# Patient Record
Sex: MALE | Race: WHITE | NOT HISPANIC OR LATINO | Employment: FULL TIME | URBAN - METROPOLITAN AREA
[De-identification: names, ages, dates, MRNs, and addresses within clinical notes are randomized per-mention and may not be internally consistent; named-entity substitution may affect disease eponyms.]

---

## 2017-01-02 DIAGNOSIS — E78.5 HYPERLIPIDEMIA LDL GOAL <130: Primary | ICD-10-CM

## 2017-01-02 RX ORDER — ATORVASTATIN CALCIUM 20 MG/1
20 TABLET, FILM COATED ORAL DAILY
Qty: 90 TABLET | Refills: 2 | Status: SHIPPED | OUTPATIENT
Start: 2017-01-02 | End: 2017-01-11

## 2017-01-02 NOTE — TELEPHONE ENCOUNTER
atorvastatin (LIPITOR) 20 MG tablet     Last Written Prescription Date: 10/11/16  Last Fill Quantity: 90, # refills: 0  Last Office Visit with [facility], [facility] or [facility] prescribing provider: 10/25/16       CHOL      215   11/9/2016  HDL       35   11/9/2016  LDL      137   11/9/2016  LDL      145   10/25/2016  TRIG      215   11/9/2016  CHOLHDLRATIO      6.3   9/25/2015

## 2017-01-03 DIAGNOSIS — Z83.3 FAMILY HISTORY OF DIABETES MELLITUS: Primary | ICD-10-CM

## 2017-01-03 RX ORDER — FENOFIBRATE 160 MG/1
160 TABLET ORAL DAILY
Qty: 90 TABLET | Refills: 2 | Status: SHIPPED | OUTPATIENT
Start: 2017-01-03 | End: 2017-01-11

## 2017-01-03 NOTE — TELEPHONE ENCOUNTER
FENOFIBRATE 160 MG       Last Written Prescription Date: 4-13-16  Last Fill Quantity: 90, # refills: 1    Last Office Visit with [facility], [facility] or [facility] prescribing provider:  4-13-16   Future Office Visit:      CHOLESTEROL   Date Value Ref Range Status   11/09/2016 215* <200 mg/dL Final     Comment:     Desirable:       <200 mg/dl     HDL CHOLESTEROL   Date Value Ref Range Status   11/09/2016 35* >39 mg/dL Final     LDL CHOLESTEROL CALCULATED   Date Value Ref Range Status   11/09/2016 137* <100 mg/dL Final     Comment:     Above desirable:  100-129 mg/dl   Borderline High:  130-159 mg/dL   High:             160-189 mg/dL   Very high:       >189 mg/dl       LDL CHOLESTEROL DIRECT   Date Value Ref Range Status   10/25/2016 145* <100 mg/dL Final     Comment:     Above desirable:  100-129 mg/dl   Borderline High:  130-159 mg/dL   High:             160-189 mg/dL   Very high:       >189 mg/dl       TRIGLYCERIDES   Date Value Ref Range Status   11/09/2016 215* <150 mg/dL Final     Comment:     Borderline high:  150-199 mg/dl   High:             200-499 mg/dl   Very high:       >499 mg/dl   Fasting specimen       CHOLESTEROL/HDL RATIO   Date Value Ref Range Status   09/25/2015 6.3* 0.0 - 5.0 Final     ALT   Date Value Ref Range Status   06/22/2016 51 0 - 70 U/L Final

## 2017-01-03 NOTE — TELEPHONE ENCOUNTER
Routing refill request to provider for review/approval because:  Labs out of range:  LDL    Routing to PCP.    Aileen-Please sign if agree.    Thank you!  DEBRA Reza, ADITIN, RN

## 2017-01-11 ENCOUNTER — OFFICE VISIT (OUTPATIENT)
Dept: FAMILY MEDICINE | Facility: CLINIC | Age: 47
End: 2017-01-11
Payer: COMMERCIAL

## 2017-01-11 VITALS
DIASTOLIC BLOOD PRESSURE: 82 MMHG | WEIGHT: 200.5 LBS | SYSTOLIC BLOOD PRESSURE: 110 MMHG | HEART RATE: 82 BPM | HEIGHT: 69 IN | OXYGEN SATURATION: 95 % | TEMPERATURE: 98.2 F | BODY MASS INDEX: 29.7 KG/M2

## 2017-01-11 DIAGNOSIS — R10.32 LEFT GROIN PAIN: ICD-10-CM

## 2017-01-11 DIAGNOSIS — M79.10 MYALGIA: Primary | ICD-10-CM

## 2017-01-11 DIAGNOSIS — E78.1 HYPERTRIGLYCERIDEMIA: ICD-10-CM

## 2017-01-11 DIAGNOSIS — Z12.5 SCREENING FOR PROSTATE CANCER: ICD-10-CM

## 2017-01-11 DIAGNOSIS — N52.9 ERECTILE DYSFUNCTION, UNSPECIFIED ERECTILE DYSFUNCTION TYPE: ICD-10-CM

## 2017-01-11 DIAGNOSIS — E78.5 HYPERLIPIDEMIA LDL GOAL <130: ICD-10-CM

## 2017-01-11 LAB
ALBUMIN SERPL-MCNC: 4.3 G/DL (ref 3.4–5)
ALP SERPL-CCNC: 58 U/L (ref 40–150)
ALT SERPL W P-5'-P-CCNC: 42 U/L (ref 0–70)
ANION GAP SERPL CALCULATED.3IONS-SCNC: 10 MMOL/L (ref 3–14)
AST SERPL W P-5'-P-CCNC: 17 U/L (ref 0–45)
BILIRUB SERPL-MCNC: 0.6 MG/DL (ref 0.2–1.3)
BUN SERPL-MCNC: 20 MG/DL (ref 7–30)
CALCIUM SERPL-MCNC: 9.7 MG/DL (ref 8.5–10.1)
CHLORIDE SERPL-SCNC: 107 MMOL/L (ref 94–109)
CK SERPL-CCNC: 90 U/L (ref 30–300)
CO2 SERPL-SCNC: 24 MMOL/L (ref 20–32)
CREAT SERPL-MCNC: 0.96 MG/DL (ref 0.66–1.25)
GFR SERPL CREATININE-BSD FRML MDRD: 85 ML/MIN/1.7M2
GLUCOSE SERPL-MCNC: 127 MG/DL (ref 70–99)
POTASSIUM SERPL-SCNC: 4.4 MMOL/L (ref 3.4–5.3)
PROT SERPL-MCNC: 7.8 G/DL (ref 6.8–8.8)
PSA SERPL-ACNC: 1.43 UG/L (ref 0–4)
SODIUM SERPL-SCNC: 141 MMOL/L (ref 133–144)

## 2017-01-11 PROCEDURE — 36415 COLL VENOUS BLD VENIPUNCTURE: CPT | Performed by: FAMILY MEDICINE

## 2017-01-11 PROCEDURE — 80053 COMPREHEN METABOLIC PANEL: CPT | Performed by: FAMILY MEDICINE

## 2017-01-11 PROCEDURE — 82550 ASSAY OF CK (CPK): CPT | Performed by: FAMILY MEDICINE

## 2017-01-11 PROCEDURE — 99214 OFFICE O/P EST MOD 30 MIN: CPT | Performed by: FAMILY MEDICINE

## 2017-01-11 PROCEDURE — G0103 PSA SCREENING: HCPCS | Performed by: FAMILY MEDICINE

## 2017-01-11 RX ORDER — ATORVASTATIN CALCIUM 20 MG/1
40 TABLET, FILM COATED ORAL DAILY
Start: 2017-01-11 | End: 2017-02-02

## 2017-01-11 NOTE — PROGRESS NOTES
Quick Note:    Your muscle enzyme, liver enzymes are fine. Lets stop fenofibrate and take 40mg of atorvastatin (2 pills). Let me know how you feel. If pain gets worse - we can follow up.  Your psa is fine.  Your glucose is high again. You have had recent confirmatory test which was fine but you are very high risk for developing diabetes it if persist. Weight loss, cut down carbohyderate, sugar, fat in diet    Layton Omalley MD  [facility]    ______

## 2017-01-11 NOTE — NURSING NOTE
"Chief Complaint   Patient presents with     Musculoskeletal Problem       Initial /82 mmHg  Pulse 82  Temp(Src) 98.2  F (36.8  C) (Oral)  Ht 5' 9.25\" (1.759 m)  Wt 200 lb 8 oz (90.946 kg)  BMI 29.39 kg/m2  SpO2 95% Estimated body mass index is 29.39 kg/(m^2) as calculated from the following:    Height as of this encounter: 5' 9.25\" (1.759 m).    Weight as of this encounter: 200 lb 8 oz (90.946 kg).  BP completed using cuff size: keagan Lechuga CMA      "

## 2017-01-11 NOTE — MR AVS SNAPSHOT
After Visit Summary   1/11/2017    Alpesh Winston    MRN: [id number]           Patient Information     Date Of Birth          1970        Visit Information        Provider Department      1/11/2017 8:40 AM Layton Omalley MD [facility]        Today's Diagnoses     Hypertriglyceridemia    -  1     Hyperlipidemia LDL goal <130         Screening for prostate cancer         Myalgia         Erectile dysfunction, unspecified erectile dysfunction type         Left groin pain            Follow-ups after your visit        Additional Services     UROLOGY ADULT REFERRAL       Your provider has referred you to: N: [facility]  [place] [phone]   [email or web address]    Please be aware that coverage of these services is subject to the terms and limitations of your health insurance plan.  Call member services at your health plan with any benefit or coverage questions.      Please bring the following with you to your appointment:    (1) Any X-Rays, CTs or MRIs which have been performed.  Contact the facility where they were done to arrange for  prior to your scheduled appointment.    (2) List of current medications  (3) This referral request   (4) Any documents/labs given to you for this referral                  Who to contact     If you have questions or need follow up information about today's clinic visit or your schedule please contact [facility]note 2[facility] directly at [phone].  Normal or non-critical lab and imaging results will be communicated to you by MyChart, letter or phone within 4 business days after the clinic has received the results. If you do not hear from us within 7 days, please contact the clinic through MyChart or phone. If you have a critical or abnormal lab result, we will notify you by phone as soon as possible.  Submit refill requests through [employer] or call your pharmacy and they will forward the refill request to us.  "Please allow 3 business days for your refill to be completed.          Additional Information About Your Visit        MyChart Information     [employer] gives you secure access to your electronic health record. If you see a primary care provider, you can also send messages to your care team and make appointments. If you have questions, please call your primary care clinic.  If you do not have a primary care provider, please call [phone] and they will assist you.        Care EveryWhere ID     This is your Care EveryWhere ID. This could be used by other organizations to access your [place] medical records  [id number]        Your Vitals Were     Pulse Temperature Height BMI (Body Mass Index) Pulse Oximetry       82 98.2  F (36.8  C) (Oral) 5' 9.25\" (1.759 m) 29.39 kg/m2 95%        Blood Pressure from Last 3 Encounters:   01/11/17 110/82   10/27/16 130/97   10/25/16 132/87    Weight from Last 3 Encounters:   01/11/17 200 lb 8 oz (90.946 kg)   10/27/16 202 lb 4 oz (91.74 kg)   10/25/16 203 lb (92.08 kg)              We Performed the Following     CK total     Comprehensive metabolic panel     PSA, screen     UROLOGY ADULT REFERRAL        Primary Care Provider Office Phone # Fax #    Belem Joshua [phone]       [facility] [address]  [facility] [address]        Thank you!     Thank you for choosing [facility] [place]  for your care. Our goal is always to provide you with excellent care. Hearing back from our patients is one way we can continue to improve our services. Please take a few minutes to complete the written survey that you may receive in the mail after your visit with us. Thank you!             Your Updated Medication List - Protect others around you: Learn how to safely use, store and throw away your medicines at www.disposemymeds.org.          This list is accurate as of: 1/11/17  9:26 AM.  Always use your most recent med list.                   Brand " Name Dispense Instructions for use    albuterol 108 (90 BASE) MCG/ACT Inhaler    PROAIR HFA/PROVENTIL HFA/VENTOLIN HFA    1 Inhaler    Inhale 2 puffs into the lungs every 4 hours as needed for shortness of breath / dyspnea or wheezing       atorvastatin 20 MG tablet    LIPITOR    90 tablet    Take 1 tablet (20 mg) by mouth daily       COENZYME Q-10 PO      Take 20 mg by mouth daily       fenofibrate 160 MG tablet     90 tablet    Take 1 tablet (160 mg) by mouth daily       IBUPROFEN PO      Take 800 mg by mouth every 6 hours       multivitamin, therapeutic Tabs tablet      Take 1 tablet by mouth daily

## 2017-01-11 NOTE — PROGRESS NOTES
SUBJECTIVE:                                                    Alpesh Winston is a 47 year old male who presents to clinic today for the following health issues:    Musculoskeletal problem/pain      Duration: 6 months     Description  Location: both shoulders usually hurts more morning     Intensity:  mild, moderate    Accompanying signs and symptoms: dull ache and sore when moving     History  Previous similar problem: YES- both hip pain similar   Previous evaluation:  none    Precipitating or alleviating factors:  Trauma or overuse: no   Aggravating factors include: none    Therapies tried and outcome: heat and co q 10    Additional: pt would like to check groin area. Some pain, if time allows. He understands if not    Hip pain - better with co q 10.   Shoulder pain - left and then right shoulder. Felt similar to hip pain. Same spots on both sides.     Father had prostate cancer at age 58.     Had hernia surgery. Yesterday some pain. Similar pain before surgery. Some symptoms of ED.   Been to urology 2 yrs ago for similar issue. cialis did not help much.     Problem list and histories reviewed & adjusted, as indicated.  Additional history: as documented    Problem list, Medication list, Allergies, and Medical/Social/Surgical histories reviewed in [facility] and updated as appropriate.    Social History     Social History     Marital Status:      Spouse Name: N/A     Number of Children: N/A     Years of Education: N/A     Occupational History      [employer]     Social History Main Topics     Smoking status: Never Smoker      Smokeless tobacco: Current User     Types: Chew      Comment: chewing less     Alcohol Use: 0.0 oz/week     0 Standard drinks or equivalent per week      Comment: 6-8 drinks a week      Drug Use: No     Sexual Activity:     Partners: Female     Other Topics Concern     Parent/Sibling W/ Cabg, Mi Or Angioplasty Before 65f 55m? No     Social History Narrative     "Dairy/d 1 servings/d.     Caffeine 3 20 oz bottles  servings/d    Exercise 2 x week    Sunscreen used - NA    Seatbelts used - Yes    Working smoke/CO detectors in the home - Yes    Guns stored in the home - Yes    Self Breast Exams - NA    Self Testicular Exam - No    Eye Exam up to date - No    Dental Exam up to date - No    Pap Smear up to date - NA    Mammogram up to date - NA    PSA up to date - NA    Dexa Scan up to date - NA    Flex Sig / Colonoscopy up to date - NA    Immunizations up to date - No-1991    Abuse: Current or Past(Physical, Sexual or Emotional)- No    Do you feel safe in your environment - Yes                 Allergies   Allergen Reactions     Omnicef Hives     Sulfa Drugs Rash     Patient Active Problem List   Diagnosis     ALLERGIC RHINITIS      HYPERLIPIDEMIA LDL GOAL <130     Family history of diabetes mellitus     Hypertriglyceridemia     Family history of coronary artery disease     Mass     Chews tobacco     Gastroesophageal reflux disease without esophagitis     Right knee pain     Tibia/fibula fracture     Reviewed medications, social history and  past medical and surgical history.    Review of system: for general, respiratory, CVS, GI and psychiatry negative except for noted above.     EXAM:  /82 mmHg  Pulse 82  Temp(Src) 98.2  F (36.8  C) (Oral)  Ht 5' 9.25\" (1.759 m)  Wt 200 lb 8 oz (90.946 kg)  BMI 29.39 kg/m2  SpO2 95%  Constitutional: healthy, alert and no distress   Psychiatric: mentation appears normal and affect normal/bright  Joint - both shoulder on deltoid region tender spot. ROM of shoulder not restricted.     ASSESSMENT / PLAN:   (M79.1) Myalgia  (primary encounter diagnosis)  Comment: I am not entirely sure if it is statin myopathy but had similar issue in the past (bilateral hip pain) and improved with coq 10. He is on both statin and fenofibrate. If CK is fine, go up on the dose of statin and stop fenofibrate and see if it improves his pain and also it will " improve compliance. He agreed. We talked about possible tendinosis.   Plan: CK total              (E78.1) Hypertriglyceridemia  Comment: see above.   Plan:      (E78.5) Hyperlipidemia LDL goal <130  Comment:  See above.    Plan: Comprehensive metabolic panel, atorvastatin         (LIPITOR) 20 MG tablet             (R10.30) Left groin pain  Comment:  Persistent even after hernia surgery. Been to urologist in the past. Offered follow up. Discussed could be inguinal ligament strain too. Had scrotal US and it was negative except for benign appearing cyst.   Plan: UROLOGY ADULT REFERRAL             (N52.9) Erectile dysfunction, unspecified erectile dysfunction type  Comment:  Used cialis in the past - did not help. Follow up with urology.   Plan: UROLOGY ADULT REFERRAL             (Z12.5) Screening for prostate cancer  Comment:  discussed too young and also usptsf recommendations on PSA . Father had prostate cancer in 50s. Will obtain screen.   Plan: PSA, screen

## 2017-01-12 ENCOUNTER — MYC MEDICAL ADVICE (OUTPATIENT)
Dept: FAMILY MEDICINE | Facility: CLINIC | Age: 47
End: 2017-01-12

## 2017-01-12 NOTE — TELEPHONE ENCOUNTER
Writer responded as per below.    Routing to Dr. Omalley.    Dr. Omalley-Please review and advise.    Thank you!  ADITI GomezN, RN

## 2017-01-30 ENCOUNTER — OFFICE VISIT (OUTPATIENT)
Dept: FAMILY MEDICINE | Facility: CLINIC | Age: 47
End: 2017-01-30
Payer: COMMERCIAL

## 2017-01-30 VITALS
TEMPERATURE: 97.7 F | OXYGEN SATURATION: 97 % | DIASTOLIC BLOOD PRESSURE: 88 MMHG | SYSTOLIC BLOOD PRESSURE: 139 MMHG | HEIGHT: 70 IN | BODY MASS INDEX: 28.77 KG/M2 | WEIGHT: 201 LBS | HEART RATE: 67 BPM

## 2017-01-30 DIAGNOSIS — J01.90 ACUTE SINUSITIS WITH COEXISTING CONDITION REQUIRING PROPHYLACTIC TREATMENT: Primary | ICD-10-CM

## 2017-01-30 PROCEDURE — 99213 OFFICE O/P EST LOW 20 MIN: CPT | Performed by: INTERNAL MEDICINE

## 2017-01-30 RX ORDER — FLUTICASONE PROPIONATE 50 MCG
1-2 SPRAY, SUSPENSION (ML) NASAL DAILY
Qty: 1 BOTTLE | Refills: 11 | Status: SHIPPED | OUTPATIENT
Start: 2017-01-30 | End: 2018-04-20

## 2017-01-30 NOTE — PROGRESS NOTES
"SUBJECTIVE:   Alpesh Winston is a 47 year old male presenting with a chief complaint of   Chief Complaint   Patient presents with     Respiratory Problems     Pt in clinic to have eval for congestion, fatigue, cough,, dizziness, sinus pressure, and sore throat for 6 days.     Pharyngitis     Fatigue     Dizziness       Onset of symptoms was 6 day(s) ago.  Course of illness is worsening.    Dizzy like room spinning for few days  Current and Associated symptoms: fever, nasal congestion, cough , sore throat, facial pain/pressure and fatigue  Eye discharge  Gum/tooth pain  Ear crackling  Treatment measures tried include otc cold combo med, netti pot  .  Predisposing factors include None.    Past Medical History   Diagnosis Date     Other and unspecified hyperlipidemia      Allergic rhinitis due to other allergen      Hyperlipidaemia      Lipoma of other skin and subcutaneous tissue 5/2012     Chest wall     History of chest pain      TO ER - EVALULATED, STRESS TEST ETC, ALL NEGATIVE     Current Outpatient Prescriptions   Medication Sig Dispense Refill     FENOFIBRATE PO        atorvastatin (LIPITOR) 20 MG tablet Take 2 tablets (40 mg) by mouth daily       albuterol (PROAIR HFA, PROVENTIL HFA, VENTOLIN HFA) 108 (90 BASE) MCG/ACT inhaler Inhale 2 puffs into the lungs every 4 hours as needed for shortness of breath / dyspnea or wheezing 1 Inhaler 0     IBUPROFEN PO Take 800 mg by mouth every 6 hours       COENZYME Q-10 PO Take 20 mg by mouth daily       multivitamin, therapeutic (THERA-VIT) TABS Take 1 tablet by mouth daily       Social History   Substance Use Topics     Smoking status: Never Smoker      Smokeless tobacco: Current User     Types: Chew      Comment: chewing less     Alcohol Use: 0.0 oz/week     0 Standard drinks or equivalent per week      Comment: 6-8 drinks a week        OBJECTIVE  :/88 mmHg  Pulse 67  Temp(Src) 97.7  F (36.5  C) (Tympanic)  Ht 5' 10\" (1.778 m)  Wt 201 lb (91.173 kg)  BMI " 28.84 kg/m2  SpO2 97%  GENERAL APPEARANCE: healthy, alert and no distress  HENT: TM's normal bilaterally, oral mucous membranes moist, no erythema noted, frontal sinus tenderness , maxillary sinus tenderness  and white pnd  NECK: supple, nontender, no lymphadenopathy  RESP: lungs clear to auscultation - no rales, rhonchi or wheezes  CV: regular rates and rhythm, normal S1 S2, no murmur noted    ASSESSMENT:   (J01.90) Acute sinusitis with coexisting condition requiring prophylactic treatment  (primary encounter diagnosis)  Comment:   Plan: fluticasone (FLONASE) 50 MCG/ACT spray,         amoxicillin-clavulanate (AUGMENTIN) 875-125 MG         per tablet             Patient Instructions   Fluids  No antihistamine  flonase  vicks  Menthol cough drop  Honey  netti pott    If not improved , fill A prescription for  augmentin antibiotics

## 2017-01-30 NOTE — PATIENT INSTRUCTIONS
Fluids  No antihistamine  flonase  vicks  Menthol cough drop  Honey  netti pott    If not improved , fill A prescription for  augmentin antibiotics

## 2017-01-30 NOTE — MR AVS SNAPSHOT
After Visit Summary   1/30/2017    Alpesh Winston    MRN: [id number]           Patient Information     Date Of Birth          1970        Visit Information        Provider Department      1/30/2017 4:30 PM Leatha Dow MD [facility]        Today's Diagnoses     Acute sinusitis with coexisting condition requiring prophylactic treatment    -  1       Care Instructions    Fluids  No antihistamine  flonase  vicks  Menthol cough drop  Honey  netti pott    If not improved         Follow-ups after your visit        Who to contact     If you have questions or need follow up information about today's clinic visit or your schedule please contact [facility] directly at [phone].  Normal or non-critical lab and imaging results will be communicated to you by [employer], letter or phone within 4 business days after the clinic has received the results. If you do not hear from us within 7 days, please contact the clinic through [employer] or phone. If you have a critical or abnormal lab result, we will notify you by phone as soon as possible.  Submit refill requests through [employer] or call your pharmacy and they will forward the refill request to us. Please allow 3 business days for your refill to be completed.          Additional Information About Your Visit        MyChart Information     [employer] gives you secure access to your electronic health record. If you see a primary care provider, you can also send messages to your care team and make appointments. If you have questions, please call your primary care clinic.  If you do not have a primary care provider, please call [phone] and they will assist you.        Care EveryWhere ID     This is your Care EveryWhere ID. This could be used by other organizations to access your [place] medical records  [id number]        Your Vitals Were     Pulse Temperature Height BMI (Body Mass Index) Pulse Oximetry        "67 97.7  F (36.5  C) (Tympanic) 5' 10\" (1.778 m) 28.84 kg/m2 97%        Blood Pressure from Last 3 Encounters:   01/30/17 139/88   01/11/17 110/82   10/27/16 130/97    Weight from Last 3 Encounters:   01/30/17 201 lb (91.173 kg)   01/11/17 200 lb 8 oz (90.946 kg)   10/27/16 202 lb 4 oz (91.74 kg)              Today, you had the following     No orders found for display         Today's Medication Changes          These changes are accurate as of: 1/30/17  5:04 PM.  If you have any questions, ask your nurse or doctor.               Start taking these medicines.        Dose/Directions    amoxicillin-clavulanate 875-125 MG per tablet   Commonly known as:  AUGMENTIN   Used for:  Acute sinusitis with coexisting condition requiring prophylactic treatment   Started by:  Leatha Dow MD        Dose:  1 tablet   Take 1 tablet by mouth 2 times daily   Quantity:  20 tablet   Refills:  0       fluticasone 50 MCG/ACT spray   Commonly known as:  FLONASE   Used for:  Acute sinusitis with coexisting condition requiring prophylactic treatment   Started by:  Leatha Dow MD        Dose:  1-2 spray   Spray 1-2 sprays into both nostrils daily   Quantity:  1 Bottle   Refills:  11            Where to get your medicines      These medications were sent to [id number] - [place], [place] - [address] AT [facility] [address]  [address], [place] [address]    Hours:  24-hours Phone:  [phone]    - fluticasone 50 MCG/ACT spray      Some of these will need a paper prescription and others can be bought over the counter.  Ask your nurse if you have questions.     Bring a paper prescription for each of these medications    - amoxicillin-clavulanate 875-125 MG per tablet             Primary Care Provider Office Phone # Fax #    Belem Joshua [phone]       [facility] [address] [facility] [address]        Thank you!     Thank you for " choosing [facility]  for your care. Our goal is always to provide you with excellent care. Hearing back from our patients is one way we can continue to improve our services. Please take a few minutes to complete the written survey that you may receive in the mail after your visit with us. Thank you!             Your Updated Medication List - Protect others around you: Learn how to safely use, store and throw away your medicines at www.disposemymeds.org.          This list is accurate as of: 1/30/17  5:04 PM.  Always use your most recent med list.                   Brand Name Dispense Instructions for use    albuterol 108 (90 BASE) MCG/ACT Inhaler    PROAIR HFA/PROVENTIL HFA/VENTOLIN HFA    1 Inhaler    Inhale 2 puffs into the lungs every 4 hours as needed for shortness of breath / dyspnea or wheezing       amoxicillin-clavulanate 875-125 MG per tablet    AUGMENTIN    20 tablet    Take 1 tablet by mouth 2 times daily       atorvastatin 20 MG tablet    LIPITOR     Take 2 tablets (40 mg) by mouth daily       COENZYME Q-10 PO      Take 20 mg by mouth daily       FENOFIBRATE PO          fluticasone 50 MCG/ACT spray    FLONASE    1 Bottle    Spray 1-2 sprays into both nostrils daily       IBUPROFEN PO      Take 800 mg by mouth every 6 hours       multivitamin, therapeutic Tabs tablet      Take 1 tablet by mouth daily

## 2017-01-30 NOTE — NURSING NOTE
"Chief Complaint   Patient presents with     Respiratory Problems     Pt in clinic to have eval for congestion, fatigue, cough,, dizziness, sinus pressure, and sore throat for 6 days.     Pharyngitis     Fatigue     Dizziness       Initial /88 mmHg  Pulse 67  Temp(Src) 97.7  F (36.5  C) (Tympanic)  Ht 1.778 m (5' 10\")  Wt 91.173 kg (201 lb)  BMI 28.84 kg/m2  SpO2 97% Estimated body mass index is 28.84 kg/(m^2) as calculated from the following:    Height as of this encounter: 1.778 m (5' 10\").    Weight as of this encounter: 91.173 kg (201 lb).  BP completed using cuff size: keagan Patel/ MA    "

## 2017-02-02 DIAGNOSIS — E78.5 HYPERLIPIDEMIA LDL GOAL <130: Primary | ICD-10-CM

## 2017-02-02 RX ORDER — ATORVASTATIN CALCIUM 20 MG/1
40 TABLET, FILM COATED ORAL DAILY
Qty: 120 TABLET | Refills: 1 | Status: SHIPPED | OUTPATIENT
Start: 2017-02-02 | End: 2017-07-26

## 2017-02-02 NOTE — TELEPHONE ENCOUNTER
ATORVASTATIN 20 MG     Last Written Prescription Date: NO REFILL INFO  Last Fill Quantity:  0, # refills: 0  Last Office Visit with G, P or [facility] prescribing provider: 1-30-17       CHOL      215   11/9/2016  HDL       35   11/9/2016  LDL      137   11/9/2016  LDL      145   10/25/2016  TRIG      215   11/9/2016  CHOLHDLRATIO      6.3   9/25/2015

## 2017-02-02 NOTE — TELEPHONE ENCOUNTER
Fenofibrate was d/c'd.  Notes Recorded by Latyon Omalley MD on 1/11/2017 at 2:56 PM  Your muscle enzyme, liver enzymes are fine. Lets stop fenofibrate and take 40mg of atorvastatin (2 pills). Let me know how you feel. If pain gets worse - we can follow up.  Your psa is fine.  Your glucose is high again. You have had recent confirmatory test which was fine but you are very high risk for developing diabetes it if persist. Weight loss, cut down carbohyderate, sugar, fat in diet    Layton Omalley MD  [facility]    Susan Price RN

## 2017-02-02 NOTE — TELEPHONE ENCOUNTER
FENOFIBRATE 160 MG       Last Written Prescription Date: NOT ON CURRENT MED LIST.  Last Fill Quantity: 0, # refills: 0    Last Office Visit with [facility], [facility] or [facility] prescribing provider:  1-30-17   Future Office Visit:      CHOLESTEROL   Date Value Ref Range Status   11/09/2016 215* <200 mg/dL Final     Comment:     Desirable:       <200 mg/dl     HDL CHOLESTEROL   Date Value Ref Range Status   11/09/2016 35* >39 mg/dL Final     LDL CHOLESTEROL CALCULATED   Date Value Ref Range Status   11/09/2016 137* <100 mg/dL Final     Comment:     Above desirable:  100-129 mg/dl   Borderline High:  130-159 mg/dL   High:             160-189 mg/dL   Very high:       >189 mg/dl       LDL CHOLESTEROL DIRECT   Date Value Ref Range Status   10/25/2016 145* <100 mg/dL Final     Comment:     Above desirable:  100-129 mg/dl   Borderline High:  130-159 mg/dL   High:             160-189 mg/dL   Very high:       >189 mg/dl       TRIGLYCERIDES   Date Value Ref Range Status   11/09/2016 215* <150 mg/dL Final     Comment:     Borderline high:  150-199 mg/dl   High:             200-499 mg/dl   Very high:       >499 mg/dl   Fasting specimen       CHOLESTEROL/HDL RATIO   Date Value Ref Range Status   09/25/2015 6.3* 0.0 - 5.0 Final     ALT   Date Value Ref Range Status   01/11/2017 42 0 - 70 U/L Final

## 2017-02-07 ENCOUNTER — MYC MEDICAL ADVICE (OUTPATIENT)
Dept: FAMILY MEDICINE | Facility: CLINIC | Age: 47
End: 2017-02-07

## 2017-02-07 DIAGNOSIS — E78.5 HYPERLIPIDEMIA LDL GOAL <130: Primary | ICD-10-CM

## 2017-02-09 RX ORDER — FENOFIBRATE 160 MG/1
160 TABLET ORAL DAILY
Qty: 90 TABLET | Refills: 0 | Status: SHIPPED | OUTPATIENT
Start: 2017-02-09 | End: 2017-06-15

## 2017-02-09 NOTE — TELEPHONE ENCOUNTER
Fenofibrate 160 mg daily and pharmacy pended.    Writer unable to authorize as medication listed as historical.    Routing to covering providers.    Please review and advise/sign if agree.    Thank you!  ADITI GomezN, RN

## 2017-03-15 ENCOUNTER — TRANSFERRED RECORDS (OUTPATIENT)
Dept: HEALTH INFORMATION MANAGEMENT | Facility: CLINIC | Age: 47
End: 2017-03-15

## 2017-06-15 DIAGNOSIS — E78.5 HYPERLIPIDEMIA LDL GOAL <130: ICD-10-CM

## 2017-06-15 NOTE — TELEPHONE ENCOUNTER
fenofibrate 160 MG tablet       Last Written Prescription Date: 2/9/17  Last Fill Quantity: 90, # refills: 0    Last Office Visit with [facility], [facility] or [facility] prescribing provider:  1/11/17   Future Office Visit:      Cholesterol   Date Value Ref Range Status   11/09/2016 215 (H) <200 mg/dL Final     Comment:     Desirable:       <200 mg/dl     HDL Cholesterol   Date Value Ref Range Status   11/09/2016 35 (L) >39 mg/dL Final     LDL Cholesterol Calculated   Date Value Ref Range Status   11/09/2016 137 (H) <100 mg/dL Final     Comment:     Above desirable:  100-129 mg/dl   Borderline High:  130-159 mg/dL   High:             160-189 mg/dL   Very high:       >189 mg/dl       Triglycerides   Date Value Ref Range Status   11/09/2016 215 (H) <150 mg/dL Final     Comment:     Borderline high:  150-199 mg/dl   High:             200-499 mg/dl   Very high:       >499 mg/dl   Fasting specimen       Cholesterol/HDL Ratio   Date Value Ref Range Status   09/25/2015 6.3 (H) 0.0 - 5.0 Final     ALT   Date Value Ref Range Status   01/11/2017 42 0 - 70 U/L Final

## 2017-06-16 RX ORDER — FENOFIBRATE 160 MG/1
TABLET ORAL
Qty: 30 TABLET | Refills: 0 | Status: SHIPPED | OUTPATIENT
Start: 2017-06-16 | End: 2017-07-26

## 2017-06-16 NOTE — TELEPHONE ENCOUNTER
Pt is due for appt to discuss fenofibrate use and also high risk of diabetes. This noted on refill for pt reminder. Refilled for 30 days only.     Cornelia Dutton RN

## 2017-07-26 DIAGNOSIS — E78.5 HYPERLIPIDEMIA LDL GOAL <130: ICD-10-CM

## 2017-07-27 RX ORDER — ATORVASTATIN CALCIUM 20 MG/1
TABLET, FILM COATED ORAL
Qty: 180 TABLET | Refills: 0 | Status: SHIPPED | OUTPATIENT
Start: 2017-07-27 | End: 2017-09-27

## 2017-07-27 RX ORDER — FENOFIBRATE 160 MG/1
TABLET ORAL
Qty: 90 TABLET | Refills: 0 | Status: SHIPPED | OUTPATIENT
Start: 2017-07-27 | End: 2017-09-27

## 2017-07-27 NOTE — TELEPHONE ENCOUNTER
atorvastatin (LIPITOR) 20 MG tablet     Last Written Prescription Date: 2/2/2017  Last Fill Quantity: 120, # refills: 1  Last Office Visit with [facility], [facility] or [facility] prescribing provider: 1/30/2017       Lab Results   Component Value Date    CHOL 215 11/09/2016     Lab Results   Component Value Date    HDL 35 11/09/2016     Lab Results   Component Value Date     11/09/2016     Lab Results   Component Value Date    TRIG 215 11/09/2016     Lab Results   Component Value Date    CHOLHDLRATIO 6.3 09/25/2015       Prescription approved per [facility] Refill Protocol.    Signed Prescriptions:                        Disp   Refills    atorvastatin (LIPITOR) 20 MG tablet        180 ta*0        Sig: TAKE 2 TABLETS DAILY  Authorizing Provider: PHOENIX [place]IN  Ordering User: ИВАН BRANCH      Closing encounter - no further actions needed at this time    Иван Branch RN

## 2017-07-27 NOTE — TELEPHONE ENCOUNTER
Routing refill request to provider for review/approval because:  Labs out of range:  LDL    Dr. Omalley-Please sign if agree.    Thank you!  DEBRA Reza, ADITIN, RN

## 2017-07-27 NOTE — TELEPHONE ENCOUNTER
fenofibrate 160 MG tablet       Last Written Prescription Date: 6/16/17  Last Fill Quantity: 30, # refills: 0    Last Office Visit with [facility] [facility] or [facility] prescribing provider:  1/30/17   Future Office Visit:      Cholesterol   Date Value Ref Range Status   11/09/2016 215 (H) <200 mg/dL Final     Comment:     Desirable:       <200 mg/dl     HDL Cholesterol   Date Value Ref Range Status   11/09/2016 35 (L) >39 mg/dL Final     LDL Cholesterol Calculated   Date Value Ref Range Status   11/09/2016 137 (H) <100 mg/dL Final     Comment:     Above desirable:  100-129 mg/dl   Borderline High:  130-159 mg/dL   High:             160-189 mg/dL   Very high:       >189 mg/dl       Triglycerides   Date Value Ref Range Status   11/09/2016 215 (H) <150 mg/dL Final     Comment:     Borderline high:  150-199 mg/dl   High:             200-499 mg/dl   Very high:       >499 mg/dl   Fasting specimen       Cholesterol/HDL Ratio   Date Value Ref Range Status   09/25/2015 6.3 (H) 0.0 - 5.0 Final     ALT   Date Value Ref Range Status   01/11/2017 42 0 - 70 U/L Final

## 2017-09-15 ENCOUNTER — OFFICE VISIT (OUTPATIENT)
Dept: FAMILY MEDICINE | Facility: CLINIC | Age: 47
End: 2017-09-15
Payer: COMMERCIAL

## 2017-09-15 VITALS
TEMPERATURE: 97.3 F | DIASTOLIC BLOOD PRESSURE: 91 MMHG | HEART RATE: 75 BPM | BODY MASS INDEX: 29.07 KG/M2 | SYSTOLIC BLOOD PRESSURE: 132 MMHG | WEIGHT: 202.6 LBS | OXYGEN SATURATION: 97 % | RESPIRATION RATE: 18 BRPM

## 2017-09-15 DIAGNOSIS — N30.90 BLADDER INFECTION: Primary | ICD-10-CM

## 2017-09-15 PROCEDURE — 99213 OFFICE O/P EST LOW 20 MIN: CPT | Performed by: FAMILY MEDICINE

## 2017-09-15 RX ORDER — DOXYCYCLINE 100 MG/1
100 CAPSULE ORAL 2 TIMES DAILY
Qty: 14 CAPSULE | Refills: 0 | Status: SHIPPED | OUTPATIENT
Start: 2017-09-15 | End: 2017-09-27

## 2017-09-15 NOTE — MR AVS SNAPSHOT
After Visit Summary   9/15/2017    Alpesh Winston    MRN: [id number]           Patient Information     Date Of Birth          1970        Visit Information        Provider Department      9/15/2017 4:15 PM Ander Helm MD [facility]        Today's Diagnoses     Bladder infection    -  1       Follow-ups after your visit        Who to contact     If you have questions or need follow up information about today's clinic visit or your schedule please contact [facility] directly at [phone].  Normal or non-critical lab and imaging results will be communicated to you by Intaccthart, letter or phone within 4 business days after the clinic has received the results. If you do not hear from us within 7 days, please contact the clinic through UrbanBuzt or phone. If you have a critical or abnormal lab result, we will notify you by phone as soon as possible.  Submit refill requests through [employer] or call your pharmacy and they will forward the refill request to us. Please allow 3 business days for your refill to be completed.          Additional Information About Your Visit        MyChart Information     [employer] gives you secure access to your electronic health record. If you see a primary care provider, you can also send messages to your care team and make appointments. If you have questions, please call your primary care clinic.  If you do not have a primary care provider, please call [phone] and they will assist you.        Care EveryWhere ID     This is your Care EveryWhere ID. This could be used by other organizations to access your [place] medical records  [id number]        Your Vitals Were     Pulse Temperature Respirations Pulse Oximetry BMI (Body Mass Index)       75 97.3  F (36.3  C) (Oral) 18 97% 29.07 kg/m2        Blood Pressure from Last 3 Encounters:   09/15/17 (!) 132/91   01/30/17 139/88   01/11/17 110/82    Weight from Last  3 Encounters:   09/15/17 202 lb 9.6 oz (91.9 kg)   01/30/17 201 lb (91.2 kg)   01/11/17 200 lb 8 oz (90.9 kg)              Today, you had the following     No orders found for display         Today's Medication Changes          These changes are accurate as of: 9/15/17  5:41 PM.  If you have any questions, ask your nurse or doctor.               Start taking these medicines.        Dose/Directions    doxycycline 100 MG capsule   Commonly known as:  VIBRAMYCIN   Used for:  Bladder infection   Started by:  Ander Helm MD        Dose:  100 mg   Take 1 capsule (100 mg) by mouth 2 times daily   Quantity:  14 capsule   Refills:  0            Where to get your medicines      These medications were sent to [facility] - [place], [place] - [address]  [address], [place] [address]     Phone:  [phone]     doxycycline 100 MG capsule                Primary Care Provider Office Phone # Fax #    Belem Joshua [phone]       [address] [facility] [address]        Equal Access to Services     ALANA ROGERS : Hadii claudia ku hadasho Soomaali, waaxda luqadaha, qaybta kaalmada adeegyada, daisha koo . So [facility] [phone].    ATENCIÓN: Si habla español, tiene a casper disposición servicios gratuitos de asistencia lingüística. Llame al [phone].    We comply with applicable federal civil rights laws and [place] laws. We do not discriminate on the basis of race, color, national origin, age, disability sex, sexual orientation or gender identity.            Thank you!     Thank you for choosing [facility]  for your care. Our goal is always to provide you with excellent care. Hearing back from our patients is one way we can continue to improve our services. Please take a few minutes to complete the written survey that you may receive in the mail after your visit with us. Thank you!             Your Updated  Medication List - Protect others around you: Learn how to safely use, store and throw away your medicines at www.disposemymeds.org.          This list is accurate as of: 9/15/17  5:41 PM.  Always use your most recent med list.                   Brand Name Dispense Instructions for use Diagnosis    albuterol 108 (90 BASE) MCG/ACT Inhaler    PROAIR HFA/PROVENTIL HFA/VENTOLIN HFA    1 Inhaler    Inhale 2 puffs into the lungs every 4 hours as needed for shortness of breath / dyspnea or wheezing    Acute bronchitis with symptoms > 10 days       amoxicillin-clavulanate 875-125 MG per tablet    AUGMENTIN    20 tablet    Take 1 tablet by mouth 2 times daily    Acute sinusitis with coexisting condition requiring prophylactic treatment       atorvastatin 20 MG tablet    LIPITOR    180 tablet    TAKE 2 TABLETS DAILY    Hyperlipidemia LDL goal <130       COENZYME Q-10 PO      Take 20 mg by mouth daily        doxycycline 100 MG capsule    VIBRAMYCIN    14 capsule    Take 1 capsule (100 mg) by mouth 2 times daily    Bladder infection       * FENOFIBRATE PO           * fenofibrate 160 MG tablet     90 tablet    TAKE 1 TABLET DAILY (DUE FOR APPOINTMENT NOW)    Hyperlipidemia LDL goal <130       fluticasone 50 MCG/ACT spray    FLONASE    1 Bottle    Spray 1-2 sprays into both nostrils daily    Acute sinusitis with coexisting condition requiring prophylactic treatment       IBUPROFEN PO      Take 800 mg by mouth every 6 hours        multivitamin, therapeutic Tabs tablet      Take 1 tablet by mouth daily        * Notice:  This list has 2 medication(s) that are the same as other medications prescribed for you. Read the directions carefully, and ask your doctor or other care provider to review them with you.

## 2017-09-15 NOTE — NURSING NOTE
"Chief Complaint   Patient presents with     Hernia       Initial BP (!) 132/91  Pulse 75  Temp 97.3  F (36.3  C) (Oral)  Resp 18  Wt 202 lb 9.6 oz (91.9 kg)  SpO2 97%  BMI 29.07 kg/m2 Estimated body mass index is 29.07 kg/(m^2) as calculated from the following:    Height as of 1/30/17: 5' 10\" (1.778 m).    Weight as of this encounter: 202 lb 9.6 oz (91.9 kg).  Medication Reconciliation: complete     Niki Carrillo MA    "

## 2017-09-15 NOTE — PROGRESS NOTES
SUBJECTIVE:   Alpesh Winston is a 47 year old male who presents to clinic today for the following health issues:        Hernia       Duration: 10 days    Description (location/character/radiation): Lower stomach area.    Intensity:  mild    Accompanying signs and symptoms: Pt dylan pain is in the same area where he had a past hernia surgery.     History (similar episodes/previous evaluation): None    Precipitating or alleviating factors: None    Therapies tried and outcome: None     He had bilateral hernia repair last year. Had previously had the left side repaired 10-15 years prior to that.  Had symptoms that prompted the recent hernia repair. Symptoms were severe pain type issue. It hurt instantly.    It is a different location of the pain at this time.    Always feels like he has to urinate.    Feels abnormal recently when he urinates. His dad had prostate cancer. In his late 50's.    Patient just urinated, does not feel he can produce a urine sample at this time.    EXAM:  BP (!) 132/91  Pulse 75  Temp 97.3  F (36.3  C) (Oral)  Resp 18  Wt 202 lb 9.6 oz (91.9 kg)  SpO2 97%  BMI 29.07 kg/m2  Constitutional: Healthy, alert, no distress   Cardiovascular: RRR. No murmurs   Respiratory: Clear to auscultation.  Gastrointestinal: Discomfort with palpation in suprapubic region, no flank pain.    ASSESSMENT    ICD-10-CM    1. Bladder infection N30.90 doxycycline (VIBRAMYCIN) 100 MG capsule      Plan:  Anticipate good response to treatment for UTI.  If not resolving should follow-up.  I believe it to be reasonable to treat for UTI based on symptoms and exam. If not resolving, will re-evaluate.    Ander Newton MD  Family Medicine Physician

## 2017-09-26 ENCOUNTER — TELEPHONE (OUTPATIENT)
Dept: FAMILY MEDICINE | Facility: CLINIC | Age: 47
End: 2017-09-26

## 2017-09-26 DIAGNOSIS — Z12.5 SCREENING FOR PROSTATE CANCER: Primary | ICD-10-CM

## 2017-09-26 NOTE — TELEPHONE ENCOUNTER
Pt called to check on the status. Relayed message that CHARU would prefer Dr. Sen Newton to review this refill request.   Advised that we have routed the message to him and is waiting for his approval. Thanks!

## 2017-09-26 NOTE — TELEPHONE ENCOUNTER
I actually have not seen him in over 3-1/2 years, so I would prefer if Dr. Champagne could review since he saw him for this.

## 2017-09-26 NOTE — TELEPHONE ENCOUNTER
CHARU review:  Patient saw DYAN on 9/15 and was given this antibiotic(doxycycline)  for Bladder infection.   He finished it yesterday and still reports sx of discomfort in lower pelvis and urgency. No fever or chills.  Sx are slightly better but it never really cleared up.    Are you willing to continue another round of Antibiotic?  Would you like DYAN to review since he saw him?   Radha Feng RN

## 2017-09-26 NOTE — TELEPHONE ENCOUNTER
Reason for Call:  Medication or medication refill:    Do you use a [facility]?  Name of the pharmacy and phone number for the current request:  [id number] - [place], [place] - [address] AT [facility] [address]    Name of the medication requested: doxycycline (VIBRAMYCIN) 100 MG capsule    Other request: Patient is requesting for a refill, states he still has some symptoms. Please advise, thank you!    Can we leave a detailed message on this number? YES    Phone number patient can be reached at: Cell number on file:    Telephone Information:   Mobile [phone]       Best Time: anytime    Call taken on 9/26/2017 at 8:33 AM by Mitzy Watson

## 2017-09-26 NOTE — TELEPHONE ENCOUNTER
Per my last note - I recommend follow-up if he is not resolved/improved.    Ander Newton MD  Family Medicine Physician

## 2017-09-27 ENCOUNTER — OFFICE VISIT (OUTPATIENT)
Dept: FAMILY MEDICINE | Facility: CLINIC | Age: 47
End: 2017-09-27
Payer: COMMERCIAL

## 2017-09-27 VITALS
TEMPERATURE: 98 F | HEART RATE: 66 BPM | OXYGEN SATURATION: 96 % | WEIGHT: 200 LBS | SYSTOLIC BLOOD PRESSURE: 118 MMHG | RESPIRATION RATE: 18 BRPM | DIASTOLIC BLOOD PRESSURE: 83 MMHG | BODY MASS INDEX: 28.7 KG/M2

## 2017-09-27 DIAGNOSIS — E78.5 HYPERLIPIDEMIA LDL GOAL <130: ICD-10-CM

## 2017-09-27 DIAGNOSIS — Z12.5 SCREENING FOR PROSTATE CANCER: ICD-10-CM

## 2017-09-27 DIAGNOSIS — N41.9 PROSTATITIS, UNSPECIFIED PROSTATITIS TYPE: Primary | ICD-10-CM

## 2017-09-27 LAB
ALBUMIN UR-MCNC: NEGATIVE MG/DL
APPEARANCE UR: CLEAR
BILIRUB UR QL STRIP: NEGATIVE
COLOR UR AUTO: YELLOW
GLUCOSE UR STRIP-MCNC: NEGATIVE MG/DL
HGB UR QL STRIP: NEGATIVE
KETONES UR STRIP-MCNC: NEGATIVE MG/DL
LEUKOCYTE ESTERASE UR QL STRIP: NEGATIVE
NITRATE UR QL: NEGATIVE
PH UR STRIP: 6 PH (ref 5–7)
SOURCE: NORMAL
SP GR UR STRIP: 1.02 (ref 1–1.03)
UROBILINOGEN UR STRIP-ACNC: 0.2 EU/DL (ref 0.2–1)

## 2017-09-27 PROCEDURE — 80053 COMPREHEN METABOLIC PANEL: CPT | Performed by: FAMILY MEDICINE

## 2017-09-27 PROCEDURE — 81003 URINALYSIS AUTO W/O SCOPE: CPT | Performed by: NURSE PRACTITIONER

## 2017-09-27 PROCEDURE — 36415 COLL VENOUS BLD VENIPUNCTURE: CPT | Performed by: FAMILY MEDICINE

## 2017-09-27 PROCEDURE — 99214 OFFICE O/P EST MOD 30 MIN: CPT | Performed by: NURSE PRACTITIONER

## 2017-09-27 PROCEDURE — G0103 PSA SCREENING: HCPCS | Performed by: FAMILY MEDICINE

## 2017-09-27 PROCEDURE — 80061 LIPID PANEL: CPT | Performed by: FAMILY MEDICINE

## 2017-09-27 RX ORDER — ATORVASTATIN CALCIUM 20 MG/1
20 TABLET, FILM COATED ORAL DAILY
Qty: 90 TABLET | Status: SHIPPED | OUTPATIENT
Start: 2017-09-27 | End: 2017-12-21

## 2017-09-27 RX ORDER — DOXYCYCLINE 100 MG/1
100 CAPSULE ORAL 2 TIMES DAILY
Qty: 14 CAPSULE | Refills: 0 | Status: CANCELLED | OUTPATIENT
Start: 2017-09-27

## 2017-09-27 RX ORDER — FENOFIBRATE 160 MG/1
TABLET ORAL
Qty: 90 TABLET | Status: SHIPPED | OUTPATIENT
Start: 2017-09-27 | End: 2017-12-21

## 2017-09-27 RX ORDER — LEVOFLOXACIN 500 MG/1
500 TABLET, FILM COATED ORAL DAILY
Qty: 14 TABLET | Refills: 0 | Status: SHIPPED | OUTPATIENT
Start: 2017-09-27 | End: 2017-09-28

## 2017-09-27 RX ORDER — LEVOFLOXACIN 750 MG/1
750 TABLET, FILM COATED ORAL DAILY
Qty: 14 TABLET | Refills: 0 | Status: SHIPPED | OUTPATIENT
Start: 2017-09-27 | End: 2017-09-27

## 2017-09-27 RX ORDER — ATORVASTATIN CALCIUM 20 MG/1
40 TABLET, FILM COATED ORAL DAILY
Qty: 180 TABLET | Refills: 0 | Status: CANCELLED | OUTPATIENT
Start: 2017-09-27

## 2017-09-27 NOTE — PROGRESS NOTES
"  SUBJECTIVE:   Alpesh Winston is a 47 year old male who presents to clinic today for the following health issues:    Lipid lab done this morning     Medication Followup of doxycycline (VIBRAMYCIN) 100 MG capsule    Taking Medication as prescribed: yes    Side Effects:  None    Medication Helping Symptoms:  NO-bladder infection has not gone away. Still having lower abdominal pain, frequency especially at night, retention, \"doesn't feel right, painful when urinating\"    He had bilateral hernia repair last year. Had previously had the left side repaired 10-15 years prior to that.     No fevers, hematuria.  No risk for STDs.  Father with prostate cancer age 58.      Currently doing well on Lipitor 20 mg daily and fenofibrate.           Problem list and histories reviewed & adjusted, as indicated.  Additional history: as documented        Reviewed and updated as needed this visit by clinical staff     Reviewed and updated as needed this visit by Provider         ROS:  C: NEGATIVE for fever, chills, change in weight  R: NEGATIVE for significant cough or SOB  CV: NEGATIVE for chest pain, palpitations or peripheral edema  : see HPI  MUSCULOSKELETAL: NEGATIVE for significant arthralgias or myalgia  NEURO: NEGATIVE for weakness, dizziness or paresthesias  PSYCHIATRIC: NEGATIVE for changes in mood or affect    OBJECTIVE:     /83  Pulse 66  Temp 98  F (36.7  C) (Oral)  Resp 18  Wt 200 lb (90.7 kg)  SpO2 96%  BMI 28.7 kg/m2  Body mass index is 28.7 kg/(m^2).  GENERAL: healthy, alert and no distress        ASSESSMENT/PLAN:             1. Prostatitis, unspecified prostatitis type  Will treat empirically with Levaquin.  If symptoms are not improving over the next week, will refer to urology.   - *UA reflex to Microscopic  - levofloxacin (LEVAQUIN) 500 MG tablet; Take 1 tablet (500 mg) by mouth daily  Dispense: 14 tablet; Refill: 0    2. Hyperlipidemia LDL goal <130  Results pending.  The current medical regimen " is effective;  continue present plan and medications.   - fenofibrate 160 MG tablet; TAKE 1 TABLET DAILY (DUE FOR APPOINTMENT NOW)  Dispense: 90 tablet; Refill: PRN  - atorvastatin (LIPITOR) 20 MG tablet; Take 1 tablet (20 mg) by mouth daily  Dispense: 90 tablet; Refill: PRN        Belem Joshua NP  [facility]

## 2017-09-27 NOTE — MR AVS SNAPSHOT
After Visit Summary   9/27/2017    Alpesh Winston    MRN: [id number]           Patient Information     Date Of Birth          1970        Visit Information        Provider Department      9/27/2017 3:45 PM Belem Joshua NP [facility]        Today's Diagnoses     Prostatitis, unspecified prostatitis type    -  1    Hyperlipidemia LDL goal <130           Follow-ups after your visit        Who to contact     If you have questions or need follow up information about today's clinic visit or your schedule please contact [facility] directly at [phone].  Normal or non-critical lab and imaging results will be communicated to you by ArtusLab[employer]art, letter or phone within 4 business days after the clinic has received the results. If you do not hear from us within 7 days, please contact the clinic through [employer] or phone. If you have a critical or abnormal lab result, we will notify you by phone as soon as possible.  Submit refill requests through [employer] or call your pharmacy and they will forward the refill request to us. Please allow 3 business days for your refill to be completed.          Additional Information About Your Visit        MyChart Information     [employer] gives you secure access to your electronic health record. If you see a primary care provider, you can also send messages to your care team and make appointments. If you have questions, please call your primary care clinic.  If you do not have a primary care provider, please call [phone] and they will assist you.        Care EveryWhere ID     This is your Care EveryWhere ID. This could be used by other organizations to access your [place] medical records  [id number]        Your Vitals Were     Pulse Temperature Respirations Pulse Oximetry BMI (Body Mass Index)       66 98  F (36.7  C) (Oral) 18 96% 28.7 kg/m2        Blood Pressure from Last 3 Encounters:   09/27/17 118/83   09/15/17 (!)  132/91   01/30/17 139/88    Weight from Last 3 Encounters:   09/27/17 200 lb (90.7 kg)   09/15/17 202 lb 9.6 oz (91.9 kg)   01/30/17 201 lb (91.2 kg)              We Performed the Following     *UA reflex to Microscopic          Today's Medication Changes          These changes are accurate as of: 9/27/17  4:21 PM.  If you have any questions, ask your nurse or doctor.               Start taking these medicines.        Dose/Directions    levofloxacin 500 MG tablet   Commonly known as:  LEVAQUIN   Used for:  Prostatitis, unspecified prostatitis type   Started by:  Belem Joshua NP        Dose:  500 mg   Take 1 tablet (500 mg) by mouth daily   Quantity:  14 tablet   Refills:  0         These medicines have changed or have updated prescriptions.        Dose/Directions    atorvastatin 20 MG tablet   Commonly known as:  LIPITOR   This may have changed:  See the new instructions.   Used for:  Hyperlipidemia LDL goal <130   Changed by:  Belem Joshua NP        Dose:  20 mg   Take 1 tablet (20 mg) by mouth daily   Quantity:  90 tablet   Refills:  PRN       fenofibrate 160 MG tablet   This may have changed:  See the new instructions.   Used for:  Hyperlipidemia LDL goal <130   Changed by:  Belem Joshua NP        TAKE 1 TABLET DAILY (DUE FOR APPOINTMENT NOW)   Quantity:  90 tablet   Refills:  PRN       fluticasone 50 MCG/ACT spray   Commonly known as:  FLONASE   This may have changed:    - when to take this  - reasons to take this   Used for:  Acute sinusitis with coexisting condition requiring prophylactic treatment        Dose:  1-2 spray   Spray 1-2 sprays into both nostrils daily   Quantity:  1 Bottle   Refills:  11            Where to get your medicines      These medications were sent to [facility] - [facility], [place] - [address] [place] [address]  [address] [facility] [address]     Phone:  [phone]     atorvastatin 20 MG tablet    fenofibrate 160 MG tablet         These  medications were sent to [facility] - [place], [place] - [address]  [address], [place] [address]     Phone:  [phone]     levofloxacin 500 MG tablet                Primary Care Provider Office Phone # Fax #    Belem PAN YancyabbyTANG [phone]       [address]  [facility] [address]        Equal Access to Services     JOSÉ ROGERS : Hadii aad ku hadasho Soomaali, waaxda luqadaha, qaybta kaalmada adeegyada, waxay idiin hayaan adeeg kharash lagrace . So [facility] [phone].    ATENCIÓN: Si habla español, tiene a casper disposición servicios gratuitos de asistencia lingüística. Maye al [phone].    We comply with applicable federal civil rights laws and [place] laws. We do not discriminate on the basis of race, color, national origin, age, disability sex, sexual orientation or gender identity.            Thank you!     Thank you for choosing [facility] your care. Our goal is always to provide you with excellent care. Hearing back from our patients is one way we can continue to improve our services. Please take a few minutes to complete the written survey that you may receive in the mail after your visit with us. Thank you!             Your Updated Medication List - Protect others around you: Learn how to safely use, store and throw away your medicines at www.disposemymeds.org.          This list is accurate as of: 9/27/17  4:21 PM.  Always use your most recent med list.                   Brand Name Dispense Instructions for use Diagnosis    ADVIL PO      Take by mouth as needed for moderate pain        atorvastatin 20 MG tablet    LIPITOR    90 tablet    Take 1 tablet (20 mg) by mouth daily    Hyperlipidemia LDL goal <130       COENZYME Q-10 PO      Take 20 mg by mouth daily        fenofibrate 160 MG tablet     90 tablet    TAKE 1 TABLET DAILY (DUE FOR APPOINTMENT NOW)    Hyperlipidemia LDL goal <130       FISH OIL PO           fluticasone  50 MCG/ACT spray    FLONASE    1 Bottle    Spray 1-2 sprays into both nostrils daily    Acute sinusitis with coexisting condition requiring prophylactic treatment       levofloxacin 500 MG tablet    LEVAQUIN    14 tablet    Take 1 tablet (500 mg) by mouth daily    Prostatitis, unspecified prostatitis type

## 2017-09-28 ENCOUNTER — MYC MEDICAL ADVICE (OUTPATIENT)
Dept: FAMILY MEDICINE | Facility: CLINIC | Age: 47
End: 2017-09-28

## 2017-09-28 LAB
ALBUMIN SERPL-MCNC: 4.4 G/DL (ref 3.4–5)
ALP SERPL-CCNC: 49 U/L (ref 40–150)
ALT SERPL W P-5'-P-CCNC: 41 U/L (ref 0–70)
ANION GAP SERPL CALCULATED.3IONS-SCNC: 12 MMOL/L (ref 3–14)
AST SERPL W P-5'-P-CCNC: 21 U/L (ref 0–45)
BILIRUB SERPL-MCNC: 0.6 MG/DL (ref 0.2–1.3)
BUN SERPL-MCNC: 21 MG/DL (ref 7–30)
CALCIUM SERPL-MCNC: 9.3 MG/DL (ref 8.5–10.1)
CHLORIDE SERPL-SCNC: 108 MMOL/L (ref 94–109)
CHOLEST SERPL-MCNC: 219 MG/DL
CO2 SERPL-SCNC: 21 MMOL/L (ref 20–32)
CREAT SERPL-MCNC: 0.93 MG/DL (ref 0.66–1.25)
GFR SERPL CREATININE-BSD FRML MDRD: 88 ML/MIN/1.7M2
GLUCOSE SERPL-MCNC: 99 MG/DL (ref 70–99)
HDLC SERPL-MCNC: 35 MG/DL
LDLC SERPL CALC-MCNC: 125 MG/DL
NONHDLC SERPL-MCNC: 184 MG/DL
POTASSIUM SERPL-SCNC: 4.4 MMOL/L (ref 3.4–5.3)
PROT SERPL-MCNC: 7.6 G/DL (ref 6.8–8.8)
PSA SERPL-ACNC: 1.39 UG/L (ref 0–4)
SODIUM SERPL-SCNC: 141 MMOL/L (ref 133–144)
TRIGL SERPL-MCNC: 295 MG/DL

## 2017-09-28 NOTE — TELEPHONE ENCOUNTER
The lipid results were ordered by Dr. Omalley, so they go to him and he has not released them yet.  I can try to release them manually so he can see them, but they look about the same as last time.  He should continue on his current cholesterol medications at the same dose.

## 2017-10-24 ENCOUNTER — MYC MEDICAL ADVICE (OUTPATIENT)
Dept: FAMILY MEDICINE | Facility: CLINIC | Age: 47
End: 2017-10-24

## 2017-10-24 DIAGNOSIS — N41.9 PROSTATITIS, UNSPECIFIED PROSTATITIS TYPE: Primary | ICD-10-CM

## 2017-10-24 NOTE — TELEPHONE ENCOUNTER
Routing to SARAH Stauffer,.to address numbness and tingling pt is experiencing again but with Cipro this time. Please advise.     Anna Marie Feliciano RN

## 2017-10-24 NOTE — TELEPHONE ENCOUNTER
Sent referral info. He is preferring Dr. Brizuela in [place].  Also a  network provider.  Radha Feng RN

## 2017-10-24 NOTE — TELEPHONE ENCOUNTER
I would have him stop Cipro.  If he is still having his prostatitis symptoms, I would like him to see urology.

## 2017-10-27 ENCOUNTER — PRE VISIT (OUTPATIENT)
Dept: UROLOGY | Facility: CLINIC | Age: 47
End: 2017-10-27

## 2017-11-13 ENCOUNTER — PRE VISIT (OUTPATIENT)
Dept: UROLOGY | Facility: CLINIC | Age: 47
End: 2017-11-13

## 2017-11-13 ASSESSMENT — ENCOUNTER SYMPTOMS
HEMATURIA: 0
DIFFICULTY URINATING: 1
DYSURIA: 1
FLANK PAIN: 1

## 2017-11-27 ENCOUNTER — OFFICE VISIT (OUTPATIENT)
Dept: UROLOGY | Facility: CLINIC | Age: 47
End: 2017-11-27

## 2017-11-27 VITALS
SYSTOLIC BLOOD PRESSURE: 135 MMHG | WEIGHT: 195 LBS | HEART RATE: 60 BPM | DIASTOLIC BLOOD PRESSURE: 90 MMHG | BODY MASS INDEX: 27.92 KG/M2 | HEIGHT: 70 IN

## 2017-11-27 DIAGNOSIS — N41.0 PROSTATITIS, ACUTE: Primary | ICD-10-CM

## 2017-11-27 RX ORDER — TAMSULOSIN HYDROCHLORIDE 0.4 MG/1
0.4 CAPSULE ORAL DAILY
Qty: 30 CAPSULE | Refills: 5 | Status: SHIPPED | OUTPATIENT
Start: 2017-11-27 | End: 2018-04-20

## 2017-11-27 RX ORDER — DOXYCYCLINE 100 MG/1
100 CAPSULE ORAL 2 TIMES DAILY
Qty: 60 CAPSULE | Refills: 0 | Status: SHIPPED | OUTPATIENT
Start: 2017-11-27 | End: 2017-12-27

## 2017-11-27 ASSESSMENT — PAIN SCALES - GENERAL: PAINLEVEL: MILD PAIN (2)

## 2017-11-27 NOTE — PATIENT INSTRUCTIONS
UROLOGY CLINIC VISIT PATIENT INSTRUCTIONS    1) Start taking Doxycycline 100 mg by mouth twice daily for 4 weeks.     2) Start taking Flomax (tamsulosin) 0.4 mg once daily before bed to help with your difficulty urinating. If your symptoms improved after completing the antibiotics, you can stop taking Flomax.     If you have any issues, questions or concerns in the meantime, do not hesitate to contact us at [phone] or via [employer].     It was a pleasure meeting with you today.  Thank you for allowing me and my team the privilege of caring for you today.  YOU are the reason we are here, and I truly hope we provided you with the excellent service you deserve.  Please let us know if there is anything else we can do for you so that we can be sure you are leaving completely satisfied with your care experience.

## 2017-11-27 NOTE — MR AVS SNAPSHOT
After Visit Summary   11/27/2017    Alpesh Winston    MRN: [id number]           Patient Information     Date Of Birth          1970        Visit Information        Provider Department      11/27/2017 3:30 PM Grisel Arriaga PA-C [facility] and [facility]        Today's Diagnoses     Prostatitis, acute    -  1      Care Instructions    UROLOGY CLINIC VISIT PATIENT INSTRUCTIONS    1) Start taking Doxycycline 100 mg by mouth twice daily for 4 weeks.     2) Start taking Flomax (tamsulosin) 0.4 mg once daily before bed to help with your difficulty urinating. If your symptoms improved after completing the antibiotics, you can stop taking Flomax.     If you have any issues, questions or concerns in the meantime, do not hesitate to contact us at [phone] or via [employer].     It was a pleasure meeting with you today.  Thank you for allowing me and my team the privilege of caring for you today.  YOU are the reason we are here, and I truly hope we provided you with the excellent service you deserve.  Please let us know if there is anything else we can do for you so that we can be sure you are leaving completely satisfied with your care experience.                Follow-ups after your visit        Your next 10 appointments already scheduled     Jan 29, 2018  9:30 AM CST   (Arrive by 9:15 AM)   New Patient Visit with BECCA Hand [facility] and [facility] ([facility])    [address] [place] [id number] [phone]              Who to contact     Please call your clinic at [phone] to:    Ask questions about your health    Make or cancel appointments    Discuss your medicines    Learn about your test results    Speak to your doctor   If you have compliments or concerns about an experience at your clinic, or if you wish to file a complaint, please contact  "[facility] Patient Relations at [phone] or email us at [email or web address]         Additional Information About Your Visit        [employer] Information     [employer] gives you secure access to your electronic health record. If you see a primary care provider, you can also send messages to your care team and make appointments. If you have questions, please call your primary care clinic.  If you do not have a primary care provider, please call [phone] and they will assist you.      [employer] is an electronic gateway that provides easy, online access to your medical records. With [employer], you can request a clinic appointment, read your test results, renew a prescription or communicate with your care team.     To access your existing account, please contact your [facility] Clinic or call [phone] for assistance.        Care EveryWhere ID     This is your Care EveryWhere ID. This could be used by other organizations to access your [place] medical records  [id number]        Your Vitals Were     Pulse Height BMI (Body Mass Index)             60 1.778 m (5' 10\") 27.98 kg/m2          Blood Pressure from Last 3 Encounters:   11/27/17 135/90   09/27/17 118/83   09/15/17 (!) 132/91    Weight from Last 3 Encounters:   11/27/17 88.5 kg (195 lb)   09/27/17 90.7 kg (200 lb)   09/15/17 91.9 kg (202 lb 9.6 oz)              Today, you had the following     No orders found for display         Today's Medication Changes          These changes are accurate as of: 11/27/17  3:53 PM.  If you have any questions, ask your nurse or doctor.               Start taking these medicines.        Dose/Directions    doxycycline 100 MG capsule   Commonly known as:  VIBRAMYCIN   Used for:  Prostatitis, acute   Started by:  Grisel Arriaga PA-C        Dose:  100 mg   Take 1 capsule (100 mg) by mouth 2 times daily   Quantity:  60 capsule   Refills:  0       tamsulosin 0.4 MG " capsule   Commonly known as:  FLOMAX   Used for:  Prostatitis, acute   Started by:  Grisel Arriaga PA-C        Dose:  0.4 mg   Take 1 capsule (0.4 mg) by mouth daily   Quantity:  30 capsule   Refills:  5         These medicines have changed or have updated prescriptions.        Dose/Directions    fluticasone 50 MCG/ACT spray   Commonly known as:  FLONASE   This may have changed:    - when to take this  - reasons to take this   Used for:  Acute sinusitis with coexisting condition requiring prophylactic treatment        Dose:  1-2 spray   Spray 1-2 sprays into both nostrils daily   Quantity:  1 Bottle   Refills:  11         Stop taking these medicines if you haven't already. Please contact your care team if you have questions.     ADVIL PO   Stopped by:  Grisel Arriaga PA-C           ciprofloxacin 500 MG tablet   Commonly known as:  CIPRO   Stopped by:  Grisel Arriaga PA-C                Where to get your medicines      These medications were sent to [id number] - [place], [place] - [address] AT [address] [facility] [employer]  [address], [place] [address]    Hours:  24-hours Phone:  [phone]     doxycycline 100 MG capsule    tamsulosin 0.4 MG capsule                Primary Care Provider Office Phone # Fax #    Belem Joshua [phone] [address] [facility] [address]        Equal Access to Services     [facility] AH: Hadii aad ku hadasho Soomaali, waaxda luqadaha, qaybta kaalmada adeegyada, waxay idiin hayaan anneliese dowd'estee ah. So [facility] [phone].    ATENCIÓN: Si habla español, tiene a casper disposición servicios gratuitos de asistencia lingüística. Llame al [phone].    We comply with applicable federal civil rights laws and [place] laws. We do not discriminate on the basis of race, color, national origin, age, disability, sex, sexual orientation, or gender identity.            Thank you!     Thank you for choosing [facility] AND [facility] PROSTATE [facility] UROLOGIC CANCERS  for your care. Our goal is always to provide you with excellent care. Hearing back from our patients is one way we can continue to improve our services. Please take a few minutes to complete the written survey that you may receive in the mail after your visit with us. Thank you!             Your Updated Medication List - Protect others around you: Learn how to safely use, store and throw away your medicines at www.disposemymeds.org.          This list is accurate as of: 11/27/17  3:53 PM.  Always use your most recent med list.                   Brand Name Dispense Instructions for use Diagnosis    atorvastatin 20 MG tablet    LIPITOR    90 tablet    Take 1 tablet (20 mg) by mouth daily    Hyperlipidemia LDL goal <130       COENZYME Q-10 PO      Take 20 mg by mouth daily        doxycycline 100 MG capsule    VIBRAMYCIN    60 capsule    Take 1 capsule (100 mg) by mouth 2 times daily    Prostatitis, acute       fenofibrate 160 MG tablet     90 tablet    TAKE 1 TABLET DAILY (DUE FOR APPOINTMENT NOW)    Hyperlipidemia LDL goal <130       FISH OIL PO           fluticasone 50 MCG/ACT spray    FLONASE    1 Bottle    Spray 1-2 sprays into both nostrils daily    Acute sinusitis with coexisting condition requiring prophylactic treatment       tamsulosin 0.4 MG capsule    FLOMAX    30 capsule    Take 1 capsule (0.4 mg) by mouth daily    Prostatitis, acute

## 2017-11-27 NOTE — LETTER
11/27/2017       RE: Alpesh Winston  [address] [place] [address]     Dear Colleague,    Thank you for referring your patient, Alpesh Winston, to the [facility] AND [facility] PROSTATE AND UROLOGIC CANCERS at [facility]. Please see a copy of my visit note below.    CC: perineal pain, urinary hesitancy.    HPI: It is a pleasure to see Mr. Alpesh Winston, a very pleasant 47 year old male seen today in the urology clinic in consultation from Belem Joshua NP for evaluation of the above. He first developed symptoms over the summer of 2017 - seemed to develop 2 weeks after traveling to [place]. Symptoms included urinary frequency, urgency, and dysuria. He was given 7 days of Doxycycline and reports that symptoms resolved. After finishing antibiotics, his symptoms started to recur. Now he complains of perineal pain/pressure, urinary hesitancy, intermittency, slow stream, frequency, urgency, and intermittent dysuria. He was prescribed courses of Levaquin and Cipro for possible prostatitis but could not tolerate more than a few days of each medication due to tendon pain and peripheral neuropathy.    He denies gross hematuria, pyuria, penile discharge, fevers, chills, N/V. [facility] for similar symptoms but did not receive any specific treatment. Occasional issues with ED for which he takes Viagra PRN. He denies history of kidney stones, but father has kidney stones as well as a h/o prostate cancer (dx age 58). Patient's PSA's have always been normal.     Past Medical History:   Diagnosis Date     Allergic rhinitis due to other allergen      History of chest pain     TO ER - EVALULATED, STRESS TEST ETC, ALL NEGATIVE     Hyperlipidaemia      Lipoma of other skin and subcutaneous tissue 5/2012    Chest wall     Other and unspecified hyperlipidemia      Past Surgical History:   Procedure Laterality Date     EYE SURGERY       SURGERY X 2 BILAT TEAR DUCTS      HEAD & NECK SURGERY       HERNIA REPAIR  2000     LAPAROSCOPIC HERNIORRHAPHY INGUINAL BILATERAL Bilateral 10/27/2016    Procedure: LAPAROSCOPIC HERNIORRHAPHY INGUINAL BILATERAL;  Surgeon: Nellie Duron MD;  Location: [facility] OR     SOFT TISSUE SURGERY      Many lypoma removed - Multiple dates     SURGICAL HISTORY OF -       removal of lipoma X 4     SURGICAL HISTORY OF -   10/00    left inguinal hernia repair     SURGICAL HISTORY OF -       wisdom teeth extraction     Current Outpatient Prescriptions   Medication Sig Dispense Refill     ciprofloxacin (CIPRO) 500 MG tablet Take 1 tablet (500 mg) by mouth 2 times daily 28 tablet 0     Omega-3 Fatty Acids (FISH OIL PO)        fenofibrate 160 MG tablet TAKE 1 TABLET DAILY (DUE FOR APPOINTMENT NOW) 90 tablet PRN     atorvastatin (LIPITOR) 20 MG tablet Take 1 tablet (20 mg) by mouth daily 90 tablet PRN     fluticasone (FLONASE) 50 MCG/ACT spray Spray 1-2 sprays into both nostrils daily (Patient taking differently: Spray 1-2 sprays into both nostrils as needed ) 1 Bottle 11     COENZYME Q-10 PO Take 20 mg by mouth daily       Allergies   Allergen Reactions     Omnicef Hives     Sulfa Drugs Rash     FAMILY HISTORY: Father with prostate cancer (dx age 58). Father and daughter with kidney stones.     SOCIAL HISTORY: The patient does not smoke cigarettes, minimal EtOH and no illicit drug use.  He is .    ROS:  Answers for HPI/ROS submitted by the patient on 11/13/2017   General Symptoms: No  Skin Symptoms: No  HENT Symptoms: No  EYE SYMPTOMS: No  HEART SYMPTOMS: No  LUNG SYMPTOMS: No  INTESTINAL SYMPTOMS: No  URINARY SYMPTOMS: Yes  REPRODUCTIVE SYMPTOMS: Yes  SKELETAL SYMPTOMS: No  BLOOD SYMPTOMS: No  NERVOUS SYSTEM SYMPTOMS: No  MENTAL HEALTH SYMPTOMS: No  Trouble holding urine or incontinence: No  Pain or burning: Yes  Trouble starting or stopping: No  Increased frequency of urination: Yes  Blood in urine: No  Decreased frequency of  "urination: No  Frequent nighttime urination: Yes  Flank pain: Yes  Difficulty emptying bladder: Yes  Scrotal pain or swelling: Yes  Erectile dysfunction: Yes  Penile discharge: No  Genital ulcers: No  Reduced libido: Yes    PHYSICAL EXAM:   Vitals:    11/27/17 1502   BP: 135/90   Pulse: 60   Weight: 88.5 kg (195 lb)   Height: 1.778 m (5' 10\")     GENERAL: Well groomed/well developed/well nourished male in NAD.  HEENT: EOMI, AT, NC.  SKIN: Warm to touch, dry.  No visible rashes or lesions.  RESP: No increased respiratory effort.  MS: Full ROM in extremities.  : Deferred.  JOURDAN:     Good sphincter tone, smooth rectal mucosa.     Normal to mildly enlarged prostate that is smooth to palpation without nodules, mass, or asymmetry. Mild tenderness to palpation.  NEURO: Alert and oriented x 3.  PSYCH: Normal mood and affect, pleasant and agreeable during interview and exam.      Office Visit on 09/27/2017   Component Date Value Ref Range Status     Color Urine 09/27/2017 Yellow   Final     Appearance Urine 09/27/2017 Clear   Final     Glucose Urine 09/27/2017 Negative  NEG^Negative mg/dL Final     Bilirubin Urine 09/27/2017 Negative  NEG^Negative Final     Ketones Urine 09/27/2017 Negative  NEG^Negative mg/dL Final     Specific Gravity Urine 09/27/2017 1.020  1.003 - 1.035 Final     Blood Urine 09/27/2017 Negative  NEG^Negative Final     pH Urine 09/27/2017 6.0  5.0 - 7.0 pH Final     Protein Albumin Urine 09/27/2017 Negative  NEG^Negative mg/dL Final     Urobilinogen Urine 09/27/2017 0.2  0.2 - 1.0 EU/dL Final     Nitrite Urine 09/27/2017 Negative  NEG^Negative Final     Leukocyte Esterase Urine 09/27/2017 Negative  NEG^Negative Final     Source 09/27/2017 Midstream Urine   Final     PSA   Date Value Ref Range Status   09/27/2017 1.39 0 - 4 ug/L Final     Comment:     Assay Method:  Chemiluminescence using Siemens Vista analyzer       ASSESSMENT/PLAN:  Mr. Alpesh Winston is a 47 year old male with prostatitis. He " was treated with 7 days of Doxycycline in 9/2017 with near resolution of symptoms but they ultimately recurred after completing antibiotics. Suspect treatment duration was insufficient to completely clear the infection. His JOURDAN today does reveal a mildly tender prostate. Plan for the following:  -Start Doxycycline 100 mg BID x 4 weeks. Side effects discussed.  -Start tamsulosin 0.4 mg daily. Side effects discussed.   -Encouraged warm Sitz baths, NSAIDS PRN, and avoidance of aggravating activities and known bladder irritants.     Follow up in 2 months to recheck, sooner with any issues.     I have enjoyed participating in the medical care of this very pleasant patient.  Please don't hesitate to contact me with any questions or concerns.     Grisel Arriaga PA-C  Department of Urology

## 2017-11-27 NOTE — PROGRESS NOTES
CC: perineal pain, urinary hesitancy.    HPI: It is a pleasure to see Mr. Alpesh Winston, a very pleasant 47 year old male seen today in the urology clinic in consultation from Belem Joshua NP for evaluation of the above. He first developed symptoms over the summer of 2017 - seemed to develop 2 weeks after traveling to [place]. Symptoms included urinary frequency, urgency, and dysuria. He was given 7 days of Doxycycline and reports that symptoms resolved. After finishing antibiotics, his symptoms started to recur. Now he complains of perineal pain/pressure, urinary hesitancy, intermittency, slow stream, frequency, urgency, and intermittent dysuria. He was prescribed courses of Levaquin and Cipro for possible prostatitis but could not tolerate more than a few days of each medication due to tendon pain and peripheral neuropathy.    He denies gross hematuria, pyuria, penile discharge, fevers, chills, N/V. [facility] for similar symptoms but did not receive any specific treatment. Occasional issues with ED for which he takes Viagra PRN. He denies history of kidney stones, but father has kidney stones as well as a h/o prostate cancer (dx age 60). Patient's PSA's have always been normal.     Past Medical History:   Diagnosis Date     Allergic rhinitis due to other allergen      History of chest pain     TO ER - EVALULATED, STRESS TEST ETC, ALL NEGATIVE     Hyperlipidaemia      Lipoma of other skin and subcutaneous tissue 5/2012    Chest wall     Other and unspecified hyperlipidemia      Past Surgical History:   Procedure Laterality Date     EYE SURGERY      SURGERY X 2 BILAT TEAR DUCTS      HEAD & NECK SURGERY       HERNIA REPAIR  2000     LAPAROSCOPIC HERNIORRHAPHY INGUINAL BILATERAL Bilateral 10/27/2016    Procedure: LAPAROSCOPIC HERNIORRHAPHY INGUINAL BILATERAL;  Surgeon: Nellie Duron MD;  Location: [facility] OR     SOFT TISSUE SURGERY      Many lypoma removed - Multiple dates     SURGICAL  HISTORY OF -       removal of lipoma X 4     SURGICAL HISTORY OF -   10/00    left inguinal hernia repair     SURGICAL HISTORY OF -       wisdom teeth extraction     Current Outpatient Prescriptions   Medication Sig Dispense Refill     ciprofloxacin (CIPRO) 500 MG tablet Take 1 tablet (500 mg) by mouth 2 times daily 28 tablet 0     Omega-3 Fatty Acids (FISH OIL PO)        fenofibrate 160 MG tablet TAKE 1 TABLET DAILY (DUE FOR APPOINTMENT NOW) 90 tablet PRN     atorvastatin (LIPITOR) 20 MG tablet Take 1 tablet (20 mg) by mouth daily 90 tablet PRN     fluticasone (FLONASE) 50 MCG/ACT spray Spray 1-2 sprays into both nostrils daily (Patient taking differently: Spray 1-2 sprays into both nostrils as needed ) 1 Bottle 11     COENZYME Q-10 PO Take 20 mg by mouth daily       Allergies   Allergen Reactions     Omnicef Hives     Sulfa Drugs Rash     FAMILY HISTORY: Father with prostate cancer (dx age 58). Father and daughter with kidney stones.     SOCIAL HISTORY: The patient does not smoke cigarettes, minimal EtOH and no illicit drug use.  He is .    ROS:  Answers for HPI/ROS submitted by the patient on 11/13/2017   General Symptoms: No  Skin Symptoms: No  HENT Symptoms: No  EYE SYMPTOMS: No  HEART SYMPTOMS: No  LUNG SYMPTOMS: No  INTESTINAL SYMPTOMS: No  URINARY SYMPTOMS: Yes  REPRODUCTIVE SYMPTOMS: Yes  SKELETAL SYMPTOMS: No  BLOOD SYMPTOMS: No  NERVOUS SYSTEM SYMPTOMS: No  MENTAL HEALTH SYMPTOMS: No  Trouble holding urine or incontinence: No  Pain or burning: Yes  Trouble starting or stopping: No  Increased frequency of urination: Yes  Blood in urine: No  Decreased frequency of urination: No  Frequent nighttime urination: Yes  Flank pain: Yes  Difficulty emptying bladder: Yes  Scrotal pain or swelling: Yes  Erectile dysfunction: Yes  Penile discharge: No  Genital ulcers: No  Reduced libido: Yes    PHYSICAL EXAM:   Vitals:    11/27/17 1502   BP: 135/90   Pulse: 60   Weight: 88.5 kg (195 lb)   Height: 1.778 m (5'  "10\")     GENERAL: Well groomed/well developed/well nourished male in NAD.  HEENT: EOMI, AT, NC.  SKIN: Warm to touch, dry.  No visible rashes or lesions.  RESP: No increased respiratory effort.  MS: Full ROM in extremities.  : Deferred.  JOURDAN:     Good sphincter tone, smooth rectal mucosa.     Normal to mildly enlarged prostate that is smooth to palpation without nodules, mass, or asymmetry. Mild tenderness to palpation.  NEURO: Alert and oriented x 3.  PSYCH: Normal mood and affect, pleasant and agreeable during interview and exam.      Office Visit on 09/27/2017   Component Date Value Ref Range Status     Color Urine 09/27/2017 Yellow   Final     Appearance Urine 09/27/2017 Clear   Final     Glucose Urine 09/27/2017 Negative  NEG^Negative mg/dL Final     Bilirubin Urine 09/27/2017 Negative  NEG^Negative Final     Ketones Urine 09/27/2017 Negative  NEG^Negative mg/dL Final     Specific Gravity Urine 09/27/2017 1.020  1.003 - 1.035 Final     Blood Urine 09/27/2017 Negative  NEG^Negative Final     pH Urine 09/27/2017 6.0  5.0 - 7.0 pH Final     Protein Albumin Urine 09/27/2017 Negative  NEG^Negative mg/dL Final     Urobilinogen Urine 09/27/2017 0.2  0.2 - 1.0 EU/dL Final     Nitrite Urine 09/27/2017 Negative  NEG^Negative Final     Leukocyte Esterase Urine 09/27/2017 Negative  NEG^Negative Final     Source 09/27/2017 Midstream Urine   Final     PSA   Date Value Ref Range Status   09/27/2017 1.39 0 - 4 ug/L Final     Comment:     Assay Method:  Chemiluminescence using Siemens Vista analyzer       ASSESSMENT/PLAN:  Mr. Alpesh Winston is a 47 year old male with prostatitis. He was treated with 7 days of Doxycycline in 9/2017 with near resolution of symptoms but they ultimately recurred after completing antibiotics. Suspect treatment duration was insufficient to completely clear the infection. His JOURDAN today does reveal a mildly tender prostate. Plan for the following:  -Start Doxycycline 100 mg BID x 4 weeks. Side " effects discussed.  -Start tamsulosin 0.4 mg daily. Side effects discussed.   -Encouraged warm Sitz baths, NSAIDS PRN, and avoidance of aggravating activities and known bladder irritants.     Follow up in 2 months to recheck, sooner with any issues.     I have enjoyed participating in the medical care of this very pleasant patient.  Please don't hesitate to contact me with any questions or concerns.     Grisel Arriaga PA-C  Department of Urology

## 2017-12-21 ENCOUNTER — OFFICE VISIT (OUTPATIENT)
Dept: FAMILY MEDICINE | Facility: CLINIC | Age: 47
End: 2017-12-21
Payer: COMMERCIAL

## 2017-12-21 VITALS
HEART RATE: 64 BPM | DIASTOLIC BLOOD PRESSURE: 78 MMHG | HEIGHT: 69 IN | BODY MASS INDEX: 29.98 KG/M2 | WEIGHT: 202.38 LBS | RESPIRATION RATE: 18 BRPM | SYSTOLIC BLOOD PRESSURE: 126 MMHG | TEMPERATURE: 98.3 F | OXYGEN SATURATION: 98 %

## 2017-12-21 DIAGNOSIS — E78.5 HYPERLIPIDEMIA LDL GOAL <130: ICD-10-CM

## 2017-12-21 DIAGNOSIS — R73.09 ELEVATED GLUCOSE: ICD-10-CM

## 2017-12-21 DIAGNOSIS — Z00.00 ROUTINE GENERAL MEDICAL EXAMINATION AT A HEALTH CARE FACILITY: Primary | ICD-10-CM

## 2017-12-21 LAB
CHOLEST SERPL-MCNC: 189 MG/DL
GLUCOSE SERPL-MCNC: 121 MG/DL (ref 70–99)
HDLC SERPL-MCNC: 39 MG/DL
LDLC SERPL CALC-MCNC: 113 MG/DL
NONHDLC SERPL-MCNC: 150 MG/DL
TRIGL SERPL-MCNC: 184 MG/DL

## 2017-12-21 PROCEDURE — 82947 ASSAY GLUCOSE BLOOD QUANT: CPT | Performed by: NURSE PRACTITIONER

## 2017-12-21 PROCEDURE — 99396 PREV VISIT EST AGE 40-64: CPT | Performed by: NURSE PRACTITIONER

## 2017-12-21 PROCEDURE — 80061 LIPID PANEL: CPT | Performed by: NURSE PRACTITIONER

## 2017-12-21 PROCEDURE — 36415 COLL VENOUS BLD VENIPUNCTURE: CPT | Performed by: NURSE PRACTITIONER

## 2017-12-21 RX ORDER — ATORVASTATIN CALCIUM 20 MG/1
20 TABLET, FILM COATED ORAL DAILY
Qty: 90 TABLET | Status: SHIPPED | OUTPATIENT
Start: 2017-12-21 | End: 2018-12-19

## 2017-12-21 RX ORDER — FENOFIBRATE 160 MG/1
TABLET ORAL
Qty: 90 TABLET | Status: SHIPPED | OUTPATIENT
Start: 2017-12-21 | End: 2018-12-19

## 2017-12-21 NOTE — PROGRESS NOTES
SUBJECTIVE:   CC: Alpesh Winston is an 47 year old male who presents for preventative health visit.     Physical   Annual:     Getting at least 3 servings of Calcium per day::  Yes    Bi-annual eye exam::  Yes    Dental care twice a year::  Yes    Sleep apnea or symptoms of sleep apnea::  None    Diet::  Regular (no restrictions)    Frequency of exercise::  2-3 days/week    Duration of exercise::  15-30 minutes    Taking medications regularly::  Yes    Medication side effects::  None    Additional concerns today::  No                      Today's PHQ-2 Score:   PHQ-2 ( 1999 [employer]) 12/21/2017   Q1: Little interest or pleasure in doing things 0   Q2: Feeling down, depressed or hopeless 0   PHQ-2 Score 0   Q1: Little interest or pleasure in doing things Not at all   Q2: Feeling down, depressed or hopeless Not at all   PHQ-2 Score 0       Abuse: Current or Past(Physical, Sexual or Emotional)- No  Do you feel safe in your environment - Yes    Social History   Substance Use Topics     Smoking status: Never Smoker     Smokeless tobacco: Current User     Types: Chew      Comment: chewing less     Alcohol use 0.0 oz/week     0 Standard drinks or equivalent per week      Comment: 6-8 drinks a week      Alcohol Use 12/21/2017   If you drink alcohol, do you typically have greater than 3 drinks per day OR greater than 7 drinks per week?   No       Last PSA:   PSA   Date Value Ref Range Status   09/27/2017 1.39 0 - 4 ug/L Final     Comment:     Assay Method:  Chemiluminescence using Siemens Vista analyzer       Reviewed orders with patient. Reviewed health maintenance and updated orders accordingly - Yes      Reviewed and updated as needed this visit by clinical staff  Tobacco  Meds         Reviewed and updated as needed this visit by Provider            Review of Systems  C: NEGATIVE for fever, chills, change in weight  I: NEGATIVE for worrisome rashes, moles or lesions  E: NEGATIVE for vision changes or  "irritation  ENT: NEGATIVE for ear, mouth and throat problems  R: NEGATIVE for significant cough or SOB  CV: NEGATIVE for chest pain, palpitations or peripheral edema  GI: NEGATIVE for nausea, abdominal pain, heartburn, or change in bowel habits   male: negative for dysuria, hematuria, decreased urinary stream, erectile dysfunction, urethral discharge  M: NEGATIVE for significant arthralgias or myalgia  N: NEGATIVE for weakness, dizziness or paresthesias  P: NEGATIVE for changes in mood or affect    OBJECTIVE:   /78  Pulse 64  Temp 98.3  F (36.8  C) (Oral)  Resp 18  Ht 5' 9\" (1.753 m)  Wt 202 lb 6 oz (91.8 kg)  SpO2 98%  BMI 29.89 kg/m2    Physical Exam  GENERAL: healthy, alert and no distress  EYES: Eyes grossly normal to inspection, PERRL and conjunctivae and sclerae normal  HENT: ear canals and TM's normal, nose and mouth without ulcers or lesions  NECK: no adenopathy, no asymmetry, masses, or scars and thyroid normal to palpation  RESP: lungs clear to auscultation - no rales, rhonchi or wheezes  CV: regular rate and rhythm, normal S1 S2, no S3 or S4, no murmur, click or rub, no peripheral edema and peripheral pulses strong  ABDOMEN: soft, nontender, no hepatosplenomegaly, no masses and bowel sounds normal  MS: no gross musculoskeletal defects noted, no edema  SKIN: no suspicious lesions or rashes  NEURO: Normal strength and tone, mentation intact and speech normal  PSYCH: mentation appears normal, affect normal/bright    ASSESSMENT/PLAN:   1. Routine general medical examination at a health care facility    - Glucose    2. Hyperlipidemia LDL goal <130  The current medical regimen is effective;  continue present plan and medications.   - fenofibrate 160 MG tablet; TAKE 1 TABLET DAILY (DUE FOR APPOINTMENT NOW)  Dispense: 90 tablet; Refill: PRN  - atorvastatin (LIPITOR) 20 MG tablet; Take 1 tablet (20 mg) by mouth daily  Dispense: 90 tablet; Refill: PRN  - Lipid panel reflex to direct LDL " "Fasting    COUNSELING:   Reviewed preventive health counseling, as reflected in patient instructions         reports that he has never smoked. His smokeless tobacco use includes Chew.  Tobacco Cessation Action Plan: Self help information given to patient  Estimated body mass index is 29.89 kg/(m^2) as calculated from the following:    Height as of this encounter: 5' 9\" (1.753 m).    Weight as of this encounter: 202 lb 6 oz (91.8 kg).   Weight management plan: Discussed healthy diet and exercise guidelines and patient will follow up in 12 months in clinic to re-evaluate.    Counseling Resources:  ATP IV Guidelines  Pooled Cohorts Equation Calculator  FRAX Risk Assessment  ICSI Preventive Guidelines  Dietary Guidelines for Americans, 2010  USDA's MyPlate  ASA Prophylaxis  Lung CA Screening    Belem Joshua NP  [facility]  Answers for HPI/ROS submitted by the patient on 12/21/2017   PHQ-2 Score: 0    "

## 2017-12-21 NOTE — MR AVS SNAPSHOT
After Visit Summary   12/21/2017    Alpesh Winston    MRN: [id number]           Patient Information     Date Of Birth          1970        Visit Information        Provider Department      12/21/2017 8:30 AM Belem oJshua NP [facility]        Today's Diagnoses     Routine general medical examination at a health care facility    -  1    Hyperlipidemia LDL goal <130          Care Instructions      Preventive Health Recommendations  Male Ages 40 to 49    Yearly exam:             See your health care provider every year in order to  o   Review health changes.   o   Discuss preventive care.    o   Review your medicines if your doctor has prescribed any.    You should be tested each year for STDs (sexually transmitted diseases) if you re at risk.     Have a cholesterol test every 5 years.     Have a colonoscopy (test for colon cancer) if someone in your family has had colon cancer or polyps before age 50.     After age 45, have a diabetes test (fasting glucose). If you are at risk for diabetes, you should have this test every 3 years.      Talk with your health care provider about whether or not a prostate cancer screening test (PSA) is right for you.    Shots: Get a flu shot each year. Get a tetanus shot every 10 years.     Nutrition:    Eat at least 5 servings of fruits and vegetables daily.     Eat whole-grain bread, whole-wheat pasta and brown rice instead of white grains and rice.     Talk to your provider about Calcium and Vitamin D.     Lifestyle    Exercise for at least 150 minutes a week (30 minutes a day, 5 days a week). This will help you control your weight and prevent disease.     Limit alcohol to one drink per day.     No smoking.     Wear sunscreen to prevent skin cancer.     See your dentist every six months for an exam and cleaning.              Follow-ups after your visit        Your next 10 appointments already scheduled     Jan 29, 2018  9:30 AM CST  "  (Arrive by 9:15 AM)   Return Visit with Grisel Arriaga PA-C   [facility] ([facility])    [address]  [address] [facility] [address] [phone]              Who to contact     If you have questions or need follow up information about today's clinic visit or your schedule please contact [facility] directly at [phone].  Normal or non-critical lab and imaging results will be communicated to you by [employer], letter or phone within 4 business days after the clinic has received the results. If you do not hear from us within 7 days, please contact the clinic through Mungohart or phone. If you have a critical or abnormal lab result, we will notify you by phone as soon as possible.  Submit refill requests through [employer] or call your pharmacy and they will forward the refill request to us. Please allow 3 business days for your refill to be completed.          Additional Information About Your Visit        M[employer]ohart Information     [employer] gives you secure access to your electronic health record. If you see a primary care provider, you can also send messages to your care team and make appointments. If you have questions, please call your primary care clinic.  If you do not have a primary care provider, please call [phone] and they will assist you.        Care EveryWhere ID     This is your Care EveryWhere ID. This could be used by other organizations to access your [facility] medical records  [id number]        Your Vitals Were     Pulse Temperature Respirations Height Pulse Oximetry BMI (Body Mass Index)    64 98.3  F (36.8  C) (Oral) 18 5' 9\" (1.753 m) 98% 29.89 kg/m2       Blood Pressure from Last 3 Encounters:   12/21/17 126/78   11/27/17 135/90   09/27/17 118/83    Weight from Last 3 Encounters:   12/21/17 202 lb 6 oz (91.8 kg)   11/27/17 195 lb (88.5 kg)   09/27/17 200 lb (90.7 kg)              We " Performed the Following     Glucose     Lipid panel reflex to direct LDL Fasting          Today's Medication Changes          These changes are accurate as of: 12/21/17  9:23 AM.  If you have any questions, ask your nurse or doctor.               These medicines have changed or have updated prescriptions.        Dose/Directions    fluticasone 50 MCG/ACT spray   Commonly known as:  FLONASE   This may have changed:    - when to take this  - reasons to take this   Used for:  Acute sinusitis with coexisting condition requiring prophylactic treatment        Dose:  1-2 spray   Spray 1-2 sprays into both nostrils daily   Quantity:  1 Bottle   Refills:  11            Where to get your medicines      These medications were sent to [facility] - [place], [place] - [address] [place] [address]  [address] [facility] [address]     Phone:  [phone]     atorvastatin 20 MG tablet    fenofibrate 160 MG tablet                Primary Care Provider Office Phone # Fax #    Belem PAN TANG Joshua [phone] [address] [facility] [address]        Equal Access to Services     ALANA ROGERS AH: Hadii aad ku hadasho Soomaali, waaxda luqadaha, qaybta kaalmada adeegyada, waxay idiin haycalvinn anneliese koo . So [facility] [phone].    ATENCIÓN: Si habla español, tiene a capser disposición servicios gratuitos de asistencia lingüística. [facility] [phone].    We comply with applicable federal civil rights laws and [place] laws. We do not discriminate on the basis of race, color, national origin, age, disability, sex, sexual orientation, or gender identity.            Thank you!     Thank you for choosing [facility]  for your care. Our goal is always to provide you with excellent care. Hearing back from our patients is one way we can continue to improve our services. Please take a few minutes to complete the written survey that you may receive in the mail after your visit with  us. Thank you!             Your Updated Medication List - Protect others around you: Learn how to safely use, store and throw away your medicines at www.disposemymeds.org.          This list is accurate as of: 12/21/17  9:23 AM.  Always use your most recent med list.                   Brand Name Dispense Instructions for use Diagnosis    atorvastatin 20 MG tablet    LIPITOR    90 tablet    Take 1 tablet (20 mg) by mouth daily    Hyperlipidemia LDL goal <130       COENZYME Q-10 PO      Take 20 mg by mouth daily        doxycycline 100 MG capsule    VIBRAMYCIN    60 capsule    Take 1 capsule (100 mg) by mouth 2 times daily    Prostatitis, acute       fenofibrate 160 MG tablet     90 tablet    TAKE 1 TABLET DAILY (DUE FOR APPOINTMENT NOW)    Hyperlipidemia LDL goal <130       FISH OIL PO           fluticasone 50 MCG/ACT spray    FLONASE    1 Bottle    Spray 1-2 sprays into both nostrils daily    Acute sinusitis with coexisting condition requiring prophylactic treatment       tamsulosin 0.4 MG capsule    FLOMAX    30 capsule    Take 1 capsule (0.4 mg) by mouth daily    Prostatitis, acute

## 2018-01-09 ENCOUNTER — PRE VISIT (OUTPATIENT)
Dept: UROLOGY | Facility: CLINIC | Age: 48
End: 2018-01-09

## 2018-01-09 NOTE — TELEPHONE ENCOUNTER
Patient is coming in to see Grisel Arriaga PA-C for a 2 month f/u for  perineal pain, urinary hesitancy, no need for a call.

## 2018-01-29 ENCOUNTER — OFFICE VISIT (OUTPATIENT)
Dept: UROLOGY | Facility: CLINIC | Age: 48
End: 2018-01-29
Payer: COMMERCIAL

## 2018-01-29 VITALS
SYSTOLIC BLOOD PRESSURE: 133 MMHG | WEIGHT: 195 LBS | DIASTOLIC BLOOD PRESSURE: 85 MMHG | HEART RATE: 66 BPM | HEIGHT: 69 IN | BODY MASS INDEX: 28.88 KG/M2

## 2018-01-29 DIAGNOSIS — R39.11 URINARY HESITANCY: Primary | ICD-10-CM

## 2018-01-29 DIAGNOSIS — R33.9 INCOMPLETE BLADDER EMPTYING: ICD-10-CM

## 2018-01-29 ASSESSMENT — PAIN SCALES - GENERAL: PAINLEVEL: MILD PAIN (3)

## 2018-01-29 NOTE — PROGRESS NOTES
"UROLOGY OFFICE VISIT - FOLLOW UP    REASON FOR VISIT: follow up prostatitis/LUTS    HPI: Mr. Alpesh Winston is a 48 year old male who returns for follow up of prostatitis and LUTS. Seen in initial consultation on 11/27/17 - please see consult note from this date for full history. In brief, he first developed symptoms over the summer of 2017 including urinary frequency, urgency, and dysuria. Was given 7 days of Doxycycline and symptoms resolved. After stopping antibiotics, his symptoms recurred. When seen in November, his main complaints included perineal pain/pressure, urinary hesitancy, intermittency, slow stream, urgency, and intermittent dysuria. He was therefore started on Doxycycline x 4 weeks and Flomax. Returns to clinic today and reports symptoms are unchanged. He does not feel the antibiotics helped at all. He does feel the Flomax was helping things, but he stopped it after the 30 days ran out. His main complaints today include incomplete bladder emptying, hesitancy, and urgency. Denies dysuria, hematuria, or penile discharge.     PEx  /85  Pulse 66  Ht 1.753 m (5' 9\")  Wt 88.5 kg (195 lb)  BMI 28.8 kg/m2  GEN: well-appearing male in NAD  RESP: no increased respiratory effort    PSA   Date Value Ref Range Status   09/27/2017 1.39 0 - 4 ug/L Final     Comment:     Assay Method:  Chemiluminescence using Siemens Vista analyzer       Color Urine (no units)   Date Value   09/27/2017 Yellow     Appearance Urine (no units)   Date Value   09/27/2017 Clear     Glucose Urine (mg/dL)   Date Value   09/27/2017 Negative     Bilirubin Urine (no units)   Date Value   09/27/2017 Negative     Ketones Urine (mg/dL)   Date Value   09/27/2017 Negative     Specific Gravity Urine (no units)   Date Value   09/27/2017 1.020     pH Urine (pH)   Date Value   09/27/2017 6.0     Protein Albumin Urine (mg/dL)   Date Value   09/27/2017 Negative     Urobilinogen Urine (EU/dL)   Date Value   09/27/2017 0.2     Nitrite " Urine (no units)   Date Value   09/27/2017 Negative     Leukocyte Esterase Urine (no units)   Date Value   09/27/2017 Negative         ASSESSMENT/PLAN  48 year old male with LUTS - hesitancy, incomplete emptying, urgency. Was initially treated for presumed prostatitis but this did not help at all. He does find the Flomax helpful. We discussed continuing on Flomax versus pursuing additional evaluation with urodynamics +/- cystoscopy. The patient elects to proceed with additional testing.  -Schedule video urodynamics next available. Pending results, consider referral to urologist to discuss outlet procedure vs other recommendation.  -Resume Flomax 0.4 mg daily. Refills available at the pharmacy.    20 minutes were spent with the patient, >50% in counseling and coordination of care.    Grisel Arriaga PA-C  Department of Urology

## 2018-01-29 NOTE — LETTER
"1/29/2018       RE: Alpesh Winston  [address] [place] [address]     Dear Colleague,    Thank you for referring your patient, Alpesh Winston, to the [facility] UROLOGY AND INST FOR PROSTATE AND UROLOGIC CANCERS at [facility]. Please see a copy of my visit note below.    UROLOGY OFFICE VISIT - FOLLOW UP    REASON FOR VISIT: follow up prostatitis/LUTS    HPI: Mr. Alpesh Winston is a 48 year old male who returns for follow up of prostatitis and LUTS. Seen in initial consultation on 11/27/17 - please see consult note from this date for full history. In brief, he first developed symptoms over the summer of 2017 including urinary frequency, urgency, and dysuria. Was given 7 days of Doxycycline and symptoms resolved. After stopping antibiotics, his symptoms recurred. When seen in November, his main complaints included perineal pain/pressure, urinary hesitancy, intermittency, slow stream, urgency, and intermittent dysuria. He was therefore started on Doxycycline x 4 weeks and Flomax. Returns to clinic today and reports symptoms are unchanged. He does not feel the antibiotics helped at all. He does feel the Flomax was helping things, but he stopped it after the 30 days ran out. His main complaints today include incomplete bladder emptying, hesitancy, and urgency. Denies dysuria, hematuria, or penile discharge.     PEx  /85  Pulse 66  Ht 1.753 m (5' 9\")  Wt 88.5 kg (195 lb)  BMI 28.8 kg/m2  GEN: well-appearing male in NAD  RESP: no increased respiratory effort    PSA   Date Value Ref Range Status   09/27/2017 1.39 0 - 4 ug/L Final     Comment:     Assay Method:  Chemiluminescence using Siemens Vista analyzer       Color Urine (no units)   Date Value   09/27/2017 Yellow     Appearance Urine (no units)   Date Value   09/27/2017 Clear     Glucose Urine (mg/dL)   Date Value   09/27/2017 Negative     Bilirubin Urine (no units)   Date Value "   09/27/2017 Negative     Ketones Urine (mg/dL)   Date Value   09/27/2017 Negative     Specific Gravity Urine (no units)   Date Value   09/27/2017 1.020     pH Urine (pH)   Date Value   09/27/2017 6.0     Protein Albumin Urine (mg/dL)   Date Value   09/27/2017 Negative     Urobilinogen Urine (EU/dL)   Date Value   09/27/2017 0.2     Nitrite Urine (no units)   Date Value   09/27/2017 Negative     Leukocyte Esterase Urine (no units)   Date Value   09/27/2017 Negative         ASSESSMENT/PLAN  48 year old male with LUTS - hesitancy, incomplete emptying, urgency. Was initially treated for presumed prostatitis but this did not help at all. He does find the Flomax helpful. We discussed continuing on Flomax versus pursuing additional evaluation with urodynamics +/- cystoscopy. The patient elects to proceed with additional testing.  -Schedule video urodynamics next available. Pending results, consider referral to urologist to discuss outlet procedure vs other recommendation.  -Resume Flomax 0.4 mg daily. Refills available at the pharmacy.    20 minutes were spent with the patient, >50% in counseling and coordination of care.      Again, thank you for allowing me to participate in the care of your patient.      Sincerely,    Grisel Arriaga PA-C

## 2018-01-29 NOTE — MR AVS SNAPSHOT
After Visit Summary   1/29/2018    Alpesh Winston    MRN: [id number]           Patient Information     Date Of Birth          1970        Visit Information        Provider Department      1/29/2018 9:30 AM Grisel Arriaga PA-C [facility] Urology and [facility]state [facility] Urologic C[facility]        Today's Diagnoses     Urinary hesitancy    -  1    Incomplete bladder emptying          Care Instructions    UROLOGY CLINIC VISIT PATIENT INSTRUCTIONS    1) Schedule urodynamics next available.     2) Restart the Flomax (tamsulosin) 0.4 mg once daily in the evenings. Refills are available at the pharmacy.      If you have any issues, questions or concerns in the meantime, do not hesitate to contact us at [phone] or via [employer].     It was a pleasure meeting with you today.  Thank you for allowing me and my team the privilege of caring for you today.  YOU are the reason we are here, and I truly hope we provided you with the excellent service you deserve.  Please let us know if there is anything else we can do for you so that we can be sure you are leaving completely satisfied with your care experience.           Urodynamic Testing  What You Need to Know  What is urodynamic testing?  Urodynamic testing is the best way for your doctor to look at the function of your bladder. It is like an EKG, which is used to look at the function of your heart.  The test takes between 60 and 90 minutes. You will spend about 11/2 to 2 hours in your doctor s office. For most patients, the test is not painful.  How should I get ready for this test?   Arrive with a full bladder, if you are able. (Try to drink six 8-ounce glasses of liquid one hour before your exam.)    You may want to wear loose clothing.   Make sure your skin below the waist is clean and dry (no lotions or powders).   If you have any of the following symptoms before the test, please call the clinic right away:Having to urinate more often or feeling  a sudden urge to go   Feeling pain or burning when you urinate   Fever or chills   Smelly or cloudy urine      If we gave you a bladder diary, please complete and bring it with you.    What happens during this test?  1. You will empty your bladder into a special toilet. The toilet measures your rate of urine flow.  2. We will then place a very small tube (catheter) into your bladder. This drains any urine that is left. We will measure the amount of urine.  3. We will place a small tube in your rectum. We ll also put a tiny patch of electrodes on the skin near the anus.  4. A computer will measure the pressure in your bladder and how well your sphincter is working. (The sphincter is the muscle that controls the bladder.)  5. Your bladder will slowly fill with a mixture of saline and contrast medium. We will take images, similar to an Xray. You will tell us when your bladder feels a bit full, quite full and completely full.  6. We will ask you to bear down several times. As you do, we will check for leaking urine.  7. You will again empty your bladder into the special toilet.    What happens after the test?  Your doctor will share the results on the day of the exam, or soon after.  After your test, you may go about your day as normal. You may notice some blood in your urine. You may feel a more urgent need to use the toilet, or you may need to go more often. These effects should improve in the next few days.          Follow-ups after your visit        Your next 10 appointments already scheduled     Mar 22, 2018  9:00 AM CDT   (Arrive by 8:45 AM)   Urodynamics with Grisel Arriaga PA-C   [facility] Urology and [facility]rologic [facility] ([facility])    [address]  [address] [place] [address] [phone]              Who to contact     Please call your clinic at [phone] to:    Ask questions about your health    Make or cancel appointments    Discuss your  "medicines    Learn about your test results    Speak to your doctor   If you have compliments or concerns about an experience at your clinic, or if you wish to file a complaint, please contact [facility] Patient Relations at [phone] or email us at [email or web address]         Additional Information About Your Visit        [employer] Information     [employer] gives you secure access to your electronic health record. If you see a primary care provider, you can also send messages to your care team and make appointments. If you have questions, please call your primary care clinic.  If you do not have a primary care provider, please call [phone] and they will assist you.      [employer] is an electronic gateway that provides easy, online access to your medical records. With [employer], you can request a clinic appointment, read your test results, renew a prescription or communicate with your care team.     To access your existing account, please contact your [facility] or call [phone] for assistance.        Care EveryWhere ID     This is your Care EveryWhere ID. This could be used by other organizations to access your [place] medical records  [id number]        Your Vitals Were     Pulse Height BMI (Body Mass Index)             66 1.753 m (5' 9\") 28.8 kg/m2          Blood Pressure from Last 3 Encounters:   01/29/18 133/85   12/21/17 126/78   11/27/17 135/90    Weight from Last 3 Encounters:   01/29/18 88.5 kg (195 lb)   12/21/17 91.8 kg (202 lb 6 oz)   11/27/17 88.5 kg (195 lb)              Today, you had the following     No orders found for display         Today's Medication Changes          These changes are accurate as of 1/29/18 10:01 AM.  If you have any questions, ask your nurse or doctor.               These medicines have changed or have updated prescriptions.        Dose/Directions    fluticasone 50 MCG/ACT spray   Commonly known as:  " FLONASE   This may have changed:    - when to take this  - reasons to take this   Used for:  Acute sinusitis with coexisting condition requiring prophylactic treatment        Dose:  1-2 spray   Spray 1-2 sprays into both nostrils daily   Quantity:  1 Bottle   Refills:  11                Primary Care Provider Office Phone # Fax #    Belem Joshua [phone] [address] [facility] [address]        Equal Access to Services     JOSÉ ROGERS : Hadii aad ku hadasho Soomaali, waaxda luqadaha, qaybta kaalmada adeegyada, waxay idiin hayaan adeeg gracieramonadonna lagrace . So [facility] [phone].    ATENCIÓN: Si debbiela español, tiene a casper disposición servicios gratuitos de asistencia lingüística. [facility] [phone].    We comply with applicable federal civil rights laws and [place] laws. We do not discriminate on the basis of race, color, national origin, age, disability, sex, sexual orientation, or gender identity.            Thank you!     Thank you for choosing [facility] UROLOGY AND [facility] FOR PROSTATE AND UROLOGIC CANCERS  for your care. Our goal is always to provide you with excellent care. Hearing back from our patients is one way we can continue to improve our services. Please take a few minutes to complete the written survey that you may receive in the mail after your visit with us. Thank you!             Your Updated Medication List - Protect others around you: Learn how to safely use, store and throw away your medicines at www.disposemymeds.org.          This list is accurate as of 1/29/18 10:01 AM.  Always use your most recent med list.                   Brand Name Dispense Instructions for use Diagnosis    atorvastatin 20 MG tablet    LIPITOR    90 tablet    Take 1 tablet (20 mg) by mouth daily    Hyperlipidemia LDL goal <130       COENZYME Q-10 PO      Take 20 mg by mouth daily        fenofibrate 160 MG tablet     90 tablet    TAKE 1 TABLET DAILY (DUE FOR APPOINTMENT NOW)     Hyperlipidemia LDL goal <130       FISH OIL PO           fluticasone 50 MCG/ACT spray    FLONASE    1 Bottle    Spray 1-2 sprays into both nostrils daily    Acute sinusitis with coexisting condition requiring prophylactic treatment       tamsulosin 0.4 MG capsule    FLOMAX    30 capsule    Take 1 capsule (0.4 mg) by mouth daily    Prostatitis, acute

## 2018-01-29 NOTE — PATIENT INSTRUCTIONS
UROLOGY CLINIC VISIT PATIENT INSTRUCTIONS    1) Schedule urodynamics next available.     2) Restart the Flomax (tamsulosin) 0.4 mg once daily in the evenings. Refills are available at the pharmacy.      If you have any issues, questions or concerns in the meantime, do not hesitate to contact us at [phone] or via [employer].     It was a pleasure meeting with you today.  Thank you for allowing me and my team the privilege of caring for you today.  YOU are the reason we are here, and I truly hope we provided you with the excellent service you deserve.  Please let us know if there is anything else we can do for you so that we can be sure you are leaving completely satisfied with your care experience.           Urodynamic Testing  What You Need to Know  What is urodynamic testing?  Urodynamic testing is the best way for your doctor to look at the function of your bladder. It is like an EKG, which is used to look at the function of your heart.  The test takes between 60 and 90 minutes. You will spend about 11/2 to 2 hours in your doctor s office. For most patients, the test is not painful.  How should I get ready for this test?   Arrive with a full bladder, if you are able. (Try to drink six 8-ounce glasses of liquid one hour before your exam.)    You may want to wear loose clothing.   Make sure your skin below the waist is clean and dry (no lotions or powders).   If you have any of the following symptoms before the test, please call the clinic right away:Having to urinate more often or feeling a sudden urge to go   Feeling pain or burning when you urinate   Fever or chills   Smelly or cloudy urine      If we gave you a bladder diary, please complete and bring it with you.    What happens during this test?  1. You will empty your bladder into a special toilet. The toilet measures your rate of urine flow.  2. We will then place a very small tube (catheter) into your bladder. This drains any urine that is left. We will  measure the amount of urine.  3. We will place a small tube in your rectum. We ll also put a tiny patch of electrodes on the skin near the anus.  4. A computer will measure the pressure in your bladder and how well your sphincter is working. (The sphincter is the muscle that controls the bladder.)  5. Your bladder will slowly fill with a mixture of saline and contrast medium. We will take images, similar to an Xray. You will tell us when your bladder feels a bit full, quite full and completely full.  6. We will ask you to bear down several times. As you do, we will check for leaking urine.  7. You will again empty your bladder into the special toilet.    What happens after the test?  Your doctor will share the results on the day of the exam, or soon after.  After your test, you may go about your day as normal. You may notice some blood in your urine. You may feel a more urgent need to use the toilet, or you may need to go more often. These effects should improve in the next few days.

## 2018-02-15 ENCOUNTER — PRE VISIT (OUTPATIENT)
Dept: UROLOGY | Facility: CLINIC | Age: 48
End: 2018-02-15

## 2018-03-20 ENCOUNTER — TELEPHONE (OUTPATIENT)
Dept: FAMILY MEDICINE | Facility: CLINIC | Age: 48
End: 2018-03-20

## 2018-03-20 DIAGNOSIS — E78.5 HYPERLIPIDEMIA LDL GOAL <130: Primary | ICD-10-CM

## 2018-03-20 NOTE — TELEPHONE ENCOUNTER
Per  patient is to get CBC ever 1 yr. Order placed for upcoming labs for patient.     Thanks! Anna Marie Guerrero RN

## 2018-03-20 NOTE — TELEPHONE ENCOUNTER
Reason for Call: Request for an order or referral:    Order or referral being requested: CBC Q1 yr    Date needed: before my next appointment - 3-21    Has the patient been seen by the PCP for this problem? YES    Additional comments: Patient is coming in on 3/21 for labs and is requesting for CBC Q1 yr per  Reminders. Please order any labs that pt may need also. Thank you!    Phone number Patient can be reached at:  Home number on file [phone] (home)    Best Time:  anytime    Can we leave a detailed message on this number?  NO    Call taken on 3/20/2018 at 7:43 AM by Mitzy Watson

## 2018-03-21 DIAGNOSIS — E78.5 HYPERLIPIDEMIA LDL GOAL <130: ICD-10-CM

## 2018-03-21 DIAGNOSIS — R73.09 ELEVATED GLUCOSE: ICD-10-CM

## 2018-03-21 LAB
ERYTHROCYTE [DISTWIDTH] IN BLOOD BY AUTOMATED COUNT: 12.1 % (ref 10–15)
GLUCOSE SERPL-MCNC: 104 MG/DL (ref 70–99)
HBA1C MFR BLD: 5.7 % (ref 4.3–6)
HCT VFR BLD AUTO: 45.7 % (ref 40–53)
HGB BLD-MCNC: 15.3 G/DL (ref 13.3–17.7)
MCH RBC QN AUTO: 31.2 PG (ref 26.5–33)
MCHC RBC AUTO-ENTMCNC: 33.5 G/DL (ref 31.5–36.5)
MCV RBC AUTO: 93 FL (ref 78–100)
PLATELET # BLD AUTO: 290 10E9/L (ref 150–450)
RBC # BLD AUTO: 4.9 10E12/L (ref 4.4–5.9)
WBC # BLD AUTO: 6.5 10E9/L (ref 4–11)

## 2018-03-21 PROCEDURE — 82947 ASSAY GLUCOSE BLOOD QUANT: CPT | Performed by: NURSE PRACTITIONER

## 2018-03-21 PROCEDURE — 83036 HEMOGLOBIN GLYCOSYLATED A1C: CPT | Performed by: NURSE PRACTITIONER

## 2018-03-21 PROCEDURE — 36415 COLL VENOUS BLD VENIPUNCTURE: CPT | Performed by: NURSE PRACTITIONER

## 2018-03-21 PROCEDURE — 85027 COMPLETE CBC AUTOMATED: CPT | Performed by: NURSE PRACTITIONER

## 2018-03-22 ENCOUNTER — OFFICE VISIT (OUTPATIENT)
Dept: UROLOGY | Facility: CLINIC | Age: 48
End: 2018-03-22
Payer: COMMERCIAL

## 2018-03-22 ENCOUNTER — PRE VISIT (OUTPATIENT)
Dept: UROLOGY | Facility: CLINIC | Age: 48
End: 2018-03-22

## 2018-03-22 ENCOUNTER — RADIANT APPOINTMENT (OUTPATIENT)
Dept: RADIOLOGY | Facility: AMBULATORY SURGERY CENTER | Age: 48
End: 2018-03-22
Attending: PHYSICIAN ASSISTANT
Payer: COMMERCIAL

## 2018-03-22 VITALS
SYSTOLIC BLOOD PRESSURE: 145 MMHG | HEIGHT: 70 IN | DIASTOLIC BLOOD PRESSURE: 99 MMHG | HEART RATE: 73 BPM | BODY MASS INDEX: 29.22 KG/M2 | WEIGHT: 204.1 LBS

## 2018-03-22 DIAGNOSIS — R39.198 URINARY DYSFUNCTION: ICD-10-CM

## 2018-03-22 DIAGNOSIS — R35.1 NOCTURIA: ICD-10-CM

## 2018-03-22 DIAGNOSIS — R39.11 URINARY HESITANCY: Primary | ICD-10-CM

## 2018-03-22 DIAGNOSIS — R33.9 INCOMPLETE BLADDER EMPTYING: ICD-10-CM

## 2018-03-22 LAB
APPEARANCE UR: CLEAR
BILIRUB UR QL: NORMAL
COLOR UR: YELLOW
GLUCOSE URINE: NORMAL MG/DL
HGB UR QL: NORMAL
KETONES UR QL: NORMAL MG/DL
LEUKOCYTE ESTERASE URINE: NORMAL
NITRITE UR QL STRIP: NORMAL
PH UR STRIP: 5.5 PH (ref 5–7)
PROTEIN ALBUMIN URINE: NORMAL MG/DL
SOURCE: NORMAL
SP GR UR STRIP: 1 (ref 1–1.03)
UROBILINOGEN UR QL STRIP: 0.2 EU/DL (ref 0.2–1)

## 2018-03-22 ASSESSMENT — PAIN SCALES - GENERAL: PAINLEVEL: NO PAIN (0)

## 2018-03-22 NOTE — LETTER
3/22/2018       RE: Alpesh Winston  [address] [place] [address]     Dear Colleague,    Thank you for referring your patient, Alpesh Winston, to the [facility] UROLOGY AND INST FOR PROSTATE AND UROLOGIC CANCERS at [facility]. Please see a copy of my visit note below.    PREPROCEDURE DIAGNOSES:    1. LUTS including hesitancy, incomplete emptying, urgency, nocturia    POSTPROCEDURE DIAGNOSES:  1. Suggestion for possible bladder outlet obstruction  2. Same as above.  3. UDS shows:  -Normal bladder compliance  -Normal bladder capacity   -Sensations: normal  -Incontinence: no DYAN or DOI  -Detrusor activity during filling: questionable very mild DO without incontinence. Suspect this may be artifact from diaphragmatic breathing/movement of Pabd baseline.  -Detrusor activity during voiding: strong detrusor contraction to 48.7 cm H2O  -Flow: slow flow (Qmax 13.2 ml/s) with intermittent voiding curve and incomplete emptying ( mL initially). He does void a second time with all catheters removed, still with a slow flow (Qmax 16.5 ml/s) and obstructed-appearing voiding curve.  -Detrusor sphincter dyssenergia: very mild increased EMG activity, most likely secondary to abdominal straining/voiding dysfunction rather than true DSD in this otherwise neurologically intact patient.   -Outlet obstruction: BOOI is 11.7, not strongly suggestive of outlet obstruction. However, high pressure/low flow voiding pattern is more suggestive of a possible WATKINS.  -Fluoroscopy reveals a smooth-walled bladder without diverticuli or VUR. Bladder neck is closed during filling and open during voiding.  -SUMMARY: possible evidence for WATKINS. Will refer to Dr. Mensah to consider cystoscopic evaluation and discussion for outlet procedure if indicated. Continue Flomax daily for now. Consider pelvic floor PT in the future as well (has some left inguinal pain, likely 2/2 previous hernia  repairs x 2 on the left).    PROCEDURE:    1. Uroflowmetry.  2. Sterile urethral catheterization for measurement of postvoid residual urine volume.  3. Complex filling cystometrogram with measurement of bladder and rectal pressures.  4. Complex voiding cystometrogram with measurement of bladder and rectal pressures.  5. Electromyography of the pelvic floor during urodynamics.  6. Fluoroscopic imaging of the bladder during urodynamics, at least 3 views.    7. Interpretation of urodynamics and flouroscopic imaging.      INDICATIONS FOR PROCEDURE:  Mr. Alpesh Winston is a pleasant 48 year old male with LUTS including urinary hesitancy, incomplete emptying, and urgency. Treatment for prostatitis resulted in no improvement. Flomax helps slightly but he remains bothered by his current symptoms. Baseline video urodynamic assessment is requested today to better characterize Mr. Alpesh Winston's voiding dysfunction.      VOIDING DIARY:  Voids every 1-2 hours during the day, nocturia x 3-4 (usually right in the beginning of sleep).  Episodes of incontinence: none.  Incontinence associated with: n/a.  Total Volume Intake: 2734 mL; 20-30 oz of coffee, the rest green tea or water.  Total Volume Output: 2130 mL; average voided volume 162 mL, largest voided volume 325 mL.    DESCRIPTION OF PROCEDURE:  Risks, benefits, and alternatives to urodynamics were discussed with the patient and he wished to proceed.  Urodynamics are planned to better assess the primary etiology for Mr. Winston's urologic dysfunction.  The patient does not currently take anticholinergic medications for his bladder. He does take Flomax 0.4 mg daily.  After informed consent was obtained, the patient was taken to the procedure room where uroflowmetry was performed. Findings below.     PRE-STUDY UROFLOWMETRY:  Voided volume: 75 mL.  Maximum flow rate: 6.8 mL/sec.  Average flow rate: 2.7 mL/sec.  Character of the curve: blunted  bell-shape.  Postvoid residual by catheter: 90 mL.  Pretest urine dipstick was negative for leukocytes and nitrites.    Next a 7F double-lumen urodynamics catheter was inserted into the bladder under sterile technique via native urethra.  A 7F abdominal manometry catheter was placed in the rectum.  EMG pads were placed on both sides of the anal verge.  The bladder was filled with 200 mL of Cystografin at 25 mL/minute and serial pressures were recorded.  With coughing there was an appropriate rise in vesical and abdominal pressures with no change in detrusor pressure, confirming good study catheter placement.    DURING THE FILLING PHASE:  First sensation: 232 mL.  First Desire: 285 mL.  Strong Desire: 575 mL.  Maximum Capacity: 722 mL    Uninhibited detrusor contractions: questionable very mild DO without incontinence. Suspect this may be artifact from diaphragmatic breathing/movement of Pabd baseline.  Compliance: normal. PDet=7 cmH20 at capacity.  Continence: no DYAN or DOI  EMG: concordant during filling.    DURING THE VOIDING PHASE:  Maximum detrusor contraction with void: 48.7 cm of H2O pressure.  Voided volume: ~500 mL (uroflow recorded 442 mL, ~50 mL missed the funnel)  Maximum flow rate: 13.2 mL/sec.  Average flow rate: 3.9 mL/sec.  Postvoid Residual: 280 mL.  Detrusor sphincter dyssynergia: very mild increased EMG activity, most likely secondary to abdominal straining/pelvic floor dysfunction rather than true DSD in this otherwise neurologically intact patient.   Character of voiding curve: intermittent.  BOOI: 11.7 (suggesting no obstruction - see key below)  [obstructed (WATKINS index [BOOI] ? 40); equivocal (no definite   obstruction; BOOI 20-40); and no obstruction (BOOI ? 20)]    After voiding to sense of completion on the Validas chair, all catheters were then removed. He then expressed desire to void again so was brought to private uroflow where he was able to void with the following data:     Voided  volume: 242 mL  Maximum flow rate: 16.5 mL/sec.  Average flow rate: 8.0 mL/sec.  Postvoid Residual: 0 mL.  Character of voiding curve: blunted bell-shape    FLUOROSCOPIC IMAGING OF THE BLADDER DURING URODYNAMICS:  Fluoroscopy during today's procedure demonstrated a smooth walled bladder without diverticulae or cellules.  No vesicoureteral reflux was observed.  The bladder neck was closed during filling and open during voiding.  After voiding to completion, all catheters were removed and the patient was brought back into the consultation room to further discuss today's study results.      ASSESSMENT/PLAN:  Mr. Alpesh Winston is a pleasant 48 year old male with LUTS including urinary hesitancy, incomplete emptying, and urgency who demonstrated the following findings today on urodynamic evaluation:    -Normal bladder compliance  -Normal bladder capacity   -Sensations: normal  -Incontinence: no DYAN or DOI  -Detrusor activity during filling: questionable very mild DO without incontinence. Suspect this may be artifact from diaphragmatic breathing/movement of Pabd baseline.  -Detrusor activity during voiding: strong detrusor contraction to 48.7 cm H2O  -Flow: slow flow (Qmax 13.2 ml/s) with intermittent voiding curve and incomplete emptying ( mL initially). He does void a second time with all catheters removed, still with a slow flow (Qmax 16.5 ml/s) and obstructed-appearing voiding curve.  -Detrusor sphincter dyssenergia: very mild increased EMG activity, most likely secondary to abdominal straining/voiding dysfunction rather than true DSD in this otherwise neurologically intact patient.   -Outlet obstruction: BOOI is 11.7, not strongly suggestive of outlet obstruction. However, high pressure/low flow voiding pattern is more suggestive of a possible WATKINS.    We discussed his study results in detail today. There is some suggestion for a possible bladder outlet obstruction. Discussed management options including  continuing with Flomax daily and limiting bladder irritants, referral to pelvic floor PT, trial of a low dose anticholinergic qhs, or referral to urologist for possible cystoscopy/further discussion regarding bladder outlet procedure. Patient elects for the latter.  -Follow up with Dr. Mensah next available for possible cystoscopy and to further discuss possible outlet procedure if indicated.     - A single Cipro antibiotic was provided for UTI prophylaxis following completion of today's study per department protocol.  The risk of UTI with VUDS is low at ~2.5-3%.      Thank you for allowing me to participate in the care of Mr. Alpesh Winston and please don't hesitate to contact me with any questions or concerns.        Again, thank you for allowing me to participate in the care of your patient.      Sincerely,    Grisel Arriaga PA-C

## 2018-03-22 NOTE — PATIENT INSTRUCTIONS
UROLOGY CLINIC VISIT PATIENT INSTRUCTIONS    Follow up with Dr. Amadeo Mensah next available to further discuss results of today's urodynamic procedure.    He may decide to perform a cystoscopy at that visit to further evaluate the prostate. Below is some information on that office procedure. It would be a good idea to come to that appointment with some food in your stomach.    Continue to take Flomax once daily.    Work on limiting bladder irritants (coffee), especially late into the afternoon/evening.    CYSTOSCOPY    What is a Cystoscopy?  This is a procedure done to check for problems inside the bladder.  Problems may include polyps (growths), tumors, inflammation (swelling and redness) and other concerns.    The doctor inserts a thin tube (called a cystoscope) into the bladder.  The tube is about the size of a pencil.  We will give you numbing medicine to reduce the pain or discomfort you may feel.    The tube allows the doctor to:  The doctor will be able to see inside the bladder by filling the bladder with water.  The water makes it easier to see any problems that may be present.    If needed, the doctor may use the tube to:  The doctor is able to take tissue samples (biopsies).  Samples are sent to the lab for testing.  The doctor can also burn off any small growths or tumors that are found.  This is call fulguration.    How should I get ready for the exam?  To prepare, stop taking any medications as instructed. Ask whether you should avoid eating or drinking anything after midnight before the procedure. Follow any other instructions your doctor gives you.    If you are having this procedure done at the clinic, you will be there for up to an hour.  You will receive care before and after the procedure.    Please tell your doctor if:  - You have a history of urinary tract infections.  - You know that you have a tumor in your bladder.  - You have bleeding problems.  - You have any allergies.  - You are or may  be pregnant.      What happens after the exam?  You may go back to your normal diet and activity as you feel ready, unless your doctor tells you not to.    For the next two days, you may notice:  - Some blood in your urine.  - Some burning when you urinate (use the toilet).  - An urge to urinate more often.  - Bladder spasms.    These are normal after the procedure. They should go away on their own after a day or two.      - You can help to relieve the above listed symptoms by:  - Drinking 6 to 8 large glasses of water each day (includes drinks at meals).  This will help clear the urine.  - Take warm baths to relieve pain and bladder spasms.  Do not add anything to the bath water.  - Your doctor may prescribe pain medicine.  You may also take Tylenol (acetaminophen) for pain.    When should I call my doctor?  - A fever over 100.0 F (38 C) for more than a day.  (Before you call the doctor, check your temperature under your tongue.)  - Chills.  - Failure to urinate: No urine comes out when you try to use the toilet.  (Try soaking in a bathtub full of warm water.  If still no urine, call your doctor.)  - A lot of blood in the urine or blood clots larger than a nickel.  - Pain in the back or abdomen (belly / stomach area).  - Pain or spasms that are not relieved by warm tub baths and pain medicine.  - Severe pain, burning or other problems while passing urine.  - Pain that gets worse after two days.      If you have any issues, questions or concerns in the meantime, do not hesitate to contact us at [phone] or via [employer].     It was a pleasure meeting with you today.  Thank you for allowing me and my team the privilege of caring for you today.  YOU are the reason we are here, and I truly hope we provided you with the excellent service you deserve.  Please let us know if there is anything else we can do for you so that we can be sure you are leaving completely satisfied with your care experience.

## 2018-03-22 NOTE — MR AVS SNAPSHOT
After Visit Summary   3/22/2018    Alpesh Winston    MRN: [id number]           Patient Information     Date Of Birth          1970        Visit Information        Provider Department      3/22/2018 9:00 AM Grisel Arriaga PA-C [facility] Urology and [facility]        Today's Diagnoses     Urinary hesitancy    -  1    Incomplete bladder emptying        Nocturia          Care Instructions    UROLOGY CLINIC VISIT PATIENT INSTRUCTIONS    Follow up with Dr. Amadeo Mensah next available to further discuss results of today's urodynamic procedure.    He may decide to perform a cystoscopy at that visit to further evaluate the prostate. Below is some information on that office procedure. It would be a good idea to come to that appointment with some food in your stomach.    Continue to take Flomax once daily.    Work on limiting bladder irritants (coffee), especially late into the afternoon/evening.    CYSTOSCOPY    What is a Cystoscopy?  This is a procedure done to check for problems inside the bladder.  Problems may include polyps (growths), tumors, inflammation (swelling and redness) and other concerns.    The doctor inserts a thin tube (called a cystoscope) into the bladder.  The tube is about the size of a pencil.  We will give you numbing medicine to reduce the pain or discomfort you may feel.    The tube allows the doctor to:  The doctor will be able to see inside the bladder by filling the bladder with water.  The water makes it easier to see any problems that may be present.    If needed, the doctor may use the tube to:  The doctor is able to take tissue samples (biopsies).  Samples are sent to the lab for testing.  The doctor can also burn off any small growths or tumors that are found.  This is call fulguration.    How should I get ready for the exam?  To prepare, stop taking any medications as instructed. Ask whether you should avoid eating or drinking anything  after midnight before the procedure. Follow any other instructions your doctor gives you.    If you are having this procedure done at the clinic, you will be there for up to an hour.  You will receive care before and after the procedure.    Please tell your doctor if:  - You have a history of urinary tract infections.  - You know that you have a tumor in your bladder.  - You have bleeding problems.  - You have any allergies.  - You are or may be pregnant.      What happens after the exam?  You may go back to your normal diet and activity as you feel ready, unless your doctor tells you not to.    For the next two days, you may notice:  - Some blood in your urine.  - Some burning when you urinate (use the toilet).  - An urge to urinate more often.  - Bladder spasms.    These are normal after the procedure. They should go away on their own after a day or two.      - You can help to relieve the above listed symptoms by:  - Drinking 6 to 8 large glasses of water each day (includes drinks at meals).  This will help clear the urine.  - Take warm baths to relieve pain and bladder spasms.  Do not add anything to the bath water.  - Your doctor may prescribe pain medicine.  You may also take Tylenol (acetaminophen) for pain.    When should I call my doctor?  - A fever over 100.0 F (38 C) for more than a day.  (Before you call the doctor, check your temperature under your tongue.)  - Chills.  - Failure to urinate: No urine comes out when you try to use the toilet.  (Try soaking in a bathtub full of warm water.  If still no urine, call your doctor.)  - A lot of blood in the urine or blood clots larger than a nickel.  - Pain in the back or abdomen (belly / stomach area).  - Pain or spasms that are not relieved by warm tub baths and pain medicine.  - Severe pain, burning or other problems while passing urine.  - Pain that gets worse after two days.      If you have any issues, questions or concerns in the meantime, do not hesitate  to contact us at [phone] or via [facility].     It was a pleasure meeting with you today.  Thank you for allowing me and my team the privilege of caring for you today.  YOU are the reason we are here, and I truly hope we provided you with the excellent service you deserve.  Please let us know if there is anything else we can do for you so that we can be sure you are leaving completely satisfied with your care experience.                Follow-ups after your visit        Your next 10 appointments already scheduled     Apr 17, 2018  7:20 AM CDT   (Arrive by 7:05 AM)   Cystoscopy with Ronni Mensah MD   [facility]rology and [facility] Prostate and Urologic C[facility] ([facility])    [address]  [address] [facility] [address] [phone]              Who to contact     Please call your clinic at [phone] to:    Ask questions about your health    Make or cancel appointments    Discuss your medicines    Learn about your test results    Speak to your doctor            Additional Information About Your Visit        [employer] Information     [facility] gives you secure access to your electronic health record. If you see a primary care provider, you can also send messages to your care team and make appointments. If you have questions, please call your primary care clinic.  If you do not have a primary care provider, please call [phone] and they will assist you.      [facility] is an electronic gateway that provides easy, online access to your medical records. With [facility], you can request a clinic appointment, read your test results, renew a prescription or communicate with your care team.     To access your existing account, please contact your [facility] or call [phone] for assistance.        Care EveryWhere ID     This is your Care EveryWhere ID. This could be used by other organizations to access your [facility]  "records  [id number]        Your Vitals Were     Pulse Height BMI (Body Mass Index)             73 1.778 m (5' 10\") 29.29 kg/m2          Blood Pressure from Last 3 Encounters:   03/22/18 (!) 145/99   01/29/18 133/85   12/21/17 126/78    Weight from Last 3 Encounters:   03/22/18 92.6 kg (204 lb 1.6 oz)   01/29/18 88.5 kg (195 lb)   12/21/17 91.8 kg (202 lb 6 oz)              We Performed the Following     COMPLEX UROFLOWMETRY     CYSTOMETROGRAM COMPLEX W VOIDING PRESS PROFILE     EMG ANAL/URETH SPHINCTER, OTHER THAN NEEDLE     HC CONTRAST ISOVUE 370 MG/ML, PER ML     Urinalysis chemical screen POCT     VOIDING PRESSURE STUDY, INTRA-ABDOMINAL          Today's Medication Changes          These changes are accurate as of 3/22/18 10:52 AM.  If you have any questions, ask your nurse or doctor.               These medicines have changed or have updated prescriptions.        Dose/Directions    fluticasone 50 MCG/ACT spray   Commonly known as:  FLONASE   This may have changed:    - when to take this  - reasons to take this   Used for:  Acute sinusitis with coexisting condition requiring prophylactic treatment        Dose:  1-2 spray   Spray 1-2 sprays into both nostrils daily   Quantity:  1 Bottle   Refills:  11                Primary Care Provider Office Phone # Fax #    Belem Joshua [phone] [facility] [address]        Equal Access to Services     ALANA ROGERS AH: Hadii claudia holbrooko Soluca, waaxda luqadaha, qaybta kaalmada adeegyada, wax jeremias long. So [facility] [place] [phone].    ATENCIÓN: Si habla español, tiene a casper disposición servicios gratuitos de asistencia lingüística. Llame al [phone].    We comply with applicable federal civil rights laws and [place] laws. We do not discriminate on the basis of race, color, national origin, age, disability, sex, sexual orientation, or gender identity.            Thank you!     Thank you for choosing [facility] " [facility] UROLOGY AND [facility] FOR PROSTATE AND UROLOGIC CANCERS  for your care. Our goal is always to provide you with excellent care. Hearing back from our patients is one way we can continue to improve our services. Please take a few minutes to complete the written survey that you may receive in the mail after your visit with us. Thank you!             Your Updated Medication List - Protect others around you: Learn how to safely use, store and throw away your medicines at www.disposemymeds.org.          This list is accurate as of 3/22/18 10:52 AM.  Always use your most recent med list.                   Brand Name Dispense Instructions for use Diagnosis    atorvastatin 20 MG tablet    LIPITOR    90 tablet    Take 1 tablet (20 mg) by mouth daily    Hyperlipidemia LDL goal <130       COENZYME Q-10 PO      Take 20 mg by mouth daily        fenofibrate 160 MG tablet     90 tablet    TAKE 1 TABLET DAILY (DUE FOR APPOINTMENT NOW)    Hyperlipidemia LDL goal <130       FISH OIL PO           fluticasone 50 MCG/ACT spray    FLONASE    1 Bottle    Spray 1-2 sprays into both nostrils daily    Acute sinusitis with coexisting condition requiring prophylactic treatment       tamsulosin 0.4 MG capsule    FLOMAX    30 capsule    Take 1 capsule (0.4 mg) by mouth daily    Prostatitis, acute

## 2018-03-22 NOTE — NURSING NOTE
Chief Complaint   Patient presents with     Urodynamics Study    Dread Michelle LPN   HTN (hypertension)

## 2018-03-22 NOTE — PROGRESS NOTES
PREPROCEDURE DIAGNOSES:    1. LUTS including hesitancy, incomplete emptying, urgency, nocturia    POSTPROCEDURE DIAGNOSES:  1. Suggestion for possible bladder outlet obstruction  2. Same as above.  3. UDS shows:  -Normal bladder compliance  -Normal bladder capacity   -Sensations: normal  -Incontinence: no DYAN or DOI  -Detrusor activity during filling: questionable very mild DO without incontinence. Suspect this may be artifact from diaphragmatic breathing/movement of Pabd baseline.  -Detrusor activity during voiding: strong detrusor contraction to 48.7 cm H2O  -Flow: slow flow (Qmax 13.2 ml/s) with intermittent voiding curve and incomplete emptying ( mL initially). He does void a second time with all catheters removed, still with a slow flow (Qmax 16.5 ml/s) and obstructed-appearing voiding curve.  -Detrusor sphincter dyssenergia: very mild increased EMG activity, most likely secondary to abdominal straining/voiding dysfunction rather than true DSD in this otherwise neurologically intact patient.   -Outlet obstruction: BOOI is 11.7, not strongly suggestive of outlet obstruction. However, high pressure/low flow voiding pattern is more suggestive of a possible WATKINS.  -Fluoroscopy reveals a smooth-walled bladder without diverticuli or VUR. Bladder neck is closed during filling and open during voiding.  -SUMMARY: possible evidence for WATKINS. Will refer to Dr. Mensah to consider cystoscopic evaluation and discussion for outlet procedure if indicated. Continue Flomax daily for now. Consider pelvic floor PT in the future as well (has some left inguinal pain, likely 2/2 previous hernia repairs x 2 on the left).    PROCEDURE:    1. Uroflowmetry.  2. Sterile urethral catheterization for measurement of postvoid residual urine volume.  3. Complex filling cystometrogram with measurement of bladder and rectal pressures.  4. Complex voiding cystometrogram with measurement of bladder and rectal pressures.  5. Electromyography  of the pelvic floor during urodynamics.  6. Fluoroscopic imaging of the bladder during urodynamics, at least 3 views.    7. Interpretation of urodynamics and flouroscopic imaging.      INDICATIONS FOR PROCEDURE:  Mr. Alpesh Winston is a pleasant 48 year old male with LUTS including urinary hesitancy, incomplete emptying, and urgency. Treatment for prostatitis resulted in no improvement. Flomax helps slightly but he remains bothered by his current symptoms. Baseline video urodynamic assessment is requested today to better characterize Mr. Alpesh Winston's voiding dysfunction.      VOIDING DIARY:  Voids every 1-2 hours during the day, nocturia x 3-4 (usually right in the beginning of sleep).  Episodes of incontinence: none.  Incontinence associated with: n/a.  Total Volume Intake: 2734 mL; 20-30 oz of coffee, the rest green tea or water.  Total Volume Output: 2130 mL; average voided volume 162 mL, largest voided volume 325 mL.    DESCRIPTION OF PROCEDURE:  Risks, benefits, and alternatives to urodynamics were discussed with the patient and he wished to proceed.  Urodynamics are planned to better assess the primary etiology for Mr. Winston's urologic dysfunction.  The patient does not currently take anticholinergic medications for his bladder. He does take Flomax 0.4 mg daily.  After informed consent was obtained, the patient was taken to the procedure room where uroflowmetry was performed. Findings below.     PRE-STUDY UROFLOWMETRY:  Voided volume: 75 mL.  Maximum flow rate: 6.8 mL/sec.  Average flow rate: 2.7 mL/sec.  Character of the curve: blunted bell-shape.  Postvoid residual by catheter: 90 mL.  Pretest urine dipstick was negative for leukocytes and nitrites.    Next a 7F double-lumen urodynamics catheter was inserted into the bladder under sterile technique via native urethra.  A 7F abdominal manometry catheter was placed in the rectum.  EMG pads were placed on both sides of the anal verge.   The bladder was filled with 200 mL of Cystografin at 25 mL/minute and serial pressures were recorded.  With coughing there was an appropriate rise in vesical and abdominal pressures with no change in detrusor pressure, confirming good study catheter placement.    DURING THE FILLING PHASE:  First sensation: 232 mL.  First Desire: 285 mL.  Strong Desire: 575 mL.  Maximum Capacity: 722 mL    Uninhibited detrusor contractions: questionable very mild DO without incontinence. Suspect this may be artifact from diaphragmatic breathing/movement of Pabd baseline.  Compliance: normal. PDet=7 cmH20 at capacity.  Continence: no DYAN or DOI  EMG: concordant during filling.    DURING THE VOIDING PHASE:  Maximum detrusor contraction with void: 48.7 cm of H2O pressure.  Voided volume: ~500 mL (uroflow recorded 442 mL, ~50 mL missed the funnel)  Maximum flow rate: 13.2 mL/sec.  Average flow rate: 3.9 mL/sec.  Postvoid Residual: 280 mL.  Detrusor sphincter dyssynergia: very mild increased EMG activity, most likely secondary to abdominal straining/pelvic floor dysfunction rather than true DSD in this otherwise neurologically intact patient.   Character of voiding curve: intermittent.  BOOI: 11.7 (suggesting no obstruction - see key below)  [obstructed (WATKINS index [BOOI] ? 40); equivocal (no definite   obstruction; BOOI 20-40); and no obstruction (BOOI ? 20)]    After voiding to sense of completion on the Celiroesta chair, all catheters were then removed. He then expressed desire to void again so was brought to private uroflow where he was able to void with the following data:     Voided volume: 242 mL  Maximum flow rate: 16.5 mL/sec.  Average flow rate: 8.0 mL/sec.  Postvoid Residual: 0 mL.  Character of voiding curve: blunted bell-shape    FLUOROSCOPIC IMAGING OF THE BLADDER DURING URODYNAMICS:  Fluoroscopy during today's procedure demonstrated a smooth walled bladder without diverticulae or cellules.  No vesicoureteral reflux was  observed.  The bladder neck was closed during filling and open during voiding.  After voiding to completion, all catheters were removed and the patient was brought back into the consultation room to further discuss today's study results.      ASSESSMENT/PLAN:  Mr. Alpesh Winston is a pleasant 48 year old male with LUTS including urinary hesitancy, incomplete emptying, and urgency who demonstrated the following findings today on urodynamic evaluation:    -Normal bladder compliance  -Normal bladder capacity   -Sensations: normal  -Incontinence: no DYAN or DOI  -Detrusor activity during filling: questionable very mild DO without incontinence. Suspect this may be artifact from diaphragmatic breathing/movement of Pabd baseline.  -Detrusor activity during voiding: strong detrusor contraction to 48.7 cm H2O  -Flow: slow flow (Qmax 13.2 ml/s) with intermittent voiding curve and incomplete emptying ( mL initially). He does void a second time with all catheters removed, still with a slow flow (Qmax 16.5 ml/s) and obstructed-appearing voiding curve.  -Detrusor sphincter dyssenergia: very mild increased EMG activity, most likely secondary to abdominal straining/voiding dysfunction rather than true DSD in this otherwise neurologically intact patient.   -Outlet obstruction: BOOI is 11.7, not strongly suggestive of outlet obstruction. However, high pressure/low flow voiding pattern is more suggestive of a possible WATKINS.    We discussed his study results in detail today. There is some suggestion for a possible bladder outlet obstruction. Discussed management options including continuing with Flomax daily and limiting bladder irritants, referral to pelvic floor PT, trial of a low dose anticholinergic qhs, or referral to urologist for possible cystoscopy/further discussion regarding bladder outlet procedure. Patient elects for the latter.  -Follow up with Dr. Mensah next available for possible cystoscopy and to further  discuss possible outlet procedure if indicated.     - A single Cipro antibiotic was provided for UTI prophylaxis following completion of today's study per department protocol.  The risk of UTI with VUDS is low at ~2.5-3%.      Thank you for allowing me to participate in the care of . Alpesh Kellyhimick and please don't hesitate to contact me with any questions or concerns.      Grisel Arriaga PA-C  Urology Physician Assistant

## 2018-03-28 ENCOUNTER — TELEPHONE (OUTPATIENT)
Dept: UROLOGY | Facility: CLINIC | Age: 48
End: 2018-03-28

## 2018-03-28 DIAGNOSIS — R30.0 DYSURIA: ICD-10-CM

## 2018-03-28 DIAGNOSIS — R30.0 DYSURIA: Primary | ICD-10-CM

## 2018-03-28 LAB
ALBUMIN UR-MCNC: NEGATIVE MG/DL
APPEARANCE UR: CLEAR
BILIRUB UR QL STRIP: NEGATIVE
COLOR UR AUTO: YELLOW
GLUCOSE UR STRIP-MCNC: NEGATIVE MG/DL
HGB UR QL STRIP: NEGATIVE
KETONES UR STRIP-MCNC: NEGATIVE MG/DL
LEUKOCYTE ESTERASE UR QL STRIP: NEGATIVE
NITRATE UR QL: NEGATIVE
NON-SQ EPI CELLS #/AREA URNS LPF: NORMAL /LPF
PH UR STRIP: 6 PH (ref 5–7)
RBC #/AREA URNS AUTO: NORMAL /HPF
SOURCE: NORMAL
SP GR UR STRIP: 1.01 (ref 1–1.03)
UROBILINOGEN UR STRIP-ACNC: 0.2 EU/DL (ref 0.2–1)
WBC #/AREA URNS AUTO: NORMAL /HPF

## 2018-03-28 PROCEDURE — 87086 URINE CULTURE/COLONY COUNT: CPT | Performed by: PHYSICIAN ASSISTANT

## 2018-03-28 PROCEDURE — 81001 URINALYSIS AUTO W/SCOPE: CPT | Performed by: PHYSICIAN ASSISTANT

## 2018-03-28 NOTE — TELEPHONE ENCOUNTER
Patient had urodynamics last Thursday. In the past two days he has had frequency, urgency and burning.  Will have patient come in for UA/UC and will call with results. Will treat per protocol. Patient states understanding.

## 2018-03-29 LAB
BACTERIA SPEC CULT: NO GROWTH
SPECIMEN SOURCE: NORMAL

## 2018-04-17 ENCOUNTER — OFFICE VISIT (OUTPATIENT)
Dept: UROLOGY | Facility: CLINIC | Age: 48
End: 2018-04-17
Payer: COMMERCIAL

## 2018-04-17 VITALS
HEART RATE: 69 BPM | SYSTOLIC BLOOD PRESSURE: 130 MMHG | DIASTOLIC BLOOD PRESSURE: 79 MMHG | WEIGHT: 205.5 LBS | HEIGHT: 70 IN | BODY MASS INDEX: 29.42 KG/M2

## 2018-04-17 DIAGNOSIS — N32.81 OVERACTIVE BLADDER: ICD-10-CM

## 2018-04-17 DIAGNOSIS — N52.9 ERECTILE DYSFUNCTION, UNSPECIFIED ERECTILE DYSFUNCTION TYPE: ICD-10-CM

## 2018-04-17 DIAGNOSIS — N52.9 ERECTILE DYSFUNCTION, UNSPECIFIED ERECTILE DYSFUNCTION TYPE: Primary | ICD-10-CM

## 2018-04-17 RX ORDER — OXYBUTYNIN CHLORIDE 5 MG/1
5 TABLET, EXTENDED RELEASE ORAL DAILY
Qty: 30 TABLET | Refills: 1 | Status: SHIPPED | OUTPATIENT
Start: 2018-04-17 | End: 2018-04-20

## 2018-04-17 RX ORDER — SILDENAFIL 50 MG/1
50 TABLET, FILM COATED ORAL DAILY PRN
Qty: 12 TABLET | Refills: 3 | Status: SHIPPED | OUTPATIENT
Start: 2018-04-17 | End: 2018-08-06

## 2018-04-17 ASSESSMENT — PAIN SCALES - GENERAL: PAINLEVEL: NO PAIN (0)

## 2018-04-17 NOTE — PATIENT INSTRUCTIONS
Return in 6 months for appt with Grisel Arriaga. Stop at lab today for testosterone lab.      It was a pleasure meeting with you today.  Thank you for allowing me and my team the privilege of caring for you today.  YOU are the reason we are here, and I truly hope we provided you with the excellent service you deserve.  Please let us know if there is anything else we can do for you so that we can be sure you are leaving completely satisfied with your care experience.       Dread Michelle LPN

## 2018-04-17 NOTE — MR AVS SNAPSHOT
After Visit Summary   4/17/2018    Alpesh Winston    MRN: [id number]           Patient Information     Date Of Birth          1970        Visit Information        Provider Department      4/17/2018 7:20 AM Ronni Mensah MD [facility]        Today's Diagnoses     Erectile dysfunction, unspecified erectile dysfunction type    -  1    Overactive bladder          Care Instructions    Return in 6 months for appt with Grisel Arriaga. Stop at lab today for testosterone lab.      It was a pleasure meeting with you today.  Thank you for allowing me and my team the privilege of caring for you today.  YOU are the reason we are here, and I truly hope we provided you with the excellent service you deserve.  Please let us know if there is anything else we can do for you so that we can be sure you are leaving completely satisfied with your care experience.       Dread Michelle LPN          Follow-ups after your visit        Your next 10 appointments already scheduled     Apr 17, 2018  9:15 AM CDT   LAB with  LAB   [facility] ([facility])    [address] [place] [address]  [address] [facility] [address] [phone]           Please do not eat 10-12 hours before your appointment if you are coming in fasting for labs on lipids, cholesterol, or glucose (sugar). This does not apply to pregnant women. Water, hot tea and black coffee (with nothing added) are okay. Do not drink other fluids, diet soda or chew gum.            Oct 08, 2018  3:00 PM CDT   (Arrive by 2:45 PM)   Return Visit with Grisel Arriaga PA-C   [facility] ([facility])    [address] [place] [address]  [address] [facility] [address] [phone]              Future tests that were ordered for you today     Open Future Orders        Priority Expected Expires Ordered    Testosterone total  "Routine  4/17/2019 4/17/2018            Who to contact     Please call your clinic at [phone] to:    Ask questions about your health    Make or cancel appointments    Discuss your medicines    Learn about your test results    Speak to your doctor            Additional Information About Your Visit        [employer] Information     [employer] gives you secure access to your electronic health record. If you see a primary care provider, you can also send messages to your care team and make appointments. If you have questions, please call your primary care clinic.  If you do not have a primary care provider, please call [phone] and they will assist you.      [employer] is an electronic gateway that provides easy, online access to your medical records. With [employer], you can request a clinic appointment, read your test results, renew a prescription or communicate with your care team.     To access your existing account, please contact your [facility] or call [phone] for assistance.        Care EveryWhere ID     This is your Care EveryWhere ID. This could be used by other organizations to access your [facility] medical records  [id number]        Your Vitals Were     Pulse Height BMI (Body Mass Index)             69 1.778 m (5' 10\") 29.49 kg/m2          Blood Pressure from Last 3 Encounters:   04/17/18 130/79   03/22/18 (!) 145/99   01/29/18 133/85    Weight from Last 3 Encounters:   04/17/18 93.2 kg (205 lb 8 oz)   03/22/18 92.6 kg (204 lb 1.6 oz)   01/29/18 88.5 kg (195 lb)                 Today's Medication Changes          These changes are accurate as of 4/17/18  9:01 AM.  If you have any questions, ask your nurse or doctor.               Start taking these medicines.        Dose/Directions    oxybutynin 5 MG 24 hr tablet   Commonly known as:  DITROPAN-XL   Used for:  Overactive bladder   Started by:  Ronni Mensah MD        Dose:  5 mg   Take 1 tablet (5 mg) by mouth daily "   Quantity:  30 tablet   Refills:  1       sildenafil 50 MG tablet   Commonly known as:  VIAGRA   Used for:  Erectile dysfunction, unspecified erectile dysfunction type   Started by:  Ronni Mensah MD        Dose:  50 mg   Take 1 tablet (50 mg) by mouth daily as needed   Quantity:  12 tablet   Refills:  3         These medicines have changed or have updated prescriptions.        Dose/Directions    fluticasone 50 MCG/ACT spray   Commonly known as:  FLONASE   This may have changed:    - when to take this  - reasons to take this   Used for:  Acute sinusitis with coexisting condition requiring prophylactic treatment        Dose:  1-2 spray   Spray 1-2 sprays into both nostrils daily   Quantity:  1 Bottle   Refills:  11            Where to get your medicines      These medications were sent to [facility] [id number] - [place], [place] [facility] [address] [facility] [facility]  [address], [place] [address]    Hours:  24-hours Phone:  [phone]     oxybutynin 5 MG 24 hr tablet    sildenafil 50 MG tablet                Primary Care Provider Office Phone # Fax #    Belem Joshua [phone] [address] [facility] [address]        Equal Access to Services     JOSÉ ROGERS AH: Hadii claudia ku hadasho Soomaali, waaxda luqadaha, qaybta kaalmada adeegyada, waxay jeremias haycalvinn anneliese long. So [facility] [phone].    ATENCIÓN: Si habla español, tiene a casper disposición servicios gratuitos de asistencia lingüística. Llame al [phone].    We comply with applicable federal civil rights laws and [place] laws. We do not discriminate on the basis of race, color, national origin, age, disability, sex, sexual orientation, or gender identity.            Thank you!     Thank you for choosing [facility] FOR PROSTATE AND UROLOGIC CANCERS  for your care. Our goal is always to provide you with excellent care. Hearing back from our patients is one way we  can continue to improve our services. Please take a few minutes to complete the written survey that you may receive in the mail after your visit with us. Thank you!             Your Updated Medication List - Protect others around you: Learn how to safely use, store and throw away your medicines at www.disposemymeds.org.          This list is accurate as of 4/17/18  9:01 AM.  Always use your most recent med list.                   Brand Name Dispense Instructions for use Diagnosis    atorvastatin 20 MG tablet    LIPITOR    90 tablet    Take 1 tablet (20 mg) by mouth daily    Hyperlipidemia LDL goal <130       COENZYME Q-10 PO      Take 20 mg by mouth daily        fenofibrate 160 MG tablet     90 tablet    TAKE 1 TABLET DAILY (DUE FOR APPOINTMENT NOW)    Hyperlipidemia LDL goal <130       FISH OIL PO           fluticasone 50 MCG/ACT spray    FLONASE    1 Bottle    Spray 1-2 sprays into both nostrils daily    Acute sinusitis with coexisting condition requiring prophylactic treatment       oxybutynin 5 MG 24 hr tablet    DITROPAN-XL    30 tablet    Take 1 tablet (5 mg) by mouth daily    Overactive bladder       sildenafil 50 MG tablet    VIAGRA    12 tablet    Take 1 tablet (50 mg) by mouth daily as needed    Erectile dysfunction, unspecified erectile dysfunction type       tamsulosin 0.4 MG capsule    FLOMAX    30 capsule    Take 1 capsule (0.4 mg) by mouth daily    Prostatitis, acute

## 2018-04-17 NOTE — NURSING NOTE
Chief Complaint   Patient presents with     RECHECK     discuss UDS and possible cysto    Dread Michelle LPN

## 2018-04-17 NOTE — LETTER
4/17/2018       RE: Alpesh Winston  [address] [place] [address]     Dear Colleague,    Thank you for referring your patient, Alpesh Winston, to the [facility] AND [facility] FOR PROSTATE AND UROLOGIC CANCERS at [facility]. Please see a copy of my visit note below.            Chief Complaint:   LUTS         History of Present Illness:    Alpesh Winston is a very pleasant 48 year old male who presents with a history of LUTS. He states his urinary symptoms began approximately 3 years ago, at which time he started noticing increased urinary frequency and nocturia. He also simultaneously began to notice pain in the lower left groiin which he believes may be related to a left-sided hernia that was initially repaired 15 years ago as part of a bilateral inguinal hernia repair. This repair was repeated laparoscopically 1.5 years ago.    He also notes that his urinary symptoms are accompanied by ED. He is unable to achieve erections sufficient for intercourse.  He has never tried oral pharmacotherapy.  He reports some fatigue but desire and libido are adequate.    He recently underwent urodynamic evaluation which demonstrated a question of outlet obstruction (detrusor pressure of 48 with qMax 13-16).    He has tried Flomax but discontinued as he did not find it helpful at all in improving his urinary symptoms. AUA SS is 25/35    He reports family history of prostate cancer (father, diagnosed at age 56). Most recent PSA from 9/2017 was 1.39. Recent JOURDAN with BECCA Arriaga was unremarkable for nodules or asymmetry, question of mild BPH.           Past Medical History:     Past Medical History:   Diagnosis Date     Allergic rhinitis due to other allergen      History of chest pain     TO ER - EVALULATED, STRESS TEST ETC, ALL NEGATIVE     Hyperlipidaemia      Lipoma of other skin and subcutaneous tissue 5/2012    Chest wall     Other and unspecified  hyperlipidemia             Past Surgical History:     Past Surgical History:   Procedure Laterality Date     EYE SURGERY      SURGERY X 2 BILAT TEAR DUCTS      HEAD & NECK SURGERY       HERNIA REPAIR       LAPAROSCOPIC HERNIORRHAPHY INGUINAL BILATERAL Bilateral 10/27/2016    Procedure: LAPAROSCOPIC HERNIORRHAPHY INGUINAL BILATERAL;  Surgeon: Nellie Duron MD;  Location: [facility] OR     SOFT TISSUE SURGERY      Many lypoma removed - Multiple dates     SURGICAL HISTORY OF -       removal of lipoma X 4     SURGICAL HISTORY OF -   10/00    left inguinal hernia repair     SURGICAL HISTORY OF -       wisdom teeth extraction            Medications     Current Outpatient Prescriptions   Medication     fenofibrate 160 MG tablet     atorvastatin (LIPITOR) 20 MG tablet     Omega-3 Fatty Acids (FISH OIL PO)     fluticasone (FLONASE) 50 MCG/ACT spray     COENZYME Q-10 PO     tamsulosin (FLOMAX) 0.4 MG capsule     No current facility-administered medications for this visit.             Family History:     Family History   Problem Relation Age of Onset     C.A.D. Maternal Grandmother      MI X 2 ,  in her 60's      Coronary Artery Disease Maternal Grandmother      Hypertension Maternal Grandmother      Hyperlipidemia Maternal Grandmother      Other Cancer Maternal Grandmother      cervical     HEART DISEASE Father      DIABETES Father      Prostate Cancer Father      DIABETES Paternal Grandmother      type 2     Breast Cancer Sister      Alzheimer Disease Mother 74     CBD     Alcohol/Drug Maternal Uncle      C.A.D. Maternal Uncle      MI age 56     Substance Abuse Other      CEREBROVASCULAR DISEASE No family hx of      Cancer - colorectal No family hx of             Social History:     Social History     Social History     Marital status:      Spouse name: N/A     Number of children: N/A     Years of education: N/A     Occupational History      [email or web address]     Social History Main Topics      "Smoking status: Never Smoker     Smokeless tobacco: Current User     Types: Chew      Comment: chewing less     Alcohol use 0.0 oz/week     0 Standard drinks or equivalent per week      Comment: 6-8 drinks a week      Drug use: No     Sexual activity: Yes     Partners: Female     Other Topics Concern     Parent/Sibling W/ Cabg, Mi Or Angioplasty Before 65f 55m? No     Social History Narrative    Dairy/d 1 servings/d.     Caffeine 3 20 oz bottles  servings/d    Exercise 2 x week    Sunscreen used - NA    Seatbelts used - Yes    Working smoke/CO detectors in the home - Yes    Guns stored in the home - Yes    Self Breast Exams - NA    Self Testicular Exam - No    Eye Exam up to date - No    Dental Exam up to date - No    Pap Smear up to date - NA    Mammogram up to date - NA    PSA up to date - NA    Dexa Scan up to date - NA    Flex Sig / Colonoscopy up to date - NA    Immunizations up to date - No-1991    Abuse: Current or Past(Physical, Sexual or Emotional)- No    Do you feel safe in your environment - Yes                        Allergies:   Omnicef and Sulfa drugs         Review of Systems:  From intake questionnaire   Negative 14 system review except as noted on HPI, nurse's note.         Physical Exam:   Patient is a 48 year old  male   Vitals: Blood pressure 130/79, pulse 69, height 1.778 m (5' 10\"), weight 93.2 kg (205 lb 8 oz).  General Appearance Adult: Alert, no acute distress, oriented  HENT: throat/mouth:normal, good dentition  Neck: No adenopathy,masses or thyromegaly  Lungs: no respiratory distress, or pursed lip breathing  Heart: No obvious jugular venous distension present  Abdomen: soft, nontender, no organomegaly or masses, Body mass index is 29.49 kg/(m^2).  Lymphatics: No cervical or supraclavicular adenopathy  Musculoskeltal: extremities normal, no peripheral edema  Skin: no suspicious lesions or rashes  Neuro: Alert, oriented, speech and mentation normal  Psych: affect and mood normal  Gait: " Normal    CYSTOSCOPY PROCEDURE    After obtaining informed consent, the patient was prepped and draped in the standard sterile fashion.  The 15 Bahamian flexible cystoscope was inserted through the urethral meatus.      The anterior urethra was:  normal without stricture.    The external sphincter was appropriately coapted.    The prostatic urethra demonstrated bilobar hypertrophy. Raised median bar. Prostate length approx 2cm.  The bladder neck was nonocclusive.    The bladder was unremarkable for tumors, erythema or stones.    The ureteral orifices were identified on each side in orthotopic position with efflux of clear urine.   There were no trabeculations.    On retroflexion there was the usual bladder neck hyperemia.    There was no intravesical protrusion of the prostate.      The patient tolerated the procedure well without complication.       Uroflow and Post-Void Residual by Bladder Scan   Voided Volume: 443 ml  QMax: 19.3 ml/s  QAv.8 ml/s  PVR: 19 ml      Labs and Pathology:    I personally reviewed all applicable laboratory data and went over findings with patient  Significant for:    CBC RESULTS:  Recent Labs   Lab Test  18   0907  16   0845  12/22/15   0519  12   1118   WBC  6.5  6.8  6.0   --    HGB  15.3  15.0  15.6  15.5   PLT  290  272  280   --         BMP RESULTS:  Recent Labs   Lab Test  18   0907  17   0936  17   0954  17   0908   16   0845  12/22/15   0519   NA   --    --   141  141   --   139  139   POTASSIUM   --    --   4.4  4.4   --   4.1  3.7   CHLORIDE   --    --   108  107   --   108  106   CO2   --    --   21  24   --   26  28   ANIONGAP   --    --   12  10   --   5  5   GLC  104*  121*  99  127*   < >  104*  108*   BUN   --    --   21  20   --   19  18   CR   --    --   0.93  0.96   --   0.81  0.98   GFRESTIMATED   --    --   88  85   --   >90  Non  GFR Calc    83   GFRESTBLACK   --    --   >90  >90   GFR  Calc     --   >90   GFR Calc    >90   GFR Calc     NAOMI   --    --   9.3  9.7   --   9.3  9.2    < > = values in this interval not displayed.       UA RESULTS:   Recent Labs   Lab Test  03/28/18   1307 03/22/18 09/27/17   1617  01/05/12   1140   SG  1.015  1.005  1.020  1.025   URINEPH  6.0  5.5  6.0  6.0   NITRITE  Negative  Neg  Negative  Negative   RBCU  O - 2   --    --   O - 2   WBCU  0 - 5   --    --   O - 2       CALCIUM RESULTS  Lab Results   Component Value Date    NAOMI 9.3 09/27/2017    NAOMI 9.7 01/11/2017    NAOMI 9.3 06/22/2016       PSA RESULTS  PSA   Date Value Ref Range Status   09/27/2017 1.39 0 - 4 ug/L Final     Comment:     Assay Method:  Chemiluminescence using Siemens Vista analyzer   01/11/2017 1.43 0 - 4 ug/L Final     Comment:     Assay Method:  Chemiluminescence using Siemens Vista analyzer   01/05/2012 1.18 0 - 4 ug/L Final        Standardized Questionnaire:      [employer] SYMPTOM SCORE  SUM 25/35  QOL not answered           Assessment and Plan:     Assessment: 48 year old male w/ hx of LUTS of unclear etiology    1) LUTS.  - No objective evidence of BPH on cystoscopy.  qMax is 19.3, do not have high suspicion for BPH related symptoms, has also not had any response to alpha blocker in the past.   Suspect pelvic floor dysfunction or overactive bladder are more likely cause of his symptoms.   Discussed lifestyle changes including limiting evening fluids, decreasing coffee, switching to decaf.  He has tried some of these and is interested in more aggressive therapeutic measures, as such will try a trial of ditropan to see if it improves symptoms.  If no response consider pelvic floor rehab. Eval.  Will F/U with BECCA Arriaga.    2) ED  - Trial of viagra, counseled regarding risks  - Testosterone check today     3) Family hx of prostate cancer  -Continue periodic PSA, JOURDAN      Scribe Disclosure:   Ping APPIAH, am serving as a scribe; to  document services personally performed by Ronni Mensah MD based on data collection and the provider's statements to me.     I, Ronni Mensah saw and evaluated this patient and agree with the plan as stated above.  I personally performed all listed procedures.         Again, thank you for allowing me to participate in the care of your patient.      Sincerely,    Ronni Mensah MD    CC:  Belem Joshua

## 2018-04-17 NOTE — PROGRESS NOTES
Chief Complaint:   LUTS         History of Present Illness:    Alpesh Winston is a very pleasant 48 year old male who presents with a history of LUTS. He states his urinary symptoms began approximately 3 years ago, at which time he started noticing increased urinary frequency and nocturia. He also simultaneously began to notice pain in the lower left groiin which he believes may be related to a left-sided hernia that was initially repaired 15 years ago as part of a bilateral inguinal hernia repair. This repair was repeated laparoscopically 1.5 years ago.    He also notes that his urinary symptoms are accompanied by ED. He is unable to achieve erections sufficient for intercourse.  He has never tried oral pharmacotherapy.  He reports some fatigue but desire and libido are adequate.    He recently underwent urodynamic evaluation which demonstrated a question of outlet obstruction (detrusor pressure of 48 with qMax 13-16).    He has tried Flomax but discontinued as he did not find it helpful at all in improving his urinary symptoms. AUA SS is 25/35    He reports family history of prostate cancer (father, diagnosed at age 56). Most recent PSA from 9/2017 was 1.39. Recent JOURDAN with BECCA Arriaga was unremarkable for nodules or asymmetry, question of mild BPH.           Past Medical History:     Past Medical History:   Diagnosis Date     Allergic rhinitis due to other allergen      History of chest pain     TO ER - EVALULATED, STRESS TEST ETC, ALL NEGATIVE     Hyperlipidaemia      Lipoma of other skin and subcutaneous tissue 5/2012    Chest wall     Other and unspecified hyperlipidemia             Past Surgical History:     Past Surgical History:   Procedure Laterality Date     EYE SURGERY      SURGERY X 2 BILAT TEAR DUCTS      HEAD & NECK SURGERY       HERNIA REPAIR  2000     LAPAROSCOPIC HERNIORRHAPHY INGUINAL BILATERAL Bilateral 10/27/2016    Procedure: LAPAROSCOPIC HERNIORRHAPHY INGUINAL BILATERAL;  Surgeon:  Nellie Duron MD;  Location: [facility]     SOFT TISSUE SURGERY      Many lypoma removed - Multiple dates     SURGICAL HISTORY OF -       removal of lipoma X 4     SURGICAL HISTORY OF -   10/00    left inguinal hernia repair     SURGICAL HISTORY OF -       wisdom teeth extraction            Medications     Current Outpatient Prescriptions   Medication     fenofibrate 160 MG tablet     atorvastatin (LIPITOR) 20 MG tablet     Omega-3 Fatty Acids (FISH OIL PO)     fluticasone (FLONASE) 50 MCG/ACT spray     COENZYME Q-10 PO     tamsulosin (FLOMAX) 0.4 MG capsule     No current facility-administered medications for this visit.             Family History:     Family History   Problem Relation Age of Onset     C.A.D. Maternal Grandmother      MI X 2 ,  in her 60's      Coronary Artery Disease Maternal Grandmother      Hypertension Maternal Grandmother      Hyperlipidemia Maternal Grandmother      Other Cancer Maternal Grandmother      cervical     HEART DISEASE Father      DIABETES Father      Prostate Cancer Father      DIABETES Paternal Grandmother      type 2     Breast Cancer Sister      Alzheimer Disease Mother 74     CBD     Alcohol/Drug Maternal Uncle      C.A.D. Maternal Uncle      MI age 56     Substance Abuse Other      CEREBROVASCULAR DISEASE No family hx of      Cancer - colorectal No family hx of             Social History:     Social History     Social History     Marital status:      Spouse name: N/A     Number of children: N/A     Years of education: N/A     Occupational History      [employer]     Social History Main Topics     Smoking status: Never Smoker     Smokeless tobacco: Current User     Types: Chew      Comment: chewing less     Alcohol use 0.0 oz/week     0 Standard drinks or equivalent per week      Comment: 6-8 drinks a week      Drug use: No     Sexual activity: Yes     Partners: Female     Other Topics Concern     Parent/Sibling W/ Cabg, Mi Or Angioplasty Before  "65f 55m? No     Social History Narrative    Dairy/d 1 servings/d.     Caffeine 3 20 oz bottles  servings/d    Exercise 2 x week    Sunscreen used - NA    Seatbelts used - Yes    Working smoke/CO detectors in the home - Yes    Guns stored in the home - Yes    Self Breast Exams - NA    Self Testicular Exam - No    Eye Exam up to date - No    Dental Exam up to date - No    Pap Smear up to date - NA    Mammogram up to date - NA    PSA up to date - NA    Dexa Scan up to date - NA    Flex Sig / Colonoscopy up to date - NA    Immunizations up to date - No-1991    Abuse: Current or Past(Physical, Sexual or Emotional)- No    Do you feel safe in your environment - Yes                        Allergies:   Omnicef and Sulfa drugs         Review of Systems:  From intake questionnaire   Negative 14 system review except as noted on HPI, nurse's note.         Physical Exam:   Patient is a 48 year old  male   Vitals: Blood pressure 130/79, pulse 69, height 1.778 m (5' 10\"), weight 93.2 kg (205 lb 8 oz).  General Appearance Adult: Alert, no acute distress, oriented  HENT: throat/mouth:normal, good dentition  Neck: No adenopathy,masses or thyromegaly  Lungs: no respiratory distress, or pursed lip breathing  Heart: No obvious jugular venous distension present  Abdomen: soft, nontender, no organomegaly or masses, Body mass index is 29.49 kg/(m^2).  Lymphatics: No cervical or supraclavicular adenopathy  Musculoskeltal: extremities normal, no peripheral edema  Skin: no suspicious lesions or rashes  Neuro: Alert, oriented, speech and mentation normal  Psych: affect and mood normal  Gait: Normal    CYSTOSCOPY PROCEDURE    After obtaining informed consent, the patient was prepped and draped in the standard sterile fashion.  The 15 Serbian flexible cystoscope was inserted through the urethral meatus.      The anterior urethra was:  normal without stricture.    The external sphincter was appropriately coapted.    The prostatic urethra " demonstrated bilobar hypertrophy. Raised median bar. Prostate length approx 2cm.  The bladder neck was nonocclusive.    The bladder was unremarkable for tumors, erythema or stones.    The ureteral orifices were identified on each side in orthotopic position with efflux of clear urine.   There were no trabeculations.    On retroflexion there was the usual bladder neck hyperemia.    There was no intravesical protrusion of the prostate.      The patient tolerated the procedure well without complication.       Uroflow and Post-Void Residual by Bladder Scan   Voided Volume: 443 ml  QMax: 19.3 ml/s  QAv.8 ml/s  PVR: 19 ml      Labs and Pathology:    I personally reviewed all applicable laboratory data and went over findings with patient  Significant for:    CBC RESULTS:  Recent Labs   Lab Test  18   0907  16   0845  12/22/15   0519  12   1118   WBC  6.5  6.8  6.0   --    HGB  15.3  15.0  15.6  15.5   PLT  290  272  280   --         BMP RESULTS:  Recent Labs   Lab Test  18   0907  17   0936  17   0954  17   0908   16   0845  12/22/15   0519   NA   --    --   141  141   --   139  139   POTASSIUM   --    --   4.4  4.4   --   4.1  3.7   CHLORIDE   --    --   108  107   --   108  106   CO2   --    --   21  24   --   26  28   ANIONGAP   --    --   12  10   --   5  5   GLC  104*  121*  99  127*   < >  104*  108*   BUN   --    --   21  20   --   19  18   CR   --    --   0.93  0.96   --   0.81  0.98   GFRESTIMATED   --    --   88  85   --   >90  Non  GFR Calc    83   GFRESTBLACK   --    --   >90  >90  African American GFR Calc     --   >90   GFR Calc    >90   GFR Calc     NAOMI   --    --   9.3  9.7   --   9.3  9.2    < > = values in this interval not displayed.       UA RESULTS:   Recent Labs   Lab Test  18   1307 18   1617  12   1140   SG  1.015  1.005  1.020  1.025   URINEPH  6.0  5.5  6.0  6.0   NITRITE   Negative  Neg  Negative  Negative   RBCU  O - 2   --    --   O - 2   WBCU  0 - 5   --    --   O - 2       CALCIUM RESULTS  Lab Results   Component Value Date    NAOMI 9.3 09/27/2017    NAOMI 9.7 01/11/2017    NAOMI 9.3 06/22/2016       PSA RESULTS  PSA   Date Value Ref Range Status   09/27/2017 1.39 0 - 4 ug/L Final     Comment:     Assay Method:  Chemiluminescence using Siemens Vista analyzer   01/11/2017 1.43 0 - 4 ug/L Final     Comment:     Assay Method:  Chemiluminescence using Siemens Vista analyzer   01/05/2012 1.18 0 - 4 ug/L Final        Standardized Questionnaire:      [employer] SYMPTOM SCORE  SUM 25/35  QOL not answered           Assessment and Plan:     Assessment: 48 year old male w/ hx of LUTS of unclear etiology    1) LUTS.  - No objective evidence of BPH on cystoscopy.  qMax is 19.3, do not have high suspicion for BPH related symptoms, has also not had any response to alpha blocker in the past.   Suspect pelvic floor dysfunction or overactive bladder are more likely cause of his symptoms.   Discussed lifestyle changes including limiting evening fluids, decreasing coffee, switching to decaf.  He has tried some of these and is interested in more aggressive therapeutic measures, as such will try a trial of ditropan to see if it improves symptoms.  If no response consider pelvic floor rehab. Eval.  Will F/U with BECCA Arriaga.    2) ED  - Trial of viagra, counseled regarding risks  - Testosterone check today     3) Family hx of prostate cancer  -Continue periodic PSA, JOURDAN      Scribe Disclosure:   IPing, am serving as a scribe; to document services personally performed by Ronni Mensah MD based on data collection and the provider's statements to me.     I, Ronni Mensah saw and evaluated this patient and agree with the plan as stated above.  I personally performed all listed procedures.     CC:  Belem Joshua

## 2018-04-17 NOTE — NURSING NOTE
Invasive Procedure Safety Checklist:    Procedure:     Action: Complete sections and checkboxes as appropriate.    Pre-procedure:  1. Patient ID Verified with 2 identifiers (Nimisha and  or MRN) : YES    2. Procedure and site verified with patient/designee (when able) : YES    3. Accurate consent documentation in medical record : YES    4. H&P (or appropriate assessment) documented in medical record : YES  H&P must be up to 30 days prior to procedure an updated within 24 hours of                 Procedure as applicable.     5. Relevant diagnostic and radiology test results appropriately labeled and displayed as applicable : YES    6. Blood products, implants, devices, and/or special equipment available for the procedure as applicable : YES    7. Procedure site(s) marked with provider initials [Exclusions: ] : YES    8. Marking not required. Reason : Yes  Procedure does not require site marking    Time Out:     Time-Out performed immediately prior to starting procedure, including verbal and active participation of all team members addressing: YES    1. Correct patient identity.  2. Confirmed that the correct side and site are marked.  3. An accurate procedure to be done.  4. Agreement on the procedure to be done.  5. Correct patient position.  6. Relevant images and results are properly labeled and appropriately displayed.  7. The need to administer antibiotics or fluids for irrigation purposes during the procedure as applicable.  8. Safety precautions based on patient history or medication use.    During Procedure: Verification of correct person, site, and procedure occurs any time the responsibility for care of the patient is transferred to another member of the care team.    urot ndc [id number]  Lot [id number]  Exp 12-19

## 2018-04-18 LAB — TESTOST SERPL-MCNC: 239 NG/DL (ref 240–950)

## 2018-04-20 ENCOUNTER — MYC MEDICAL ADVICE (OUTPATIENT)
Dept: UROLOGY | Facility: CLINIC | Age: 48
End: 2018-04-20

## 2018-04-20 ENCOUNTER — RADIANT APPOINTMENT (OUTPATIENT)
Dept: GENERAL RADIOLOGY | Facility: CLINIC | Age: 48
End: 2018-04-20
Attending: FAMILY MEDICINE
Payer: COMMERCIAL

## 2018-04-20 ENCOUNTER — OFFICE VISIT (OUTPATIENT)
Dept: FAMILY MEDICINE | Facility: CLINIC | Age: 48
End: 2018-04-20
Payer: COMMERCIAL

## 2018-04-20 VITALS
BODY MASS INDEX: 28.88 KG/M2 | HEART RATE: 75 BPM | DIASTOLIC BLOOD PRESSURE: 79 MMHG | OXYGEN SATURATION: 97 % | SYSTOLIC BLOOD PRESSURE: 112 MMHG | RESPIRATION RATE: 19 BRPM | TEMPERATURE: 98.6 F | WEIGHT: 201.25 LBS

## 2018-04-20 DIAGNOSIS — R68.89 FLU-LIKE SYMPTOMS: Primary | ICD-10-CM

## 2018-04-20 DIAGNOSIS — R05.9 COUGH: ICD-10-CM

## 2018-04-20 LAB
FLUAV+FLUBV AG SPEC QL: NEGATIVE
FLUAV+FLUBV AG SPEC QL: NEGATIVE
SPECIMEN SOURCE: NORMAL

## 2018-04-20 PROCEDURE — 99213 OFFICE O/P EST LOW 20 MIN: CPT | Performed by: FAMILY MEDICINE

## 2018-04-20 PROCEDURE — 71046 X-RAY EXAM CHEST 2 VIEWS: CPT

## 2018-04-20 PROCEDURE — 87804 INFLUENZA ASSAY W/OPTIC: CPT | Performed by: FAMILY MEDICINE

## 2018-04-20 RX ORDER — BENZONATATE 200 MG/1
200 CAPSULE ORAL 3 TIMES DAILY PRN
Qty: 21 CAPSULE | Refills: 0 | Status: SHIPPED | OUTPATIENT
Start: 2018-04-20 | End: 2018-05-07

## 2018-04-20 RX ORDER — AZITHROMYCIN 250 MG/1
TABLET, FILM COATED ORAL
Qty: 6 TABLET | Refills: 0 | Status: SHIPPED | OUTPATIENT
Start: 2018-04-20 | End: 2018-05-07

## 2018-04-20 NOTE — MR AVS SNAPSHOT
After Visit Summary   4/20/2018    Alpesh Winston    MRN: [id number]           Patient Information     Date Of Birth          1970        Visit Information        Provider Department      4/20/2018 1:40 PM Layton Omalley MD [facility]        Today's Diagnoses     Flu-like symptoms    -  1    Cough           Follow-ups after your visit        Your next 10 appointments already scheduled     Oct 08, 2018  3:00 PM CDT   (Arrive by 2:45 PM)   Return Visit with Grisel Arriaga PA-C   [facility] ([facility])    [address]  4th Floor  [facility] [address] [phone]              Who to contact     If you have questions or need follow up information about today's clinic visit or your schedule please contact [facility] directly at [phone].  Normal or non-critical lab and imaging results will be communicated to you by MyChart, letter or phone within 4 business days after the clinic has received the results. If you do not hear from us within 7 days, please contact the clinic through [employer] or phone. If you have a critical or abnormal lab result, we will notify you by phone as soon as possible.  Submit refill requests through [facility] or call your pharmacy and they will forward the refill request to us. Please allow 3 business days for your refill to be completed.          Additional Information About Your Visit        MyChart Information     [facility] gives you secure access to your electronic health record. If you see a primary care provider, you can also send messages to your care team and make appointments. If you have questions, please call your primary care clinic.  If you do not have a primary care provider, please call [phone] and they will assist you.        Care EveryWhere ID     This is your Care EveryWhere ID. This could be used by other  organizations to access your [place] medical records  [id number]        Your Vitals Were     Pulse Temperature Respirations Pulse Oximetry BMI (Body Mass Index)       75 98.6  F (37  C) (Oral) 19 97% 28.88 kg/m2        Blood Pressure from Last 3 Encounters:   04/23/18 122/79   04/20/18 112/79   04/17/18 130/79    Weight from Last 3 Encounters:   04/23/18 202 lb (91.6 kg)   04/20/18 201 lb 4 oz (91.3 kg)   04/17/18 205 lb 8 oz (93.2 kg)              We Performed the Following     Influenza A/B antigen          Today's Medication Changes          These changes are accurate as of 4/20/18 11:59 PM.  If you have any questions, ask your nurse or doctor.               Start taking these medicines.        Dose/Directions    azithromycin 250 MG tablet   Commonly known as:  ZITHROMAX   Used for:  Cough   Started by:  Layton Oamlley MD        Two tablets first day, then one tablet daily for four days.   Quantity:  6 tablet   Refills:  0       benzonatate 200 MG capsule   Commonly known as:  TESSALON   Used for:  Cough   Started by:  Layton Omalley MD        Dose:  200 mg   Take 1 capsule (200 mg) by mouth 3 times daily as needed for cough   Quantity:  21 capsule   Refills:  0         Stop taking these medicines if you haven't already. Please contact your care team if you have questions.     fluticasone 50 MCG/ACT spray   Commonly known as:  FLONASE   Stopped by:  Layton Omalley MD           oxybutynin 5 MG 24 hr tablet   Commonly known as:  DITROPAN-XL   Stopped by:  Layton Omalley MD           tamsulosin 0.4 MG capsule   Commonly known as:  FLOMAX   Stopped by:  Layton Omalley MD                Where to get your medicines      These medications were sent to [id number] - [place], [place] - [address] AT [facility]  [address] [place] [address]    Hours:  24-hours Phone:  [phone]     azithromycin 250 MG tablet     benzonatate 200 MG capsule                Primary Care Provider Office Phone # Fax #    Belem Joshua, [phone] [address] [place]  [facility] [address]        Equal Access to Services     JOSÉ ROGERS : Hadii claudia ku hadphillipo Soomaali, waaxda luqadaha, qaybta kaalmada adeegyada, waxgrabiel omkarin hayaan prakashras santos hanane long. So [facility] [phone].    ATENCIÓN: Si habla español, tiene a casper disposición servicios gratuitos de asistencia lingüística. Llame al [phone].    We comply with applicable federal civil rights laws and [place] laws. We do not discriminate on the basis of race, color, national origin, age, disability, sex, sexual orientation, or gender identity.            Thank you!     Thank you for choosing [facility]  for your care. Our goal is always to provide you with excellent care. Hearing back from our patients is one way we can continue to improve our services. Please take a few minutes to complete the written survey that you may receive in the mail after your visit with us. Thank you!             Your Updated Medication List - Protect others around you: Learn how to safely use, store and throw away your medicines at www.disposemymeds.org.          This list is accurate as of 4/20/18 11:59 PM.  Always use your most recent med list.                   Brand Name Dispense Instructions for use Diagnosis    atorvastatin 20 MG tablet    LIPITOR    90 tablet    Take 1 tablet (20 mg) by mouth daily    Hyperlipidemia LDL goal <130       azithromycin 250 MG tablet    ZITHROMAX    6 tablet    Two tablets first day, then one tablet daily for four days.    Cough       benzonatate 200 MG capsule    TESSALON    21 capsule    Take 1 capsule (200 mg) by mouth 3 times daily as needed for cough    Cough       COENZYME Q-10 PO      Take 20 mg by mouth daily        fenofibrate 160 MG tablet     90 tablet    TAKE 1 TABLET DAILY (DUE FOR APPOINTMENT NOW)    Hyperlipidemia LDL goal  <130       FISH OIL PO           sildenafil 50 MG tablet    VIAGRA    12 tablet    Take 1 tablet (50 mg) by mouth daily as needed    Erectile dysfunction, unspecified erectile dysfunction type

## 2018-04-20 NOTE — PROGRESS NOTES
SUBJECTIVE:   Alpesh Winston is a 48 year old male who presents to clinic today for the following health issues:      Acute Illness   Acute illness concern: flu like symptoms   Onset:  1 week    Fever: YES    Chills/Sweats: YES    Headache (location?): YES    Sinus Pressure:YES    Conjunctivitis:  no    Ear Pain: YES: both    Rhinorrhea: YES    Congestion: YES- chest    Sore Throat: YES     Cough: YES    Wheeze: yes    Decreased Appetite: YES    Nausea: YES    Vomiting: no     Diarrhea:  no     Dysuria/Freq.: no     Fatigue/Achiness: YES- fatigue     Sick/Strep Exposure: no - other than being at the hospital a couple days ago.     Therapies Tried and outcome:  Advil     1.5 weeks of weird tongue taste.   For last 1 week - sick.   For last few days fever. Too sleepy.  Needs to take advil. Headache.   Low energy, run down feeling is worst.   Some wheezing that improves with coughing.   No travel. No sick contacts. No smoke exposure.     Chews tobacco but does not smoke.     Problem list and histories reviewed & adjusted, as indicated.  Additional history: as documented    Labs reviewed in EPIC    Reviewed and updated as needed this visit by clinical staff  Tobacco  Allergies  Meds  Med Hx  Surg Hx  Fam Hx  Soc Hx      Reviewed and updated as needed this visit by Provider        Social History     Social History     Marital status:      Spouse name: N/A     Number of children: N/A     Years of education: N/A     Occupational History      [employer]     Social History Main Topics     Smoking status: Never Smoker     Smokeless tobacco: Current User     Types: Chew      Comment: chewing less     Alcohol use 0.0 oz/week     0 Standard drinks or equivalent per week      Comment: 6-8 drinks a week      Drug use: No     Sexual activity: Yes     Partners: Female     Other Topics Concern     Parent/Sibling W/ Cabg, Mi Or Angioplasty Before 65f 55m? No     Social History Narrative     Dairy/d 1 servings/d.     Caffeine 3 20 oz bottles  servings/d    Exercise 2 x week    Sunscreen used - NA    Seatbelts used - Yes    Working smoke/CO detectors in the home - Yes    Guns stored in the home - Yes    Self Breast Exams - NA    Self Testicular Exam - No    Eye Exam up to date - No    Dental Exam up to date - No    Pap Smear up to date - NA    Mammogram up to date - NA    PSA up to date - NA    Dexa Scan up to date - NA    Flex Sig / Colonoscopy up to date - NA    Immunizations up to date - No-1991    Abuse: Current or Past(Physical, Sexual or Emotional)- No    Do you feel safe in your environment - Yes                 Allergies   Allergen Reactions     Omnicef Hives     Sulfa Drugs Rash     Patient Active Problem List   Diagnosis     ALLERGIC RHINITIS      HYPERLIPIDEMIA LDL GOAL <130     Family history of diabetes mellitus     Hypertriglyceridemia     Family history of coronary artery disease     Chews tobacco     Gastroesophageal reflux disease without esophagitis     Reviewed medications, social history and  past medical and surgical history.    Review of system: for general, respiratory, CVS, GI and psychiatry negative except for noted above.     EXAM:  /79 (BP Location: Left arm, Patient Position: Chair, Cuff Size: Adult Small)  Pulse 75  Temp 98.6  F (37  C) (Oral)  Resp 19  Wt 201 lb 4 oz (91.3 kg)  SpO2 97%  BMI 28.88 kg/m2  Constitutional: healthy, alert and no distress   Psychiatric: mentation appears normal and affect normal/bright  Cardiovascular: RRR. No murmurs,  Respiratory: negative, Lungs clear. No crackles or wheezing. No tachypnea.   Neck: mild cervical adenopathy  ENT: Both TM exam normal, mild frontal sinus tenderness, mild nasal turbinate hypertrophy, throat clear     ASSESSMENT / PLAN:  (R69.05) Flu-like symptoms  (primary encounter diagnosis)  Comment: Flu season is mostly lower but cannot be ruled out completely rapid influenza swab was negative.  The symptoms are  persistent for a while and I suspect most likely a bacterial infection.  We will obtain a chest x-ray which unfortunately was unchanged from the last one and we decided to treat with empiric antibiotics and Tessalon Perles.  Plan: Influenza A/B antigen             (R05) Cough  Comment:    Plan: XR Chest 2 Views, azithromycin (ZITHROMAX) 250         MG tablet, benzonatate (TESSALON) 200 MG         capsule               Follow-up if not seeing improvement

## 2018-04-23 ENCOUNTER — OFFICE VISIT (OUTPATIENT)
Dept: FAMILY MEDICINE | Facility: CLINIC | Age: 48
End: 2018-04-23
Payer: COMMERCIAL

## 2018-04-23 VITALS
TEMPERATURE: 97.3 F | BODY MASS INDEX: 28.98 KG/M2 | HEART RATE: 66 BPM | DIASTOLIC BLOOD PRESSURE: 79 MMHG | OXYGEN SATURATION: 96 % | WEIGHT: 202 LBS | SYSTOLIC BLOOD PRESSURE: 122 MMHG | RESPIRATION RATE: 20 BRPM

## 2018-04-23 DIAGNOSIS — J20.9 ACUTE BRONCHITIS, UNSPECIFIED ORGANISM: Primary | ICD-10-CM

## 2018-04-23 DIAGNOSIS — J01.00 ACUTE NON-RECURRENT MAXILLARY SINUSITIS: ICD-10-CM

## 2018-04-23 PROCEDURE — 99214 OFFICE O/P EST MOD 30 MIN: CPT | Performed by: FAMILY MEDICINE

## 2018-04-23 RX ORDER — DOXYCYCLINE 100 MG/1
100 CAPSULE ORAL 2 TIMES DAILY
Qty: 20 CAPSULE | Refills: 0 | Status: SHIPPED | OUTPATIENT
Start: 2018-04-23 | End: 2018-05-07

## 2018-04-23 RX ORDER — ALBUTEROL SULFATE 0.83 MG/ML
1 SOLUTION RESPIRATORY (INHALATION) ONCE
Qty: 3 ML | Refills: 0 | Status: SHIPPED | OUTPATIENT
Start: 2018-04-23 | End: 2019-02-06

## 2018-04-23 RX ORDER — ALBUTEROL SULFATE 90 UG/1
2 AEROSOL, METERED RESPIRATORY (INHALATION) EVERY 6 HOURS PRN
Qty: 1 INHALER | Refills: 1 | Status: SHIPPED | OUTPATIENT
Start: 2018-04-23 | End: 2018-05-07

## 2018-04-23 NOTE — NURSING NOTE
The following nebulizer treatment was given:     MEDICATION: Albuterol Sulfate 2.5 mg  : [employer]  LOT #: [id number]  EXPIRATION DATE:  12/2019  NDC # 1083-9874-07    Carri Georges MA

## 2018-04-23 NOTE — PROGRESS NOTES
HPI      ROS      Physical Exam      SUBJECTIVE:   Alpesh Winston is a 48 year old male who presents to clinic today for the following health issues:    RESPIRATORY SYMPTOMS      Duration: x1.5 week     Description  Fever, cough, flu like symptoms, run jessica, sinus problem, and ear pain in both ears, congestion      Severity: mild    Accompanying signs and symptoms: Pt saw Dr. Omalley on 4/20/2018. Both flu swab and chest xray was done.     History (predisposing factors):  none    Precipitating or alleviating factors: None    Therapies tried and outcome:  Azithromycin outcome not effective     Started with flu-like symptoms at least 10 days ago, with bad taste in his mouth, sore throat, nasal drainage, cough and aches and fatigue, as well as fevers.  He as seen by Dr. Omalley; flu swab was negative and CXR was ultimately negative.  He was treated with azithromycin and advised to f/u if not feeling better after the course.  Unfortunately, he is not feeling better . He is still having subjective fevers (just takes advil, doesn't check temp).  He is having sinus congestion, drainage and pressure as well as bilateral ear pressure.  His cough is terrible, and he is feeling short of breath.  His cough is mostly dry but occasionally productive.  No hemoptysis.  No n/v/d.  Mucinex is somewhat helpful.          Problem list and histories reviewed & adjusted, as indicated.  Additional history: as documented    Patient Active Problem List   Diagnosis     ALLERGIC RHINITIS      HYPERLIPIDEMIA LDL GOAL <130     Family history of diabetes mellitus     Hypertriglyceridemia     Family history of coronary artery disease     Chews tobacco     Gastroesophageal reflux disease without esophagitis     Past Surgical History:   Procedure Laterality Date     EYE SURGERY      SURGERY X 2 BILAT TEAR DUCTS      HEAD & NECK SURGERY       HERNIA REPAIR  2000     LAPAROSCOPIC HERNIORRHAPHY INGUINAL BILATERAL Bilateral 10/27/2016    Procedure:  LAPAROSCOPIC HERNIORRHAPHY INGUINAL BILATERAL;  Surgeon: Nellie Duron MD;  Location: [facility]     SOFT TISSUE SURGERY      Many lypoma removed - Multiple dates     SURGICAL HISTORY OF -       removal of lipoma X 4     SURGICAL HISTORY OF -   10/00    left inguinal hernia repair     SURGICAL HISTORY OF -       wisdom teeth extraction       Social History   Substance Use Topics     Smoking status: Never Smoker     Smokeless tobacco: Current User     Types: Chew      Comment: chewing less     Alcohol use 0.0 oz/week     0 Standard drinks or equivalent per week      Comment: 6-8 drinks a week      Family History   Problem Relation Age of Onset     C.A.D. Maternal Grandmother      MI X 2 ,  in her 60's      Coronary Artery Disease Maternal Grandmother      Hypertension Maternal Grandmother      Hyperlipidemia Maternal Grandmother      Other Cancer Maternal Grandmother      cervical     HEART DISEASE Father      DIABETES Father      Prostate Cancer Father      DIABETES Paternal Grandmother      type 2     Breast Cancer Sister      Alzheimer Disease Mother 74     CBD     Alcohol/Drug Maternal Uncle      C.A.D. Maternal Uncle      MI age 56     Substance Abuse Other      CEREBROVASCULAR DISEASE No family hx of      Cancer - colorectal No family hx of          Current Outpatient Prescriptions   Medication Sig Dispense Refill     albuterol (2.5 MG/3ML) 0.083% neb solution Take 1 vial (2.5 mg) by nebulization once for 1 dose 3 mL 0     atorvastatin (LIPITOR) 20 MG tablet Take 1 tablet (20 mg) by mouth daily 90 tablet PRN     azithromycin (ZITHROMAX) 250 MG tablet Two tablets first day, then one tablet daily for four days. 6 tablet 0     benzonatate (TESSALON) 200 MG capsule Take 1 capsule (200 mg) by mouth 3 times daily as needed for cough 21 capsule 0     COENZYME Q-10 PO Take 20 mg by mouth daily       doxycycline (VIBRAMYCIN) 100 MG capsule Take 1 capsule (100 mg) by mouth 2 times daily 20 capsule 0     fenofibrate  160 MG tablet TAKE 1 TABLET DAILY (DUE FOR APPOINTMENT NOW) 90 tablet PRN     Omega-3 Fatty Acids (FISH OIL PO)        sildenafil (VIAGRA) 50 MG tablet Take 1 tablet (50 mg) by mouth daily as needed 12 tablet 3     Allergies   Allergen Reactions     Omnicef Hives     Sulfa Drugs Rash       Reviewed and updated as needed this visit by clinical staff  Tobacco  Allergies  Meds  Med Hx  Surg Hx  Fam Hx  Soc Hx      Reviewed and updated as needed this visit by Provider         ROS:  Constitutional, HEENT, cardiovascular, pulmonary, gi and gu systems are negative, except as otherwise noted.    OBJECTIVE:     /79 (BP Location: Left arm, Patient Position: Sitting, Cuff Size: Adult Regular)  Pulse 66  Temp 97.3  F (36.3  C) (Oral)  Resp 20  Wt 202 lb (91.6 kg)  SpO2 96%  BMI 28.98 kg/m2  Body mass index is 28.98 kg/(m^2).  GENERAL APPEARANCE: fatigued, alert and no distress  EYES: Eyes grossly normal to inspection, PERRL and conjunctivae and sclerae normal  HENT: ear canals and TM's normal and nose and mouth without ulcers or lesions.  Left maxillary ttp.   NECK: no adenopathy, no asymmetry, masses, or scars and thyroid normal to palpation  RESP: diffuse rhonchi and wheezing.  No rales.  Normal work of breathing.  Speaking in full sentences without dyspnea but frequent cough.    CV: regular rates and rhythm, normal S1 S2, no S3 or S4 and no murmur, click or rub  PSYCH: mentation appears normal and affect normal/bright    Diagnostic Test Results:  none     ASSESSMENT/PLAN:             1. Acute bronchitis, unspecified organism  Wheezing and decreased air movement on exam, improved after albuterol neb.  Rx done for albuterol, reviewed how to use it, can go over it also with pharmacist.  I did not appreciate any rales or other evidence of a pneumonia, though I am changing his antibiotic to doxycycline, which would also cover.  Mucinex dm.  - albuterol (2.5 MG/3ML) 0.083% neb solution; Take 1 vial (2.5 mg) by  nebulization once for 1 dose  Dispense: 3 mL; Refill: 0  - albuterol (PROAIR HFA/PROVENTIL HFA/VENTOLIN HFA) 108 (90 Base) MCG/ACT Inhaler; Inhale 2 puffs into the lungs every 6 hours as needed for shortness of breath / dyspnea or wheezing  Dispense: 1 Inhaler; Refill: 1    2. Acute non-recurrent maxillary sinusitis  Worsening at the 10 day clyde on azithromycin, will change to doxycycline due to possible bacterial resistance to azithromycin.  Recommended nasal saline, flonase if desired.  - doxycycline (VIBRAMYCIN) 100 MG capsule; Take 1 capsule (100 mg) by mouth 2 times daily  Dispense: 20 capsule; Refill: 0        Doris Gutierrez MD  [facility]

## 2018-04-23 NOTE — MR AVS SNAPSHOT
After Visit Summary   4/23/2018    Alpesh Winston    MRN: [id number]           Patient Information     Date Of Birth          1970        Visit Information        Provider Department      4/23/2018 10:40 AM Doris Gutierrez MD [facility]        Today's Diagnoses     Acute bronchitis, unspecified organism    -  1    Acute non-recurrent maxillary sinusitis          Care Instructions    Albuterol 2 puffs every 4-6 hours while awake for the next 2-3 days, then just if needed for cough, shortness of breath.     Doxycycline--antibiotic for sinus infection, can use nasal saline spray if needed, or flonase.      mucinex is fine    If fever 101 or higher, or anything getting worse, come back.            Follow-ups after your visit        Your next 10 appointments already scheduled     Oct 08, 2018  3:00 PM CDT   (Arrive by 2:45 PM)   Return Visit with Grisel Arriaga PA-C   [facility] for Prostate and Urologic Cancers ([facility])    [address] [place] [address]  4th [facility] [place] [address] [phone]              Who to contact     If you have questions or need follow up information about today's clinic visit or your schedule please contact [facility] directly at [phone].  Normal or non-critical lab and imaging results will be communicated to you by MyChart, letter or phone within 4 business days after the clinic has received the results. If you do not hear from us within 7 days, please contact the clinic through MyChart or phone. If you have a critical or abnormal lab result, we will notify you by phone as soon as possible.  Submit refill requests through [employer] or call your pharmacy and they will forward the refill request to us. Please allow 3 business days for your refill to be completed.          Additional Information About Your Visit        [employer] Information     [employer] gives you secure access  to your electronic health record. If you see a primary care provider, you can also send messages to your care team and make appointments. If you have questions, please call your primary care clinic.  If you do not have a primary care provider, please call [phone] and they will assist you.        Care EveryWhere ID     This is your Care EveryWhere ID. This could be used by other organizations to access your [facility] medical records  [id number]        Your Vitals Were     Pulse Temperature Respirations Pulse Oximetry BMI (Body Mass Index)       66 97.3  F (36.3  C) (Oral) 20 96% 28.98 kg/m2        Blood Pressure from Last 3 Encounters:   04/23/18 122/79   04/20/18 112/79   04/17/18 130/79    Weight from Last 3 Encounters:   04/23/18 202 lb (91.6 kg)   04/20/18 201 lb 4 oz (91.3 kg)   04/17/18 205 lb 8 oz (93.2 kg)              Today, you had the following     No orders found for display         Today's Medication Changes          These changes are accurate as of 4/23/18 11:27 AM.  If you have any questions, ask your nurse or doctor.               Start taking these medicines.        Dose/Directions    * albuterol (2.5 MG/3ML) 0.083% neb solution   Used for:  Acute bronchitis, unspecified organism   Started by:  Doris Gutierrez MD        Dose:  1 vial   Take 1 vial (2.5 mg) by nebulization once for 1 dose   Quantity:  3 mL   Refills:  0       * albuterol 108 (90 Base) MCG/ACT Inhaler   Commonly known as:  PROAIR HFA/PROVENTIL HFA/VENTOLIN HFA   Used for:  Acute bronchitis, unspecified organism   Started by:  Doris Gutierrez MD        Dose:  2 puff   Inhale 2 puffs into the lungs every 6 hours as needed for shortness of breath / dyspnea or wheezing   Quantity:  1 Inhaler   Refills:  1       doxycycline 100 MG capsule   Commonly known as:  VIBRAMYCIN   Used for:  Acute non-recurrent maxillary sinusitis   Started by:  Doris Gutierrez MD        Dose:  100 mg   Take 1 capsule (100 mg) by mouth 2 times  daily   Quantity:  20 capsule   Refills:  0       * Notice:  This list has 2 medication(s) that are the same as other medications prescribed for you. Read the directions carefully, and ask your doctor or other care provider to review them with you.         Where to get your medicines      These medications were sent to [id number] - [place], [place] - [address] AT [facility] [address]  [address], [place] [address]    Hours:  24-hours Phone:  [phone]     albuterol (2.5 MG/3ML) 0.083% neb solution    albuterol 108 (90 Base) MCG/ACT Inhaler    doxycycline 100 MG capsule                Primary Care Provider Office Phone # Fax #    Belem Joshua [phone] [address] [facility] [address]        Equal Access to Services     JOSÉ ROGERS AH: Hadii aad ku hadasho Soomaali, waaxda luqadaha, qaybta kaalmada adeegyada, waxay omkarin hayaan anneliese koo . So [facility] [phone].    ATENCIÓN: Si habla español, tiene a casper disposición servicios gratuitos de asistencia lingüística. Maye al [phone].    We comply with applicable federal civil rights laws and [place] laws. We do not discriminate on the basis of race, color, national origin, age, disability, sex, sexual orientation, or gender identity.            Thank you!     Thank you for choosing [facility]  for your care. Our goal is always to provide you with excellent care. Hearing back from our patients is one way we can continue to improve our services. Please take a few minutes to complete the written survey that you may receive in the mail after your visit with us. Thank you!             Your Updated Medication List - Protect others around you: Learn how to safely use, store and throw away your medicines at www.disposemymeds.org.          This list is accurate as of 4/23/18 11:27 AM.  Always use your most recent med list.                   Brand Name Dispense  Instructions for use Diagnosis    * albuterol (2.5 MG/3ML) 0.083% neb solution     3 mL    Take 1 vial (2.5 mg) by nebulization once for 1 dose    Acute bronchitis, unspecified organism       * albuterol 108 (90 Base) MCG/ACT Inhaler    PROAIR HFA/PROVENTIL HFA/VENTOLIN HFA    1 Inhaler    Inhale 2 puffs into the lungs every 6 hours as needed for shortness of breath / dyspnea or wheezing    Acute bronchitis, unspecified organism       atorvastatin 20 MG tablet    LIPITOR    90 tablet    Take 1 tablet (20 mg) by mouth daily    Hyperlipidemia LDL goal <130       azithromycin 250 MG tablet    ZITHROMAX    6 tablet    Two tablets first day, then one tablet daily for four days.    Cough       benzonatate 200 MG capsule    TESSALON    21 capsule    Take 1 capsule (200 mg) by mouth 3 times daily as needed for cough    Cough       COENZYME Q-10 PO      Take 20 mg by mouth daily        doxycycline 100 MG capsule    VIBRAMYCIN    20 capsule    Take 1 capsule (100 mg) by mouth 2 times daily    Acute non-recurrent maxillary sinusitis       fenofibrate 160 MG tablet     90 tablet    TAKE 1 TABLET DAILY (DUE FOR APPOINTMENT NOW)    Hyperlipidemia LDL goal <130       FISH OIL PO           sildenafil 50 MG tablet    VIAGRA    12 tablet    Take 1 tablet (50 mg) by mouth daily as needed    Erectile dysfunction, unspecified erectile dysfunction type       * Notice:  This list has 2 medication(s) that are the same as other medications prescribed for you. Read the directions carefully, and ask your doctor or other care provider to review them with you.

## 2018-04-23 NOTE — PATIENT INSTRUCTIONS
Albuterol 2 puffs every 4-6 hours while awake for the next 2-3 days, then just if needed for cough, shortness of breath.     Doxycycline--antibiotic for sinus infection, can use nasal saline spray if needed, or flonase.      mucinex is fine    If fever 101 or higher, or anything getting worse, come back.

## 2018-04-24 ENCOUNTER — TELEPHONE (OUTPATIENT)
Dept: FAMILY MEDICINE | Facility: CLINIC | Age: 48
End: 2018-04-24

## 2018-04-24 DIAGNOSIS — R79.89 LOW TESTOSTERONE: Primary | ICD-10-CM

## 2018-04-24 NOTE — TELEPHONE ENCOUNTER
albuterol (PROAIR HFA/PROVENTIL HFA/VENTOLIN HFA) 108 (90 Base) MCG/ACT Inhaler: not covered by pt plan.  The preferred alternative is proairhfa, proairrespcilick, ventolininhfa.  Please call/fax the pharmacy to change med with complete instructions.

## 2018-04-24 NOTE — TELEPHONE ENCOUNTER
Generic sent pharmacy may dispense per insurance - called [facility] - [place] [facility] [place] - spoke with pharmacy - verbal given if needed - may dispense per insurance -    Closing encounter - no further actions needed at this time    Quiana Downing RN

## 2018-04-25 DIAGNOSIS — R79.89 LOW TESTOSTERONE: ICD-10-CM

## 2018-04-25 PROCEDURE — 84403 ASSAY OF TOTAL TESTOSTERONE: CPT | Performed by: FAMILY MEDICINE

## 2018-04-25 PROCEDURE — 36415 COLL VENOUS BLD VENIPUNCTURE: CPT | Performed by: FAMILY MEDICINE

## 2018-04-27 DIAGNOSIS — E29.1 HYPOGONADISM MALE: Primary | ICD-10-CM

## 2018-04-27 LAB — TESTOST SERPL-MCNC: 263 NG/DL (ref 240–950)

## 2018-04-30 ENCOUNTER — TELEPHONE (OUTPATIENT)
Dept: UROLOGY | Facility: CLINIC | Age: 48
End: 2018-04-30

## 2018-05-01 ENCOUNTER — OFFICE VISIT (OUTPATIENT)
Dept: FAMILY MEDICINE | Facility: CLINIC | Age: 48
End: 2018-05-01
Payer: COMMERCIAL

## 2018-05-01 VITALS
RESPIRATION RATE: 18 BRPM | DIASTOLIC BLOOD PRESSURE: 86 MMHG | OXYGEN SATURATION: 96 % | BODY MASS INDEX: 28.3 KG/M2 | WEIGHT: 197.2 LBS | HEART RATE: 60 BPM | TEMPERATURE: 96.6 F | SYSTOLIC BLOOD PRESSURE: 126 MMHG

## 2018-05-01 DIAGNOSIS — J32.0 CHRONIC MAXILLARY SINUSITIS: Primary | ICD-10-CM

## 2018-05-01 PROCEDURE — 99213 OFFICE O/P EST LOW 20 MIN: CPT | Performed by: NURSE PRACTITIONER

## 2018-05-01 NOTE — PROGRESS NOTES
SUBJECTIVE:   Alpesh Winston is a 48 year old male who presents to clinic today for the following health issues:    RESPIRATORY SYMPTOMS      Duratixon: x 3 weeks    Description  facial pain/pressure has gotten a lot worse- his ears, gums, cheeks, and eyes hurt. Still having some cough     Severity: severe    Accompanying signs and symptoms: None    History (predisposing factors):  none    Precipitating or alleviating factors: None    Therapies tried and outcome:  Albuterol &  zpak then Vibramycin & sudafed     Just does not seem to be going away.    Severe sinus pain and into his teeth.    Has 2 days left on the doxycycline and it didn't change much.    No fevers.        Problem list and histories reviewed & adjusted, as indicated.  Additional history:     Reviewed and updated as needed this visit by clinical staff  Tobacco  Allergies  Meds  Problems  Med Hx  Surg Hx  Fam Hx  Soc Hx        Reviewed and updated as needed this visit by Provider  Tobacco  Allergies  Meds  Problems  Med Hx  Surg Hx  Fam Hx  Soc Hx          ROS:  Review of systems negative except as stated above.    OBJECTIVE:  /86 (BP Location: Right arm, Patient Position: Chair, Cuff Size: Adult Regular)  Pulse 60  Temp 96.6  F (35.9  C) (Tympanic)  Resp 18  Wt 197 lb 3.2 oz (89.4 kg)  SpO2 96%  BMI 28.3 kg/m2  GENERAL APPEARANCE: healthy, alert and no distress  EYES: EOMI,  PERRL, conjunctiva clear  HENT: Bilateral frontal and maxillary sinus tenderness ear canals and TM's normal.  Nose and mouth without ulcers, erythema or lesions  NECK: supple, nontender, no lymphadenopathy  RESP: lungs clear to auscultation - no rales, rhonchi or wheezes  CV: regular rates and rhythm, normal S1 S2, no murmur noted  ABDOMEN:  soft, nontender, no HSM or masses and bowel sounds normal  SKIN: no suspicious lesions or rashes    ASSESSMENT:  Sinusitis    PLAN:  Tylenol, Ibuprofen, Fluids, Rest and Saline gargles  Continue doxycycline  until gone  F/u with ENT for further eval given duration and progression.    Discussed risks of c diff/fungal infection with 3 rounds of antibiotic in short succession.    See orders in Epic

## 2018-05-01 NOTE — MR AVS SNAPSHOT
After Visit Summary   5/1/2018    Alpesh Winston    MRN: [id number]           Patient Information     Date Of Birth          1970        Visit Information        Provider Department      5/1/2018 9:00 AM Elo Cavanaugh APRN [facility]        Today's Diagnoses     Chronic maxillary sinusitis    -  1       Follow-ups after your visit        Additional Services     OTOLARYNGOLOGY REFERRAL       Your provider has referred you to: [facility] (Otolaryngology) - [place] [phone]  [email or web address]  N: [facility] [phone]   [email or web address]    Please be aware that coverage of these services is subject to the terms and limitations of your health insurance plan.  Call member services at your health plan with any benefit or coverage questions.      Please bring the following to your appointment:  >>   Any x-rays, CTs or MRIs which have been performed.  Contact the facility where they were done to arrange for  prior to your scheduled appointment.  Any new CT, MRI or other procedures ordered by your specialist must be performed at a [facility] facility or coordinated by your clinic's referral office.    >>   List of current medications   >>   This referral request   >>   Any documents/labs given to you for this referral                  Your next 10 appointments already scheduled     Oct 08, 2018  3:00 PM CDT   (Arrive by 2:45 PM)   Return Visit with Grisel Arriaga PA-C   [facility] for Prostate and Urologic Cancers ([facility] and [facility])    [address] [place] [address]  4th Floor  [facility] [address] [phone]              Who to contact     If you have questions or need follow up information about today's clinic visit or your schedule please contact  [facility] directly at [phone].  Normal or non-critical lab and imaging results will be communicated to you by MyChart, letter or phone within 4 business days after the clinic has received the results. If you do not hear from us within 7 days, please contact the clinic through MyChart or phone. If you have a critical or abnormal lab result, we will notify you by phone as soon as possible.  Submit refill requests through [employer] or call your pharmacy and they will forward the refill request to us. Please allow 3 business days for your refill to be completed.          Additional Information About Your Visit        [employer] Information     [employer] gives you secure access to your electronic health record. If you see a primary care provider, you can also send messages to your care team and make appointments. If you have questions, please call your primary care clinic.  If you do not have a primary care provider, please call [phone] and they will assist you.        Care EveryWhere ID     This is your Care EveryWhere ID. This could be used by other organizations to access your [place] medical records  [id number]        Your Vitals Were     Pulse Temperature Respirations Pulse Oximetry BMI (Body Mass Index)       60 96.6  F (35.9  C) (Tympanic) 18 96% 28.3 kg/m2        Blood Pressure from Last 3 Encounters:   05/01/18 129/90   04/23/18 122/79   04/20/18 112/79    Weight from Last 3 Encounters:   05/01/18 197 lb 3.2 oz (89.4 kg)   04/23/18 202 lb (91.6 kg)   04/20/18 201 lb 4 oz (91.3 kg)              We Performed the Following     OTOLARYNGOLOGY REFERRAL        Primary Care Provider Office Phone # Fax #    Belem Joshua [phone] [facility] [address]        Equal Access to Services     [facility] KEN : Leesa holbrooko Soluca, waaxda luqadaha, qaybta kaalmada adeegyada, waxay jeremias long. So [facility]  [phone].    ATENCIÓN: Si rose mary ribeiro, tiene a casper disposición servicios gratuitos de asistencia lingüística. Maye cabrera [phone].    We comply with applicable federal civil rights laws and [place] laws. We do not discriminate on the basis of race, color, national origin, age, disability, sex, sexual orientation, or gender identity.            Thank you!     Thank you for choosing [facility]  for your care. Our goal is always to provide you with excellent care. Hearing back from our patients is one way we can continue to improve our services. Please take a few minutes to complete the written survey that you may receive in the mail after your visit with us. Thank you!             Your Updated Medication List - Protect others around you: Learn how to safely use, store and throw away your medicines at www.disposemymeds.org.          This list is accurate as of 5/1/18  9:35 AM.  Always use your most recent med list.                   Brand Name Dispense Instructions for use Diagnosis    albuterol 108 (90 Base) MCG/ACT Inhaler    PROAIR HFA/PROVENTIL HFA/VENTOLIN HFA    1 Inhaler    Inhale 2 puffs into the lungs every 6 hours as needed for shortness of breath / dyspnea or wheezing    Acute bronchitis, unspecified organism       atorvastatin 20 MG tablet    LIPITOR    90 tablet    Take 1 tablet (20 mg) by mouth daily    Hyperlipidemia LDL goal <130       azithromycin 250 MG tablet    ZITHROMAX    6 tablet    Two tablets first day, then one tablet daily for four days.    Cough       benzonatate 200 MG capsule    TESSALON    21 capsule    Take 1 capsule (200 mg) by mouth 3 times daily as needed for cough    Cough       COENZYME Q-10 PO      Take 20 mg by mouth daily        doxycycline 100 MG capsule    VIBRAMYCIN    20 capsule    Take 1 capsule (100 mg) by mouth 2 times daily    Acute non-recurrent maxillary sinusitis       fenofibrate 160 MG tablet     90 tablet    TAKE 1 TABLET DAILY (DUE FOR  APPOINTMENT NOW)    Hyperlipidemia LDL goal <130       FISH OIL PO           sildenafil 50 MG tablet    VIAGRA    12 tablet    Take 1 tablet (50 mg) by mouth daily as needed    Erectile dysfunction, unspecified erectile dysfunction type

## 2018-05-02 ENCOUNTER — TRANSFERRED RECORDS (OUTPATIENT)
Dept: HEALTH INFORMATION MANAGEMENT | Facility: CLINIC | Age: 48
End: 2018-05-02

## 2018-05-03 ENCOUNTER — TELEPHONE (OUTPATIENT)
Dept: ENDOCRINOLOGY | Facility: CLINIC | Age: 48
End: 2018-05-03

## 2018-05-03 NOTE — TELEPHONE ENCOUNTER
To schedulers: Please schedule  for lst available endocrine    Marlene Saini MD  Endocrine triage      [facility] Call Center    Phone Message    May a detailed message be left on voicemail: no    Reason for Call: Other: Pt has an internal referral needing to be reviewed for new pt scheduling. He has a dx of  Hypogonadism.     Action Taken: Message routed to:  [facility] ([facility]): ENDO clinic coordinators

## 2018-05-07 ENCOUNTER — OFFICE VISIT (OUTPATIENT)
Dept: ENDOCRINOLOGY | Facility: CLINIC | Age: 48
End: 2018-05-07
Payer: COMMERCIAL

## 2018-05-07 VITALS
HEIGHT: 70 IN | BODY MASS INDEX: 28.9 KG/M2 | SYSTOLIC BLOOD PRESSURE: 147 MMHG | DIASTOLIC BLOOD PRESSURE: 92 MMHG | HEART RATE: 60 BPM | WEIGHT: 201.9 LBS

## 2018-05-07 DIAGNOSIS — R79.89 LOW TESTOSTERONE IN MALE: Primary | ICD-10-CM

## 2018-05-07 ASSESSMENT — ENCOUNTER SYMPTOMS
SNORES LOUDLY: 0
SPUTUM PRODUCTION: 1
FLANK PAIN: 0
NECK MASS: 0
POSTURAL DYSPNEA: 0
SINUS CONGESTION: 1
DYSPNEA ON EXERTION: 0
SHORTNESS OF BREATH: 1
COUGH DISTURBING SLEEP: 1
HOARSE VOICE: 1
SINUS PAIN: 1
HEMATURIA: 0
WHEEZING: 1
TASTE DISTURBANCE: 1
SMELL DISTURBANCE: 1
HEMOPTYSIS: 0
COUGH: 1
SORE THROAT: 0
DIFFICULTY URINATING: 1
DYSURIA: 0
TROUBLE SWALLOWING: 0

## 2018-05-07 NOTE — PROGRESS NOTES
Endocrinology consultation note:    Alpesh is a pleasant 48-year-old male who works as an  presenting with symptoms of erectile dysfunction, low libido, increased urinary frequency and nocturia.    Alpesh had a hernia surgery nearly 15 years ago.  Vasectomy was performed nearly 10 years ago.    In 2016, bilateral herniorrhaphy was performed.   Following surgery, he started to note sharp pain on the left side groin area.  However, no acute issues were identified.  Around the same time, he developed urinary urgency, nocturia.  Since his father was diagnosed with prostate cancer around age 58, he went for evaluation with urology.  During evaluation, he was found to have a borderline low testosterone levels and was referred to endocrine clinic.    At present, he complains of having low libido.  He also has problems with maintaining erection. He continues to have morning erections. This has been an ongoing issue but recently this has worsened.  He complains about low energy levels.  He has 3 biological children aged 17, 14, and 12.  Most of the time is spent on afterschool activities for these 3 children.  He barely finds any time for scheduled exercise.  Additionally, he also notes that his mother, who has dementia is now in a nursing home.  He is working with his father to sell the house and have him settled in the new house.  This has been a very stressful time.    He continues to have poor sleep.  He is easily awakened at night with any sounds and early morning from chirping birds.  Moreover, he goes to bathroom frequently at night.  He was prescribed Flomax but this was not helpful.  Overall, he gets about less than 6 hours of sleep every day.      He denies having headaches, visual problems.  No history of surgery or radiation.  No history of glucocorticoid use or opiate use.  No history of use of exogenous testosterone.  No tenderness in the chest, breast area.  No history of diabetes.      Review  of systems is otherwise unremarkable.  Recovering from sinus infection.   No neck swelling, compressive symptoms, change in appetite or weight.  No Depression  No change in frequency of shaving, change in body hair pattern.    Past medical history:  Past Medical History:   Diagnosis Date     Allergic rhinitis due to other allergen      History of chest pain     TO ER - EVALULATED, STRESS TEST ETC, ALL NEGATIVE     Hyperlipidaemia      Lipoma of other skin and subcutaneous tissue 2012    Chest wall     Mass 5/3/2012     Other and unspecified hyperlipidemia      Tibia/fibula fracture 2016     Past Surgical History:   Procedure Laterality Date     EYE SURGERY      SURGERY X 2 BILAT TEAR DUCTS      HEAD & NECK SURGERY       HERNIA REPAIR       LAPAROSCOPIC HERNIORRHAPHY INGUINAL BILATERAL Bilateral 10/27/2016    Procedure: LAPAROSCOPIC HERNIORRHAPHY INGUINAL BILATERAL;  Surgeon: Nellie Duron MD;  Location: [facility] OR     SOFT TISSUE SURGERY      Many lypoma removed - Multiple dates     SURGICAL HISTORY OF -       removal of lipoma X 4     SURGICAL HISTORY OF -   10/00    left inguinal hernia repair     SURGICAL HISTORY OF -       wisdom teeth extraction     Family History   Problem Relation Age of Onset     C.A.D. Maternal Grandmother      MI X 2 ,  in her 60's      Coronary Artery Disease Maternal Grandmother      Hypertension Maternal Grandmother      Hyperlipidemia Maternal Grandmother      Other Cancer Maternal Grandmother      cervical     HEART DISEASE Father      DIABETES Father      Prostate Cancer Father      DIABETES Paternal Grandmother      type 2     Breast Cancer Sister      Alzheimer Disease Mother 74     CBD     Alcohol/Drug Maternal Uncle      C.A.D. Maternal Uncle      MI age 56     Substance Abuse Other      CEREBROVASCULAR DISEASE No family hx of      Cancer - colorectal No family hx of      Social History     Social History     Marital status:      Spouse name: N/A     Number  "of children: N/A     Years of education: N/A     Occupational History      [employer]     Social History Main Topics     Smoking status: Never Smoker     Smokeless tobacco: Current User     Types: Chew      Comment: chewing less     Alcohol use 0.0 oz/week     0 Standard drinks or equivalent per week      Comment: 6-8 drinks a week      Drug use: No     Sexual activity: Yes     Partners: Female     Other Topics Concern     Parent/Sibling W/ Cabg, Mi Or Angioplasty Before 65f 55m? No     Social History Narrative    Dairy/d 1 servings/d.     Caffeine 3 20 oz bottles  servings/d    Exercise 2 x week    Sunscreen used - NA    Seatbelts used - Yes    Working smoke/CO detectors in the home - Yes    Guns stored in the home - Yes    Self Breast Exams - NA    Self Testicular Exam - No    Eye Exam up to date - No    Dental Exam up to date - No    Pap Smear up to date - NA    Mammogram up to date - NA    PSA up to date - NA    Dexa Scan up to date - NA    Flex Sig / Colonoscopy up to date - NA    Immunizations up to date - No-1991    Abuse: Current or Past(Physical, Sexual or Emotional)- No    Do you feel safe in your environment - Yes                 Current Outpatient Prescriptions   Medication     atorvastatin (LIPITOR) 20 MG tablet     COENZYME Q-10 PO     fenofibrate 160 MG tablet     Omega-3 Fatty Acids (FISH OIL PO)     sildenafil (VIAGRA) 50 MG tablet     No current facility-administered medications for this visit.         Allergies   Allergen Reactions     Omnicef Hives     Sulfa Drugs Rash       Exam  BP (!) 147/92 (BP Location: Right arm)  Pulse 60  Ht 1.778 m (5' 10\")  Wt 91.6 kg (201 lb 14.4 oz)  BMI 28.97 kg/m2   General: No acute distress.   Head normocephalic atraumatic  Eyes: Pupils are reactive.  EOMI.  No loss of peripheral vision.   ENT / Thyroid: No adenopathy, thyroid not palpable.  Chest clear to auscultation. No gynecomastia  CVS S1-S2, no murmurs  Abdomen soft, no striae, bowel " sounds present  : Bilateral 20 mL firm testicles.   Skin and Hair: Male pattern receding hair line, normal male pattern pubic and axillary hair.   Extremities: No edema    Assessment and plan:   Borderline low T.   Fatigue, significant stressors, low libido, busy schedule and poor sleep.   ED - may be a separate issue  Hernia sx and vasectomy  LUTS which may be exacerbated if TRT is tried.     Alpesh is a 48-year-old pleasant male who presents with symptoms of low libido, difficulty maintaining erections and poor energy levels.  His testosterone level was borderline low but recent levels have been low normal.    He seems to be going through several stressors in his life and has poor sleep and nocturia.  It is therefore difficult to confirm whether these symptoms are related to his low testosterone levels.  Especially given completely normal physical exam including testicular exam.  He had no problems with conception in the past.  Additionally, if he has prostatic enlargement, use of testosterone may further exacerbate his urinary symptoms.    We discussed about these issues in great detail.  As an initial step we will repeat morning testosterone levels along with testing for underlying etiology.  Additionally, we will perform urine specific gravity and hemoglobin A1c to rule out endocrine causes of polyuria.    If the labs are persistently on the lower side, a trial of testosterone could be tried to see if he has any benefits.  We also discussed about lifestyle changes, including cutting out some time to spend with his spouse, maintaining regular exercise schedule etc.         Answers for HPI/ROS submitted by the patient on 5/7/2018     Tinnitus: Yes  Congestion: Yes  Sinus pain: Yes   Voice hoarseness: Yes  Tooth pain: Yes  Gum tenderness: Yes  Change in taste: Yes  Change in sense of smell: Yes  Cough: Yes  Sputum or phlegm: Yes  Difficulty breating or shortness of breath: Yes  Wheezing: Yes  Nighttime Cough:  Yes  Increased frequency of urination: Yes  Frequent nighttime urination: Yes  Difficulty emptying bladder: Yes  Erectile dysfunction: Yes  Reduced libido: Yes

## 2018-05-07 NOTE — LETTER
5/7/2018       RE: Alpesh Winston  [address] [place] [address]     Dear Colleague,    Thank you for referring your patient, Alpesh Winston, to the [facility] ENDOCRINOLOGY at [facility]. Please see a copy of my visit note below.    Endocrinology consultation note:    Alpesh is a pleasant 48-year-old male who works as an  presenting with symptoms of erectile dysfunction, low libido, increased urinary frequency and nocturia.    Alpesh had a hernia surgery nearly 15 years ago.  Vasectomy was performed nearly 10 years ago.    In 2016, bilateral herniorrhaphy was performed.   Following surgery, he started to note sharp pain on the left side groin area.  However, no acute issues were identified.  Around the same time, he developed urinary urgency, nocturia.  Since his father was diagnosed with prostate cancer around age 58, he went for evaluation with urology.  During evaluation, he was found to have a borderline low testosterone levels and was referred to endocrine clinic.    At present, he complains of having low libido.  He also has problems with maintaining erection. He continues to have morning erections. This has been an ongoing issue but recently this has worsened.  He complains about low energy levels.  He has 3 biological children aged 17, 14, and 12.  Most of the time is spent on afterschool activities for these 3 children.  He barely finds any time for scheduled exercise.  Additionally, he also notes that his mother, who has dementia is now in a nursing home.  He is working with his father to sell the house and have him settled in the new house.  This has been a very stressful time.    He continues to have poor sleep.  He is easily awakened at night with any sounds and early morning from chirping birds.  Moreover, he goes to bathroom frequently at night.  He was prescribed Flomax but this was not helpful.  Overall, he gets about  less than 6 hours of sleep every day.      He denies having headaches, visual problems.  No history of surgery or radiation.  No history of glucocorticoid use or opiate use.  No history of use of exogenous testosterone.  No tenderness in the chest, breast area.  No history of diabetes.      Review of systems is otherwise unremarkable.  Recovering from sinus infection.   No neck swelling, compressive symptoms, change in appetite or weight.  No Depression  No change in frequency of shaving, change in body hair pattern.    Past medical history:  Past Medical History:   Diagnosis Date     Allergic rhinitis due to other allergen      History of chest pain     TO ER - EVALULATED, STRESS TEST ETC, ALL NEGATIVE     Hyperlipidaemia      Lipoma of other skin and subcutaneous tissue 2012    Chest wall     Mass 5/3/2012     Other and unspecified hyperlipidemia      Tibia/fibula fracture 2016     Past Surgical History:   Procedure Laterality Date     EYE SURGERY      SURGERY X 2 BILAT TEAR DUCTS      HEAD & NECK SURGERY       HERNIA REPAIR       LAPAROSCOPIC HERNIORRHAPHY INGUINAL BILATERAL Bilateral 10/27/2016    Procedure: LAPAROSCOPIC HERNIORRHAPHY INGUINAL BILATERAL;  Surgeon: Nellie Duron MD;  Location: [facility] OR     SOFT TISSUE SURGERY      Many lypoma removed - Multiple dates     SURGICAL HISTORY OF -       removal of lipoma X 4     SURGICAL HISTORY OF -   10/00    left inguinal hernia repair     SURGICAL HISTORY OF -       wisdom teeth extraction     Family History   Problem Relation Age of Onset     C.A.D. Maternal Grandmother      MI X 2 ,  in her 60's      Coronary Artery Disease Maternal Grandmother      Hypertension Maternal Grandmother      Hyperlipidemia Maternal Grandmother      Other Cancer Maternal Grandmother      cervical     HEART DISEASE Father      DIABETES Father      Prostate Cancer Father      DIABETES Paternal Grandmother      type 2     Breast Cancer Sister      Alzheimer Disease  "Mother 74     CBD     Alcohol/Drug Maternal Uncle      C.A.D. Maternal Uncle      MI age 56     Substance Abuse Other      CEREBROVASCULAR DISEASE No family hx of      Cancer - colorectal No family hx of      Social History     Social History     Marital status:      Spouse name: N/A     Number of children: N/A     Years of education: N/A     Occupational History      [employer]     Social History Main Topics     Smoking status: Never Smoker     Smokeless tobacco: Current User     Types: Chew      Comment: chewing less     Alcohol use 0.0 oz/week     0 Standard drinks or equivalent per week      Comment: 6-8 drinks a week      Drug use: No     Sexual activity: Yes     Partners: Female     Other Topics Concern     Parent/Sibling W/ Cabg, Mi Or Angioplasty Before 65f 55m? No     Social History Narrative    Dairy/d 1 servings/d.     Caffeine 3 20 oz bottles  servings/d    Exercise 2 x week    Sunscreen used - NA    Seatbelts used - Yes    Working smoke/CO detectors in the home - Yes    Guns stored in the home - Yes    Self Breast Exams - NA    Self Testicular Exam - No    Eye Exam up to date - No    Dental Exam up to date - No    Pap Smear up to date - NA    Mammogram up to date - NA    PSA up to date - NA    Dexa Scan up to date - NA    Flex Sig / Colonoscopy up to date - NA    Immunizations up to date - No-1991    Abuse: Current or Past(Physical, Sexual or Emotional)- No    Do you feel safe in your environment - Yes                 Current Outpatient Prescriptions   Medication     atorvastatin (LIPITOR) 20 MG tablet     COENZYME Q-10 PO     fenofibrate 160 MG tablet     Omega-3 Fatty Acids (FISH OIL PO)     sildenafil (VIAGRA) 50 MG tablet     No current facility-administered medications for this visit.         Allergies   Allergen Reactions     Omnicef Hives     Sulfa Drugs Rash     Exam  BP (!) 147/92 (BP Location: Right arm)  Pulse 60  Ht 1.778 m (5' 10\")  Wt 91.6 kg (201 lb 14.4 oz) "  BMI 28.97 kg/m2   General: No acute distress.   Head normocephalic atraumatic  Eyes: Pupils are reactive.  EOMI.  No loss of peripheral vision.   ENT / Thyroid: No adenopathy, thyroid not palpable.  Chest clear to auscultation. No gynecomastia  CVS S1-S2, no murmurs  Abdomen soft, no striae, bowel sounds present  : Bilateral 20 mL firm testicles.   Skin and Hair: Male pattern receding hair line, normal male pattern pubic and axillary hair.   Extremities: No edema    Assessment and plan:   Borderline low T.   Fatigue, significant stressors, low libido, busy schedule and poor sleep.   ED - may be a separate issue  Hernia sx and vasectomy  LUTS which may be exacerbated if TRT is tried.     Alpesh is a 48-year-old pleasant male who presents with symptoms of low libido, difficulty maintaining erections and poor energy levels.  His testosterone level was borderline low but recent levels have been low normal.    He seems to be going through several stressors in his life and has poor sleep and nocturia.  It is therefore difficult to confirm whether these symptoms are related to his low testosterone levels.  Especially given completely normal physical exam including testicular exam.  He had no problems with conception in the past.  Additionally, if he has prostatic enlargement, use of testosterone may further exacerbate his urinary symptoms.    We discussed about these issues in great detail.  As an initial step we will repeat morning testosterone levels along with testing for underlying etiology.  Additionally, we will perform urine specific gravity and hemoglobin A1c to rule out endocrine causes of polyuria.    If the labs are persistently on the lower side, a trial of testosterone could be tried to see if he has any benefits.  We also discussed about lifestyle changes, including cutting out some time to spend with his spouse, maintaining regular exercise schedule etc.     Again, thank you for allowing me to participate  in the care of your patient.      Sincerely,    Shanique Villa MD

## 2018-05-07 NOTE — MR AVS SNAPSHOT
After Visit Summary   5/7/2018    Alpesh Winston    MRN: [id number]           Patient Information     Date Of Birth          1970        Visit Information        Provider Department      5/7/2018 4:00 PM Shanique Villa MD [facility]        Today's Diagnoses     Low testosterone in male    -  1       Follow-ups after your visit        Your next 10 appointments already scheduled     May 07, 2018  5:00 PM CDT   LAB with  LAB   [facility] ([facility])    [address] [place] [address]  [address] [facility] [address] [phone]           Please do not eat 10-12 hours before your appointment if you are coming in fasting for labs on lipids, cholesterol, or glucose (sugar). This does not apply to pregnant women. Water, hot tea and black coffee (with nothing added) are okay. Do not drink other fluids, diet soda or chew gum.            Oct 08, 2018  3:00 PM CDT   (Arrive by 2:45 PM)   Return Visit with Grisel Arriaga PA-C   [facility] ([facility])    [address] [place] [address] [phone]              Future tests that were ordered for you today     Open Future Orders        Priority Expected Expires Ordered    CBC with platelets Routine 5/7/2018 5/7/2019 5/7/2018    Testosterone Free and Total Routine 5/7/2018 5/7/2019 5/7/2018    Lutropin Routine 5/7/2018 5/7/2019 5/7/2018    ALT Routine 5/7/2018 5/7/2019 5/7/2018    Ferritin Routine  5/7/2019 5/7/2018    Follicle stimulating hormone Routine  5/7/2019 5/7/2018            Who to contact     Please call your clinic at [phone] to:    Ask questions about your health    Make or cancel appointments    Discuss your medicines    Learn about your test results    Speak to your doctor            Additional Information About Your Visit        MyChart Information     [employer] gives you secure  "access to your electronic health record. If you see a primary care provider, you can also send messages to your care team and make appointments. If you have questions, please call your primary care clinic.  If you do not have a primary care provider, please call [phone] and they will assist you.      [employer] is an electronic gateway that provides easy, online access to your medical records. With [employer], you can request a clinic appointment, read your test results, renew a prescription or communicate with your care team.     To access your existing account, please contact your [facility] or call [phone] for assistance.        Care EveryWhere ID     This is your Care EveryWhere ID. This could be used by other organizations to access your [facility] medical records  [id number]        Your Vitals Were     Pulse Height BMI (Body Mass Index)             60 1.778 m (5' 10\") 28.97 kg/m2          Blood Pressure from Last 3 Encounters:   05/07/18 (!) 147/92   05/01/18 126/86   04/23/18 122/79    Weight from Last 3 Encounters:   05/07/18 91.6 kg (201 lb 14.4 oz)   05/01/18 89.4 kg (197 lb 3.2 oz)   04/23/18 91.6 kg (202 lb)                 Today's Medication Changes          These changes are accurate as of 5/7/18  4:52 PM.  If you have any questions, ask your nurse or doctor.               Stop taking these medicines if you haven't already. Please contact your care team if you have questions.     albuterol 108 (90 Base) MCG/ACT Inhaler   Commonly known as:  PROAIR HFA/PROVENTIL HFA/VENTOLIN HFA   Stopped by:  Shanique Villa MD           azithromycin 250 MG tablet   Commonly known as:  ZITHROMAX   Stopped by:  Shanique Villa MD           benzonatate 200 MG capsule   Commonly known as:  TESSALON   Stopped by:  Shanique Villa MD           doxycycline 100 MG capsule   Commonly known as:  VIBRAMYCIN   Stopped by:  Shanique Villa" MD                    Primary Care Provider Office Phone # Fax #    Belem VIVIANE Joshua [phone] [address] [place]  [facility] [address]        Equal Access to Services     JOSÉ ROGERS : Hadnoel taylor ku hadphillipo Soomaali, waaxda luqadaha, qaybta kaalmada adeegyada, daisha santos laEsaestee long. So [facility] [phone].    ATENCIÓN: Si habla español, tiene a casper disposición servicios gratuitos de asistencia lingüística. Llame al [phone].    We comply with applicable federal civil rights laws and [place] laws. We do not discriminate on the basis of race, color, national origin, age, disability, sex, sexual orientation, or gender identity.            Thank you!     Thank you for choosing [facility] ENDOCRINOLOGY  for your care. Our goal is always to provide you with excellent care. Hearing back from our patients is one way we can continue to improve our services. Please take a few minutes to complete the written survey that you may receive in the mail after your visit with us. Thank you!             Your Updated Medication List - Protect others around you: Learn how to safely use, store and throw away your medicines at www.disposemymeds.org.          This list is accurate as of 5/7/18  4:52 PM.  Always use your most recent med list.                   Brand Name Dispense Instructions for use Diagnosis    atorvastatin 20 MG tablet    LIPITOR    90 tablet    Take 1 tablet (20 mg) by mouth daily    Hyperlipidemia LDL goal <130       COENZYME Q-10 PO      Take 20 mg by mouth daily        fenofibrate 160 MG tablet     90 tablet    TAKE 1 TABLET DAILY (DUE FOR APPOINTMENT NOW)    Hyperlipidemia LDL goal <130       FISH OIL PO           sildenafil 50 MG tablet    VIAGRA    12 tablet    Take 1 tablet (50 mg) by mouth daily as needed    Erectile dysfunction, unspecified erectile dysfunction type

## 2018-05-16 DIAGNOSIS — R79.89 LOW TESTOSTERONE IN MALE: ICD-10-CM

## 2018-05-16 LAB
ALBUMIN UR-MCNC: NEGATIVE MG/DL
APPEARANCE UR: CLEAR
BACTERIA #/AREA URNS HPF: ABNORMAL /HPF
BILIRUB UR QL STRIP: NEGATIVE
COLOR UR AUTO: YELLOW
ERYTHROCYTE [DISTWIDTH] IN BLOOD BY AUTOMATED COUNT: 12.2 % (ref 10–15)
FSH SERPL-ACNC: 2.9 IU/L (ref 0.7–10.8)
GLUCOSE UR STRIP-MCNC: NEGATIVE MG/DL
HBA1C MFR BLD: 5.7 % (ref 0–5.6)
HCT VFR BLD AUTO: 44 % (ref 40–53)
HGB BLD-MCNC: 14.6 G/DL (ref 13.3–17.7)
HGB UR QL STRIP: ABNORMAL
KETONES UR STRIP-MCNC: NEGATIVE MG/DL
LEUKOCYTE ESTERASE UR QL STRIP: NEGATIVE
LH SERPL-ACNC: 4.1 IU/L (ref 1.5–9.3)
MCH RBC QN AUTO: 31 PG (ref 26.5–33)
MCHC RBC AUTO-ENTMCNC: 33.2 G/DL (ref 31.5–36.5)
MCV RBC AUTO: 93 FL (ref 78–100)
NITRATE UR QL: NEGATIVE
NON-SQ EPI CELLS #/AREA URNS LPF: ABNORMAL /LPF
PH UR STRIP: 6 PH (ref 5–7)
PLATELET # BLD AUTO: 281 10E9/L (ref 150–450)
PROLACTIN SERPL-MCNC: 6 UG/L (ref 2–18)
RBC # BLD AUTO: 4.71 10E12/L (ref 4.4–5.9)
RBC #/AREA URNS AUTO: ABNORMAL /HPF
SOURCE: ABNORMAL
SP GR UR STRIP: 1.02 (ref 1–1.03)
UROBILINOGEN UR STRIP-ACNC: 0.2 EU/DL (ref 0.2–1)
WBC # BLD AUTO: 4.8 10E9/L (ref 4–11)
WBC #/AREA URNS AUTO: ABNORMAL /HPF

## 2018-05-16 PROCEDURE — 84460 ALANINE AMINO (ALT) (SGPT): CPT | Performed by: FAMILY MEDICINE

## 2018-05-16 PROCEDURE — 36415 COLL VENOUS BLD VENIPUNCTURE: CPT | Performed by: FAMILY MEDICINE

## 2018-05-16 PROCEDURE — 84270 ASSAY OF SEX HORMONE GLOBUL: CPT | Performed by: FAMILY MEDICINE

## 2018-05-16 PROCEDURE — 82728 ASSAY OF FERRITIN: CPT | Performed by: FAMILY MEDICINE

## 2018-05-16 PROCEDURE — 83002 ASSAY OF GONADOTROPIN (LH): CPT | Performed by: FAMILY MEDICINE

## 2018-05-16 PROCEDURE — 80048 BASIC METABOLIC PNL TOTAL CA: CPT | Performed by: FAMILY MEDICINE

## 2018-05-16 PROCEDURE — 83001 ASSAY OF GONADOTROPIN (FSH): CPT | Performed by: FAMILY MEDICINE

## 2018-05-16 PROCEDURE — 83036 HEMOGLOBIN GLYCOSYLATED A1C: CPT | Performed by: FAMILY MEDICINE

## 2018-05-16 PROCEDURE — 85027 COMPLETE CBC AUTOMATED: CPT | Performed by: FAMILY MEDICINE

## 2018-05-16 PROCEDURE — 84403 ASSAY OF TOTAL TESTOSTERONE: CPT | Performed by: FAMILY MEDICINE

## 2018-05-16 PROCEDURE — 84146 ASSAY OF PROLACTIN: CPT | Performed by: FAMILY MEDICINE

## 2018-05-16 PROCEDURE — 81001 URINALYSIS AUTO W/SCOPE: CPT | Performed by: FAMILY MEDICINE

## 2018-05-17 LAB
ALT SERPL W P-5'-P-CCNC: 51 U/L (ref 0–70)
ANION GAP SERPL CALCULATED.3IONS-SCNC: 10 MMOL/L (ref 3–14)
BUN SERPL-MCNC: 17 MG/DL (ref 7–30)
CALCIUM SERPL-MCNC: 9.1 MG/DL (ref 8.5–10.1)
CHLORIDE SERPL-SCNC: 110 MMOL/L (ref 94–109)
CO2 SERPL-SCNC: 22 MMOL/L (ref 20–32)
CREAT SERPL-MCNC: 0.87 MG/DL (ref 0.66–1.25)
FERRITIN SERPL-MCNC: 1150 NG/ML (ref 26–388)
GFR SERPL CREATININE-BSD FRML MDRD: >90 ML/MIN/1.7M2
GLUCOSE SERPL-MCNC: 105 MG/DL (ref 70–99)
POTASSIUM SERPL-SCNC: 4.6 MMOL/L (ref 3.4–5.3)
SODIUM SERPL-SCNC: 142 MMOL/L (ref 133–144)

## 2018-05-18 LAB
SHBG SERPL-SCNC: 21 NMOL/L (ref 11–80)
TESTOST FREE SERPL-MCNC: 7.53 NG/DL (ref 4.7–24.4)
TESTOST SERPL-MCNC: 302 NG/DL (ref 240–950)

## 2018-05-30 DIAGNOSIS — R79.89 LOW TESTOSTERONE IN MALE: ICD-10-CM

## 2018-05-30 DIAGNOSIS — R79.89 HIGH SERUM FERRITIN: Primary | ICD-10-CM

## 2018-05-30 LAB — TRANSFERRIN SERPL-MCNC: 319 MG/DL (ref 210–360)

## 2018-05-30 PROCEDURE — 84466 ASSAY OF TRANSFERRIN: CPT | Performed by: FAMILY MEDICINE

## 2018-05-30 PROCEDURE — 83550 IRON BINDING TEST: CPT | Performed by: FAMILY MEDICINE

## 2018-05-30 PROCEDURE — 82728 ASSAY OF FERRITIN: CPT | Performed by: FAMILY MEDICINE

## 2018-05-30 PROCEDURE — 83540 ASSAY OF IRON: CPT | Performed by: FAMILY MEDICINE

## 2018-05-30 PROCEDURE — 36415 COLL VENOUS BLD VENIPUNCTURE: CPT | Performed by: FAMILY MEDICINE

## 2018-05-31 LAB
FERRITIN SERPL-MCNC: 1082 NG/ML (ref 26–388)
IRON SATN MFR SERPL: 31 % (ref 15–46)
IRON SERPL-MCNC: 120 UG/DL (ref 35–180)
TIBC SERPL-MCNC: 383 UG/DL (ref 240–430)

## 2018-06-01 ENCOUNTER — MYC MEDICAL ADVICE (OUTPATIENT)
Dept: UROLOGY | Facility: CLINIC | Age: 48
End: 2018-06-01

## 2018-06-01 DIAGNOSIS — M62.89 PELVIC FLOOR DYSFUNCTION: Primary | ICD-10-CM

## 2018-06-01 DIAGNOSIS — R10.2 PELVIC PAIN IN MALE: ICD-10-CM

## 2018-06-01 NOTE — TELEPHONE ENCOUNTER
Patient with ongoing pelvic/perineal pain. No improvement with treatment for prostatitis. Mild improvement with alpha blocker therapy. Questionable evidence for outlet obstruction on urodynamics but no evidence for obstructing BPH on cystoscopy.     Will refer to pelvic floor PT for evaluation and management of possible pelvic floor myofascial dysfunction.    Grisel Arriaga PA-C  Department of Urology

## 2018-06-02 NOTE — PROGRESS NOTES
High ferritin levels noted. Genetic counseling ordered, I will ask clinic staff to help Alpesh to schedule this.     Best regards,   Shanique Villa MD  [id number]  Endocrinology Service

## 2018-06-12 ENCOUNTER — MYC MEDICAL ADVICE (OUTPATIENT)
Dept: ENDOCRINOLOGY | Facility: CLINIC | Age: 48
End: 2018-06-12

## 2018-06-18 ENCOUNTER — MYC MEDICAL ADVICE (OUTPATIENT)
Dept: UROLOGY | Facility: CLINIC | Age: 48
End: 2018-06-18

## 2018-06-18 DIAGNOSIS — I86.1 VARICOCELE: Primary | ICD-10-CM

## 2018-06-20 ENCOUNTER — RADIANT APPOINTMENT (OUTPATIENT)
Dept: ULTRASOUND IMAGING | Facility: CLINIC | Age: 48
End: 2018-06-20
Attending: PHYSICIAN ASSISTANT
Payer: COMMERCIAL

## 2018-06-20 ENCOUNTER — MEDICAL CORRESPONDENCE (OUTPATIENT)
Dept: UROLOGY | Facility: CLINIC | Age: 48
End: 2018-06-20

## 2018-06-20 ENCOUNTER — OFFICE VISIT (OUTPATIENT)
Dept: UROLOGY | Facility: CLINIC | Age: 48
End: 2018-06-20
Payer: COMMERCIAL

## 2018-06-20 VITALS — SYSTOLIC BLOOD PRESSURE: 133 MMHG | DIASTOLIC BLOOD PRESSURE: 96 MMHG | OXYGEN SATURATION: 96 % | HEART RATE: 60 BPM

## 2018-06-20 DIAGNOSIS — N50.819 EPIDIDYMAL PAIN: Primary | ICD-10-CM

## 2018-06-20 DIAGNOSIS — I86.1 VARICOCELE: ICD-10-CM

## 2018-06-20 PROCEDURE — 64425 NJX AA&/STRD II IH NERVES: CPT | Performed by: UROLOGY

## 2018-06-20 PROCEDURE — 99202 OFFICE O/P NEW SF 15 MIN: CPT | Mod: 25 | Performed by: UROLOGY

## 2018-06-20 PROCEDURE — 76870 US EXAM SCROTUM: CPT

## 2018-06-20 ASSESSMENT — PAIN SCALES - GENERAL: PAINLEVEL: MILD PAIN (2)

## 2018-06-20 NOTE — MR AVS SNAPSHOT
After Visit Summary   6/20/2018    Alpesh Winston    MRN: [id number]           Patient Information     Date Of Birth          1970        Visit Information        Provider Department      6/20/2018 10:30 AM Chance Anand MD [facility]        Today's Diagnoses     Epididymal pain    -  1       Follow-ups after your visit        Follow-up notes from your care team     Return if symptoms worsen or fail to improve.      Your next 10 appointments already scheduled     Oct 02, 2018  1:00 PM CDT   Genetic Counseling with [facility] ([facility])    [facility]  [address]  [address] [address] [phone]            Oct 08, 2018  3:00 PM CDT   (Arrive by 2:45 PM)   Return Visit with Grisel Arriaga PA-C   [facility] ([facility])    [address]  [address] [facility] [address] [phone]              Who to contact     If you have questions or need follow up information about today's clinic visit or your schedule please contact [facility] directly at [phone].  Normal or non-critical lab and imaging results will be communicated to you by [employer], letter or phone within 4 business days after the clinic has received the results. If you do not hear from us within 7 days, please contact the clinic through [employer] or phone. If you have a critical or abnormal lab result, we will notify you by phone as soon as possible.  Submit refill requests through [employer]amiliar or call your pharmacy and they will forward the refill request to us. Please allow 3 business days for your refill to be completed.          Additional Information About Your Visit        MyChart Information     [employer]amiliar gives you secure access to your electronic health record. If you see a primary care provider, you can also send messages to your care  team and make appointments. If you have questions, please call your primary care clinic.  If you do not have a primary care provider, please call [phone] and they will assist you.      DNA SEQ is an electronic gateway that provides easy, online access to your medical records. With [employer], you can request a clinic appointment, read your test results, renew a prescription or communicate with your care team.     To access your existing account, please contact your [facility] or call [phone] for assistance.        Care EveryWhere ID     This is your Care EveryWhere ID. This could be used by other organizations to access your [place] medical records  [id number]        Your Vitals Were     Pulse Pulse Oximetry                60 96%           Blood Pressure from Last 3 Encounters:   06/20/18 (!) 133/96   05/07/18 (!) 147/92   05/01/18 126/86    Weight from Last 3 Encounters:   05/07/18 91.6 kg (201 lb 14.4 oz)   05/01/18 89.4 kg (197 lb 3.2 oz)   04/23/18 91.6 kg (202 lb)              Today, you had the following     No orders found for display       Primary Care Provider Office Phone # Fax #    Belem Joshua [phone] [address] [facility] [address]        Equal Access to Services     JOSÉ ROGERS : Hadii aad ku hadasho Soomaali, waaxda luqadaha, qaybta kaalmada adeegyada, waxay idiin hayaan anneliese koo . So [facility] [phone].    ATENCIÓN: Si habla español, tiene a casper disposición servicios gratuitos de asistencia lingüística. Llame al [phone].    We comply with applicable federal civil rights laws and [place] laws. We do not discriminate on the basis of race, color, national origin, age, disability, sex, sexual orientation, or gender identity.            Thank you!     Thank you for choosing [facility]  for your care. Our goal is always to provide you with excellent care. Hearing back from our patients is  one way we can continue to improve our services. Please take a few minutes to complete the written survey that you may receive in the mail after your visit with us. Thank you!             Your Updated Medication List - Protect others around you: Learn how to safely use, store and throw away your medicines at www.disposemymeds.org.          This list is accurate as of 6/20/18 11:42 AM.  Always use your most recent med list.                   Brand Name Dispense Instructions for use Diagnosis    atorvastatin 20 MG tablet    LIPITOR    90 tablet    Take 1 tablet (20 mg) by mouth daily    Hyperlipidemia LDL goal <130       COENZYME Q-10 PO      Take 20 mg by mouth daily        fenofibrate 160 MG tablet     90 tablet    TAKE 1 TABLET DAILY (DUE FOR APPOINTMENT NOW)    Hyperlipidemia LDL goal <130       FISH OIL PO           sildenafil 50 MG tablet    VIAGRA    12 tablet    Take 1 tablet (50 mg) by mouth daily as needed    Erectile dysfunction, unspecified erectile dysfunction type

## 2018-06-20 NOTE — NURSING NOTE
Alpesh Winston's goals for this visit include:   Chief Complaint   Patient presents with     Consult     Chronic scrotal pain       He requests these members of his care team be copied on today's visit information: Yes    PCP: Belem Joshua    Referring Provider:  No referring provider defined for this encounter.    BP (!) 133/96 (BP Location: Left arm, Patient Position: Sitting, Cuff Size: Adult Regular)  Pulse 60  SpO2 96%    Do you need any medication refills at today's visit? No

## 2018-06-20 NOTE — PROGRESS NOTES
Pt here for follow-up of chronic left testis pain.  Started after left inguinal hernia repair 15 years ago. No pain after that.  Vasectomy 12 years ago, no pain after that.    Has had left orchalgia x 5 years, started about 7 years after the vasectomy.  Pt saw Dr. Brizuela ~5 years ago, advised observation.    Repeat bilateral inguinal hernia repair was done 1.5 years ago.    Lower urinary tract symptoms are static, just nocturia occasionally. Symptoms manageable.    He has a high ferritin, normal gonadotropins, low normal testosterone.    Pain is bothersome enough that he wants surgery.    Did not pursue pelvic PT ( advised for lower urinary tract symptoms).  He's tried several courses of antibiotics, Cipro, levaquin, doxycycline, amoxicillin and none of these changed the pain.    UA clear 3/28/18    Exacerbating/relieving factors-  -Worse with exercise, riding in car  -no other exacerbating/relieving factors.  - ice helps some.    Exam  BP (!) 133/96 (BP Location: Left arm, Patient Position: Sitting, Cuff Size: Adult Regular)  Pulse 60  SpO2 96%     Skin: No lesions noted  HEENT:    Ears- Normal external canals   Eyes- Normal, PERR  Neck: supple.  Lungs: Clear.   Back: No CVA Tenderness, spine nontender  Extremities: no edema.  Abdomen: Soft, Non-tender, no masses or organomegaly.    Musculoskeletal: Gait- normal.  Neurologic: Cranial nerves 2-12 intact.  Mental Status: cooperative, normal affect, no gross thought process defects during casual conversation.   Circumcised.  Testes ++  Right cord, epididymis, testis are all within normal limits.  Left testis is minimally tender to palpation, left epididymis is moderately tender to palpation, left cord mild-moderate tenderness.  Hard to fee where the left vasectomy was done.  I feel a tiny left epididymal head cyst seen on scrotal ultrasound today.    Scrotal ultrasound today 6/20/18  IMPRESSION:  1. No varicocele.  2. Unchanged small left scrotal cyst.    Cord  block:  A left cord block was performed for diagnostic purposes.  Pt gave informed consent.  10cc of 1% lidocaine without epinephrine were utilized.  2 minutes later the pain level was almost gone, from 4-5/10 prior to injection.     Chronic pain in epididymis. Options I discussed with him today were:  1.  Observation  2. Conservative management with sitz bath, nsaids, repeat trial abx.  3. Referral to pain specialist for regional anesthesia or repeated nerve block procedures.  4. Epididymectomy ( estimate 50-60% pain cure)  5. If he responds completely to a cord block with 1% lidocaine: microscopic denervation of the spermatic cord ( estimate 100% chance pain improvement, 80+% chance pain cure in my experience).  6. Orchiectomy, estimate 95% pain cure, but could have phantom pain.   7. He has high ferritin and lower testosterone.  Perhaps if he takes testosterone replacement therapy his pain might decrease from decreased spermatogenesis.    Discussed each of these surgical options:  1. Open-ended vasectomy  2. Epididymectomy  3. Reverse vasectomy.  4. Microscopic denervation of the spermatic cord   5. Orchiectomy.    Overall I think denervation would be the preferred option.  Discussed detailed risks of surgery.  These include damage to artery (ischemia/atrophy), damage to lymphatics ( hydrocele), hypogonadism requiring testosterone replacement, as well as risk of continued pain.  He understands an ipsilateral vasectomy is re-performed with this operation if the man's had a previous vasectomy.    He did see relief from the cord block.  Pain is much better, essentially resolved.  He will see endocrine for the elevated ferritin and consider microscopic denervation of the spermatic cord after that.      Charlie DAVILA     25min visit, over 50% face to face in counseling/discussion of above issues.

## 2018-07-27 ENCOUNTER — MYC MEDICAL ADVICE (OUTPATIENT)
Dept: ENDOCRINOLOGY | Facility: CLINIC | Age: 48
End: 2018-07-27

## 2018-07-27 DIAGNOSIS — R79.89 ELEVATED FERRITIN: ICD-10-CM

## 2018-07-27 DIAGNOSIS — R79.89 ELEVATED FERRITIN: Primary | ICD-10-CM

## 2018-07-27 PROCEDURE — 36415 COLL VENOUS BLD VENIPUNCTURE: CPT | Performed by: FAMILY MEDICINE

## 2018-07-27 PROCEDURE — 81256 HFE GENE: CPT | Performed by: FAMILY MEDICINE

## 2018-08-06 ENCOUNTER — TELEPHONE (OUTPATIENT)
Dept: FAMILY MEDICINE | Facility: CLINIC | Age: 48
End: 2018-08-06

## 2018-08-06 ENCOUNTER — RADIANT APPOINTMENT (OUTPATIENT)
Dept: GENERAL RADIOLOGY | Facility: CLINIC | Age: 48
End: 2018-08-06
Attending: FAMILY MEDICINE
Payer: COMMERCIAL

## 2018-08-06 ENCOUNTER — OFFICE VISIT (OUTPATIENT)
Dept: FAMILY MEDICINE | Facility: CLINIC | Age: 48
End: 2018-08-06
Payer: COMMERCIAL

## 2018-08-06 VITALS
HEIGHT: 70 IN | DIASTOLIC BLOOD PRESSURE: 94 MMHG | RESPIRATION RATE: 16 BRPM | SYSTOLIC BLOOD PRESSURE: 144 MMHG | WEIGHT: 203.75 LBS | TEMPERATURE: 98.3 F | BODY MASS INDEX: 29.17 KG/M2 | HEART RATE: 64 BPM | OXYGEN SATURATION: 96 %

## 2018-08-06 DIAGNOSIS — R03.0 ELEVATED BLOOD PRESSURE READING WITHOUT DIAGNOSIS OF HYPERTENSION: ICD-10-CM

## 2018-08-06 DIAGNOSIS — R79.89 ELEVATED FERRITIN: ICD-10-CM

## 2018-08-06 DIAGNOSIS — R10.84 GENERALIZED ABDOMINAL PAIN: ICD-10-CM

## 2018-08-06 DIAGNOSIS — R07.89 STERNAL PAIN: ICD-10-CM

## 2018-08-06 DIAGNOSIS — K21.9 GASTROESOPHAGEAL REFLUX DISEASE WITHOUT ESOPHAGITIS: ICD-10-CM

## 2018-08-06 DIAGNOSIS — R00.2 PALPITATIONS: Primary | ICD-10-CM

## 2018-08-06 DIAGNOSIS — Z72.0 CHEWS TOBACCO: ICD-10-CM

## 2018-08-06 LAB
ALBUMIN SERPL-MCNC: 4.2 G/DL (ref 3.4–5)
ALP SERPL-CCNC: 73 U/L (ref 40–150)
ALT SERPL W P-5'-P-CCNC: 47 U/L (ref 0–70)
ANION GAP SERPL CALCULATED.3IONS-SCNC: 6 MMOL/L (ref 3–14)
AST SERPL W P-5'-P-CCNC: 27 U/L (ref 0–45)
BASOPHILS # BLD AUTO: 0.1 10E9/L (ref 0–0.2)
BASOPHILS NFR BLD AUTO: 1.3 %
BILIRUB SERPL-MCNC: 0.3 MG/DL (ref 0.2–1.3)
BUN SERPL-MCNC: 20 MG/DL (ref 7–30)
CALCIUM SERPL-MCNC: 9.2 MG/DL (ref 8.5–10.1)
CHLORIDE SERPL-SCNC: 109 MMOL/L (ref 94–109)
CO2 SERPL-SCNC: 27 MMOL/L (ref 20–32)
CREAT SERPL-MCNC: 0.91 MG/DL (ref 0.66–1.25)
CRP SERPL-MCNC: <2.9 MG/L (ref 0–8)
DIFFERENTIAL METHOD BLD: NORMAL
EOSINOPHIL # BLD AUTO: 0.3 10E9/L (ref 0–0.7)
EOSINOPHIL NFR BLD AUTO: 4.5 %
ERYTHROCYTE [DISTWIDTH] IN BLOOD BY AUTOMATED COUNT: 12.7 % (ref 10–15)
ERYTHROCYTE [SEDIMENTATION RATE] IN BLOOD BY WESTERGREN METHOD: 7 MM/H (ref 0–15)
GFR SERPL CREATININE-BSD FRML MDRD: 89 ML/MIN/1.7M2
GLUCOSE SERPL-MCNC: 105 MG/DL (ref 70–99)
HCT VFR BLD AUTO: 43.4 % (ref 40–53)
HGB BLD-MCNC: 14.3 G/DL (ref 13.3–17.7)
LYMPHOCYTES # BLD AUTO: 2.3 10E9/L (ref 0.8–5.3)
LYMPHOCYTES NFR BLD AUTO: 41.5 %
MCH RBC QN AUTO: 30.6 PG (ref 26.5–33)
MCHC RBC AUTO-ENTMCNC: 32.9 G/DL (ref 31.5–36.5)
MCV RBC AUTO: 93 FL (ref 78–100)
MONOCYTES # BLD AUTO: 0.6 10E9/L (ref 0–1.3)
MONOCYTES NFR BLD AUTO: 9.9 %
NEUTROPHILS # BLD AUTO: 2.4 10E9/L (ref 1.6–8.3)
NEUTROPHILS NFR BLD AUTO: 42.8 %
PLATELET # BLD AUTO: 258 10E9/L (ref 150–450)
POTASSIUM SERPL-SCNC: 4.2 MMOL/L (ref 3.4–5.3)
PROT SERPL-MCNC: 7.4 G/DL (ref 6.8–8.8)
RBC # BLD AUTO: 4.67 10E12/L (ref 4.4–5.9)
SODIUM SERPL-SCNC: 142 MMOL/L (ref 133–144)
TSH SERPL DL<=0.005 MIU/L-ACNC: 1.67 MU/L (ref 0.4–4)
WBC # BLD AUTO: 5.6 10E9/L (ref 4–11)

## 2018-08-06 PROCEDURE — 82728 ASSAY OF FERRITIN: CPT | Performed by: FAMILY MEDICINE

## 2018-08-06 PROCEDURE — 86140 C-REACTIVE PROTEIN: CPT | Performed by: FAMILY MEDICINE

## 2018-08-06 PROCEDURE — 99214 OFFICE O/P EST MOD 30 MIN: CPT | Mod: 25 | Performed by: FAMILY MEDICINE

## 2018-08-06 PROCEDURE — 85025 COMPLETE CBC W/AUTO DIFF WBC: CPT | Performed by: FAMILY MEDICINE

## 2018-08-06 PROCEDURE — 36415 COLL VENOUS BLD VENIPUNCTURE: CPT | Performed by: FAMILY MEDICINE

## 2018-08-06 PROCEDURE — 84443 ASSAY THYROID STIM HORMONE: CPT | Performed by: FAMILY MEDICINE

## 2018-08-06 PROCEDURE — 71046 X-RAY EXAM CHEST 2 VIEWS: CPT

## 2018-08-06 PROCEDURE — 93000 ELECTROCARDIOGRAM COMPLETE: CPT | Performed by: FAMILY MEDICINE

## 2018-08-06 PROCEDURE — 85652 RBC SED RATE AUTOMATED: CPT | Performed by: FAMILY MEDICINE

## 2018-08-06 PROCEDURE — 80053 COMPREHEN METABOLIC PANEL: CPT | Performed by: FAMILY MEDICINE

## 2018-08-06 NOTE — PATIENT INSTRUCTIONS
Labs today so far wnl  EKG shows slower heart rate could account for fatigue  Blood pressure elevated  Ferritin high on recent tests  Could be from hemachromatosis but confirmatory test not back  See hematology for definitive treatment and phlebotomies if indicated  Recommend also cardiology consult to make sure high ferritin not affecting heart with high blood pressure and slower pulse  Zantac 150 mg twice a day / simethicone may help with bloating  Avoid caffeine  Push fluids  Stay active  Go to the ER if worse in anyway

## 2018-08-06 NOTE — TELEPHONE ENCOUNTER
Dr Jo has asked that pt be triaged. He is on her schedule for abd and chest pain this afternoon.     Detailed message left on pt's personally identified phone to call before his appt today - left message on all three numbers.    Cornelia Dutton RN, BSN

## 2018-08-06 NOTE — PROGRESS NOTES
SUBJECTIVE:   Alpesh Winston is a 48 year old male who presents to clinic today for the following health issues:    Abdominal Pain      Duration: 3 weeks    Description (location/character/radiation): generalized, lower left quadrant pain and all over at times, last 3 weeks feels heart palpitation and feels pain in sternum  And takes a deep breath to catch breath and goes away        Associated flank pain: left side back     Intensity:  moderate    Accompanying signs and symptoms:        Fever/Chills: no        Gas/Bloating: YES- bloating        Nausea/vomiting: YES- nausea       Diarrhea: no        Dysuria or Hematuria: no     History (previous similar pain/trauma/previous testing): none    Precipitating or alleviating factors:       Pain worse with eating/BM/urination: no       Pain relieved by BM: no     Therapies tried and outcome: Tums and antacids did not help     LMP:  not applicable     with history of ED, low libido increased urinary frequency nocturia.  History of hernia surgery 15 years ago vasectomy 10 years ago bilateral herniorrhaphy in 2016 following surgery he noted sharp pain left groin no acute issues identified around same time developed urinary urgency nocturia.  Since his father had prostate cancer on 58 he was seen by urology during evaluation was found to have borderline low testosterone levels and was referred to endocrine clinic.   He has low libido has had a lot of stress children are teenagers most of his time is spent with the after school activities.  Mother has dementia in the nursing home settling his father into a new home has been stressful he has had poor sleep.  Scrotal ultrasound in 6/20/2018 showed no varicocele unchanged small left scrotal cyst.  Seen by urology on 6/20 and a left cord block was performed for diagnostic purposes diagnosis was chronic pain and epididymis various options were discussed.  He did see some relief from the cord block and pain  was resolved.  CBC was normal, testosterone was 302 on the lower end of normal, he did include troponin ALT FSH prolactin were all normal range, BMP was normal, UA was normal hemoglobin A1c was in the prediabetic range of 5.7.  Iron was 120 iron binding capacity was normal at 383 iron saturation index of 31, ferritin was elevated at 1150 on 16 of May and recheck on 30 May was 1082, transferrin was 319.  Hemochromatosis testing was done but that is not back yet and he was given an appointment with a  but no till October and feels that that is unacceptable.     Recently stomach issues: generalized abdominal pain & bloating (points to whole abdomen), full feeling all the time 3 to 4 weeks, last week got worse, now does not go away, not helped with OTC antacid. Used to come and go, now ist there all the time. Reports 2 yrs ago saw a urologist thought had decreased libido, low testosterone etc, prescribed Viagra never took it. Then had issues thought infection in bladder,from  large prostate, seen by urology at [facility]. Then had scrotal pain, ct scans done, dx with something cannot remember what given couple options. Then seen by [facility] in may and T ested iron levels and found high. Notes he ave blood 2 weeks ago to get genetic counseling visit scheduled sometime in oct. Has had more symptoms recently. He did a google search and read about About hemochromatosis and feels all symptoms he's had last 2.5 to 3, yrs including higher blood pressure follow in line with that diagnosis and wants to get treated right away as feels has had high iron long time even though tested for the first time today. Feels bloating in stomach. Didn't get over weekend. Now has loss of appetite because of it. Once gets test back endo will get back to him. But feels Cant wait here. Last few weeks heart seems to catch, and notes a sternal discomfort and has to take a deep breath and then symptoms resolve. Its occurred 10 to 15 times in  last 2 to 3 weeks. Denies anxiety being an issue currently. Notes also had purple skin below left lower eyelid, no preceding falls or injury or bite, now resolved. Notes these symptoms are different form her known GERD. Has hx of Elevated Tg, chews tobacco and has FH of CAD and DM. Prior lipoma removal x 4, wisdom teeth extraction, and tear duct surgery.     No fever or chills, no headache or dizziness, If bends and sits up suddenly may get briefly lightheaded but that is not new. no double or blurry vision, no facial pain, earache, sore throat, runny nose, post nasal drip, has hx of allergic rhinitis, no trouble hearing, has chronic tinnitus, no trouble smelling, tasting or swallowing, no cough, no chest pain, trouble breathing. No , heart burn, reflux, nausea or vomiting or diarrhea or constipation, no blood in stools or black stools, no weight loss or night sweats. No dysuria, hematuria, frequency, urgency, hesitancy, incontinence.  No leg swelling or joint pain. No rash.    Problem list and histories reviewed & adjusted, as indicated.  Additional history: as documented    Patient Active Problem List   Diagnosis     ALLERGIC RHINITIS      HYPERLIPIDEMIA LDL GOAL <130     Family history of diabetes mellitus     Hypertriglyceridemia     Family history of coronary artery disease     Chews tobacco     Gastroesophageal reflux disease without esophagitis     Past Surgical History:   Procedure Laterality Date     EYE SURGERY      SURGERY X 2 BILAT TEAR DUCTS      HEAD & NECK SURGERY       HERNIA REPAIR  2000     LAPAROSCOPIC HERNIORRHAPHY INGUINAL BILATERAL Bilateral 10/27/2016    Procedure: LAPAROSCOPIC HERNIORRHAPHY INGUINAL BILATERAL;  Surgeon: Nellie Duron MD;  Location: [facility] OR     SOFT TISSUE SURGERY      Many lypoma removed - Multiple dates     SURGICAL HISTORY OF -       removal of lipoma X 4     SURGICAL HISTORY OF -   10/00    left inguinal hernia repair     SURGICAL HISTORY OF -       wisdom teeth  extraction       Social History   Substance Use Topics     Smoking status: Never Smoker     Smokeless tobacco: Current User     Types: Chew      Comment: chewing less     Alcohol use 0.0 oz/week     0 Standard drinks or equivalent per week      Comment: 6-8 drinks a week      Family History   Problem Relation Age of Onset     C.A.D. Maternal Grandmother      MI X 2 ,  in her 60's      Coronary Artery Disease Maternal Grandmother      Hypertension Maternal Grandmother      Hyperlipidemia Maternal Grandmother      Other Cancer Maternal Grandmother      cervical     HEART DISEASE Father      Diabetes Father      Prostate Cancer Father      Diabetes Paternal Grandmother      type 2     Breast Cancer Sister      Alzheimer Disease Mother 74     CBD     Alcohol/Drug Maternal Uncle      C.A.D. Maternal Uncle      MI age 56     Substance Abuse Other      Cerebrovascular Disease No family hx of      Cancer - colorectal No family hx of          Current Outpatient Prescriptions   Medication Sig Dispense Refill     atorvastatin (LIPITOR) 20 MG tablet Take 1 tablet (20 mg) by mouth daily 90 tablet PRN     COENZYME Q-10 PO Take 20 mg by mouth daily       fenofibrate 160 MG tablet TAKE 1 TABLET DAILY (DUE FOR APPOINTMENT NOW) 90 tablet PRN     Omega-3 Fatty Acids (FISH OIL PO)        Allergies   Allergen Reactions     Omnicef Hives     Sulfa Drugs Rash     Recent Labs   Lab Test  18   1516  18   0906  18   0907  17   0936  17   0954   16   0851  10/25/16   1332   12   1527   A1C   --   5.7*  5.7   --    --    --    --   5.7   < >   --    LDL   --    --    --   113*  125*   --   137*  Cannot estimate LDL when triglyceride exceeds 400 mg/dL  145*   < >   --    HDL   --    --    --   39*  35*   --   35*  28*   < >   --    TRIG   --    --    --   184*  295*   --   215*  677*   < >   --    ALT  47  51   --    --   41   < >   --    --    < >   --    CR  0.91  0.87   --    --   0.93   < >    "--    --    < >   --    GFRESTIMATED  89  >90   --    --   88   < >   --    --    < >   --    GFRESTBLACK  >90  >90   --    --   >90   < >   --    --    < >   --    POTASSIUM  4.2  4.6   --    --   4.4   < >   --    --    < >   --    TSH  1.67   --    --    --    --    --    --    --    --   1.72    < > = values in this interval not displayed.      BP Readings from Last 3 Encounters:   08/06/18 (!) 144/94   06/20/18 (!) 133/96   05/07/18 (!) 147/92    Wt Readings from Last 3 Encounters:   08/06/18 203 lb 12 oz (92.4 kg)   05/07/18 201 lb 14.4 oz (91.6 kg)   05/01/18 197 lb 3.2 oz (89.4 kg)                  Labs reviewed in EPIC    Reviewed and updated as needed this visit by clinical staff       Reviewed and updated as needed this visit by Provider         ROS:  Constitutional, HEENT, cardiovascular, pulmonary, GI, , musculoskeletal, neuro, skin, endocrine and psych systems are negative, except as otherwise noted.    OBJECTIVE:     BP (!) 144/94 (BP Location: Right arm, Patient Position: Sitting, Cuff Size: Adult Regular)  Pulse 64  Temp 98.3  F (36.8  C) (Oral)  Resp 16  Ht 5' 10\" (1.778 m)  Wt 203 lb 12 oz (92.4 kg)  SpO2 96%  BMI 29.24 kg/m2  Body mass index is 29.24 kg/(m^2).  GENERAL: healthy, alert, no distress and over weight  EYES: Eyes grossly normal to inspection, PERRL and conjunctivae and sclerae normal, wears glasses  HENT: ear canals and TM's normal, nose and mouth without ulcers or lesions  NECK: no adenopathy, no asymmetry, masses, or scars and thyroid normal to palpation  RESP: lungs clear to auscultation - no rales, rhonchi or wheezes  CV: regular rate and rhythm, normal S1 S2, no S3 or S4, no murmur, click or rub, no peripheral edema and peripheral pulses strong  ABDOMEN: soft, non tender, no hepatosplenomegaly, no masses and bowel sounds normal  MS: no gross musculoskeletal defects noted, no edema  SKIN: no suspicious lesions or rashes  NEURO: Normal strength and tone, mentation intact " and speech normal  PSYCH: mentation appears normal, anxious, judgement and insight intact and appearance well groomed    Diagnostic Test Results:  Results for orders placed or performed in visit on 08/06/18 (from the past 24 hour(s))   ESR: Erythrocyte sedimentation rate   Result Value Ref Range    Sed Rate 7 0 - 15 mm/h   CRP, inflammation   Result Value Ref Range    CRP Inflammation <2.9 0.0 - 8.0 mg/L   TSH with free T4 reflex   Result Value Ref Range    TSH 1.67 0.40 - 4.00 mU/L   Comprehensive metabolic panel   Result Value Ref Range    Sodium 142 133 - 144 mmol/L    Potassium 4.2 3.4 - 5.3 mmol/L    Chloride 109 94 - 109 mmol/L    Carbon Dioxide 27 20 - 32 mmol/L    Anion Gap 6 3 - 14 mmol/L    Glucose 105 (H) 70 - 99 mg/dL    Urea Nitrogen 20 7 - 30 mg/dL    Creatinine 0.91 0.66 - 1.25 mg/dL    GFR Estimate 89 >60 mL/min/1.7m2    GFR Estimate If Black >90 >60 mL/min/1.7m2    Calcium 9.2 8.5 - 10.1 mg/dL    Bilirubin Total 0.3 0.2 - 1.3 mg/dL    Albumin 4.2 3.4 - 5.0 g/dL    Protein Total 7.4 6.8 - 8.8 g/dL    Alkaline Phosphatase 73 40 - 150 U/L    ALT 47 0 - 70 U/L    AST 27 0 - 45 U/L   CBC with platelets differential   Result Value Ref Range    WBC 5.6 4.0 - 11.0 10e9/L    RBC Count 4.67 4.4 - 5.9 10e12/L    Hemoglobin 14.3 13.3 - 17.7 g/dL    Hematocrit 43.4 40.0 - 53.0 %    MCV 93 78 - 100 fl    MCH 30.6 26.5 - 33.0 pg    MCHC 32.9 31.5 - 36.5 g/dL    RDW 12.7 10.0 - 15.0 %    Platelet Count 258 150 - 450 10e9/L    Diff Method Automated Method     % Neutrophils 42.8 %    % Lymphocytes 41.5 %    % Monocytes 9.9 %    % Eosinophils 4.5 %    % Basophils 1.3 %    Absolute Neutrophil 2.4 1.6 - 8.3 10e9/L    Absolute Lymphocytes 2.3 0.8 - 5.3 10e9/L    Absolute Monocytes 0.6 0.0 - 1.3 10e9/L    Absolute Eosinophils 0.3 0.0 - 0.7 10e9/L    Absolute Basophils 0.1 0.0 - 0.2 10e9/L       ASSESSMENT/PLAN:       ICD-10-CM    1. Palpitations R00.2 EKG 12-lead complete w/read - Clinics     XR Chest 2 Views     TSH with  free T4 reflex     Comprehensive metabolic panel     CBC with platelets differential     ONC/HEME ADULT REFERRAL     CARDIO  ADULT REFERRAL   2. Generalized abdominal pain R10.84 XR Chest 2 Views     ESR: Erythrocyte sedimentation rate     CRP, inflammation     Comprehensive metabolic panel     CBC with platelets differential     ONC/HEME ADULT REFERRAL   3. Sternal pain R07.89 EKG 12-lead complete w/read - Clinics     XR Chest 2 Views     ESR: Erythrocyte sedimentation rate     CRP, inflammation     Comprehensive metabolic panel     CBC with platelets differential     ONC/HEME ADULT REFERRAL     CARDIO  ADULT REFERRAL   4. Elevated ferritin R79.89 ONC/HEME ADULT REFERRAL     Ferritin     CARDIO  ADULT REFERRAL   5. Elevated blood pressure reading without diagnosis of hypertension R03.0 ONC/HEME ADULT REFERRAL     CARDIO  ADULT REFERRAL   6. Chews tobacco Z72.0 EKG 12-lead complete w/read - Clinics     XR Chest 2 Views     Comprehensive metabolic panel     CBC with platelets differential     ONC/HEME ADULT REFERRAL   7. Gastroesophageal reflux disease without esophagitis K21.9 XR Chest 2 Views     Comprehensive metabolic panel     CBC with platelets differential     ONC/HEME ADULT REFERRAL     Labs today so far wnl. EKG shows marked bradycardia  could account for fatigue. Might be reason for escaped beats and the pulse that seems to take his breath away. No reproducible chest pain, asymptomatic currently and cxr and EKG otherwise unremarkable. No sign of ischemia vitally stable. No acute abdomen.  Blood pressure elevated. Ferritin high on recent tests. Repeat pending. Esr and crp normal suggesting it is elevated for reasons other than non specific inflammation. Could be from hemachromatosis but confirmatory test not back. See hematology for definitive treatment and phlebotomies if indicated. Avoid donating blood till can see hematology as may affect what they want to check.  Recommend also cardiology consult to make sure high ferritin not affecting heart with high blood pressure and slower pulse. Zantac 150 mg twice a day / simethicone may help with bloating. Avoid caffeine. Push fluids. Stay active. Go to the ER if worse in anyway. suspect anxiety about symptoms and diagnosis and stress in general also might be playing a role here.  See Patient Instructions    Lotus Jo MD  [facility]

## 2018-08-06 NOTE — PROGRESS NOTES
Marck Mr. Winston,  Your results came back with a normal sed rate( inflammation marker ). -Normal red blood cell (hgb) levels, normal white blood cell count and normal platelet levels.. If you have any further concerns please do not hesitate to contact us by message, phone or making an appointment.  Have a good day   Dr Sandro DAVILA

## 2018-08-06 NOTE — TELEPHONE ENCOUNTER
Dr. Jo triaged sx.appointment today.    High  ferrritin levels.  He notes abdominal pain over course of few weeks. Eating ,drinking ok and no N and V. No blood in stools.  No fever. No weakness or sweating.   Still waiting to see the genetic counselor.    Also reports sporadic sternal pain lasting seconds. Then gets some SOB and takes deep breaths and it resolves.  He is not having these sx at this time. Very sporadic.     He is not in any acute distress at this time.  Radha Feng RN

## 2018-08-06 NOTE — MR AVS SNAPSHOT
After Visit Summary   8/6/2018    Alpesh Winston    MRN: [id number]           Patient Information     Date Of Birth          1970        Visit Information        Provider Department      8/6/2018 2:40 PM Lotus Jo MD [facility]        Today's Diagnoses     Palpitations    -  1    Generalized abdominal pain        Sternal pain        Elevated ferritin        Elevated blood pressure reading without diagnosis of hypertension        Chews tobacco        Gastroesophageal reflux disease without esophagitis          Care Instructions    Labs today   EKG shows slower heart rate could account for fatigue  Blood pressure elevated  Ferritin high on recent tests  Could be form hemachromatosis but confirmatory test not back  See hematology for definitive treatment and phlebotomies if indicated  Recommend also cardiology consult to make sure high ferritin not affecting heart with high blood pressure and slower pulse  Zantac 150 mg twice a day / simethicone may help with bloating  Avoid caffeine  Push fluids  Stay active  Go to the ER if worse in anyway          Follow-ups after your visit        Additional Services     CARDIO  ADULT REFERRAL       [facility] is referring you to Cardiology Services.       The  Representative will assist you in the coordination of your Cardiology care as prescribed by your physician.    The  Representative will call you within 24 hours to help schedule your appointment, or you may contact the  Representative at: [phone].         Type of Referral: New Cardiology Referral            Timeframe requested: 3-5 days       Coverage of these services is subject to the terms and limitations of your health insurance plan.  Please call member services at your health plan with any benefit or coverage questions.      If X-rays, CT or MRI's have been performed, please contact the facility where they were done to  arrange for , prior to your scheduled appointment.  Please bring this referral request to your appointment and present it to your specialist.            ONC/HEME ADULT REFERRAL       Your provider has referred you to:  Health: Cancer Care/Hematology (All Cancer Related Services) - [place] [phone]   [email or web address]  [place] [phone]   [email or web address]  [place] [phone]   [email or web address]  [place] [phone]   [email or web address] [facility] [place] [phone]  [email or web address]-[email or web address]    Please be aware that coverage of these services is subject to the terms and limitations of your health insurance plan.  Call member services at your health plan with any benefit or coverage questions.      Please bring the following with you to your appointment:    (1) Any X-Rays, CTs or MRIs which have been performed.  Contact the facility where they were done to arrange for  prior to your scheduled appointment.   (2) List of current medications  (3) This referral request   (4) Any documents/labs given to you for this referral                  Your next 10 appointments already scheduled     Oct 02, 2018  1:00 PM CDT   Genetic Counseling with [facility] ([facility])    [facility]  [address]  [address] [address] [phone]            Oct 08, 2018  3:00 PM CDT   (Arrive by 2:45 PM)   Return Visit with Grisel Arriaga PA-C   [facility] ([facility])    [address]  [address]th [address]  [facility] [address] [phone]              Who to contact     If  "you have questions or need follow up information about today's clinic visit or your schedule please contact [facility] directly at [phone].  Normal or non-critical lab and imaging results will be communicated to you by MyChart, letter or phone within 4 business days after the clinic has received the results. If you do not hear from us within 7 days, please contact the clinic through [employer] or phone. If you have a critical or abnormal lab result, we will notify you by phone as soon as possible.  Submit refill requests through [employer] or call your pharmacy and they will forward the refill request to us. Please allow 3 business days for your refill to be completed.          Additional Information About Your Visit        [employer] Information     [employer] gives you secure access to your electronic health record. If you see a primary care provider, you can also send messages to your care team and make appointments. If you have questions, please call your primary care clinic.  If you do not have a primary care provider, please call [phone] and they will assist you.        Care EveryWhere ID     This is your Care EveryWhere ID. This could be used by other organizations to access your [facility] medical records  [id number]        Your Vitals Were     Pulse Temperature Respirations Height Pulse Oximetry BMI (Body Mass Index)    64 98.3  F (36.8  C) (Oral) 16 5' 10\" (1.778 m) 96% 29.24 kg/m2       Blood Pressure from Last 3 Encounters:   08/06/18 (!) 144/94   06/20/18 (!) 133/96   05/07/18 (!) 147/92    Weight from Last 3 Encounters:   08/06/18 203 lb 12 oz (92.4 kg)   05/07/18 201 lb 14.4 oz (91.6 kg)   05/01/18 197 lb 3.2 oz (89.4 kg)              We Performed the Following     CARDIO  ADULT REFERRAL     CBC with platelets differential     Comprehensive metabolic panel     CRP, inflammation     EKG 12-lead complete w/read - Clinics     ESR: Erythrocyte sedimentation rate     Ferritin     " ONC/HEME ADULT REFERRAL     TSH with free T4 reflex     XR Chest 2 Views        Primary Care Provider Office Phone # Fax #    Belem PAN TANG Joshua [phone] [address]  [facility] [address]        Equal Access to Services     JOSÉ ROGERS : Hadii aad ku hadasho Soomaali, waaxda luqadaha, qaybta kaalmada adeegyada, waxay idiin hayaan adeeg khramonadonna koo . So [facility] [phone].    ATENCIÓN: Si habla español, tiene a casper disposición servicios gratuitos de asistencia lingüística. Llame al [phone].    We comply with applicable federal civil rights laws and [place] laws. We do not discriminate on the basis of race, color, national origin, age, disability, sex, sexual orientation, or gender identity.            Thank you!     Thank you for choosing [facility]  for your care. Our goal is always to provide you with excellent care. Hearing back from our patients is one way we can continue to improve our services. Please take a few minutes to complete the written survey that you may receive in the mail after your visit with us. Thank you!             Your Updated Medication List - Protect others around you: Learn how to safely use, store and throw away your medicines at www.disposemymeds.org.          This list is accurate as of 8/6/18  3:49 PM.  Always use your most recent med list.                   Brand Name Dispense Instructions for use Diagnosis    atorvastatin 20 MG tablet    LIPITOR    90 tablet    Take 1 tablet (20 mg) by mouth daily    Hyperlipidemia LDL goal <130       COENZYME Q-10 PO      Take 20 mg by mouth daily        fenofibrate 160 MG tablet     90 tablet    TAKE 1 TABLET DAILY (DUE FOR APPOINTMENT NOW)    Hyperlipidemia LDL goal <130       FISH OIL PO

## 2018-08-06 NOTE — PROGRESS NOTES
Marck Mr. Winston,  Your results came back with a normal crp. If you have any further concerns please do not hesitate to contact us by message, phone or making an appointment.  Have a good day   Dr Sandro DAVILA

## 2018-08-06 NOTE — PROGRESS NOTES
Marck Mr. Winston,  Your results came back and are within acceptable limits. -Liver and gallbladder tests are normal. (ALT,AST, Alk phos, bilirubin), kidney function is normal (Cr, GFR), Sodium is normal, Potassium is normal, Calcium is normal, Glucose is normal for non fasting sample  -TSH (thyroid stimulating hormone) level is normal which indicates normal thyroid function.. If you have any further concerns please do not hesitate to contact us by message, phone or making an appointment.  Have a good day   Dr Sandro DAVILA

## 2018-08-07 LAB
COPATH REPORT: NORMAL
FERRITIN SERPL-MCNC: 1578 NG/ML (ref 26–388)

## 2018-08-07 NOTE — TELEPHONE ENCOUNTER
FUTURE VISIT INFORMATION:    Date: 8/9/18    Time: 0930    Location:  Cardiology  REFERRAL INFORMATION:    Referring provider:  Lotus Jo MD    Referring providers clinic:    Family Practice    Reason for visit/diagnosis  Palpitations, HTN            NOTES STATUS DETAILS   OFFICE NOTE from referring provider  Internal    OFFICE NOTE from other cardiologist  N/A    DISCHARGE SUMMARY from hospital  N/A    DISCHARGE REPORT from the ER N/A    OPERATIVE REPORT  N/A    MEDICATION LIST N/A    LABS     BMP Internal    CBC     internal    CMP Internal    TSH     internal    Lipids Internal    DIAGNOSTIC PROCEDURES     EKG Internal    Monitor Reports N/A     N/A    IMAGING (DISC & REPORT)      ECHO  internal 12/22/15   Stress Tests Internal 12/22/15   Cath N/A    MRI/MRA N/A    CT/CTA N/A     N/A

## 2018-08-07 NOTE — PROGRESS NOTES
Marck Mr. Winston,  Your results came back with elevated ferritin. See hematology as planned. If you have any further concerns please do not hesitate to contact us by message, phone or making an appointment.  Have a good day   Dr Sandro DAVILA

## 2018-08-07 NOTE — PROGRESS NOTES
Marck Mr. Winston,  Your results came back with a normal chest xray . It just shows scarring from prior old infections stable to prior chest xray If you have any further concerns please do not hesitate to contact us by message, phone or making an appointment.  Have a good day   Dr Sandro DAVILA

## 2018-08-08 ASSESSMENT — ENCOUNTER SYMPTOMS
SLEEP DISTURBANCES DUE TO BREATHING: 0
BOWEL INCONTINENCE: 0
BLOOD IN STOOL: 0
BLOATING: 1
POSTURAL DYSPNEA: 0
HEARTBURN: 0
ABDOMINAL PAIN: 1
EXERCISE INTOLERANCE: 0
HEMOPTYSIS: 0
COUGH: 0
COUGH DISTURBING SLEEP: 0
SPUTUM PRODUCTION: 0
LIGHT-HEADEDNESS: 1
JAUNDICE: 0
HYPOTENSION: 0
ORTHOPNEA: 0
DIARRHEA: 0
SNORES LOUDLY: 0
VOMITING: 0
WHEEZING: 0
HYPERTENSION: 1
PALPITATIONS: 1
LEG PAIN: 0
RECTAL PAIN: 0
SHORTNESS OF BREATH: 1
DYSPNEA ON EXERTION: 0
CONSTIPATION: 0
NAUSEA: 0
SYNCOPE: 0

## 2018-08-08 NOTE — PROGRESS NOTES
Dear Alpesh,     We did not find any medications on the genetic testing that we did for hemochromatosis.  This is very reassuring.  You need to follow-up with your primary care physician regarding follow-up for ferritin levels.  Sometimes, the rising ferritin levels could indicate acute inflammation and it gets better over time.  If the levels do not improve, you may need to see hematologist especially if hemoglobin levels go up.  At this time your hemoglobin levels are normal.    With Best Regards,   Shanique Villa MD  Endocrinology Service.

## 2018-08-09 ENCOUNTER — PRE VISIT (OUTPATIENT)
Dept: CARDIOLOGY | Facility: CLINIC | Age: 48
End: 2018-08-09

## 2018-08-09 ENCOUNTER — OFFICE VISIT (OUTPATIENT)
Dept: CARDIOLOGY | Facility: CLINIC | Age: 48
End: 2018-08-09
Attending: INTERNAL MEDICINE
Payer: COMMERCIAL

## 2018-08-09 ENCOUNTER — ONCOLOGY VISIT (OUTPATIENT)
Dept: ONCOLOGY | Facility: CLINIC | Age: 48
End: 2018-08-09
Attending: INTERNAL MEDICINE
Payer: COMMERCIAL

## 2018-08-09 VITALS
TEMPERATURE: 97.3 F | BODY MASS INDEX: 29.06 KG/M2 | SYSTOLIC BLOOD PRESSURE: 139 MMHG | WEIGHT: 203 LBS | HEIGHT: 70 IN | HEART RATE: 60 BPM | RESPIRATION RATE: 14 BRPM | DIASTOLIC BLOOD PRESSURE: 95 MMHG | OXYGEN SATURATION: 95 %

## 2018-08-09 VITALS
WEIGHT: 203 LBS | SYSTOLIC BLOOD PRESSURE: 139 MMHG | HEIGHT: 70 IN | HEART RATE: 60 BPM | BODY MASS INDEX: 29.06 KG/M2 | DIASTOLIC BLOOD PRESSURE: 95 MMHG

## 2018-08-09 DIAGNOSIS — R03.0 ELEVATED BLOOD PRESSURE READING WITHOUT DIAGNOSIS OF HYPERTENSION: ICD-10-CM

## 2018-08-09 DIAGNOSIS — Z72.0 CHEWS TOBACCO: ICD-10-CM

## 2018-08-09 DIAGNOSIS — R79.89 ELEVATED FERRITIN: ICD-10-CM

## 2018-08-09 DIAGNOSIS — R93.2 ABNORMAL MAGNETIC RESONANCE IMAGING OF LIVER: ICD-10-CM

## 2018-08-09 DIAGNOSIS — R07.89 STERNAL PAIN: ICD-10-CM

## 2018-08-09 DIAGNOSIS — E78.1 HYPERTRIGLYCERIDEMIA: Primary | ICD-10-CM

## 2018-08-09 DIAGNOSIS — R00.2 PALPITATIONS: ICD-10-CM

## 2018-08-09 DIAGNOSIS — K21.9 GASTROESOPHAGEAL REFLUX DISEASE WITHOUT ESOPHAGITIS: ICD-10-CM

## 2018-08-09 DIAGNOSIS — R10.84 GENERALIZED ABDOMINAL PAIN: ICD-10-CM

## 2018-08-09 PROCEDURE — 0298T ZZC EXT ECG > 48HR TO 21 DAY REVIEW AND INTERPRETATN: CPT | Performed by: INTERNAL MEDICINE

## 2018-08-09 PROCEDURE — G0463 HOSPITAL OUTPT CLINIC VISIT: HCPCS | Mod: ZF

## 2018-08-09 PROCEDURE — 99204 OFFICE O/P NEW MOD 45 MIN: CPT | Mod: ZP | Performed by: INTERNAL MEDICINE

## 2018-08-09 PROCEDURE — 0296T ZIO PATCH HOLTER: CPT | Mod: ZF | Performed by: INTERNAL MEDICINE

## 2018-08-09 ASSESSMENT — PAIN SCALES - GENERAL
PAINLEVEL: NO PAIN (0)
PAINLEVEL: NO PAIN (0)

## 2018-08-09 NOTE — NURSING NOTE
"Oncology Rooming Note    August 9, 2018 8:09 AM   Alpesh Wisnton is a 48 year old male who presents for:    Chief Complaint   Patient presents with     Oncology Clinic Visit     [facility] NEW- ELEVATED FERRITIN     Initial Vitals: BP (!) 139/95 (BP Location: Left arm, Patient Position: Chair, Cuff Size: Adult Regular)  Pulse 60  Temp 97.3  F (36.3  C) (Oral)  Resp 14  Ht 1.778 m (5' 10\")  Wt 92.1 kg (203 lb)  SpO2 95%  BMI 29.13 kg/m2 Estimated body mass index is 29.13 kg/(m^2) as calculated from the following:    Height as of this encounter: 1.778 m (5' 10\").    Weight as of this encounter: 92.1 kg (203 lb). Body surface area is 2.13 meters squared.  No Pain (0) Comment: Data Unavailable   No LMP for male patient.  Allergies reviewed: Yes  Medications reviewed: Yes    Medications: Medication refills not needed today.  Pharmacy name entered into [facility]:    [email or web address]  [id number] - [place], [place] - [address] AT [facility] [address] [facility]  [facility] - [place], [place] - [address]    Clinical concerns: No new concerns. Clemente was notified.    10 minutes for nursing intake (face to face time)     Ja Dixon LPN            "

## 2018-08-09 NOTE — PROGRESS NOTES
I am delighted to see Alpesh Winston in consultation for palpitations.    History of Present Illness:  As you know, the patient is a 48 year old  Male who saw an endocrinologist in May 2018 for possible low testosterone, labs were drawn and high ferritin levels of > 1000 was found. During f/u he also reported episodes of sharp chest pain with shortness of breath. He describes these episodes occurring over the last few weeks, usually when he's driving, sharp, brief mid sternal chest pain, followed by difficulty catching his breath. He denies lightheadedness, syncope, rapid/irregular heart beats. He ran 2 miles last week without any symptoms. He saw PCP last week and EKG showed sinus bradycardia at 48 bpm, and he was referred to cardiology.      The following portions of the patient's history were reviewed and updated as appropriate: allergies, current medications, past family history, past medical history, past social history, past surgical history, and the problem list.      Past Medical History:  Elevated ferritin level of 1150, found 18  Hyperlipidemia since age 21, familial  Hernia surgery  Postatitis  Vasectomy    Medications:   Atorvastatin 20 every day   Fenofibrate 160 qd  Coenzyme Q-10  Fish oil    Allergies:    Allergies   Allergen Reactions     Omnicef Hives     Sulfa Drugs Rash       Family History:   Father had valve replacement    Family History   Problem Relation Age of Onset     C.A.D. Maternal Grandmother      MI X 2 ,  in her 60's      Coronary Artery Disease Maternal Grandmother      Hypertension Maternal Grandmother      Hyperlipidemia Maternal Grandmother      Other Cancer Maternal Grandmother      cervical     HEART DISEASE Father      Diabetes Father      Prostate Cancer Father      Diabetes Paternal Grandmother      type 2     Breast Cancer Sister      Alzheimer Disease Mother 74     CBD     Alcohol/Drug Maternal Uncle      C.A.D. Maternal Uncle      MI age 56     Substance  "Abuse Other      Cerebrovascular Disease No family hx of      Cancer - colorectal No family hx of        Psychosocial history:  reports that he has never smoked. His smokeless tobacco use includes Chew. He reports that he drinks alcohol. He reports that he does not use illicit drugs. No cigarettes but he continues to chew tobacco. Drinks 6-8 beers per weekend.    Review of systems:   Cardiovascular: No palpitations, chest pain, shortness of breath at rest, dyspnea with exertion, orthopnea, paroxysmal nocturia dyspnea, nocturia, dizziness, syncope.    In addition,   Constitutional: No change in weight, sleep or appetite.  Normal energy.  No fever or chills  Eyes: Negative for vision changes or eye problems  ENT: No problems with ears, nose or throat.  No difficulty swallowing.  Resp: No coughing, wheezing or shortness of breath  GI: No nausea, vomiting,  heartburn, abdominal pain, diarrhea, constipation or change in bowel habits  : No urinary frequency or dysuria, bladder or kidney problems  Musculoskeletal: No significant muscle or joint pains  Neurologic: No headaches, numbness, tingling, weakness, problems with balance or coordination  Psychiatric: No problems with anxiety, depression or mental health  Heme/immune/allergy: No history of bleeding or clotting problems or anemia.  No allergies or immune system problems  Integumentary: No rashes,worrisome lesions or skin problems      Physical examination  Vitals: BP (!) 139/95  Pulse 60  Ht 1.778 m (5' 10\")  Wt 92.1 kg (203 lb)  BMI 29.13 kg/m2  BMI= Body mass index is 29.13 kg/(m^2).    Constitutional: In general, the patient is a pleasant male in no apparent distress.    Eyes: PERRLA.  EOMI.  Sclerae white, not injected.  ENT/mouth: Normiocephalic and atraumatic.  Nares clear.  Pharynx without erythema or exudate.  Dentition intact.  No adenopathy.  No thyromegaly. Carotids +2/2 bilaterally without bruits.  No jugular venous distension.   Card/Vasc: The PMI " is in the 5th ICS in the midclavicular line. There is no heave. Regular rate and rhythm. Normal S1, S2. No murmur, rub, click, or gallop. Pulses are normal bilaterally throughout. No peripheral edema.  Respiratory: Clear to asculation.  No ronchi, wheezes, rales.  No dullness to percussion.   GI: Abdomen is soft, nontender, nondistended. No organomegaly. No AAA.  No bruits.   Integument: No significant bruises or rashes  Neurological: The neurological examination reveal a patient who was oriented to person, place, and time.    Psych: Normal  Heme/Lymph/Immun: no significant adenopathy      I have reviewed the following labs/imaging:  Labs 8/6/18: K 4.2, cr 0.9, hgb 14, Plt 258K, TSH 1.67, ALT 47, AST 27  12/21/17: Cholesterol 189, HDL 39, ,   10/25/16:   Stress echo 12/22/15 (done due to an episode of chest pain which woke him up from sleep) : bicycle, normal baseline and exercise echo, no ischemia    I have personally and independently reviewed the following:  EKG 8/6/18: sinus samina 48 bpm, normal intervals  10/25/16: sinus 64 bpm      Assessment :  1. Bradycardia on EKG. Today his HR is 60 bpm. He is active, HR at 48 bpm without symptoms is within normal limits.  2. Intermittent sharp chest pain, with difficulty catching his breath. Doubt ischemic related. Most likely noncardiac although he could have had some symptomatic PVCs.   3. High BP. Noted since May 2018. ? Anxiety related.     Plan:  14-day Ziopatch monitor today.      I spent a total of 30 minutes face to face with  Alpesh Winston during today's office visit. Over 50% of this time was spent counseling the patient and/or coordinating care regarding his symptoms.        The patient is to return as needed. I will notify him of Ziopatch results. The patient understood the treatment plan as outlined above.  There were no barriers to learning.      Imelda Powell MD

## 2018-08-09 NOTE — PROGRESS NOTES
"Hematology clinic consult note    Reason for consult: Elevated ferritin    History of present illness:  is a 48-year-old man who was found to have an elevated ferritin of 1150 on 5/16/18.  Is not clear why it was checked.  Repeat ferritin on 5/30/18 was 1082, and repeat on 8/6/18 was 1578.  He had HFE gene testing and was negative for the C282Y, H63D, and at S65C mutations.  He has been having some  chronic abdominal pain and bloating now for several weeks.  Not associated with eating.  It is somewhat left-sided.  The pain is  constant and he says it is there when he goes to bed at night and when he wakes up in the morning.  No diarrhea or constipation.  No melena or hematochezia.  No heartburn.  No urinary symptoms.  No fevers, chills, sweats, anorexia, early satiety.    Past medical history:  Hypertriglyceridemia diagnosed at age 21  Status post hernia surgery  History of prostatitis, seen by urology  Status post lipoma excision    Family history: Maternal grandmother had coronary artery disease and high cholesterol, mother has dementia, father has prostate cancer.    Medications:  atorvastatin 20 mg daily  Coenzyme Q 20 mg daily  Fenofibrate 160 mg daily  Omega-3 fatty capsule daily    Social: He does not smoke cigarettes and was never smoker, 6 to 8 drinks on weekends, none during a week.  He has worked full-time as an .    Review of Systems:  As in history above.  He has been having some episodes of sharp chest pain and palpitations with shortness of breath that last a few minutes.  He is going to see a cardiologist regarding this.  The rest of the >10 point ROS is negative.      PHYSICAL EXAMINATION:  BP (!) 139/95 (BP Location: Left arm, Patient Position: Chair, Cuff Size: Adult Regular)  Pulse 60  Temp 97.3  F (36.3  C) (Oral)  Resp 14  Ht 1.778 m (5' 10\")  Wt 92.1 kg (203 lb)  SpO2 95%  BMI 29.13 kg/m2    General appearance:  Patient is 47 yo man in no acute distress.   "   HEENT:  No pallor, icterus, or mucositis.  No thyromegaly.   Lymph nodes:  No cervical, supraclavicular, axillary, or inguinal lymphadenopathy.   Lungs:  Clear to auscultation bilaterally.   Heart:  Regular rate and rhythm; no S3 S4 or murmer.     Abdomen:  Positive bowel sounds, soft and nontender, nondistended.  No hepatomegaly. No splenomegaly appreciated.    Extremities:  No joint swelling or tenderness.  No ankle edema.     Skin:  ~3 cm well-healed surgical scar R flank. No rash, no petechiae or ecchymoses.    Labs:  Results for SHIRA ENRIQUEZ (MRN [id number]) as of 8/9/2018 14:38   Ref. Range 8/6/2018 15:16   Sodium Latest Ref Range: 133 - 144 mmol/L 142   Potassium Latest Ref Range: 3.4 - 5.3 mmol/L 4.2   Chloride Latest Ref Range: 94 - 109 mmol/L 109   Carbon Dioxide Latest Ref Range: 20 - 32 mmol/L 27   Urea Nitrogen Latest Ref Range: 7 - 30 mg/dL 20   Creatinine Latest Ref Range: 0.66 - 1.25 mg/dL 0.91   GFR Estimate Latest Ref Range: >60 mL/min/1.7m2 89   GFR Estimate If Black Latest Ref Range: >60 mL/min/1.7m2 >90   Calcium Latest Ref Range: 8.5 - 10.1 mg/dL 9.2   Anion Gap Latest Ref Range: 3 - 14 mmol/L 6   Albumin Latest Ref Range: 3.4 - 5.0 g/dL 4.2   Protein Total Latest Ref Range: 6.8 - 8.8 g/dL 7.4   Bilirubin Total Latest Ref Range: 0.2 - 1.3 mg/dL 0.3   Alkaline Phosphatase Latest Ref Range: 40 - 150 U/L 73   ALT Latest Ref Range: 0 - 70 U/L 47   AST Latest Ref Range: 0 - 45 U/L 27   CRP Inflammation Latest Ref Range: 0.0 - 8.0 mg/L <2.9   Ferritin Latest Ref Range: 26 - 388 ng/mL 1578 (H)   TSH Latest Ref Range: 0.40 - 4.00 mU/L 1.67   Glucose Latest Ref Range: 70 - 99 mg/dL 105 (H)   WBC Latest Ref Range: 4.0 - 11.0 10e9/L 5.6   Hemoglobin Latest Ref Range: 13.3 - 17.7 g/dL 14.3   Hematocrit Latest Ref Range: 40.0 - 53.0 % 43.4   Platelet Count Latest Ref Range: 150 - 450 10e9/L 258   RBC Count Latest Ref Range: 4.4 - 5.9 10e12/L 4.67   MCV Latest Ref Range: 78 - 100 fl 93   MCH  Latest Ref Range: 26.5 - 33.0 pg 30.6   MCHC Latest Ref Range: 31.5 - 36.5 g/dL 32.9   RDW Latest Ref Range: 10.0 - 15.0 % 12.7   Diff Method Unknown Automated Method   % Neutrophils Latest Units: % 42.8   % Lymphocytes Latest Units: % 41.5   % Monocytes Latest Units: % 9.9   % Eosinophils Latest Units: % 4.5   % Basophils Latest Units: % 1.3   Absolute Neutrophil Latest Ref Range: 1.6 - 8.3 10e9/L 2.4   Absolute Lymphocytes Latest Ref Range: 0.8 - 5.3 10e9/L 2.3   Absolute Monocytes Latest Ref Range: 0.0 - 1.3 10e9/L 0.6   Absolute Eosinophils Latest Ref Range: 0.0 - 0.7 10e9/L 0.3   Absolute Basophils Latest Ref Range: 0.0 - 0.2 10e9/L 0.1   Patient Name: SHIRA ENRIQUEZ   MR#: [id number]   Specimen #: [id number]   Collected: 7/27/2018 16:51   Received: 7/30/2018 09:10   Reported: 8/7/2018 11:53   Ordering Phy(s): RUPENDRA DEV THAKU SULLIVAN   Additional Phy(s): [facility]     For improved result formatting, select 'View Enhanced Report Format' under    Linked Documents section.   _________________________________________     TEST(S) REQUESTED:   Hemochromatosis Mutation Analysis by PCR     SPECIMEN DESCRIPTION:   Blood     METHODOLOGY: The regions of genomic DNA containing c.845 G>A(C282Y)   mutation, the c.187 C>G (H63D), and the   c.193 A>T(S65C) mutation in the HFE gene were simultaneously amplified   using the polymerase chain reaction.   The amplified products were digested with restriction endonuclease BbrPI   and Hinf1 respectively and products   were analyzed by gel electrophoresis.     HEMOCHROMATOSIS RESULTS     HFE Gene C282Y (G845A) RESULTS:     C282Y Mutation Interpretation: NORMAL     HFE Gene H63D (C187G) RESULTS:     H63D Mutation Interpretation: NORMAL     HFE Gene S65C (A193T) RESULTS:     S65C Mutation Interpretation: NORMAL     INTERPRETATION:   This patient does not carry the C282Y, the H63D or the S65C mutations in   the HFE gene. The absence of these   mutations, particularly in  non-Caucasians, does not rule out the presence   of hemochromatosis.     COMMENTS:   If a patient is the recipient of an allogeneic bone marrow transplant,   this test must be done on a   pre-transplant sample or buccal swab.  A previous allogeneic bone marrow   transplant will interfere with test   results.  Call the [facility]([phone]) for   instructions on sample collection for these   patients.     This test was developed and its performance characteristics determined by   the [facility],  [facility]. It has not been cleared or   approved by the FDA. The laboratory is   regulated under CLIA as qualified to perform high-complexity testing. This    test is used for clinical purposes.   It should not be regarded as investigational or for research.     A resident/fellow in an accredited training program was involved in the   selection of testing, review of   laboratory data, and/or interpretation of this case.  I, as the senior   physician, attest that I: (i) confirmed   appropriate testing, (ii) examined the relevant raw data for the   specimen(s); and (iii) rendered or confirmed   the interpretation(s).     Electronically Signed Out By:   MIRLANDE Mcdowell     CPT Codes:   A: 15576-GVBMI, -OHYEVM     TESTING LAB LOCATION:   [address] [address] [phone]     Assessment and recommendation: Elevated ferritin on 3 occasions over the past 3 months.  Differential diagnosis for elevated ferritin include iron overload, liver disease, inflammation.  The genetic testing rules out the common hereditary hemochromatosis gene abnormalities.  There are other genetic disorders that can cause iron overload, however does tend to present at a much younger age and had more severe abnormalities.  He does not have any disorder such as a serious  hemoglobinopathy or recurrent transfusions or hematologic malignancy that can be a cause of iron overload.  On the most recent testing he had a CRP checked and it was in the normal range, making significant inflammation less likely.  He does have high cholesterol and triglycerides and may have a fatty liver that can be associated ferritin.  His liver enzymes and bilirubin are normal making severe liver disease less likely.    I discussed that when a person develops iron overload, it is a slowly progressive process and does not cause any sort of acute symptoms.  Symptoms occur when there is a overload in the liver leading to cirrhosis, and the pancreas leading to diabetes, and the heart leading to heart failure.  The ferritin of 1578 does not explain his vague abdominal complaints.  It might be a result of whatever is causing his abdominal complaints.  And the cause of his abdominal complaints are not clear to me.  I can add a lipase to the next lab tests that he has done, to assess for possible pancreatitis.    I discussed with them that it does not appear that he has hereditary hemochromatosis based on the gene testing.  He most likely has elevated liver and associated with hepatic steatosis, although we have no imaging as of yet to suggest that.  It would be useful to know if he is truly iron overloaded before starting therapeutic phlebotomy.  In patients with hepatic steatosis there may be some benefit in doing therapeutic phlebotomy to lower the ferritin to less than 100.  I have a few other patients who are in the situation.  I discussed that the ferritin can go up and down with inflammation and is not a very exacting test for iron overload.  If therapeutic phlebotomy is started there is not a linear relationship in the drop of ferritin with each phlebotomy.  However a look at the overall trend with a goal of a ferritin less than 100.  I discussed that usually the therapeutic phlebotomy is done here in our  infusion center.  The IV is inserted and 500 mL will be removed.  If he is feeling at all lightheaded or had low blood pressure he will be given 500 mL of normal saline.  Patients with hereditary hemochromatosis can now donate blood at [facility] in the [facility].  However since we do not know exactly why his ferritin is high, I would recommend against that at this time.  -MRI scan of liver to assess for iron content  -Repeat CBC with differential and ferritin along with a lipase on the day of the MRI.  -I will set up one therapeutic phlebotomy to occur about a week after the MRI scan results are back, since he wants to get going on management.    I will be in touch with him regarding these results and we can decide if he should have ongoing therapeutic phlebotomy once a month.    Suma Cornejo MD  Hematology    Addendum: The liver iron concentration is within the normal range. In reviewing the medical literature, there is conflicting data as to whether therapeutic phlebotomy is beneficial in improving liver steatosis. Assuming that he tolerates the phlebotomy, recommend continuing once a month until ferritin is <100 ng/ml.    There was an incidental abnormal finding on the MRI, possible R renal cyst. Will order ultrasound of RUQ and R kidney to be done at his convenience.   Discussed with Mr. Winston by phone.   F/u in 3 months.   Suma Cornejo MD    MRI scan 8/15/18    MRI FOR LIVER IRON CONCENTRATION (FERRISCAN)     CLINICAL HISTORY: elevated ferritin- assess for iron overload, fatty  liver     TECHNIQUE:     Sequential non-contrast spin-echo MRI imaging obtained of the liver  with TR 2500 msec and progressively longer Erica up to 18 msec.     FINDINGS:     Average liver iron concentration is 1.5 mg/g dry tissue (normal = 0.17  - 1.8)  Average liver iron concentration is 28 mmol/kg dry tissue (normal = 3  - 33)     Other findings: Incompletely visualized 2.2 cm round hypointense focus  in  the right upper quadrant, possibly a renal cyst.         IMPRESSION:  1. Liver iron concentration within normal limits.  2. Incompletely visualized 2.2 cm round focus in the right upper  quadrant, possibly a renal cyst.  Consider ultrasound for further  evaluation.        [Consider Follow Up: possible renal cyst]     This report will be copied to the [facility] to ensure a  provider acknowledges the finding.      I have personally reviewed the examination and initial interpretation  and I agree with the findings.     GEORGE MORALES MD

## 2018-08-09 NOTE — PATIENT INSTRUCTIONS
"You were seen today in the Cardiovascular Clinic at the [facility].     Cardiology Providers you saw during your visit: Dr. Imelda Powell    Diagnosis:     Results: discussed with patient    Orders:   None    Medication Changes:   None    Recommendations:   None    Follow-up:   As needed         Please feel free to call me with any questions or concerns.       Madison Arora LPN     Questions and [phone].   First press #1 for the [employer] and then press #3 for \"Medical Questions\" to reach us Cardiology Nurses.      On Call Cardiologist for after hours or on weekends: [phone]   option #4 and ask to speak to the on-call Cardiologist.          If you need a medication refill please contact your pharmacy.  Please allow 3 business days for your refill to be completed.    "

## 2018-08-09 NOTE — MR AVS SNAPSHOT
After Visit Summary   8/9/2018    Alpesh Winston    MRN: [id number]           Patient Information     Date Of Birth          1970        Visit Information        Provider Department      8/9/2018 8:00 AM Suma Cornejo MD [facility]        Today's Diagnoses     Hypertriglyceridemia    -  1    Elevated ferritin        Palpitations        Sternal pain        Generalized abdominal pain        Chews tobacco        Gastroesophageal reflux disease without esophagitis        Elevated blood pressure reading without diagnosis of hypertension           Follow-ups after your visit        Follow-up notes from your care team     Return if symptoms worsen or fail to improve.      Your next 10 appointments already scheduled     Aug 09, 2018  9:30 AM CDT   (Arrive by 9:15 AM)   New Patient Visit with Imelda Powell MD   [facility] ([facility])    [address] [facility]  [address] [place] [id number] [phone]            Aug 15, 2018  9:00 AM CDT   (Arrive by 8:30 AM)   MR LIVER WO & W CONTRAST with UUMR1   [facility] [place] ([facility])    [address] [facility] [id number] [phone]           Take your medicines as usual, unless your doctor tells you not to. Bring a list of your current medicines to your exam (including vitamins, minerals and over-the-counter drugs). Also bring the results of similar scans you may have had.    You may or may not receive IV contrast for this exam pending the discretion of the Radiologist.   Do not eat or drink for 6 hours prior to exam.  The MRI machine uses a strong magnet. Please wear clothes without metal (snaps, zippers). A sweatsuit works well, or we may give you a hospital gown.  Please remove any body piercings and hair extensions before you arrive. You will also remove watches, jewelry, hairpins, wallets, dentures, partial dental  plates and hearing aids. You may wear contact lenses, and you may be able to wear your rings. We have a safe place to keep your personal items, but it is safer to leave them at home.  **IMPORTANT** THE INSTRUCTIONS BELOW ARE ONLY FOR THOSE PATIENTS WHO HAVE BEEN PRESCRIBED SEDATION OR GENERAL ANESTHESIA DURING THEIR MRI PROCEDURE:  IF YOUR DOCTOR PRESCRIBED ORAL SEDATION (take medicine to help you relax during your exam):   You must get the medicine from your doctor (oral medication) before you arrive. Bring the medicine to the exam. Do not take it at home. You ll be told when to take it upon arriving for your exam.   Arrive one hour early. Bring someone who can take you home after the test. Your medicine will make you sleepy. After the exam, you may not drive, take a bus or take a taxi by yourself.  IF YOUR DOCTOR PRESCRIBED IV SEDATION:   Arrive one hour early. Bring someone who can take you home after the test. Your medicine will make you sleepy. After the exam, you may not drive, take a bus or take a taxi by yourself.   No eating 6 hours before your exam. You may have clear liquids up until 4 hours before your exam. (Clear liquids include water, clear tea, black coffee and fruit juice without pulp.)  IF YOUR DOCTOR PRESCRIBED ANESTHESIA (be asleep for your exam):   Arrive 1 1/2 hours early. Bring someone who can take you home after the test. You may not drive, take a bus or take a taxi by yourself.   No eating 8 hours before your exam. You may have clear liquids up until 4 hours before your exam. (Clear liquids include water, clear tea, black coffee and fruit juice without pulp.)   You will spend four to five hours in the recovery room.  If you have any questions, please contact your Imaging Department exam site.            Aug 22, 2018  2:30 PM CDT   Infusion 60 with UC ONCOLOGY INFUSION, UC 29 ATC   [facility] ([facility] [place])    [address] [place] [address]  [address]  [facility] [id number] [phone]            Oct 02, 2018  1:00 PM CDT   Genetic Counseling with [facility] ([facility])    [facility]  [address]th [address]  [address]  [facility] [id number] [phone]            Oct 08, 2018  3:00 PM CDT   (Arrive by 2:45 PM)   Return Visit with Grisel Arriaga PA-C   [facility] ([facility])    [address] [facility]  4th Floor  [facility] [id number] [phone]              Future tests that were ordered for you today     Open Future Orders        Priority Expected Expires Ordered    CBC with platelets differential Routine 8/13/2018 8/23/2018 8/9/2018    CRP inflammation Routine 8/13/2018 8/23/2018 8/9/2018    Ferritin Routine 8/13/2018 8/23/2018 8/9/2018    Lipase Routine 8/13/2018 8/23/2018 8/9/2018    MR Liver wo & w Contrast Routine  8/9/2019 8/9/2018            Who to contact     If you have questions or need follow up information about today's clinic visit or your schedule please contact [facility] directly at [phone].  Normal or non-critical lab and imaging results will be communicated to you by CompareNetworkshart, letter or phone within 4 business days after the clinic has received the results. If you do not hear from us within 7 days, please contact the clinic through MyChart or phone. If you have a critical or abnormal lab result, we will notify you by phone as soon as possible.  Submit refill requests through Viveve or call your pharmacy and they will forward the refill request to us. Please allow 3 business days for your refill to be completed.          Additional Information About Your Visit        CompareNetworkshart Information     Viveve gives you secure access to your electronic health record. If you see a primary care provider, you can also send messages to your care team and make appointments. If you have questions,  "please call your primary care clinic.  If you do not have a primary care provider, please call [phone] and they will assist you.        Care EveryWhere ID     This is your Care EveryWhere ID. This could be used by other organizations to access your [facility] medical records  [id number]        Your Vitals Were     Pulse Temperature Respirations Height Pulse Oximetry BMI (Body Mass Index)    60 97.3  F (36.3  C) (Oral) 14 1.778 m (5' 10\") 95% 29.13 kg/m2       Blood Pressure from Last 3 Encounters:   08/09/18 (!) 139/95   08/06/18 (!) 144/94   06/20/18 (!) 133/96    Weight from Last 3 Encounters:   08/09/18 92.1 kg (203 lb)   08/06/18 92.4 kg (203 lb 12 oz)   05/07/18 91.6 kg (201 lb 14.4 oz)               Primary Care Provider Office Phone # Fax #    Belem Joshua [phone]       [address] [facility] [address]        Equal Access to Services     [facility] : Hadii aad ku hadasho Soomaali, waaxda luqadaha, qaybta kaalmada adeegyada, daisha koo . So [facility] [phone].    ATENCIÓN: Si habla español, tiene a casper disposición servicios gratuitos de asistencia lingüística. [facility] [phone].    We comply with applicable federal civil rights laws and [place] laws. We do not discriminate on the basis of race, color, national origin, age, disability, sex, sexual orientation, or gender identity.            Thank you!     Thank you for choosing [facility]  for your care. Our goal is always to provide you with excellent care. Hearing back from our patients is one way we can continue to improve our services. Please take a few minutes to complete the written survey that you may receive in the mail after your visit with us. Thank you!             Your Updated Medication List - Protect others around you: Learn how to safely use, store and throw away your medicines at www.disposemymeds.org.          This list is accurate as of 8/9/18  9:13 " AM.  Always use your most recent med list.                   Brand Name Dispense Instructions for use Diagnosis    atorvastatin 20 MG tablet    LIPITOR    90 tablet    Take 1 tablet (20 mg) by mouth daily    Hyperlipidemia LDL goal <130       COENZYME Q-10 PO      Take 20 mg by mouth daily        fenofibrate 160 MG tablet     90 tablet    TAKE 1 TABLET DAILY (DUE FOR APPOINTMENT NOW)    Hyperlipidemia LDL goal <130       FISH OIL PO

## 2018-08-09 NOTE — NURSING NOTE
Chief Complaint   Patient presents with     New Patient     47 yo male hx HLD present palpitations, undiagnosed HTN, and high ferritin levels      Vitals were taken and medications were reconciled.     Sandor Salgado, RMA  9:30 AM

## 2018-08-09 NOTE — NURSING NOTE
Per Imelda Robison patient to have 141 day zio patch monitor placed.  Diagnosis:Palpitations  Monitor placed: Yes  Patient Instructed: Yes  Patient verbalized understanding: Yes  Holter # [id number]    Placed by ALEN Gallegos

## 2018-08-09 NOTE — LETTER
2018      RE: Alpesh Winston  [address] [place] [address]       Dear Colleague,    Thank you for the opportunity to participate in the care of your patient, Alpesh Winston, at the [facility]. Please see a copy of my visit note below.    I am delighted to see Alpesh Winston in consultation for palpitations.    History of Present Illness:  As you know, the patient is a 48 year old  Male who saw an endocrinologist in May 2018 for possible low testosterone, labs were drawn and high ferritin levels of > 1000 was found. During f/u he also reported episodes of sharp chest pain with shortness of breath. He describes these episodes occurring over the last few weeks, usually when he's driving, sharp, brief mid sternal chest pain, followed by difficulty catching his breath. He denies lightheadedness, syncope, rapid/irregular heart beats. He ran 2 miles last week without any symptoms. He saw PCP last week and EKG showed sinus bradycardia at 48 bpm, and he was referred to cardiology.      The following portions of the patient's history were reviewed and updated as appropriate: allergies, current medications, past family history, past medical history, past social history, past surgical history, and the problem list.      Past Medical History:  Elevated ferritin level of 1150, found 18  Hyperlipidemia since age 21, familial  Hernia surgery  Postatitis  Vasectomy    Medications:   Atorvastatin 20 every day   Fenofibrate 160 qd  Coenzyme Q-10  Fish oil    Allergies:    Allergies   Allergen Reactions     Omnicef Hives     Sulfa Drugs Rash       Family History:   Father had valve replacement    Family History   Problem Relation Age of Onset     C.A.D. Maternal Grandmother      MI X 2 ,  in her 60's      Coronary Artery Disease Maternal Grandmother      Hypertension Maternal Grandmother      Hyperlipidemia Maternal Grandmother  "     Other Cancer Maternal Grandmother      cervical     HEART DISEASE Father      Diabetes Father      Prostate Cancer Father      Diabetes Paternal Grandmother      type 2     Breast Cancer Sister      Alzheimer Disease Mother 74     CBD     Alcohol/Drug Maternal Uncle      C.A.D. Maternal Uncle      MI age 56     Substance Abuse Other      Cerebrovascular Disease No family hx of      Cancer - colorectal No family hx of        Psychosocial history:  reports that he has never smoked. His smokeless tobacco use includes Chew. He reports that he drinks alcohol. He reports that he does not use illicit drugs. No cigarettes but he continues to chew tobacco. Drinks 6-8 beers per weekend.    Review of systems:   Cardiovascular: No palpitations, chest pain, shortness of breath at rest, dyspnea with exertion, orthopnea, paroxysmal nocturia dyspnea, nocturia, dizziness, syncope.    In addition,   Constitutional: No change in weight, sleep or appetite.  Normal energy.  No fever or chills  Eyes: Negative for vision changes or eye problems  ENT: No problems with ears, nose or throat.  No difficulty swallowing.  Resp: No coughing, wheezing or shortness of breath  GI: No nausea, vomiting,  heartburn, abdominal pain, diarrhea, constipation or change in bowel habits  : No urinary frequency or dysuria, bladder or kidney problems  Musculoskeletal: No significant muscle or joint pains  Neurologic: No headaches, numbness, tingling, weakness, problems with balance or coordination  Psychiatric: No problems with anxiety, depression or mental health  Heme/immune/allergy: No history of bleeding or clotting problems or anemia.  No allergies or immune system problems  Integumentary: No rashes,worrisome lesions or skin problems      Physical examination  Vitals: BP (!) 139/95  Pulse 60  Ht 1.778 m (5' 10\")  Wt 92.1 kg (203 lb)  BMI 29.13 kg/m2  BMI= Body mass index is 29.13 kg/(m^2).    Constitutional: In general, the patient is a " pleasant male in no apparent distress.    Eyes: PERRLA.  EOMI.  Sclerae white, not injected.  ENT/mouth: Normiocephalic and atraumatic.  Nares clear.  Pharynx without erythema or exudate.  Dentition intact.  No adenopathy.  No thyromegaly. Carotids +2/2 bilaterally without bruits.  No jugular venous distension.   Card/Vasc: The PMI is in the 5th ICS in the midclavicular line. There is no heave. Regular rate and rhythm. Normal S1, S2. No murmur, rub, click, or gallop. Pulses are normal bilaterally throughout. No peripheral edema.  Respiratory: Clear to asculation.  No ronchi, wheezes, rales.  No dullness to percussion.   GI: Abdomen is soft, nontender, nondistended. No organomegaly. No AAA.  No bruits.   Integument: No significant bruises or rashes  Neurological: The neurological examination reveal a patient who was oriented to person, place, and time.    Psych: Normal  Heme/Lymph/Immun: no significant adenopathy      I have reviewed the following labs/imaging:  Labs 8/6/18: K 4.2, cr 0.9, hgb 14, Plt 258K, TSH 1.67, ALT 47, AST 27  12/21/17: Cholesterol 189, HDL 39, ,   10/25/16:   Stress echo 12/22/15 (done due to an episode of chest pain which woke him up from sleep) : bicycle, normal baseline and exercise echo, no ischemia    I have personally and independently reviewed the following:  EKG 8/6/18: sinus samina 48 bpm, normal intervals  10/25/16: sinus 64 bpm      Assessment :  1. Bradycardia on EKG. Today his HR is 60 bpm. He is active, HR at 48 bpm without symptoms is within normal limits.  2. Intermittent sharp chest pain, with difficulty catching his breath. Doubt ischemic related. Most likely noncardiac although he could have had some symptomatic PVCs.   3. High BP. Noted since May 2018. ? Anxiety related.     Plan:  14-day Ziopatch monitor today.      I spent a total of 30 minutes face to face with  Alpesh Winston during today's office visit. Over 50% of this time was spent counseling  the patient and/or coordinating care regarding his symptoms.        The patient is to return as needed. I will notify him of Ziopatch results. The patient understood the treatment plan as outlined above.  There were no barriers to learning.      Imelda Powell MD

## 2018-08-09 NOTE — LETTER
8/9/2018       RE: Alpesh Winston  [address] [place] [address]     Dear Colleague,    Thank you for referring your patient, Alpesh Winston, to the [facility]. Please see a copy of my visit note below.    Hematology clinic consult note    Reason for consult: Elevated ferritin    History of present illness:  is a 48-year-old man who was found to have an elevated ferritin of 1150 on 5/16/18.  Is not clear why it was checked.  Repeat ferritin on 5/30/18 was 1082, and repeat on 8/6/18 was 1578.  He had HFE gene testing and was negative for the C282Y, H63D, and at S65C mutations.  He has been having some  chronic abdominal pain and bloating now for several weeks.  Not associated with eating.  It is somewhat left-sided.  The pain is  constant and he says it is there when he goes to bed at night and when he wakes up in the morning.  No diarrhea or constipation.  No melena or hematochezia.  No heartburn.  No urinary symptoms.  No fevers, chills, sweats, anorexia, early satiety.    Past medical history:  Hypertriglyceridemia diagnosed at age 21  Status post hernia surgery  History of prostatitis, seen by urology  Status post lipoma excision    Family history: Maternal grandmother had coronary artery disease and high cholesterol, mother has dementia, father has prostate cancer.    Medications:  atorvastatin 20 mg daily  Coenzyme Q 20 mg daily  Fenofibrate 160 mg daily  Omega-3 fatty capsule daily    Social: He does not smoke cigarettes and was never smoker, 6 to 8 drinks on weekends, none during a week.  He has worked full-time as an .    Review of Systems:  As in history above.  He has been having some episodes of sharp chest pain and palpitations with shortness of breath that last a few minutes.  He is going to see a cardiologist regarding this.  The rest of the >10 point ROS is negative.      PHYSICAL EXAMINATION:  BP (!) 139/95 (BP Location: Left  "arm, Patient Position: Chair, Cuff Size: Adult Regular)  Pulse 60  Temp 97.3  F (36.3  C) (Oral)  Resp 14  Ht 1.778 m (5' 10\")  Wt 92.1 kg (203 lb)  SpO2 95%  BMI 29.13 kg/m2    General appearance:  Patient is 49 yo man in no acute distress.     HEENT:  No pallor, icterus, or mucositis.  No thyromegaly.   Lymph nodes:  No cervical, supraclavicular, axillary, or inguinal lymphadenopathy.   Lungs:  Clear to auscultation bilaterally.   Heart:  Regular rate and rhythm; no S3 S4 or murmer.     Abdomen:  Positive bowel sounds, soft and nontender, nondistended.  No hepatomegaly. No splenomegaly appreciated.    Extremities:  No joint swelling or tenderness.  No ankle edema.     Skin:  ~3 cm well-healed surgical scar R flank. No rash, no petechiae or ecchymoses.    Labs:  Results for SHIRA ENRIQUEZ (MRN [id number]) as of 8/9/2018 14:38   Ref. Range 8/6/2018 15:16   Sodium Latest Ref Range: 133 - 144 mmol/L 142   Potassium Latest Ref Range: 3.4 - 5.3 mmol/L 4.2   Chloride Latest Ref Range: 94 - 109 mmol/L 109   Carbon Dioxide Latest Ref Range: 20 - 32 mmol/L 27   Urea Nitrogen Latest Ref Range: 7 - 30 mg/dL 20   Creatinine Latest Ref Range: 0.66 - 1.25 mg/dL 0.91   GFR Estimate Latest Ref Range: >60 mL/min/1.7m2 89   GFR Estimate If Black Latest Ref Range: >60 mL/min/1.7m2 >90   Calcium Latest Ref Range: 8.5 - 10.1 mg/dL 9.2   Anion Gap Latest Ref Range: 3 - 14 mmol/L 6   Albumin Latest Ref Range: 3.4 - 5.0 g/dL 4.2   Protein Total Latest Ref Range: 6.8 - 8.8 g/dL 7.4   Bilirubin Total Latest Ref Range: 0.2 - 1.3 mg/dL 0.3   Alkaline Phosphatase Latest Ref Range: 40 - 150 U/L 73   ALT Latest Ref Range: 0 - 70 U/L 47   AST Latest Ref Range: 0 - 45 U/L 27   CRP Inflammation Latest Ref Range: 0.0 - 8.0 mg/L <2.9   Ferritin Latest Ref Range: 26 - 388 ng/mL 1578 (H)   TSH Latest Ref Range: 0.40 - 4.00 mU/L 1.67   Glucose Latest Ref Range: 70 - 99 mg/dL 105 (H)   WBC Latest Ref Range: 4.0 - 11.0 10e9/L 5.6 "   Hemoglobin Latest Ref Range: 13.3 - 17.7 g/dL 14.3   Hematocrit Latest Ref Range: 40.0 - 53.0 % 43.4   Platelet Count Latest Ref Range: 150 - 450 10e9/L 258   RBC Count Latest Ref Range: 4.4 - 5.9 10e12/L 4.67   MCV Latest Ref Range: 78 - 100 fl 93   MCH Latest Ref Range: 26.5 - 33.0 pg 30.6   MCHC Latest Ref Range: 31.5 - 36.5 g/dL 32.9   RDW Latest Ref Range: 10.0 - 15.0 % 12.7   Diff Method Unknown Automated Method   % Neutrophils Latest Units: % 42.8   % Lymphocytes Latest Units: % 41.5   % Monocytes Latest Units: % 9.9   % Eosinophils Latest Units: % 4.5   % Basophils Latest Units: % 1.3   Absolute Neutrophil Latest Ref Range: 1.6 - 8.3 10e9/L 2.4   Absolute Lymphocytes Latest Ref Range: 0.8 - 5.3 10e9/L 2.3   Absolute Monocytes Latest Ref Range: 0.0 - 1.3 10e9/L 0.6   Absolute Eosinophils Latest Ref Range: 0.0 - 0.7 10e9/L 0.3   Absolute Basophils Latest Ref Range: 0.0 - 0.2 10e9/L 0.1   Patient Name: SHIRA ENRIQUEZ   MR#: [id number]   Specimen #: [id number]   Collected: 7/27/2018 16:51   Received: 7/30/2018 09:10   Reported: 8/7/2018 11:53   Ordering Phy(s): RUPENDRA DEV THAKU SULLIVAN   Additional Phy(s): [facility]     For improved result formatting, select 'View Enhanced Report Format' under    Linked Documents section.   _________________________________________     TEST(S) REQUESTED:   Hemochromatosis Mutation Analysis by PCR     SPECIMEN DESCRIPTION:   Blood     METHODOLOGY: The regions of genomic DNA containing c.845 G>A(C282Y)   mutation, the c.187 C>G (H63D), and the   c.193 A>T(S65C) mutation in the HFE gene were simultaneously amplified   using the polymerase chain reaction.   The amplified products were digested with restriction endonuclease BbrPI   and Hinf1 respectively and products   were analyzed by gel electrophoresis.     HEMOCHROMATOSIS RESULTS     HFE Gene C282Y (G845A) RESULTS:     C282Y Mutation Interpretation: NORMAL     HFE Gene H63D (C187G) RESULTS:     H63D Mutation  Interpretation: NORMAL     HFE Gene S65C (A193T) RESULTS:     S65C Mutation Interpretation: NORMAL     INTERPRETATION:   This patient does not carry the C282Y, the H63D or the S65C mutations in   the HFE gene. The absence of these   mutations, particularly in non-Caucasians, does not rule out the presence   of hemochromatosis.     COMMENTS:   If a patient is the recipient of an allogeneic bone marrow transplant,   this test must be done on a   pre-transplant sample or buccal swab.  A previous allogeneic bone marrow   transplant will interfere with test   results.  Call the [facility]([phone]) for   instructions on sample collection for these   patients.     This test was developed and its performance characteristics determined by   the [facility]  [facility]. It has not been cleared or   approved by the FDA. The laboratory is   regulated under CLIA as qualified to perform high-complexity testing. This    test is used for clinical purposes.   It should not be regarded as investigational or for research.     A resident/fellow in an accredited training program was involved in the   selection of testing, review of   laboratory data, and/or interpretation of this case.  I, as the senior   physician, attest that I: (i) confirmed   appropriate testing, (ii) examined the relevant raw data for the   specimen(s); and (iii) rendered or confirmed   the interpretation(s).     Electronically Signed Out By:   MIRLANDE Mcdowell     CPT Codes:   A: 07932-FWRNT, -YOVUVT     TESTING LAB LOCATION:   [address] [address] [phone]     Ascending recommendation: Elevated ferritin on 3 occasions over the past 3 months.  Differential diagnosis for elevated ferritin include iron overload, liver disease, inflammation.  The genetic testing rules out the  comment hereditary hemochromatosis gene abnormalities.  There are other genetic disorders that can cause iron overload, however does tend to present at a much younger age and had more severe abnormalities.  He does not have any disorder such as a serious hemoglobinopathy or recurrent transfusions for hematologic malignancy that can be a cause of iron overload.  On the most recent testing he had a CRP checked and it was in the normal range, making significant inflammation less likely.  He does have high cholesterol and triglycerides and may have a fatty liver that can be associated ferritin.  His liver enzymes and bilirubin are normal making severe liver disease less likely.    I discussed that when a person develops iron overload, it is a slowly progressive process and does not cause any sort of acute symptoms.  Symptoms occur when there is a overload in the liver leading to cirrhosis, and the pancreas leading to diabetes, and the heart leading to heart failure.  These ferritin of 1578 does not explain his vague abdominal complaints.  It might be a result of whatever is causing his abdominal complaints.  And the cause of his abdominal complaints are not clear to me.  I can add a lipase to the next lab tests that he has done, to assess for possible pancreatitis.    I discussed with them that it does not appear that he has hereditary hemochromatosis based on the gene testing.  He most likely has elevated liver and associated with hepatic steatosis, although we have no imaging as of yet to suggest that.  It would be useful to know if he is truly iron overloaded before starting therapeutic phlebotomy.  In patients with hepatic steatosis there may be some benefit in doing therapeutic phlebotomy to lower the ferritin to less than 100.  I have a few other patients who are in the situation.  I discussed that the ferritin can go up and down with inflammation and is not a very exacting test for iron overload.  If therapeutic  phlebotomy is started there is not a linear relationship in the drop of ferritin with each phlebotomy.  However a look at the overall trend with a goal of a ferritin less than 100.  I discussed that usually the therapeutic phlebotomy is done here in our infusion center.  The IV is inserted and 500 mL will be removed.  If he is feeling at all lightheaded or had low blood pressure he will be given 500 mL of normal saline.  Patients with hereditary hemochromatosis can now donate blood at [facility] in the [facility].  However since we do not know exactly why his ferritin is high, I would recommend against that at this time.  -MRI scan of liver to assess for iron content  -Repeat CBC with differential and ferritin along with a lipase on the day of the MRI.  -I will set up 1 therapeutic phlebotomy to occur about a week after the MRI scan results are back, since he wants to get going on management.    I will be in touch with him regarding these results and we can decide if he should have ongoing therapeutic phlebotomy once a month.    Suma Cornejo MD  Hematology

## 2018-08-09 NOTE — MR AVS SNAPSHOT
"              After Visit Summary   2018    Alpesh Winston    MRN: [id number]           Patient Information     Date Of Birth          1970        Visit Information        Provider Department      2018 9:30 AM Imelda Powell MD [facility]        Today's Diagnoses     Palpitations        Sternal pain        Elevated ferritin        Elevated blood pressure reading without diagnosis of hypertension          Care Instructions    You were seen today in the Cardiovascular Clinic at the [facility].     Cardiology Providers you saw during your visit: Dr. Imelda Powell    Diagnosis:     Results: discussed with patient    Orders:   None    Medication Changes:   None    Recommendations:   None    Follow-up:   As needed         Please feel free to call me with any questions or concerns.       Madison Arora LPN     Questions and [phone].   First press #1 for the YouHelp and then press #3 for \"Medical Questions\" to reach us Cardiology Nurses.      On Call Cardiologist for after hours or on weekends: [phone]   option #4 and ask to speak to the on-call Cardiologist.          If you need a medication refill please contact your pharmacy.  Please allow 3 business days for your refill to be completed.            Follow-ups after your visit        Your next 10 appointments already scheduled     Aug 15, 2018  9:00 AM CDT   (Arrive by 8:30 AM)   MR LIVER WO & W CONTRAST with UUMR1   [facility], [place], MRI ([facility])    [address] [id number] [phone]           Take your medicines as usual, unless your doctor tells you not to. Bring a list of your current medicines to your exam (including vitamins, minerals and over-the-counter drugs). Also bring the results of similar scans you may have had.    You may or may not receive IV contrast for this exam pending the discretion of the Radiologist.   Do " not eat or drink for 6 hours prior to exam.  The MRI machine uses a strong magnet. Please wear clothes without metal (snaps, zippers). A sweatsuit works well, or we may give you a hospital gown.  Please remove any body piercings and hair extensions before you arrive. You will also remove watches, jewelry, hairpins, wallets, dentures, partial dental plates and hearing aids. You may wear contact lenses, and you may be able to wear your rings. We have a safe place to keep your personal items, but it is safer to leave them at home.  **IMPORTANT** THE INSTRUCTIONS BELOW ARE ONLY FOR THOSE PATIENTS WHO HAVE BEEN PRESCRIBED SEDATION OR GENERAL ANESTHESIA DURING THEIR MRI PROCEDURE:  IF YOUR DOCTOR PRESCRIBED ORAL SEDATION (take medicine to help you relax during your exam):   You must get the medicine from your doctor (oral medication) before you arrive. Bring the medicine to the exam. Do not take it at home. You ll be told when to take it upon arriving for your exam.   Arrive one hour early. Bring someone who can take you home after the test. Your medicine will make you sleepy. After the exam, you may not drive, take a bus or take a taxi by yourself.  IF YOUR DOCTOR PRESCRIBED IV SEDATION:   Arrive one hour early. Bring someone who can take you home after the test. Your medicine will make you sleepy. After the exam, you may not drive, take a bus or take a taxi by yourself.   No eating 6 hours before your exam. You may have clear liquids up until 4 hours before your exam. (Clear liquids include water, clear tea, black coffee and fruit juice without pulp.)  IF YOUR DOCTOR PRESCRIBED ANESTHESIA (be asleep for your exam):   Arrive 1 1/2 hours early. Bring someone who can take you home after the test. You may not drive, take a bus or take a taxi by yourself.   No eating 8 hours before your exam. You may have clear liquids up until 4 hours before your exam. (Clear liquids include water, clear tea, black coffee and fruit juice  without pulp.)   You will spend four to five hours in the recovery room.  If you have any questions, please contact your Imaging Department exam site.            Aug 22, 2018  2:30 PM CDT   Infusion 60 with UC ONCOLOGY INFUSION, UC 29 ATC   [facility] ([facility])    [address]  [address]  [facility] [id number] [phone]            Oct 02, 2018  1:00 PM CDT   Genetic Counseling with Luz Elder GC   Peds Genetics ([facility])    [facility]  [address]  [address]  [facility] [id number] [phone]            Oct 08, 2018  3:00 PM CDT   (Arrive by 2:45 PM)   Return Visit with Grisel Arriaga PA-C   [facility] ([facility])    [address]  4th Floor  [facility] [address] [phone]              Future tests that were ordered for you today     Open Future Orders        Priority Expected Expires Ordered    CBC with platelets differential Routine 8/13/2018 8/23/2018 8/9/2018    CRP inflammation Routine 8/13/2018 8/23/2018 8/9/2018    Ferritin Routine 8/13/2018 8/23/2018 8/9/2018    Lipase Routine 8/13/2018 8/23/2018 8/9/2018    MR Liver wo & w Contrast Routine  8/9/2019 8/9/2018            Who to contact     If you have questions or need follow up information about today's clinic visit or your schedule please contact [facility] directly at [phone].  Normal or non-critical lab and imaging results will be communicated to you by MyChart, letter or phone within 4 business days after the clinic has received the results. If you do not hear from us within 7 days, please contact the clinic through MyChart or phone. If you have a critical or abnormal lab result, we will notify you by phone as soon as possible.  Submit refill requests through [facility] or call your pharmacy and they will forward the refill request to us. Please  "allow 3 business days for your refill to be completed.          Additional Information About Your Visit        MyChart Information     [employer] gives you secure access to your electronic health record. If you see a primary care provider, you can also send messages to your care team and make appointments. If you have questions, please call your primary care clinic.  If you do not have a primary care provider, please call [phone] and they will assist you.        Care EveryWhere ID     This is your Care EveryWhere ID. This could be used by other organizations to access your [facility] medical records  [id number]        Your Vitals Were     Pulse Height BMI (Body Mass Index)             60 1.778 m (5' 10\") 29.13 kg/m2          Blood Pressure from Last 3 Encounters:   08/09/18 (!) 139/95   08/09/18 (!) 139/95   08/06/18 (!) 144/94    Weight from Last 3 Encounters:   08/09/18 92.1 kg (203 lb)   08/09/18 92.1 kg (203 lb)   08/06/18 92.4 kg (203 lb 12 oz)               Primary Care Provider Office Phone # Fax #    Belem Joshua [phone]       [address] [facility] [address]        Equal Access to Services     JOSÉ ROGERS AH: Hadii aad ku hadasho Soomaali, waaxda luqadaha, qaybta kaalmada adeegyada, waxay idiin haycalvinn anneliese santos lagrace . So [facility] [phone].    ATENCIÓN: Si habla español, tiene a casper disposición servicios gratuitos de asistencia lingüística. Llame al [phone].    We comply with applicable federal civil rights laws and [place] laws. We do not discriminate on the basis of race, color, national origin, age, disability, sex, sexual orientation, or gender identity.            Thank you!     Thank you for choosing [facility]  for your care. Our goal is always to provide you with excellent care. Hearing back from our patients is one way we can continue to improve our services. Please take a few minutes to complete the written survey that you may receive in the " mail after your visit with us. Thank you!             Your Updated Medication List - Protect others around you: Learn how to safely use, store and throw away your medicines at www.disposemymeds.org.          This list is accurate as of 8/9/18 10:19 AM.  Always use your most recent med list.                   Brand Name Dispense Instructions for use Diagnosis    atorvastatin 20 MG tablet    LIPITOR    90 tablet    Take 1 tablet (20 mg) by mouth daily    Hyperlipidemia LDL goal <130       COENZYME Q-10 PO      Take 20 mg by mouth daily        fenofibrate 160 MG tablet     90 tablet    TAKE 1 TABLET DAILY (DUE FOR APPOINTMENT NOW)    Hyperlipidemia LDL goal <130       FISH OIL PO

## 2018-08-15 ENCOUNTER — HOSPITAL ENCOUNTER (OUTPATIENT)
Dept: MRI IMAGING | Facility: CLINIC | Age: 48
Discharge: HOME | End: 2018-08-15
Attending: INTERNAL MEDICINE | Admitting: INTERNAL MEDICINE
Payer: COMMERCIAL

## 2018-08-15 DIAGNOSIS — R79.89 ELEVATED FERRITIN: ICD-10-CM

## 2018-08-15 DIAGNOSIS — E78.1 HYPERTRIGLYCERIDEMIA: ICD-10-CM

## 2018-08-15 PROCEDURE — 74181 MRI ABDOMEN W/O CONTRAST: CPT

## 2018-08-17 LAB — RADIOLOGIST FLAGS: NORMAL

## 2018-08-22 ENCOUNTER — HOSPITAL ENCOUNTER (OUTPATIENT)
Dept: ULTRASOUND IMAGING | Facility: CLINIC | Age: 48
Discharge: HOME | End: 2018-08-22
Attending: INTERNAL MEDICINE | Admitting: INTERNAL MEDICINE
Payer: COMMERCIAL

## 2018-08-22 ENCOUNTER — INFUSION THERAPY VISIT (OUTPATIENT)
Dept: ONCOLOGY | Facility: CLINIC | Age: 48
End: 2018-08-22
Attending: INTERNAL MEDICINE
Payer: COMMERCIAL

## 2018-08-22 VITALS
DIASTOLIC BLOOD PRESSURE: 90 MMHG | SYSTOLIC BLOOD PRESSURE: 151 MMHG | HEART RATE: 58 BPM | OXYGEN SATURATION: 98 % | WEIGHT: 200.7 LBS | BODY MASS INDEX: 28.8 KG/M2 | TEMPERATURE: 97.9 F

## 2018-08-22 VITALS — SYSTOLIC BLOOD PRESSURE: 126 MMHG | DIASTOLIC BLOOD PRESSURE: 86 MMHG | HEART RATE: 58 BPM | OXYGEN SATURATION: 99 %

## 2018-08-22 DIAGNOSIS — R93.2 ABNORMAL MAGNETIC RESONANCE IMAGING OF LIVER: ICD-10-CM

## 2018-08-22 DIAGNOSIS — R10.84 GENERALIZED ABDOMINAL PAIN: ICD-10-CM

## 2018-08-22 DIAGNOSIS — R79.89 ELEVATED FERRITIN: ICD-10-CM

## 2018-08-22 DIAGNOSIS — R79.89 ELEVATED FERRITIN: Primary | ICD-10-CM

## 2018-08-22 LAB
BASOPHILS # BLD AUTO: 0.1 10E9/L (ref 0–0.2)
BASOPHILS NFR BLD AUTO: 1.2 %
DIFFERENTIAL METHOD BLD: NORMAL
EOSINOPHIL # BLD AUTO: 0.2 10E9/L (ref 0–0.7)
EOSINOPHIL NFR BLD AUTO: 4.2 %
ERYTHROCYTE [DISTWIDTH] IN BLOOD BY AUTOMATED COUNT: 12.2 % (ref 10–15)
FERRITIN SERPL-MCNC: 1526 NG/ML (ref 26–388)
HCT VFR BLD AUTO: 43.9 % (ref 40–53)
HGB BLD-MCNC: 14.9 G/DL (ref 13.3–17.7)
IMM GRANULOCYTES # BLD: 0 10E9/L (ref 0–0.4)
IMM GRANULOCYTES NFR BLD: 0 %
LIPASE SERPL-CCNC: 234 U/L (ref 73–393)
LYMPHOCYTES # BLD AUTO: 1.9 10E9/L (ref 0.8–5.3)
LYMPHOCYTES NFR BLD AUTO: 37.6 %
MCH RBC QN AUTO: 30.8 PG (ref 26.5–33)
MCHC RBC AUTO-ENTMCNC: 33.9 G/DL (ref 31.5–36.5)
MCV RBC AUTO: 91 FL (ref 78–100)
MONOCYTES # BLD AUTO: 0.5 10E9/L (ref 0–1.3)
MONOCYTES NFR BLD AUTO: 9.1 %
NEUTROPHILS # BLD AUTO: 2.4 10E9/L (ref 1.6–8.3)
NEUTROPHILS NFR BLD AUTO: 47.9 %
NRBC # BLD AUTO: 0 10*3/UL
NRBC BLD AUTO-RTO: 0 /100
PLATELET # BLD AUTO: 282 10E9/L (ref 150–450)
RBC # BLD AUTO: 4.84 10E12/L (ref 4.4–5.9)
WBC # BLD AUTO: 5 10E9/L (ref 4–11)

## 2018-08-22 PROCEDURE — 82728 ASSAY OF FERRITIN: CPT | Performed by: INTERNAL MEDICINE

## 2018-08-22 PROCEDURE — 36415 COLL VENOUS BLD VENIPUNCTURE: CPT

## 2018-08-22 PROCEDURE — 76770 US EXAM ABDO BACK WALL COMP: CPT

## 2018-08-22 PROCEDURE — 83690 ASSAY OF LIPASE: CPT | Performed by: INTERNAL MEDICINE

## 2018-08-22 PROCEDURE — 86140 C-REACTIVE PROTEIN: CPT | Performed by: INTERNAL MEDICINE

## 2018-08-22 PROCEDURE — 85018 HEMOGLOBIN: CPT | Performed by: INTERNAL MEDICINE

## 2018-08-22 PROCEDURE — 85025 COMPLETE CBC W/AUTO DIFF WBC: CPT | Performed by: INTERNAL MEDICINE

## 2018-08-22 PROCEDURE — 99195 PHLEBOTOMY: CPT

## 2018-08-22 ASSESSMENT — PAIN SCALES - GENERAL: PAINLEVEL: MILD PAIN (3)

## 2018-08-22 NOTE — NURSING NOTE
Chief Complaint   Patient presents with     Blood Draw     labs drawn via venipuncture by RN     /87 (BP Location: Right arm, Patient Position: Chair, Cuff Size: Adult Large)  Pulse 68  Temp 97.9  F (36.6  C) (Oral)  Wt 91 kg (200 lb 11.2 oz)  SpO2 97%  BMI 28.8 kg/m2    Vitals taken.  Labs collected and sent from right antecubital venipuncture. Pt tolerated well. Pt checked in for next appointment.    Kia Ceballos RN

## 2018-08-22 NOTE — PROGRESS NOTES
Infusion Nursing Note:  Alpesh Winston presents today for a phlebotomy.    Patient seen by provider today: No    Note: Today is patient's first time receiving a therapeutic phlebotomy.  Per patient, he verbalizes understanding of need for phlebotomy as explained to him by Dr. Cornejo.  Patient shown infusion area and where bathrooms and snack areas located.  Phlebotomy started at 1440 and was completed at 1510.  500 ml of whole blood removed without incident.  Patient consumed 960 ml of water during and post phlebotomy.  Patient denied dizziness or lightheadedness and reminded to not preform strenuous exercise tonight and to stay well hydrated.  Patient able to be discharged from clinic is stable condition.    Intravenous Access:  Peripheral IV placed.      Treatment Conditions:  Lab Results   Component Value Date    HGB 14.9 08/22/2018     Lab Results   Component Value Date    WBC 5.0 08/22/2018      Lab Results   Component Value Date    ANEU 2.4 08/22/2018     Lab Results   Component Value Date     08/22/2018      Lab Results   Component Value Date     08/06/2018                   Lab Results   Component Value Date    POTASSIUM 4.2 08/06/2018           No results found for: MAG         Lab Results   Component Value Date    CR 0.91 08/06/2018                   Lab Results   Component Value Date    NAOMI 9.2 08/06/2018                Lab Results   Component Value Date    BILITOTAL 0.3 08/06/2018           Lab Results   Component Value Date    ALBUMIN 4.2 08/06/2018                    Lab Results   Component Value Date    ALT 47 08/06/2018           Lab Results   Component Value Date    AST 27 08/06/2018       Results reviewed, labs MET treatment parameters, ok to proceed with treatment.      Post Infusion Assessment:  Patient tolerated infusion without incident.  Blood return noted pre and post infusion.  Site patent and intact, free from redness, edema or discomfort.  No evidence of  extravasations.  Access discontinued per protocol.    Discharge Plan:   Patient declined prescription refills.  Discharge instructions reviewed with: Patient.  Patient and/or family verbalized understanding of discharge instructions and all questions answered.  AVS to patient via [employer].  Patient will return 9/19/18 for labs and phlebotomy for next appointment.   Patient discharged in stable condition accompanied by: self.  Departure Mode: Ambulatory.    Danae Carpenter RN                    ,[facility]

## 2018-08-22 NOTE — MR AVS SNAPSHOT
After Visit Summary   8/22/2018    Alpesh Winston    MRN: [id number]           Patient Information     Date Of Birth          1970        Visit Information        Provider Department      8/22/2018 2:30 PM UC 29 ATC; UC ONCOLOGY INFUSION [facility]        Today's Diagnoses     Elevated ferritin    -  1    Generalized abdominal pain          Care Instructions    Contact Numbers    [facility] Main Line: [phone]    Triage Nurse Line: [phone]      Please call the [facility] Nurse Triage line if you experience a temperature greater than or equal to 100.5, shaking chills, have uncontrolled nausea, vomiting and/or diarrhea, dizziness, shortness of breath, bleeding not relieved with pressure, or if you have any other questions or concerns.     If it is after hours, weekends, or holidays, please call the [phone] and a nurse will be able to triage your call.    If you are having any concerning symptoms or wish to speak to a provider before your next infusion visit, please call your care coordinator or triage them so we can adequately serve you.      If you need to refill your narcotic prescription or other medication, please call triage before your infusion appointment.        August 2018 Sunday Monday Tuesday Wednesday Thursday Friday Saturday                  1     2     3     4       5     6     LONG    2:40 PM   (20 min.)   Lotus Jo MD   [facility]     XR CHEST 2 VIEWS    3:30 PM   (15 min.)   HWXR1   [facility] 7     8     9     [facility]    7:45 AM   (60 min.)   Suma Cornejo MD   [facility]    9:15 AM   (30 min.)   Imelda Powell MD   [facility] 10     11       12     13     14     15     MR LIVER WO & W CONTRAST    8:30 AM   (60 min.)   UUMR1   [facility] 16     17     18       19     20     21     22     US ABD/PEL DUPLEX COMPLETE    9:00 AM   (60 min.)    UUUS1   [facility], Ultrasound     [facility] MASONIC LAB DRAW    2:00 PM   (15 min.)    MASONIC LAB DRAW   [facility] Masonic Lab Draw     UMP ONC INFUSION 60    2:30 PM   (60 min.)    ONCOLOGY INFUSION   [facility] 23     24     25       26     27     28     29     30     31 September 2018 Sunday Monday Tuesday Wednesday Thursday Friday Saturday                                 1       2     3     4     5     6     7     8       9     10     11     12     13     14     15       16     17     18     19     [facility] MASONIC LAB DRAW    1:00 PM   (15 min.)    MASONIC LAB DRAW   [facility] Masonic Lab Draw     [facility] ONC INFUSION 120    1:30 PM   (120 min.)    ONCOLOGY INFUSION   [facility] 20     21     22       23     24     25     26     27     28     29       30                                               Recent Results (from the past 24 hour(s))   Ferritin    Collection Time: 08/22/18  2:16 PM   Result Value Ref Range    Ferritin 1526 (H) 26 - 388 ng/mL   Lipase    Collection Time: 08/22/18  2:16 PM   Result Value Ref Range    Lipase 234 73 - 393 U/L   CBC with platelets differential    Collection Time: 08/22/18  2:16 PM   Result Value Ref Range    WBC 5.0 4.0 - 11.0 10e9/L    RBC Count 4.84 4.4 - 5.9 10e12/L    Hemoglobin 14.9 13.3 - 17.7 g/dL    Hematocrit 43.9 40.0 - 53.0 %    MCV 91 78 - 100 fl    MCH 30.8 26.5 - 33.0 pg    MCHC 33.9 31.5 - 36.5 g/dL    RDW 12.2 10.0 - 15.0 %    Platelet Count 282 150 - 450 10e9/L    Diff Method Automated Method     % Neutrophils 47.9 %    % Lymphocytes 37.6 %    % Monocytes 9.1 %    % Eosinophils 4.2 %    % Basophils 1.2 %    % Immature Granulocytes 0.0 %    Nucleated RBCs 0 0 /100    Absolute Neutrophil 2.4 1.6 - 8.3 10e9/L    Absolute Lymphocytes 1.9 0.8 - 5.3 10e9/L    Absolute Monocytes 0.5 0.0 - 1.3 10e9/L    Absolute Eosinophils 0.2 0.0 - 0.7 10e9/L    Absolute Basophils 0.1 0.0 - 0.2 10e9/L    Abs Immature  Granulocytes 0.0 0 - 0.4 10e9/L    Absolute Nucleated RBC 0.0                  Follow-ups after your visit        Your next 10 appointments already scheduled     Sep 19, 2018  1:00 PM CDT   Masonic Lab Draw with UC MASONIC LAB DRAW   [facility] Masonic Lab Draw ([facility])    [address]  [address]  [facility] [address]   [phone]            Sep 19, 2018  1:30 PM CDT   Infusion 120 with UC ONCOLOGY INFUSION, UC 22 ATC   [facility] ([facility])    [address]  [address]  [place] [facility] [address]   [phone]            Oct 08, 2018  3:00 PM CDT   (Arrive by 2:45 PM)   Return Visit with Grisel Arriaga PA-C   [facility] for Prostate and Urologic Cancers ([facility])    [address] [place] [address]  4th Floor  [facility] [address]   [phone]            Oct 17, 2018  1:00 PM CDT   Masonic Lab Draw with UC MASONIC LAB DRAW   [facility] Masonic Lab Draw ([facility])    [address]  [address]  [place] [address]   [phone]            Oct 17, 2018  1:30 PM CDT   Infusion 120 with UC ONCOLOGY INFUSION, UC 22 ATC   [facility] ([facility])    [address]  [address]  [facility] [address]   [phone]            Nov 14, 2018  9:00 AM CST   Masonic Lab Draw with UC MASONIC LAB DRAW   [facility] Masonic Lab Draw ([facility])    [address]  [address]  [place] [facility] [address]   [phone]            Nov 14, 2018  9:30 AM CST   (Arrive by 9:15 AM)   Return Visit with Suma Cornejo MD   [facility] ([facility])    [address] [place] [address]  [address]  [facility] [address]   [phone]            Nov 14, 2018 10:00 AM CST   Infusion 120 with UC ONCOLOGY INFUSION   [facility] ([facility]  [facility])    [address]  [address]  [place] [address] [phone]              Future tests that were ordered for you today     Open Standing Orders        Priority Remaining Interval Expires Ordered    Phlebotomy therapeutic Routine [id number] PRN  8/22/2018          Open Future Orders        Priority Expected Expires Ordered    US Renal Complete Routine  8/22/2019 8/21/2018            Who to contact     If you have questions or need follow up information about today's clinic visit or your schedule please contact [facility] CANCER [facility] directly at [phone].  Normal or non-critical lab and imaging results will be communicated to you by Fancredhart, letter or phone within 4 business days after the clinic has received the results. If you do not hear from us within 7 days, please contact the clinic through NeoReacht or phone. If you have a critical or abnormal lab result, we will notify you by phone as soon as possible.  Submit refill requests through ViralNinjas or call your pharmacy and they will forward the refill request to us. Please allow 3 business days for your refill to be completed.          Additional Information About Your Visit        MyChart Information     ViralNinjas gives you secure access to your electronic health record. If you see a primary care provider, you can also send messages to your care team and make appointments. If you have questions, please call your primary care clinic.  If you do not have a primary care provider, please call [phone] and they will assist you.        Care EveryWhere ID     This is your Care EveryWhere ID. This could be used by other organizations to access your [facility] medical records  [id number]        Your Vitals Were     Pulse Temperature Pulse Oximetry BMI (Body Mass Index)          58 97.9  F (36.6  C) (Oral) 98% 28.8 kg/m2         Blood Pressure from Last 3 Encounters:   08/22/18 151/90   08/22/18 126/86   08/09/18 (!)  139/95    Weight from Last 3 Encounters:   08/22/18 91 kg (200 lb 11.2 oz)   08/09/18 92.1 kg (203 lb)   08/09/18 92.1 kg (203 lb)              We Performed the Following     CBC with platelets differential     Ferritin     Lipase        Primary Care Provider Office Phone # Fax #    Belem Joshua [phone] [address] [facility]  [facility] [address]        Equal Access to Services     JOSÉ ROGERS : Hadii aad ku hadasho Soomaali, waaxda luqadaha, qaybta kaalmada adeegyada, waxay idiin hayaan adeeg kharash la'aan . So [facility] [phone].    ATENCIÓN: Si habla español, tiene a casper disposición servicios gratuitos de asistencia lingüística. [facility] [phone].    We comply with applicable federal civil rights laws and [place] laws. We do not discriminate on the basis of race, color, national origin, age, disability, sex, sexual orientation, or gender identity.            Thank you!     Thank you for choosing [facility] CANCER CLINIC  for your care. Our goal is always to provide you with excellent care. Hearing back from our patients is one way we can continue to improve our services. Please take a few minutes to complete the written survey that you may receive in the mail after your visit with us. Thank you!             Your Updated Medication List - Protect others around you: Learn how to safely use, store and throw away your medicines at www.disposemymeds.org.          This list is accurate as of 8/22/18  4:23 PM.  Always use your most recent med list.                   Brand Name Dispense Instructions for use Diagnosis    atorvastatin 20 MG tablet    LIPITOR    90 tablet    Take 1 tablet (20 mg) by mouth daily    Hyperlipidemia LDL goal <130       COENZYME Q-10 PO      Take 20 mg by mouth daily        fenofibrate 160 MG tablet     90 tablet    TAKE 1 TABLET DAILY (DUE FOR APPOINTMENT NOW)    Hyperlipidemia LDL goal <130       FISH OIL PO

## 2018-08-22 NOTE — PATIENT INSTRUCTIONS
Contact Numbers    [facility] Main Line: [phone]    Triage Nurse Line: [phone]      Please call the [facility] Nurse Triage line if you experience a temperature greater than or equal to 100.5, shaking chills, have uncontrolled nausea, vomiting and/or diarrhea, dizziness, shortness of breath, bleeding not relieved with pressure, or if you have any other questions or concerns.     If it is after hours, weekends, or holidays, please call the [phone] and a nurse will be able to triage your call.    If you are having any concerning symptoms or wish to speak to a provider before your next infusion visit, please call your care coordinator or triage them so we can adequately serve you.      If you need to refill your narcotic prescription or other medication, please call triage before your infusion appointment.        August 2018 Sunday Monday Tuesday Wednesday Thursday Friday Saturday                  1     2     3     4       5     6     LONG    2:40 PM   (20 min.)   Lotus Jo MD   [facility]     XR CHEST 2 VIEWS    3:30 PM   (15 min.)   HWXR1   [facility] 7     8     9     [facility] NEW    7:45 AM   (60 min.)   Suma Cornejo MD   [facility] NEW    9:15 AM   (30 min.)   Imelda Powell MD   [facility] 10     11       12     13     14     15     MR LIVER WO & W CONTRAST    8:30 AM   (60 min.)   UUMR1   [facility] 16     17     18       19     20     21     22     US ABD/PEL DUPLEX COMPLETE    9:00 AM   (60 min.)   UUUS1   [facility], Ultrasound     [facility] MASONIC LAB DRAW    2:00 PM   (15 min.)    MASONIC LAB DRAW   [facility] Lab Draw     [facility] ONC INFUSION 60    2:30 PM   (60 min.)    ONCOLOGY INFUSION   [facility] 23     24     25       26     27     28     29     30     31 September 2018 Sunday Monday Tuesday Wednesday Thursday Friday Saturday                                  1       2     3     4     5     6     7     8       9     10     11     12     13     14     15       16     17     18     19     [facility]    1:00 PM   (15 min.)    MASONIC LAB DRAW   [facility] Lab Draw     [facility] ONC INFUSION 120    1:30 PM   (120 min.)    ONCOLOGY INFUSION   [facility] 20     21     22       23     24     25     26     27     28     29       30                                               Recent Results (from the past 24 hour(s))   Ferritin    Collection Time: 08/22/18  2:16 PM   Result Value Ref Range    Ferritin 1526 (H) 26 - 388 ng/mL   Lipase    Collection Time: 08/22/18  2:16 PM   Result Value Ref Range    Lipase 234 73 - 393 U/L   CBC with platelets differential    Collection Time: 08/22/18  2:16 PM   Result Value Ref Range    WBC 5.0 4.0 - 11.0 10e9/L    RBC Count 4.84 4.4 - 5.9 10e12/L    Hemoglobin 14.9 13.3 - 17.7 g/dL    Hematocrit 43.9 40.0 - 53.0 %    MCV 91 78 - 100 fl    MCH 30.8 26.5 - 33.0 pg    MCHC 33.9 31.5 - 36.5 g/dL    RDW 12.2 10.0 - 15.0 %    Platelet Count 282 150 - 450 10e9/L    Diff Method Automated Method     % Neutrophils 47.9 %    % Lymphocytes 37.6 %    % Monocytes 9.1 %    % Eosinophils 4.2 %    % Basophils 1.2 %    % Immature Granulocytes 0.0 %    Nucleated RBCs 0 0 /100    Absolute Neutrophil 2.4 1.6 - 8.3 10e9/L    Absolute Lymphocytes 1.9 0.8 - 5.3 10e9/L    Absolute Monocytes 0.5 0.0 - 1.3 10e9/L    Absolute Eosinophils 0.2 0.0 - 0.7 10e9/L    Absolute Basophils 0.1 0.0 - 0.2 10e9/L    Abs Immature Granulocytes 0.0 0 - 0.4 10e9/L    Absolute Nucleated RBC 0.0

## 2018-08-23 ENCOUNTER — MYC MEDICAL ADVICE (OUTPATIENT)
Dept: ONCOLOGY | Facility: CLINIC | Age: 48
End: 2018-08-23

## 2018-09-06 ENCOUNTER — CARE COORDINATION (OUTPATIENT)
Dept: CARDIOLOGY | Facility: CLINIC | Age: 48
End: 2018-09-06

## 2018-09-06 NOTE — PROGRESS NOTES
Spoke with patient and relayed results for zio patch.     Patient was wondering if his dad's history of getting these similar symptoms and later had to have his valve replaced could be the same. Patient request for Dr. Powell to advise.

## 2018-09-18 ENCOUNTER — PRE VISIT (OUTPATIENT)
Dept: UROLOGY | Facility: CLINIC | Age: 48
End: 2018-09-18

## 2018-09-19 ENCOUNTER — INFUSION THERAPY VISIT (OUTPATIENT)
Dept: ONCOLOGY | Facility: CLINIC | Age: 48
End: 2018-09-19
Attending: INTERNAL MEDICINE
Payer: COMMERCIAL

## 2018-09-19 ENCOUNTER — APPOINTMENT (OUTPATIENT)
Dept: LAB | Facility: CLINIC | Age: 48
End: 2018-09-19
Attending: INTERNAL MEDICINE
Payer: COMMERCIAL

## 2018-09-19 VITALS
DIASTOLIC BLOOD PRESSURE: 75 MMHG | BODY MASS INDEX: 29.03 KG/M2 | HEART RATE: 59 BPM | TEMPERATURE: 98.3 F | WEIGHT: 202.3 LBS | SYSTOLIC BLOOD PRESSURE: 120 MMHG | OXYGEN SATURATION: 98 % | RESPIRATION RATE: 18 BRPM

## 2018-09-19 DIAGNOSIS — R79.89 ELEVATED FERRITIN: Primary | ICD-10-CM

## 2018-09-19 LAB
FERRITIN SERPL-MCNC: 1098 NG/ML (ref 26–388)
HGB BLD-MCNC: 14.8 G/DL (ref 13.3–17.7)

## 2018-09-19 PROCEDURE — 82728 ASSAY OF FERRITIN: CPT | Performed by: INTERNAL MEDICINE

## 2018-09-19 PROCEDURE — 85018 HEMOGLOBIN: CPT | Performed by: INTERNAL MEDICINE

## 2018-09-19 PROCEDURE — 40000556 ZZH STATISTIC PERIPHERAL IV START W US GUIDANCE: Mod: ZF

## 2018-09-19 PROCEDURE — 25000125 ZZHC RX 250: Mod: ZF | Performed by: RADIOLOGY

## 2018-09-19 PROCEDURE — 36415 COLL VENOUS BLD VENIPUNCTURE: CPT

## 2018-09-19 PROCEDURE — 99195 PHLEBOTOMY: CPT

## 2018-09-19 RX ORDER — ETHYL CHLORIDE 100 %
AEROSOL, SPRAY (ML) TOPICAL ONCE
Status: COMPLETED | OUTPATIENT
Start: 2018-09-19 | End: 2018-09-19

## 2018-09-19 RX ADMIN — Medication: at 15:00

## 2018-09-19 ASSESSMENT — PAIN SCALES - GENERAL: PAINLEVEL: NO PAIN (0)

## 2018-09-19 NOTE — MR AVS SNAPSHOT
After Visit Summary   9/19/2018    Alpesh Winston    MRN: [id number]           Patient Information     Date Of Birth          1970        Visit Information        Provider Department      9/19/2018 1:30 PM  22 ATC;  ONCOLOGY INFUSION [facility]        Today's Diagnoses     Elevated ferritin    -  1      Care Instructions    Contact Numbers  [facility] Nurse Triage: [phone]  After Hours Nurse Line:  [phone]    Please call the [facility] Triage line if you experience a temperature greater than or equal to 100.5, shaking chills, have uncontrolled nausea, vomiting and/or diarrhea, dizziness, shortness of breath, chest pain, bleeding, unexplained bruising, or if you have any other new/concerning symptoms, questions or concerns.     If it is after hours, weekends, or holidays, please call either the after hours nurse line listed above.     If you are having any concerning symptoms or wish to speak to a provider before your next infusion visit, please call your care coordinator or triage to notify them so we can adequately serve you.     If you need a refill on a narcotic prescription or other medication, please call triage before your infusion appointment.         September 2018 Sunday Monday Tuesday Wednesday Thursday Friday Saturday                                 1       2     3     4     5     6     7     8       9     10     11     12     13     14     15       16     17     18     19     [facility] LAB DRAW    1:00 PM   (15 min.)   UC MASONIC LAB DRAW   [facility] Lab Draw     UMP ONC INFUSION 120    1:30 PM   (120 min.)    ONCOLOGY INFUSION   [facility] 20     21     22       23     24     25     26     27     28     29       30 October 2018 Sunday Monday Tuesday Wednesday Thursday Friday Saturday        1     2     3     4     5     6       7     8     UMP RETURN    2:45  PM   (30 min.)   Grisel Arriaga PA-C [facility] Urology and [facility] for Prostate and Urologic Cancers 9     10     11     12     13       14     15     16     17     UM MASONIC LAB DRAW    1:00 PM   (15 min.)   UC MASONIC LAB DRAW   [facility] Masonic Lab Draw     [facility] ONC INFUSION 120    1:30 PM   (120 min.)   UC ONCOLOGY INFUSION   [facility] 18     19     20       21     22     23     24     25     26     27       28     29     30     31                                 Lab Results:  Recent Results (from the past 12 hour(s))   Ferritin    Collection Time: 09/19/18  1:50 PM   Result Value Ref Range    Ferritin 1098 (H) 26 - 388 ng/mL   Hemoglobin    Collection Time: 09/19/18  1:50 PM   Result Value Ref Range    Hemoglobin 14.8 13.3 - 17.7 g/dL               Follow-ups after your visit        Your next 10 appointments already scheduled     Oct 08, 2018  3:00 PM CDT   (Arrive by 2:45 PM)   Return Visit with BECCA Hand [facility] Urology and [facility] for Prostate and Urologic Cancers ([facility])    [address] [place] [address]  [address] [facility] [address]   [phone]            Oct 17, 2018  1:00 PM CDT   Masonic Lab Draw with  MASONIC LAB DRAW   [facility])    [address] [place] [address]  [address]  [facility] [address]   [phone]            Oct 17, 2018  1:30 PM CDT   Infusion 120 with UC ONCOLOGY INFUSION, UC 22 ATC   [facility] ([facility])    [address]  [address]  [facility] [address]   [phone]            Nov 14, 2018  9:00 AM CST   Masonic Lab Draw with  MASONIC LAB DRAW   [facility] Masonic Lab Draw ([facility])    [address] [place] [address]  [address]  [facility] [address]   [phone]            Nov 14, 2018  9:30 AM CST   (Arrive by 9:15 AM)   Return Visit with Suma Cornejo MD   [facility]  Cancer Clinic ([facility])    [address]  [address]  [facility] [address]   [phone]            Nov 14, 2018 10:00 AM CST   Infusion 120 with UC ONCOLOGY INFUSION, UC 28 ATC   [facility] ([facility])    [address]  [address]  [facility] [address]   [phone]            Dec 12, 2018  1:00 PM CST   Masonic Lab Draw with UC MASONIC LAB DRAW   [facility] Lab Draw ([facility])    [address] [place] [address]  [address]  [facility] [address]   [phone]            Dec 12, 2018  1:30 PM CST   Infusion 120 with UC ONCOLOGY INFUSION   [facility] ([facility])    [address] [place] [address]  [address]  [facility] [address]   [phone]              Future tests that were ordered for you today     Open Standing Orders        Priority Remaining Interval Expires Ordered    Phlebotomy therapeutic Routine [id number] PRN  9/19/2018            Who to contact     If you have questions or need follow up information about today's clinic visit or your schedule please contact [facility] CANCER [facility] directly at [phone].  Normal or non-critical lab and imaging results will be communicated to you by ATCOR Holdingshart, letter or phone within 4 business days after the clinic has received the results. If you do not hear from us within 7 days, please contact the clinic through Blockboardt or phone. If you have a critical or abnormal lab result, we will notify you by phone as soon as possible.  Submit refill requests through ProTenders or call your pharmacy and they will forward the refill request to us. Please allow 3 business days for your refill to be completed.          Additional Information About Your Visit        ProTenders Information     ProTenders gives you secure access to your electronic health record. If you see a primary care provider, you can also send messages  to your care team and make appointments. If you have questions, please call your primary care clinic.  If you do not have a primary care provider, please call [phone] and they will assist you.        Care EveryWhere ID     This is your Care EveryWhere ID. This could be used by other organizations to access your [facility] medical records  [id number]        Your Vitals Were     Pulse Temperature Respirations Pulse Oximetry BMI (Body Mass Index)       59 98.3  F (36.8  C) (Oral) 18 98% 29.03 kg/m2        Blood Pressure from Last 3 Encounters:   09/19/18 120/75   08/22/18 151/90   08/22/18 126/86    Weight from Last 3 Encounters:   09/19/18 91.8 kg (202 lb 4.8 oz)   08/22/18 91 kg (200 lb 11.2 oz)   08/09/18 92.1 kg (203 lb)              We Performed the Following     Ferritin     Hemoglobin        Primary Care Provider Office Phone # Fax #    Belem Joshua [phone]       [address] [facility] [address]        Equal Access to Services     [facility]: Hadii aad ku hadasho Soomaali, waaxda luqadaha, qaybta kaalmada yandy, daisha koo . So [facility] [phone].    ATENCIÓN: Si habla español, tiene a casper disposición servicios gratuitos de asistencia lingüística. [facility] [phone].    We comply with applicable federal civil rights laws and [place] laws. We do not discriminate on the basis of race, color, national origin, age, disability, sex, sexual orientation, or gender identity.            Thank you!     Thank you for choosing [facility] CANCER CLINIC  for your care. Our goal is always to provide you with excellent care. Hearing back from our patients is one way we can continue to improve our services. Please take a few minutes to complete the written survey that you may receive in the mail after your visit with us. Thank you!             Your Updated Medication List - Protect others around you: Learn how to safely use, store and throw away  your medicines at www.disposemymeds.org.          This list is accurate as of 9/19/18  2:31 PM.  Always use your most recent med list.                   Brand Name Dispense Instructions for use Diagnosis    atorvastatin 20 MG tablet    LIPITOR    90 tablet    Take 1 tablet (20 mg) by mouth daily    Hyperlipidemia LDL goal <130       COENZYME Q-10 PO      Take 20 mg by mouth daily        fenofibrate 160 MG tablet     90 tablet    TAKE 1 TABLET DAILY (DUE FOR APPOINTMENT NOW)    Hyperlipidemia LDL goal <130       FISH OIL PO

## 2018-09-19 NOTE — PATIENT INSTRUCTIONS
Contact Numbers  [facility] Nurse Triage: [phone]  After Hours Nurse Line:  [phone]    Please call the [facility] Triage line if you experience a temperature greater than or equal to 100.5, shaking chills, have uncontrolled nausea, vomiting and/or diarrhea, dizziness, shortness of breath, chest pain, bleeding, unexplained bruising, or if you have any other new/concerning symptoms, questions or concerns.     If it is after hours, weekends, or holidays, please call either the after hours nurse line listed above.     If you are having any concerning symptoms or wish to speak to a provider before your next infusion visit, please call your care coordinator or triage to notify them so we can adequately serve you.     If you need a refill on a narcotic prescription or other medication, please call triage before your infusion appointment.         September 2018 Sunday Monday Tuesday Wednesday Thursday Friday Saturday                                 1       2     3     4     5     6     7     8       9     10     11     12     13     14     15       16     17     18     19     [facility] DRAW    1:00 PM   (15 min.)   [facility] LAB DRAW   [facility] Lab Draw     P ONC INFUSION 120    1:30 PM   (120 min.)    ONCOLOGY INFUSION   [facility] 20     21     22       23     24     25     26     27     28     29       30                                              October 2018 Sunday Monday Tuesday Wednesday Thursday Friday Saturday        1     2     3     4     5     6       7     8     [facility] RETURN    2:45 PM   (30 min.)   Grisel Arriaga PA-C   [facility] 9     10     11     12     13       14     15     16     17     [facility] DRAW    1:00 PM   (15 min.)    [facility] DRAW   [facility] Lab Draw     [facility] INFUSION 120    1:30 PM   (120 min.)    ONCOLOGY INFUSION   [facility] 18      19     20       21     22     23     24     25     26     27       28     29     30     31                                 Lab Results:  Recent Results (from the past 12 hour(s))   Ferritin    Collection Time: 09/19/18  1:50 PM   Result Value Ref Range    Ferritin 1098 (H) 26 - 388 ng/mL   Hemoglobin    Collection Time: 09/19/18  1:50 PM   Result Value Ref Range    Hemoglobin 14.8 13.3 - 17.7 g/dL

## 2018-09-19 NOTE — NURSING NOTE
Chief Complaint   Patient presents with     Blood Draw     Labs drawn from  by RN. VS taken. Checked in for appt.        Labs drawn from vpt by RN. Vitals taken. Pt checked in for appointment(s).    GERI Quinonez RN

## 2018-09-19 NOTE — PROGRESS NOTES
Infusion Nursing Note:  Alpesh Winston presents today for therapeutic phlebotomy.    Patient seen by provider today: No    Intravenous Access:  Peripheral IV placed.    Treatment Conditions:  Lab Results   Component Value Date    HGB 14.8 2018     Lab Results   Component Value Date    WBC 5.0 2018      Lab Results   Component Value Date    ANEU 2.4 2018     Lab Results   Component Value Date     2018      Results reviewed, labs MET treatment parameters, ok to proceed with treatment.    Today's Ferritin level: 1098    Note: Patient reports a tension that has lasted 4-5 days. He reports his mom passed away recently and the  was yesterday. Endorses abdominal complaints that is unchanged. He expressed concern about not knowing what is causing the iron overload and the possibility of another hematological issue that has not been tested for. IB sent Dr. Cornejo/Elo Mendenhall, RNCC to follow-up with patient question.    Patient tolerated 500ml phlebotomy. Phlebotomy ran from 6383-5804. Maintained oral hydration during/post phlebotomy. Patient denied feeling symptomatic. Reports feeling well enough to discharge home.    Post Infusion Assessment:  Patient tolerated infusion without incident.  Blood return noted pre and post infusion.  Site patent and intact, free from redness, edema or discomfort.  No evidence of extravasations.  Access discontinued per protocol.    Discharge Plan:   Patient declined prescription refills.  Discharge instructions reviewed with: Patient.  Patient and/or family verbalized understanding of discharge instructions and all questions answered.  Copy of AVS reviewed with patient and/or family.  Patient will return 10/17/18 for next appointment.AVS to patient via [employer].  Patient discharged in stable condition accompanied by: self.  Departure Mode: Ambulatory.    Jackie Gan RN

## 2018-10-09 ENCOUNTER — THERAPY VISIT (OUTPATIENT)
Dept: PHYSICAL THERAPY | Facility: CLINIC | Age: 48
End: 2018-10-09
Payer: COMMERCIAL

## 2018-10-09 DIAGNOSIS — R39.89 BLADDER PAIN: Primary | ICD-10-CM

## 2018-10-09 PROCEDURE — 97140 MANUAL THERAPY 1/> REGIONS: CPT | Mod: GP | Performed by: PHYSICAL THERAPIST

## 2018-10-09 PROCEDURE — 97535 SELF CARE MNGMENT TRAINING: CPT | Mod: GP | Performed by: PHYSICAL THERAPIST

## 2018-10-09 PROCEDURE — 97162 PT EVAL MOD COMPLEX 30 MIN: CPT | Mod: GP | Performed by: PHYSICAL THERAPIST

## 2018-10-09 PROCEDURE — 97112 NEUROMUSCULAR REEDUCATION: CPT | Mod: GP | Performed by: PHYSICAL THERAPIST

## 2018-10-09 NOTE — LETTER
[facility]  [address] [place] [id number] [phone]    2018    Re: Alpesh Winston   :   1970  MRN:  [id number]   REFERRING PHYSICIAN:   Grisel Arriaga    [facility]  Date of Initial Evaluation:  10/9/18  Visits:  Rxs Used: 1  Reason for Referral:  Bladder pain    EVALUATION SUMMARY    [facility] Initial Evaluation      Objective:  Pelvic Dysfunction Evaluation:    Bladder/Pelvic Problems:  Urination delay only 10-15 minutes; 2-3 night voids.  Normal bowel movements; no pain with erection but does have pain with ejaculation.  Storage Problem:  Frequency, urgency and nocturia  Emptying Problem:  Hesitancy, strain to void and incomplete emptying      Flexibility:    Tightness present at:Adductors; Iliopsoas; Hamstrings; Piriformis and Gluteals  Lumbar spine  Abdominal Wall:  Abdominal wall pelvic: Left lower abdominal tenderness, dec fascial mobility.    Pelvic Clock Exam:  normal (no tenderness adductors, IO, LA)    External Assessment:      Muscle Contraction/Perineal Mobility:  Elevation and urogential triangle descent  Internal Assessment:  NA    SEMG Biofeedback:      Surface electrode placement--Abdominals: yes  Suraface electrode placement--Perianal:  Yes  Baseline EMG PM:  7-9 mV    Peak pelvic muscle contraction:  >20 mV  Sustained contraction:  Significant increase in IAP    Position:  SidelyingAdditional History:    Caffeine Consumption:  Reports several cups of coffee daily         History of current episode:  Onset date/MD order: 2018.    CC/Present symptoms: Lower left bladder pain with bladder is full, urgency, frequency, pain with ejaculation.  Also notes urinary hesitation, strain, and incomplete emptying.   SMHx: 2 yr onset of abdominal/bladder pain following 2nd hernia repair; rx w antibiotics for prostatitis and UTI and noted some improvement.  Hx of prostate CA in  family, but no agreement  Re: Alpesh Winston   :   1970    that prostate is causative, and PSA testing normal.  Also had MR Abdomen, US testicle, and renal US all negative.  No pain with skating/exercise, although reports prior to 2nd hernia surgery he would feel tugging in area of left lower abdomen with skating.  Pain rating (0-10): ranges 2-4/10  Conditioning is improving/unchanging/worsening: worsening    Pelvic/Abdominal Surgeries:inguinal hernia repair x 2  Hx of or present sexually transmitted disease:no  Current occupation:   Current activity: limited exercise; busy with 3 kids.  Reports higher stress levels lately due to death of mother, daughter involved in MVA  Goals: reduce bladder pain  Red flags:no    Self Care Management/Patient education (15 min): Today's session consisted of education regarding pelvic floor muscle anatomy, normal bladder function, urge suppression techniques and/or relaxation techniques as indicated, and instruction in how to complete a bladder diary for assessment next visit. Depending on patient presentation, timed voids, double voids, and proper fluid/fiber intake discussed. Pt was instructed in the pathoanatomy of the pelvic floor utilizing pelvic model.  We discussed what pelvic floor physical therapy is, components of exam, and typical patient progression. Prior to internal PFM exam,  patient was told that they were in control of exam progression, and if at any time were uncomfortable and wished to discontinue we would.      Assessment/Plan:    Patient is a 48 year old male with pelvic complaints.    Patient has the following significant findings with corresponding treatment plan.                Diagnosis 1:  Bladder pain  Pain -  manual therapy, self management, education and home program  Impaired muscle performance - biofeedback and neuro re-education  Decreased function - therapeutic activities  Impaired posture - neuro re-education    Therapy  Evaluation Codes:   1) History comprised of:   Personal factors that impact the plan of care:      Anxiety, Coping style and Time since onset of symptoms.    Comorbidity factors that impact the plan of care are:      Prostatitis.     Medications impacting care: None.  2) Examination of Body Systems comprised of:   Body structures and functions that impact the plan of care:      Pelvis.   Activity limitations that impact the plan of care are:      Frequency, Urgency and voiding.  3) Clinical presentation characteristics are:   Evolving/Changing.  4) Decision-Making    Moderate complexity using standardized patient assessment instrument and/or measureable assessment of functional outcome.  Cumulative Therapy Evaluation is: Moderate complexity.    Previous and current functional limitations:  (See Goal Flow Sheet for this information)    Short term and Long term goals: (See Goal Flow Sheet for this information)         Re: Alpesh Winston   :   1970    Communication ability:  Patient appears to be able to clearly communicate and understand verbal and written communication and follow directions correctly.  Treatment Explanation - The following has been discussed with the patient:   RX ordered/plan of care  Anticipated outcomes  Possible risks and side effects  This patient would benefit from PT intervention to resume normal activities.   Rehab potential is good.    Frequency:  1 X week, once daily  Duration:  for 8 weeks  Discharge Plan:  Achieve all LTG.  Independent in home treatment program.  Reach maximal therapeutic benefit.    Please refer to the daily flowsheet for treatment today, total treatment time and time spent performing 1:1 timed codes.         Thank you for your referral.    INQUIRIES  Therapist: Sammi Arthur, PT, OCS, Cert. MDT   [facility] [place] [id number] [place] [id number]  Phone: [phone]  Fax: [phone]

## 2018-10-10 PROBLEM — R39.89 BLADDER PAIN: Status: ACTIVE | Noted: 2018-10-10

## 2018-10-10 NOTE — PROGRESS NOTES
[facility] Initial Evaluation  Subjective:  HPI                    Objective:  System                                 Pelvic Dysfunction Evaluation:    Bladder/Pelvic Problems:  Urination delay only 10-15 minutes; 2-3 night voids.  Normal bowel movements; no pain with erection but does have pain with ejaculation.  Storage Problem:  Frequency, urgency and nocturia  Emptying Problem:  Hesitancy, strain to void and incomplete emptying        Flexibility:    Tightness present at:Adductors; Iliopsoas; Hamstrings; Piriformis and Gluteals  Lumbar spine  Abdominal Wall:  Abdominal wall pelvic: Left lower abdominal tenderness, dec fascial mobility.        Pelvic Clock Exam:  normal (no tenderness adductors, IO, LA)                External Assessment:              Muscle Contraction/Perineal Mobility:  Elevation and urogential triangle descent  Internal Assessment:  NA              SEMG Biofeedback:      Surface electrode placement--Abdominals: yes  Suraface electrode placement--Perianal:  Yes  Baseline EMG PM:  7-9 mV    Peak pelvic muscle contraction:  >20 mV  Sustained contraction:  Significant increase in IAP    Position:  SidelyingAdditional History:        Caffeine Consumption:  Reports several cups of coffee daily                     General     ROS   History of current episode:    Onset date/MD order: 6/1/2018.    CC/Present symptoms: Lower left bladder pain with bladder is full, urgency, frequency, pain with ejaculation.  Also notes urinary hesitation, strain, and incomplete emptying.   SMHx: 2 yr onset of abdominal/bladder pain following 2nd hernia repair; rx w antibiotics for prostatitis and UTI and noted some improvement.  Hx of prostate CA in family, but no agreement that prostate is causative, and PSA testing normal.  Also had MR Abdomen, US testicle, and renal US all negative.  No pain with skating/exercise, although reports prior to 2nd hernia surgery he would feel tugging in area of left  lower abdomen with skating.  Pain rating (0-10): ranges 2-4/10  Conditioning is improving/unchanging/worsening: worsening    Pelvic/Abdominal Surgeries:inguinal hernia repair x 2  Hx of or present sexually transmitted disease:no  Current occupation:   Current activity: limited exercise; busy with 3 kids.  Reports higher stress levels lately due to death of mother, daughter involved in MVA  Goals: reduce bladder pain  Red flags:no          Self Care Management/Patient education (15 min): Today's session consisted of education regarding pelvic floor muscle anatomy, normal bladder function, urge suppression techniques and/or relaxation techniques as indicated, and instruction in how to complete a bladder diary for assessment next visit. Depending on patient presentation, timed voids, double voids, and proper fluid/fiber intake discussed. Pt was instructed in the pathoanatomy of the pelvic floor utilizing pelvic model.  We discussed what pelvic floor physical therapy is, components of exam, and typical patient progression. Prior to internal PFM exam,  patient was told that they were in control of exam progression, and if at any time were uncomfortable and wished to discontinue we would.                          Assessment/Plan:    Patient is a 48 year old male with pelvic complaints.    Patient has the following significant findings with corresponding treatment plan.                Diagnosis 1:  Bladder pain  Pain -  manual therapy, self management, education and home program  Impaired muscle performance - biofeedback and neuro re-education  Decreased function - therapeutic activities  Impaired posture - neuro re-education    Therapy Evaluation Codes:   1) History comprised of:   Personal factors that impact the plan of care:      Anxiety, Coping style and Time since onset of symptoms.    Comorbidity factors that impact the plan of care are:      Prostatitis.     Medications impacting care: None.  2) Examination  of Body Systems comprised of:   Body structures and functions that impact the plan of care:      Pelvis.   Activity limitations that impact the plan of care are:      Frequency, Urgency and voiding.  3) Clinical presentation characteristics are:   Evolving/Changing.  4) Decision-Making    Moderate complexity using standardized patient assessment instrument and/or measureable assessment of functional outcome.  Cumulative Therapy Evaluation is: Moderate complexity.    Previous and current functional limitations:  (See Goal Flow Sheet for this information)    Short term and Long term goals: (See Goal Flow Sheet for this information)     Communication ability:  Patient appears to be able to clearly communicate and understand verbal and written communication and follow directions correctly.  Treatment Explanation - The following has been discussed with the patient:   RX ordered/plan of care  Anticipated outcomes  Possible risks and side effects  This patient would benefit from PT intervention to resume normal activities.   Rehab potential is good.    Frequency:  1 X week, once daily  Duration:  for 8 weeks  Discharge Plan:  Achieve all LTG.  Independent in home treatment program.  Reach maximal therapeutic benefit.    Please refer to the daily flowsheet for treatment today, total treatment time and time spent performing 1:1 timed codes.

## 2018-10-16 ENCOUNTER — THERAPY VISIT (OUTPATIENT)
Dept: PHYSICAL THERAPY | Facility: CLINIC | Age: 48
End: 2018-10-16
Payer: COMMERCIAL

## 2018-10-16 DIAGNOSIS — R39.89 BLADDER PAIN: ICD-10-CM

## 2018-10-16 PROCEDURE — 97110 THERAPEUTIC EXERCISES: CPT | Mod: GP | Performed by: PHYSICAL THERAPIST

## 2018-10-16 PROCEDURE — 97112 NEUROMUSCULAR REEDUCATION: CPT | Mod: GP | Performed by: PHYSICAL THERAPIST

## 2018-10-16 PROCEDURE — 97140 MANUAL THERAPY 1/> REGIONS: CPT | Mod: GP | Performed by: PHYSICAL THERAPIST

## 2018-10-17 ENCOUNTER — INFUSION THERAPY VISIT (OUTPATIENT)
Dept: ONCOLOGY | Facility: CLINIC | Age: 48
End: 2018-10-17
Attending: INTERNAL MEDICINE
Payer: COMMERCIAL

## 2018-10-17 VITALS
HEART RATE: 70 BPM | OXYGEN SATURATION: 98 % | SYSTOLIC BLOOD PRESSURE: 127 MMHG | DIASTOLIC BLOOD PRESSURE: 77 MMHG | TEMPERATURE: 97.6 F | RESPIRATION RATE: 16 BRPM

## 2018-10-17 DIAGNOSIS — R79.89 ELEVATED FERRITIN: Primary | ICD-10-CM

## 2018-10-17 LAB
FERRITIN SERPL-MCNC: 953 NG/ML (ref 26–388)
HGB BLD-MCNC: 13.7 G/DL (ref 13.3–17.7)

## 2018-10-17 PROCEDURE — 82728 ASSAY OF FERRITIN: CPT | Performed by: INTERNAL MEDICINE

## 2018-10-17 PROCEDURE — 36415 COLL VENOUS BLD VENIPUNCTURE: CPT

## 2018-10-17 PROCEDURE — 85018 HEMOGLOBIN: CPT | Performed by: INTERNAL MEDICINE

## 2018-10-17 PROCEDURE — 99195 PHLEBOTOMY: CPT

## 2018-10-17 PROCEDURE — 40000556 ZZH STATISTIC PERIPHERAL IV START W US GUIDANCE: Mod: ZF

## 2018-10-17 NOTE — PATIENT INSTRUCTIONS
Contact Numbers    [facility] Main Line: [phone]  [facility] Triage and after hours / weekends / holidays:  [phone]      Please call the triage or after hours line if you experience a temperature greater than or equal to 100.5, shaking chills, have uncontrolled nausea, vomiting and/or diarrhea, dizziness, shortness of breath, chest pain, bleeding, unexplained bruising, or if you have any other new/concerning symptoms, questions or concerns.      If you are having any concerning symptoms or wish to speak to a provider before your next infusion visit, please call your care coordinator or triage to notify them so we can adequately serve you.     If you need a refill on a narcotic prescription or other medication, please call before your infusion appointment.                   October 2018 Sunday Monday Tuesday Wednesday Thursday Friday Saturday        1     2     3     4     5     6       7     8     9     [facility] INITIAL    2:45 PM   (80 min.)   Sammi Arthur, PT   [facility] [place] 10     11     12     13       14     15     16     [facility] FOLLOW UP    3:40 PM   (40 min.)   Sammi Arthur, PT   [facility] [place] 17     [facility] D[facility]    1:00 PM   (15 min.)    [facility] DRAW   [facility] Lab Draw     [facility] INFUSION 120    1:30 PM   (120 min.)    ONCOLOGY INFUSION   [facility] 18     19     20       21     22     23     [facility] FOLLOW UP    3:10 PM   (40 min.)   Sammi Arthur, PT   [facility] [place] 24     25     26     27       28     29     [facility] RETURN    2:15 PM   (30 min.)   Grisel Arriaga PA-C   [facility] 30 31 November 2018 Sunday Monday Tuesday Wednesday Thursday Friday Saturday                       1     2     3       4     5     6     7     8     9     10       11     12     13     14     [facility] DRAW    9:00 AM   (15 min.)   [facility] LAB DRAW   [facility] Lab Draw     [facility] RETURN    9:15 AM   (30 min.)   Suma Cornejo MD   [facility] ONC INFUSION 120   10:00 AM   (120 min.)    ONCOLOGY INFUSION   [facility] 15     16     17       18     19     20     21     22     23     24       25     26     27     28     29     30                       Lab Results:  Recent Results (from the past 12 hour(s))   Hemoglobin    Collection Time: 10/17/18  1:05 PM   Result Value Ref Range    Hemoglobin 13.7 13.3 - 17.7 g/dL

## 2018-10-17 NOTE — PROGRESS NOTES
Infusion Nursing Note:  Alpesh Winston presents today for Therapeutic Phlebotomy .    Patient seen by provider today: No   present during visit today: Not Applicable.    Note: Pt tolerated 500cc phlebotomized today w/o issue. IV placed by Vascular RN. Pt drinking PO fluids. VSS prior to and post procedure. Pt denied dizziness or lightheadedness post procedure.     Intravenous Access:  Peripheral IV placed.    Treatment Conditions:  Lab Results   Component Value Date    HGB 13.7 10/17/2018       Post Infusion Assessment:  Patient tolerated procedure w/o issue.   Site patent and intact, free from redness, edema or discomfort.  Access discontinued per protocol.    Discharge Plan:   Patient declined prescription refills.  AVS to patient via [employer].  Patient will return 11/14/18 for next appointment.   Patient discharged in stable condition accompanied by: self.  Departure Mode: Ambulatory.    Belem [place]wood RN

## 2018-10-17 NOTE — MR AVS SNAPSHOT
After Visit Summary   10/17/2018    Alpesh Winston    MRN: [id number]           Patient Information     Date Of Birth          1970        Visit Information        Provider Department      10/17/2018 1:30 PM  22 ATC;  ONCOLOGY INFUSION [facility]        Today's Diagnoses     Elevated ferritin    -  1      Care Instructions    Contact Numbers    [facility] Main Line: [phone]  [facility] Triage and after hours / weekends / holidays:  [phone]      Please call the triage or after hours line if you experience a temperature greater than or equal to 100.5, shaking chills, have uncontrolled nausea, vomiting and/or diarrhea, dizziness, shortness of breath, chest pain, bleeding, unexplained bruising, or if you have any other new/concerning symptoms, questions or concerns.      If you are having any concerning symptoms or wish to speak to a provider before your next infusion visit, please call your care coordinator or triage to notify them so we can adequately serve you.     If you need a refill on a narcotic prescription or other medication, please call before your infusion appointment.                   October 2018 Sunday Monday Tuesday Wednesday Thursday Friday Saturday        1     2     3     4     5     6       7     8     9     [facility] INITIAL    2:45 PM   (80 min.)   Sammi Arthur, PT   [facility] [place] 10     11     12     13       14     15     16     [facility] FOLLOW UP    3:40 PM   (40 min.)   Sammi Arthur, PT   [facility] [place] 17     [facility] MASONIC LAB DRAW    1:00 PM   (15 min.)   [facility] LAB DRAW   [facility] Lab Draw     [facility] ONC INFUSION 120    1:30 PM   (120 min.)    ONCOLOGY INFUSION   [facility] 18     19     20       21     22     23     [facility] FOLLOW UP    3:10 PM   (40 min.)   Sammi Arthur, PT   [facility] [place] 24     25     26     27        28     29     [facility] RETURN    2:15 PM   (30 min.)   Grisel Arriaga PA-C [facility] for Prostate and Urologic Cancers 30 31 November 2018 Sunday Monday Tuesday Wednesday Thursday Friday Saturday                       1     2     3       4     5     6     7     8     9     10       11     12     13     14     [facility] LAB DRAW    9:00 AM   (15 min.)    MASONIC LAB DRAW   [facility] Masonic Lab Draw     [facility] RETURN    9:15 AM   (30 min.)   Suma Cornejo MD   [facility]     [facility] ONC INFUSION 120   10:00 AM   (120 min.)   UC ONCOLOGY INFUSION   [facility] 15     16     17       18     19     20     21     22     23     24       25     26     27     28     29     30                       Lab Results:  Recent Results (from the past 12 hour(s))   Hemoglobin    Collection Time: 10/17/18  1:05 PM   Result Value Ref Range    Hemoglobin 13.7 13.3 - 17.7 g/dL               Follow-ups after your visit        Your next 10 appointments already scheduled     Oct 23, 2018  3:10 PM CDT   [facility] [employer] Offsite Care Resources with Sammi Arthur, PT   [facility] [place] ([facility] [place])    [address] [place] [address] [phone]            Oct 29, 2018  2:30 PM CDT   (Arrive by 2:15 PM)   Return Visit with BECCA Hand [facility] Inst for Prostate and Urologic Cancers ([facility])    [address]  [address]  [facility] [id number] [phone]            Nov 14, 2018  9:00 AM CST   Masonic Lab Draw with  MASONIC LAB DRAW   [facility] Masonic Lab Draw ([facility])    [address]  [address]  [facility] [address] [phone]            Nov 14, 2018  9:30 AM CST   (Arrive by 9:15 AM)   Return Visit with Suma Cornejo MD   [facility] ([facility])    [address] [place] [address]  [address]  [address]  [facility] [address]   [phone]            Nov 14, 2018 10:00 AM CST   Infusion 120 with UC ONCOLOGY INFUSION, UC 28 ATC   [facility] ([facility])    [address]  [address]  [facility] [address]   [phone]            Dec 12, 2018  1:00 PM CST   Masonic Lab Draw with UC MASONIC LAB DRAW   [facility] Masonic Lab Draw ([facility])    [address] [place] [address]  [address]  [facility] [address]   [phone]            Dec 12, 2018  1:30 PM CST   Infusion 120 with UC ONCOLOGY INFUSION, UC 22 ATC   [facility] ([facility])    [address] [place] [address]  [address]  [facility] [address]   [phone]            Jan 09, 2019  1:00 PM CST   Masonic Lab Draw with UC MASONIC LAB DRAW   [facility] Masonic Lab Draw ([facility])    [address] [place] [address]  [address]  [facility] [address]   [phone]              Future tests that were ordered for you today     Open Standing Orders        Priority Remaining Interval Expires Ordered    Phlebotomy therapeutic Routine [id number] PRN  10/17/2018            Who to contact     If you have questions or need follow up information about today's clinic visit or your schedule please contact [facility]CER [facility] [place] [facility] directly at [phone].  Normal or non-critical lab and imaging results will be communicated to you by MyChart, letter or phone within 4 business days after the clinic has received the results. If you do not hear from us within 7 days, please contact the clinic through MyChart or phone. If you have a critical or abnormal lab result, we will notify you by phone as soon as possible.  Submit refill requests through [employer] or call your pharmacy and they will forward the refill request to us. Please allow 3 business days for your refill to be completed.          Additional Information About  Your Visit        MyChart Information     [employer] gives you secure access to your electronic health record. If you see a primary care provider, you can also send messages to your care team and make appointments. If you have questions, please call your primary care clinic.  If you do not have a primary care provider, please call [phone] and they will assist you.        Care EveryWhere ID     This is your Care EveryWhere ID. This could be used by other organizations to access your [place] medical records  [id number]        Your Vitals Were     Pulse Temperature Respirations Pulse Oximetry          70 97.6  F (36.4  C) (Oral) 16 98%         Blood Pressure from Last 3 Encounters:   10/17/18 127/77   09/19/18 120/75   08/22/18 151/90    Weight from Last 3 Encounters:   09/19/18 91.8 kg (202 lb 4.8 oz)   08/22/18 91 kg (200 lb 11.2 oz)   08/09/18 92.1 kg (203 lb)              We Performed the Following     Ferritin     Hemoglobin        Primary Care Provider Office Phone # Fax #    Belem Joshua [phone]       [address] [facility] [address]        Equal Access to Services     ALANA ROGERS : Hadii aad ku hadasho Soomaali, waaxda luqadaha, qaybta kaalmada adeegyada, daisha bhatian anneliese long. So [facility] [phone].    ATENCIÓN: Si habla español, tiene a casper disposición servicios gratuitos de asistencia lingüística. [facility] of [phone].    We comply with applicable federal civil rights laws and [place] laws. We do not discriminate on the basis of race, color, national origin, age, disability, sex, sexual orientation, or gender identity.            Thank you!     Thank you for choosing [facility]ER [facility]  for your care. Our goal is always to provide you with excellent care. Hearing back from our patients is one way we can continue to improve our services. Please take a few minutes to complete the written survey that you may receive in the mail after your  visit with us. Thank you!             Your Updated Medication List - Protect others around you: Learn how to safely use, store and throw away your medicines at www.disposemymeds.org.          This list is accurate as of 10/17/18  4:35 PM.  Always use your most recent med list.                   Brand Name Dispense Instructions for use Diagnosis    atorvastatin 20 MG tablet    LIPITOR    90 tablet    Take 1 tablet (20 mg) by mouth daily    Hyperlipidemia LDL goal <130       COENZYME Q-10 PO      Take 20 mg by mouth daily        fenofibrate 160 MG tablet     90 tablet    TAKE 1 TABLET DAILY (DUE FOR APPOINTMENT NOW)    Hyperlipidemia LDL goal <130       FISH OIL PO

## 2018-10-23 ENCOUNTER — PRE VISIT (OUTPATIENT)
Dept: UROLOGY | Facility: CLINIC | Age: 48
End: 2018-10-23

## 2018-10-23 ENCOUNTER — THERAPY VISIT (OUTPATIENT)
Dept: PHYSICAL THERAPY | Facility: CLINIC | Age: 48
End: 2018-10-23
Payer: COMMERCIAL

## 2018-10-23 DIAGNOSIS — R39.89 BLADDER PAIN: ICD-10-CM

## 2018-10-23 PROCEDURE — 97110 THERAPEUTIC EXERCISES: CPT | Mod: GP | Performed by: PHYSICAL THERAPIST

## 2018-10-23 PROCEDURE — 97140 MANUAL THERAPY 1/> REGIONS: CPT | Mod: GP | Performed by: PHYSICAL THERAPIST

## 2018-10-29 ENCOUNTER — OFFICE VISIT (OUTPATIENT)
Dept: UROLOGY | Facility: CLINIC | Age: 48
End: 2018-10-29
Payer: COMMERCIAL

## 2018-10-29 VITALS
BODY MASS INDEX: 27.92 KG/M2 | HEART RATE: 56 BPM | WEIGHT: 195 LBS | HEIGHT: 70 IN | DIASTOLIC BLOOD PRESSURE: 90 MMHG | SYSTOLIC BLOOD PRESSURE: 143 MMHG

## 2018-10-29 DIAGNOSIS — R39.15 URINARY URGENCY: ICD-10-CM

## 2018-10-29 DIAGNOSIS — R39.15 URINARY URGENCY: Primary | ICD-10-CM

## 2018-10-29 DIAGNOSIS — R10.32 LLQ ABDOMINAL PAIN: ICD-10-CM

## 2018-10-29 DIAGNOSIS — R10.2 PELVIC PAIN IN MALE: ICD-10-CM

## 2018-10-29 DIAGNOSIS — R35.1 NOCTURIA: ICD-10-CM

## 2018-10-29 DIAGNOSIS — N50.819 EPIDIDYMAL PAIN: ICD-10-CM

## 2018-10-29 LAB
ALBUMIN UR-MCNC: NEGATIVE MG/DL
APPEARANCE UR: CLEAR
BILIRUB UR QL STRIP: NEGATIVE
COLOR UR AUTO: NORMAL
GLUCOSE UR STRIP-MCNC: NEGATIVE MG/DL
HGB UR QL STRIP: NEGATIVE
KETONES UR STRIP-MCNC: NEGATIVE MG/DL
LEUKOCYTE ESTERASE UR QL STRIP: NEGATIVE
NITRATE UR QL: NEGATIVE
PH UR STRIP: 6 PH (ref 5–7)
SOURCE: NORMAL
SP GR UR STRIP: 1.01 (ref 1–1.03)
UROBILINOGEN UR STRIP-MCNC: 0 MG/DL (ref 0–2)

## 2018-10-29 ASSESSMENT — PAIN SCALES - GENERAL: PAINLEVEL: NO PAIN (0)

## 2018-10-29 NOTE — LETTER
10/29/2018       RE: Alpesh Winston  [address] [place] [address]     Dear Colleague,    Thank you for referring your patient, Alpesh Winston, to the [facility] UROLOGY AND INST FOR PROSTATE AND UROLOGIC CANCERS at [facility]. Please see a copy of my visit note below.    Urology Office Visit - Follow-up    Reason for visit: follow up LUTS, groin pain    Brief History: Mr. Alpesh Winston is a 48 year old male who returns to the urology clinic for follow up of urinary symptoms and groin pain. His urinary symptoms primarily consist of hesitancy, sense of incomplete emptying, and urgency. Initially suspected to have prostatitis but treatment with extended courses of a few different antibiotics resulted in no improvement. He then underwent UDS on 3/22/18 which showed normal bladder capacity and compliance without DO or incontinence as well as questionable evidence for bladder outlet obstruction during voiding. He then underwent cystoscopy with Dr. Mensah on 4/17/18 which did not reveal objective evidence for BPH. He was referred to pelvic floor PT but did not start attending these sessions until recently. He has attended 3 PT sessions thus far but he does not find them particularly helpful.     In addition to his urinary symptoms, he also complains of left lower abdominal as well as left groin pain. He believes the abdominal pain may be related to a left-sided hernia that was initially repaired 15 years ago and repeated laparoscopically ~2 years ago. This hernia pain seems to be worse in the morning or with twisting/movment, or even with a full bladder. For his chronic left groin/scrotal pain, he saw Dr. Anand for this on 6/20/18 where a left cord block was performed. This resulted in complete and immediate relief of his epididymal pain and he was offered microscopic denervation but ultimately decided not to purse this.    He returns to clinic  "today to follow up on the physical therapy. As noted above, he does not find this particularly helpful. Urinary symptoms and abdominal/groin pain are overall unchanged, though the urinary symptoms may be slightly less severe than previously. He continues to deny dysuria, hematuria, etc. Following with hematology for increased ferritin levels for which he undergoes periodic therapeutic phlebotomy. Unclear what the source of this elevated ferritin is at this point.     PEx  /90  Pulse 56  Ht 1.778 m (5' 10\")  Wt 88.5 kg (195 lb)  BMI 27.98 kg/m2  GEN: well-appearing male in NAD  RESP: no increased respiratory effort    UA today completely negative.     A/P  48 year old male with LUTS including hesitancy, sense of incomplete emptying, and urgency. No clear etiology as UDS were unremarkable and no evidence for BPH/outlet obstruction on cystoscopy. Overall, these symptoms appear to be slightly improved and he is content to continue monitoring. Would like to check another UA/UC today to rule out infection. Will comply but previous UA's have all been negative.     With regard to his chronic epididymal pain, a left-sided cord block resulted in complete and nearly immediate pain relief but he wishes to defer microscopic denervation at this time.     For his left lower abdominal pain, perhaps this is related to prior hernia repair, scar tissue, etc. Discussed referral to general surgery but patient prefers to follow up with his PCP first.     He will follow up with urology on a PRN basis for any worsening symptoms.    25 minutes spent with the patient, >50% in counseling and coordination of care.      Grisel Arriaga PA-C  Department of Urology          "

## 2018-10-29 NOTE — PROGRESS NOTES
Urology Office Visit - Follow-up    Reason for visit: follow up LUTS, groin pain    Brief History: Mr. Alpesh Winston is a 48 year old male who returns to the urology clinic for follow up of urinary symptoms and groin pain. His urinary symptoms primarily consist of hesitancy, sense of incomplete emptying, and urgency. Initially suspected to have prostatitis but treatment with extended courses of a few different antibiotics resulted in no improvement. He then underwent UDS on 3/22/18 which showed normal bladder capacity and compliance without DO or incontinence as well as questionable evidence for bladder outlet obstruction during voiding. He then underwent cystoscopy with Dr. Mensah on 4/17/18 which did not reveal objective evidence for BPH. He was referred to pelvic floor PT but did not start attending these sessions until recently. He has attended 3 PT sessions thus far but he does not find them particularly helpful.     In addition to his urinary symptoms, he also complains of left lower abdominal as well as left groin pain. He believes the abdominal pain may be related to a left-sided hernia that was initially repaired 15 years ago and repeated laparoscopically ~2 years ago. This hernia pain seems to be worse in the morning or with twisting/movment, or even with a full bladder. For his chronic left groin/scrotal pain, he saw Dr. Anand for this on 6/20/18 where a left cord block was performed. This resulted in complete and immediate relief of his epididymal pain and he was offered microscopic denervation but ultimately decided not to purse this.    He returns to clinic today to follow up on the physical therapy. As noted above, he does not find this particularly helpful. Urinary symptoms and abdominal/groin pain are overall unchanged, though the urinary symptoms may be slightly less severe than previously. He continues to deny dysuria, hematuria, etc. Following with hematology for increased ferritin  "levels for which he undergoes periodic therapeutic phlebotomy. Unclear what the source of this elevated ferritin is at this point.     PEx  /90  Pulse 56  Ht 1.778 m (5' 10\")  Wt 88.5 kg (195 lb)  BMI 27.98 kg/m2  GEN: well-appearing male in NAD  RESP: no increased respiratory effort    UA today completely negative.     A/P  48 year old male with LUTS including hesitancy, sense of incomplete emptying, and urgency. No clear etiology as UDS were unremarkable and no evidence for BPH/outlet obstruction on cystoscopy. Overall, these symptoms appear to be slightly improved and he is content to continue monitoring. Would like to check another UA/UC today to rule out infection. Will comply but previous UA's have all been negative.     With regard to his chronic epididymal pain, a left-sided cord block resulted in complete and nearly immediate pain relief but he wishes to defer microscopic denervation at this time.     For his left lower abdominal pain, perhaps this is related to prior hernia repair, scar tissue, etc. Discussed referral to general surgery but patient prefers to follow up with his PCP first.     He will follow up with urology on a PRN basis for any worsening symptoms.    25 minutes spent with the patient, >50% in counseling and coordination of care.    Grisel Arriaga PA-C  Department of Urology    "

## 2018-10-29 NOTE — MR AVS SNAPSHOT
After Visit Summary   10/29/2018    Alpesh Winston    MRN: [id number]           Patient Information     Date Of Birth          1970        Visit Information        Provider Department      10/29/2018 2:30 PM Grisel Arriaga PA-C [facility] Urology and [facility] for Prostate and Urologic Cancers        Today's Diagnoses     Urinary urgency    -  1    Epididymal pain        Pelvic pain in male        Nocturia        LLQ abdominal pain           Follow-ups after your visit        Your next 10 appointments already scheduled     Nov 14, 2018  9:00 AM CST   Masonic Lab Draw with UC [facility]IC LAB DRAW   [facility]    [address] [place] [address]  [address]  [facility] [address]   [phone]            Nov 14, 2018  9:30 AM CST   (Arrive by 9:15 AM)   Return Visit with Suma Cornejo MD   [facility] ([facility])    [address] [place] [address]  [address]  [facility] [address]   [phone]            Nov 14, 2018 10:00 AM CST   Infusion 120 with UC ONCOLOGY INFUSION, UC 28 ATC   [facility] ([facility])    [address] [place] [address]  [address]  [facility] [address]   [phone]            Dec 12, 2018  1:00 PM CST   Masonic Lab Draw with UC MASONIC LAB DRAW   [facility] Masonic Lab Draw ([facility])    [address] [place] [address] Se  [address]  [facility] [address]   [phone]            Dec 12, 2018  1:30 PM CST   Infusion 120 with UC ONCOLOGY INFUSION, UC 22 ATC   [facility] ([facility])    [address] [place] [address]  [address]  [facility] [address]   [phone]            Jan 09, 2019  1:00 PM CST   Masonic Lab Draw with UC MASONIC LAB DRAW   [facility])    [address] [place] [address] Se  [address]  [facility] [address]   [phone]             "Jan 09, 2019  1:30 PM CST   Infusion 120 with UC ONCOLOGY INFUSION, UC 22 ATC   [facility] ([facility])    [address]  [address]  [facility] [address] [phone]              Who to contact     Please call your clinic at [phone] to:    Ask questions about your health    Make or cancel appointments    Discuss your medicines    Learn about your test results    Speak to your doctor            Additional Information About Your Visit        "[employer] Information     [employer] gives you secure access to your electronic health record. If you see a primary care provider, you can also send messages to your care team and make appointments. If you have questions, please call your primary care clinic.  If you do not have a primary care provider, please call [phone] and they will assist you.      [employer] is an electronic gateway that provides easy, online access to your medical records. With [employer], you can request a clinic appointment, read your test results, renew a prescription or communicate with your care team.     To access your existing account, please contact your [facility] or call [phone] for assistance.        Care EveryWhere ID     This is your Care EveryWhere ID. This could be used by other organizations to access your [place] medical records  [id number]        Your Vitals Were     Pulse Height BMI (Body Mass Index)             56 1.778 m (5' 10\") 27.98 kg/m2          Blood Pressure from Last 3 Encounters:   10/29/18 143/90   10/17/18 127/77   09/19/18 120/75    Weight from Last 3 Encounters:   10/29/18 88.5 kg (195 lb)   09/19/18 91.8 kg (202 lb 4.8 oz)   08/22/18 91 kg (200 lb 11.2 oz)               Primary Care Provider Office Phone # Fax #    Belem Joshua [phone]       [address] [facility] [address]        Equal Access to Services     JOSÉ ROGERS AH: Leesa penn " Alejandro, jw lumercedezadaha, nj kaberlin ekbede, daisha post prakashras gracierashad williamrakel mercedes. So [facility] [phone].    ATENCIÓN: Si debbiela quintin, tiene a casper disposición servicios gratuitos de asistencia lingüística. Maye al [phone].    We comply with applicable federal civil rights laws and [place] laws. We do not discriminate on the basis of race, color, national origin, age, disability, sex, sexual orientation, or gender identity.            Thank you!     Thank you for choosing [facility] UROLOGY AND [facility] FOR PROSTATE AND UROLOGIC CANCERS  for your care. Our goal is always to provide you with excellent care. Hearing back from our patients is one way we can continue to improve our services. Please take a few minutes to complete the written survey that you may receive in the mail after your visit with us. Thank you!             Your Updated Medication List - Protect others around you: Learn how to safely use, store and throw away your medicines at www.disposemymeds.org.          This list is accurate as of 10/29/18  5:46 PM.  Always use your most recent med list.                   Brand Name Dispense Instructions for use Diagnosis    atorvastatin 20 MG tablet    LIPITOR    90 tablet    Take 1 tablet (20 mg) by mouth daily    Hyperlipidemia LDL goal <130       COENZYME Q-10 PO      Take 20 mg by mouth daily        fenofibrate 160 MG tablet     90 tablet    TAKE 1 TABLET DAILY (DUE FOR APPOINTMENT NOW)    Hyperlipidemia LDL goal <130       FISH OIL PO

## 2018-10-30 ENCOUNTER — OFFICE VISIT (OUTPATIENT)
Dept: FAMILY MEDICINE | Facility: CLINIC | Age: 48
End: 2018-10-30
Payer: COMMERCIAL

## 2018-10-30 VITALS — DIASTOLIC BLOOD PRESSURE: 80 MMHG | TEMPERATURE: 98.2 F | SYSTOLIC BLOOD PRESSURE: 118 MMHG

## 2018-10-30 DIAGNOSIS — Z71.84 COUNSELING FOR TRAVEL: Primary | ICD-10-CM

## 2018-10-30 DIAGNOSIS — Z23 NEED FOR VACCINATION: ICD-10-CM

## 2018-10-30 PROCEDURE — 90691 TYPHOID VACCINE IM: CPT | Mod: GA | Performed by: FAMILY MEDICINE

## 2018-10-30 PROCEDURE — 90472 IMMUNIZATION ADMIN EACH ADD: CPT | Mod: GA | Performed by: FAMILY MEDICINE

## 2018-10-30 PROCEDURE — 90632 HEPA VACCINE ADULT IM: CPT | Mod: GA | Performed by: FAMILY MEDICINE

## 2018-10-30 PROCEDURE — 99402 PREV MED CNSL INDIV APPRX 30: CPT | Mod: 25 | Performed by: FAMILY MEDICINE

## 2018-10-30 PROCEDURE — 90707 MMR VACCINE SC: CPT | Mod: GA | Performed by: FAMILY MEDICINE

## 2018-10-30 PROCEDURE — 90471 IMMUNIZATION ADMIN: CPT | Mod: GA | Performed by: FAMILY MEDICINE

## 2018-10-30 PROCEDURE — 90686 IIV4 VACC NO PRSV 0.5 ML IM: CPT | Mod: GA | Performed by: FAMILY MEDICINE

## 2018-10-30 RX ORDER — AZITHROMYCIN 500 MG/1
TABLET, FILM COATED ORAL
Qty: 3 TABLET | Refills: 0 | Status: SHIPPED | OUTPATIENT
Start: 2018-10-30 | End: 2018-12-12

## 2018-10-30 NOTE — PROGRESS NOTES
Itinerary:  [facility]     Departure Date: 11/10    Return Date: 11/18    Length of Trip: 1 week    Purpose of Trip: work     Urban/Rural: urban     Accommodations: hotel     IMMUNIZATION HISTORY  Have you received any immunizations within the past 4 weeks?  No  Have you ever fainted from having your blood drawn or from an injection?  No  Have you ever had a fever reaction to vaccination?  No  Have you ever had any bad reaction or side effect from any vaccination?  No  Have you ever had hepatitis A or B vaccine?  No  Do you live (or work closely) with anyone who has AIDS, an AIDS-like condition, any other immune disorder or who is on chemotherapy for cancer or a   family history of immunodeficiency?  No  Have you received any injection of immune globulin or any blood products during the past 12 months?  No    Patient roomed by ALEN Watkins     Special medical concerns: none    There were no vitals taken for this visit.  EXAM: deferred    Immunizations discussed include: Hepatitis A, Typhoid and Measles/Mumps/Rubella  Malaraia prophylaxis recommended: none, will not be in high risk areas, mosquito precautions only    Symptomatic treatment for traveler's diarrhea: azithromycin    ASSESSMENT/PLAN:    ICD-10-CM    1. Counseling for travel Z71.89 MMR VIRUS IMMUNIZATION, SUBCUT     HEPA VACCINE ADULT IM     TYPHOID VACCINE, IM     HC FLU VAC PRESRV FREE QUAD SPLIT VIR 3+YRS IM   2. Need for vaccination Z23 MMR VIRUS IMMUNIZATION, SUBCUT     HEPA VACCINE ADULT IM     TYPHOID VACCINE, IM     azithromycin (ZITHROMAX) 500 MG tablet     HC FLU VAC PRESRV FREE QUAD SPLIT VIR 3+YRS IM     ADMIN 1st VACCINE     EA ADD'L VACCINE     I have reviewed general recommendations for safe travel   including: food/water precautions, insect avoidance, safe sex   practices given high prevalence of HIV and other STDs,   roadway safety. Educational materials and Travax report provided.    Total visit time 30 minutes with over 50% of time spent  counseling patient.

## 2018-10-30 NOTE — MR AVS SNAPSHOT
After Visit Summary   10/30/2018    Alpesh Winston    MRN: [id number]           Patient Information     Date Of Birth          1970        Visit Information        Provider Department      10/30/2018 2:30 PM Sergio Manjarrez MD [facility]        Today's Diagnoses     Counseling for travel    -  1    Need for vaccination           Follow-ups after your visit        Your next 10 appointments already scheduled     Nov 08, 2018  1:15 PM CST   Masonic Lab Draw with UC MASONIC LAB DRAW   [facility]    [address] [place] [address]  [address]  [facility] [address]   [phone]            Nov 08, 2018  1:40 PM CST   (Arrive by 1:25 PM)   Return Visit with Donna Gonzalez PA-C   [facility] ([facility])    [address] [place] [address]  [address]  [facility] [address]   [phone]            Nov 08, 2018  3:00 PM CST   Infusion 120 with UC ONCOLOGY INFUSION, UC 23 ATC   [facility] ([facility])    [address] [place] [address]  [address]  [facility] [address]   [phone]            Dec 12, 2018  1:00 PM CST   Masonic Lab Draw with UC MASONIC LAB DRAW   [facility] Masonic Lab Draw ([facility])    [address] [place] [address]  [address]  [facility] [address]   [phone]            Dec 12, 2018  1:30 PM CST   Infusion 120 with UC ONCOLOGY INFUSION, UC 22 ATC   [facility] ([facility])    [address] [place] [address]  [address]  [facility] [address]   [phone]            Jan 09, 2019  1:00 PM CST   Masonic Lab Draw with UC MASONIC LAB DRAW   [facility] Masonic Lab Draw ([facility])    [address] [place] [address]  [address]  [facility] [address]   [phone]            Jan 09, 2019  1:30 PM CST   Infusion 120 with UC ONCOLOGY INFUSION, UC 22 ATC   [facility]  [facility] ([facility])    [address]  [address]  [facility] [address] [phone]              Who to contact     If you have questions or need follow up information about today's clinic visit or your schedule please contact [facility] directly at [phone].  Normal or non-critical lab and imaging results will be communicated to you by MyChart, letter or phone within 4 business days after the clinic has received the results. If you do not hear from us within 7 days, please contact the clinic through Seatershart or phone. If you have a critical or abnormal lab result, we will notify you by phone as soon as possible.  Submit refill requests through [email or web address] or call your pharmacy and they will forward the refill request to us. Please allow 3 business days for your refill to be completed.          Additional Information About Your Visit        Seatershart Information     [email or web address] gives you secure access to your electronic health record. If you see a primary care provider, you can also send messages to your care team and make appointments. If you have questions, please call your primary care clinic.  If you do not have a primary care provider, please call [phone] and they will assist you.        Care EveryWhere ID     This is your Care EveryWhere ID. This could be used by other organizations to access your [facility] medical records  [id number]        Your Vitals Were     Temperature                   98.2  F (36.8  C) (Oral)            Blood Pressure from Last 3 Encounters:   10/30/18 118/80   10/29/18 143/90   10/17/18 127/77    Weight from Last 3 Encounters:   10/29/18 195 lb (88.5 kg)   09/19/18 202 lb 4.8 oz (91.8 kg)   08/22/18 200 lb 11.2 oz (91 kg)              We Performed the Following     ADMIN 1st VACCINE     EA ADD'L VACCINE     HC FLU VAC PRESRV FREE QUAD SPLIT VIR 3+YRS IM     HEPA VACCINE ADULT IM     MMR VIRUS IMMUNIZATION, SUBCUT      TYPHOID VACCINE, IM          Today's Medication Changes          These changes are accurate as of 10/30/18 11:59 PM.  If you have any questions, ask your nurse or doctor.               Start taking these medicines.        Dose/Directions    azithromycin 500 MG tablet   Commonly known as:  ZITHROMAX   Used for:  Need for vaccination   Started by:  Sergio Manjarrez MD        Take one tablet daily for up to 3 days as needed for traveler's diarrhea   Quantity:  3 tablet   Refills:  0            Where to get your medicines      These medications were sent to [id number] - [place], [place] - [address] AT [facility] [address]  [address], [place] [address]    Hours:  24-hours Phone:  [phone]     azithromycin 500 MG tablet                Primary Care Provider Office Phone # Fax #    Belem VIVIANE Joshua [phone] [address] [facility]  [facility] [address]        Equal Access to Services     JOSÉ ROGERS AH: Hadii aad ku hadasho Soomaali, waaxda luqadaha, qaybta kaalmada adeegyada, waxay idiin hayaan adeeg kharadonna lagrace . So [facility] [phone].    ATENCIÓN: Si habla español, tiene a casper disposición servicios gratuitos de asistencia lingüística. [facility] [phone].    We comply with applicable federal civil rights laws and [place] laws. We do not discriminate on the basis of race, color, national origin, age, disability, sex, sexual orientation, or gender identity.            Thank you!     Thank you for choosing [facility]  for your care. Our goal is always to provide you with excellent care. Hearing back from our patients is one way we can continue to improve our services. Please take a few minutes to complete the written survey that you may receive in the mail after your visit with us. Thank you!             Your Updated Medication List - Protect others around you: Learn how to safely use, store and throw away your medicines at  www.disposemymeds.org.          This list is accurate as of 10/30/18 11:59 PM.  Always use your most recent med list.                   Brand Name Dispense Instructions for use Diagnosis    atorvastatin 20 MG tablet    LIPITOR    90 tablet    Take 1 tablet (20 mg) by mouth daily    Hyperlipidemia LDL goal <130       azithromycin 500 MG tablet    ZITHROMAX    3 tablet    Take one tablet daily for up to 3 days as needed for traveler's diarrhea    Need for vaccination       COENZYME Q-10 PO      Take 20 mg by mouth daily        fenofibrate 160 MG tablet     90 tablet    TAKE 1 TABLET DAILY (DUE FOR APPOINTMENT NOW)    Hyperlipidemia LDL goal <130       FISH OIL PO

## 2018-11-07 ENCOUNTER — MYC MEDICAL ADVICE (OUTPATIENT)
Dept: FAMILY MEDICINE | Facility: CLINIC | Age: 48
End: 2018-11-07

## 2018-11-07 NOTE — TELEPHONE ENCOUNTER
Routing to provider - Josiane Hay     S:  Medication request:  Over the counter medication to assist with sleep during plan ride    B/A: last office visit 10/30/18    See az    R: please review and advise as appropriate    Something like tylenol PM, diphenhydramine, or melatonin?

## 2018-11-07 NOTE — TELEPHONE ENCOUNTER
Over the counter choices for a sleep aid include unisom, melatonin, simply sleep, tylenol pm, etc.  If he is requesting a prescription option, Nitzait.

## 2018-11-08 ENCOUNTER — ONCOLOGY VISIT (OUTPATIENT)
Dept: ONCOLOGY | Facility: CLINIC | Age: 48
End: 2018-11-08
Attending: PHYSICIAN ASSISTANT
Payer: COMMERCIAL

## 2018-11-08 ENCOUNTER — APPOINTMENT (OUTPATIENT)
Dept: LAB | Facility: CLINIC | Age: 48
End: 2018-11-08
Attending: INTERNAL MEDICINE
Payer: COMMERCIAL

## 2018-11-08 VITALS
HEART RATE: 60 BPM | SYSTOLIC BLOOD PRESSURE: 132 MMHG | TEMPERATURE: 97.9 F | DIASTOLIC BLOOD PRESSURE: 76 MMHG | HEIGHT: 70 IN | BODY MASS INDEX: 29.29 KG/M2 | WEIGHT: 204.6 LBS | OXYGEN SATURATION: 96 %

## 2018-11-08 VITALS — OXYGEN SATURATION: 100 % | SYSTOLIC BLOOD PRESSURE: 117 MMHG | HEART RATE: 63 BPM | DIASTOLIC BLOOD PRESSURE: 77 MMHG

## 2018-11-08 DIAGNOSIS — R79.89 ELEVATED FERRITIN: Primary | ICD-10-CM

## 2018-11-08 LAB
FERRITIN SERPL-MCNC: 844 NG/ML (ref 26–388)
HGB BLD-MCNC: 15 G/DL (ref 13.3–17.7)

## 2018-11-08 PROCEDURE — 99195 PHLEBOTOMY: CPT

## 2018-11-08 PROCEDURE — 99213 OFFICE O/P EST LOW 20 MIN: CPT | Mod: ZP | Performed by: PHYSICIAN ASSISTANT

## 2018-11-08 PROCEDURE — 36415 COLL VENOUS BLD VENIPUNCTURE: CPT

## 2018-11-08 PROCEDURE — 85018 HEMOGLOBIN: CPT | Performed by: INTERNAL MEDICINE

## 2018-11-08 PROCEDURE — 82728 ASSAY OF FERRITIN: CPT | Performed by: INTERNAL MEDICINE

## 2018-11-08 PROCEDURE — G0463 HOSPITAL OUTPT CLINIC VISIT: HCPCS | Mod: ZF

## 2018-11-08 ASSESSMENT — PAIN SCALES - GENERAL: PAINLEVEL: NO PAIN (0)

## 2018-11-08 NOTE — MR AVS SNAPSHOT
After Visit Summary   11/8/2018    Alpesh Winston    MRN: [id number]           Patient Information     Date Of Birth          1970        Visit Information        Provider Department      11/8/2018 3:00 PM SMITA 23 ATC;  ONCOLOGY INFUSION [facility]        Today's Diagnoses     Elevated ferritin    -  1      Care Instructions    Contact Numbers    [facility] Main Line: [phone]  [facility] Triage and after hours / weekends / holidays:  [phone]      Please call the triage or after hours line if you experience a temperature greater than or equal to 100.5, shaking chills, have uncontrolled nausea, vomiting and/or diarrhea, dizziness, shortness of breath, chest pain, bleeding, unexplained bruising, or if you have any other new/concerning symptoms, questions or concerns.      If you are having any concerning symptoms or wish to speak to a provider before your next infusion visit, please call your care coordinator or triage to notify them so we can adequately serve you.     If you need a refill on a narcotic prescription or other medication, please call before your infusion appointment.                   November 2018 Sunday Monday Tuesday Wednesday Thursday Friday Saturday                       1     2     3       4     5     6     7     8     [facility] MASONIC LAB DRAW    1:15 PM   (15 min.)   UC MASONIC LAB DRAW   [facility] Lab Draw     P RETURN    1:25 PM   (50 min.)   Donna Gonzalez PA-C   [facility]    3:00 PM   (120 min.)    ONCOLOGY INFUSION   [facility] 9     10       11     12     13     14     15     16     17       18     19     20     21     22     23     24       25     26     27     28     29     30 December 2018 Sunday Monday Tuesday Wednesday Thursday Friday Saturday                                 1       2     3     4     5     6     7     8       9     10      11     12     [facility]ONIC LAB DRAW    1:00 PM   (15 min.)   [facility] MASONIC LAB DRAW   [facility] Masonic Lab Draw     [facility] ONC INFUSION 120    1:30 PM   (120 min.)   UC ONCOLOGY INFUSION   [facility] 13     14     15       16     17     18     19     20     21     22       23     24     25     26     27     28     29       30     31                                           Lab Results:  Recent Results (from the past 12 hour(s))   Ferritin    Collection Time: 11/08/18  1:07 PM   Result Value Ref Range    Ferritin 844 (H) 26 - 388 ng/mL   Hemoglobin    Collection Time: 11/08/18  1:07 PM   Result Value Ref Range    Hemoglobin 15.0 13.3 - 17.7 g/dL               Follow-ups after your visit        Your next 10 appointments already scheduled     Dec 12, 2018  1:00 PM CST   Masonic Lab Draw with  MASONIC LAB DRAW   [facility]    [address] [place] [address]  [address]  [facility] [address]   [phone]            Dec 12, 2018  1:30 PM CST   Infusion 120 with UC ONCOLOGY INFUSION, UC 22 ATC   [facility] ([facility])    [address]  [address]  [facility] [address]   [phone]            Jan 09, 2019  1:00 PM CST   Masonic Lab Draw with UC MASONIC LAB DRAW   [facility]    [address]  [address]  [facility] [address]   [phone]            Jan 09, 2019  1:30 PM CST   Infusion 120 with UC ONCOLOGY INFUSION, UC 22 ATC   [facility]    [address]  [address]  [facility] [address]   [phone]            Feb 06, 2019  1:00 PM CST   Masonic Lab Draw with UC MASONIC LAB DRAW   [facility] Masonic Lab Draw ([facility])    [address]  [address]  [facility] [address]   [phone]            Feb 06, 2019  1:30 PM CST   Infusion  120 with  ONCOLOGY INFUSION, UC 22 ATC   [facility] ([facility])    [address]  [address]  [facility] [address] [phone]              Future tests that were ordered for you today     Open Standing Orders        Priority Remaining Interval Expires Ordered    Phlebotomy therapeutic Routine [id number] PRN  11/8/2018            Who to contact     If you have questions or need follow up information about today's clinic visit or your schedule please contact [facility] directly at [phone].  Normal or non-critical lab and imaging results will be communicated to you by [facility], letter or phone within 4 business days after the clinic has received the results. If you do not hear from us within 7 days, please contact the clinic through UpNextt or phone. If you have a critical or abnormal lab result, we will notify you by phone as soon as possible.  Submit refill requests through BoomBang or call your pharmacy and they will forward the refill request to us. Please allow 3 business days for your refill to be completed.          Additional Information About Your Visit        [facility] Information     BoomBang gives you secure access to your electronic health record. If you see a primary care provider, you can also send messages to your care team and make appointments. If you have questions, please call your primary care clinic.  If you do not have a primary care provider, please call [phone] and they will assist you.        Care EveryWhere ID     This is your Care EveryWhere ID. This could be used by other organizations to access your [facility] medical records  [id number]        Your Vitals Were     Pulse Pulse Oximetry                63 100%           Blood Pressure from Last 3 Encounters:   11/08/18 117/77   11/08/18 132/76   10/30/18 118/80    Weight from Last 3 Encounters:   11/08/18 92.8 kg (204 lb 9.6 oz)   10/29/18 88.5 kg (195  lb)   09/19/18 91.8 kg (202 lb 4.8 oz)              We Performed the Following     Ferritin     Hemoglobin        Primary Care Provider Office Phone # Fax #    Belem VIVIANE Joshua [phone] [address] [place]  [facility] [address]        Equal Access to Services     JOSÉ ROGERS : Hadii aad ku hadasho Soomaali, waaxda luqadaha, qaybta kaalmada adeegyada, waxay idiin hayaan adeeg gracieramonadonna lagrace . So [facility] [phone].    ATENCIÓN: Si habla español, tiene a casper disposición servicios gratuitos de asistencia lingüística. [facility] [phone].    We comply with applicable federal civil rights laws and [place] laws. We do not discriminate on the basis of race, color, national origin, age, disability, sex, sexual orientation, or gender identity.            Thank you!     Thank you for choosing [facility]  for your care. Our goal is always to provide you with excellent care. Hearing back from our patients is one way we can continue to improve our services. Please take a few minutes to complete the written survey that you may receive in the mail after your visit with us. Thank you!             Your Updated Medication List - Protect others around you: Learn how to safely use, store and throw away your medicines at www.disposemymeds.org.          This list is accurate as of 11/8/18  4:16 PM.  Always use your most recent med list.                   Brand Name Dispense Instructions for use Diagnosis    atorvastatin 20 MG tablet    LIPITOR    90 tablet    Take 1 tablet (20 mg) by mouth daily    Hyperlipidemia LDL goal <130       azithromycin 500 MG tablet    ZITHROMAX    3 tablet    Take one tablet daily for up to 3 days as needed for traveler's diarrhea    Need for vaccination       COENZYME Q-10 PO      Take 20 mg by mouth daily        fenofibrate 160 MG tablet     90 tablet    TAKE 1 TABLET DAILY (DUE FOR APPOINTMENT NOW)    Hyperlipidemia LDL goal <130       FISH OIL PO

## 2018-11-08 NOTE — LETTER
"11/8/2018      RE: Alpesh Winston  [address] [place] [address]       Hematology-Oncology Visit  Nov 8, 2018    Reason for Visit: follow-up elevated ferritin     HPI: Alpesh Winston is a 48 year old gentleman with past medical history of hypertriglyceridemia, prostatitis with elevated ferritin most likely related to fatty liver disease. He was seen by Dr. Cornejo in August and had negative genetic testing for hereditary hemochromatosis, normal inflammatory markers. He had a liver MRI showing no liver deposition. They discussed pros and cons of therapeutic phlebotomy, and he initiated this end of August at monthly intervals.     He presents in 3 month follow-up.     Interval History: Alpesh is feeling well. He is tolerating phlebotomies well without any adverse effects, dizziness, lightheadedness, decrease in stamina. He has not had any recent fevers/chills or infectious concerns. Wonders if his statin has anything to do with elevated ferritin and wants to review work-up again. No other concerns today.     Current Outpatient Prescriptions   Medication     atorvastatin (LIPITOR) 20 MG tablet     azithromycin (ZITHROMAX) 500 MG tablet     COENZYME Q-10 PO     fenofibrate 160 MG tablet     Omega-3 Fatty Acids (FISH OIL PO)     No current facility-administered medications for this visit.      PHYSICAL EXAM:  /76 (BP Location: Left arm, Patient Position: Chair, Cuff Size: Adult Large)  Pulse 60  Temp 97.9  F (36.6  C) (Oral)  Ht 1.778 m (5' 10\")  Wt 92.8 kg (204 lb 9.6 oz)  SpO2 96%  BMI 29.36 kg/m2  General: Alert, oriented, pleasant, NAD  Lungs: CTA bilaterally, normal work of breathing  Cardiac: RRR, S1, S2, no murmurs  Neuro: CNII-XII grossly intact    Labs:    8/22/2018 14:16 9/19/2018 13:50 10/17/2018 13:05 11/8/2018 13:07   Hemoglobin 14.9 14.8 13.7 15.0      8/22/2018 14:16 9/19/2018 13:50 10/17/2018 13:05 11/8/2018 13:07   Ferritin 1526 (H) 1098 (H) 953 (H) 844 (H) "       Assessment & Plan:     1. Elevated ferritin likely related to hepatic steatosis: Has had 3 monthly phlebotomies and ferritin is trending down nicely to 844 today. Reviewed plan to continue monthly treatments until ferritin is around 100 or he is not tolerating phlebotomies. His Hgb initially trended down slightly however has rebounded to his baseline range again. Follow-up in 3 months with Dr. Cornejo.     Donna Gonzalez PA-C    [facility] [address] [id number] [phone]

## 2018-11-08 NOTE — NURSING NOTE
"Oncology Rooming Note    November 8, 2018 1:33 PM   Alpesh Winston is a 48 year old male who presents for:    Chief Complaint   Patient presents with     Blood Draw     labs drawn via venipuncture by Rn     Oncology Clinic Visit     Elevated Ferritin F/U     Initial Vitals: /76 (BP Location: Left arm, Patient Position: Chair, Cuff Size: Adult Large)  Pulse 60  Temp 97.9  F (36.6  C) (Oral)  Ht 1.778 m (5' 10\")  Wt 92.8 kg (204 lb 9.6 oz)  SpO2 96%  BMI 29.36 kg/m2 Estimated body mass index is 29.36 kg/(m^2) as calculated from the following:    Height as of this encounter: 1.778 m (5' 10\").    Weight as of this encounter: 92.8 kg (204 lb 9.6 oz). Body surface area is 2.14 meters squared.  No Pain (0) Comment: Data Unavailable   No LMP for male patient.  Allergies reviewed: Yes  Medications reviewed: Yes    Medications: Medication refills not needed today.  Pharmacy name entered into EPIC:    [id number] - [place], [place] - [address] AT [facility] [employer] [facility]  [facility] - [address]    Clinical concerns: None, Donna was NOT notified.    10 minutes for nursing intake (face to face time)     CYNTHIA MAZA LPN            "

## 2018-11-08 NOTE — MR AVS SNAPSHOT
After Visit Summary   11/8/2018    Alpesh Winston    MRN: [id number]           Patient Information     Date Of Birth          1970        Visit Information        Provider Department      11/8/2018 1:40 PM Donna Gonzalez PA-C [facility]        Today's Diagnoses     Elevated ferritin    -  1       Follow-ups after your visit        Your next 10 appointments already scheduled     Dec 12, 2018  1:00 PM CST   Masonic Lab Draw with UC MASONIC LAB DRAW   [facility] ([facility])    [address] [place] [address]  [address]  [facility] [address]   [phone]            Dec 12, 2018  1:30 PM CST   Infusion 120 with UC ONCOLOGY INFUSION, UC 22 ATC   [facility] ([facility])    [address] [place] [address]  [address]  [facility] [address]   [phone]            Jan 09, 2019  1:00 PM CST   Masonic Lab Draw with UC MASONIC LAB DRAW   [facility] Masonic Lab Draw ([facility])    [address] [place] [address]  [address]  [facility] [address]   [phone]            Jan 09, 2019  1:30 PM CST   Infusion 120 with UC ONCOLOGY INFUSION, UC 22 ATC   [facility] ([facility])    [address]  [address]  [facility] [address]   [phone]            Feb 06, 2019  1:00 PM CST   Masonic Lab Draw with UC MASONIC LAB DRAW   [facility] ([facility])    [address] [place] [address]  [address]  [facility] [address]   [phone]            Feb 06, 2019  1:30 PM CST   Infusion 120 with UC ONCOLOGY INFUSION, UC 22 ATC   [facility] ([facility])    [address] [place] [address]  [address]  [facility] [address]   [phone]              Who to contact     If you have questions or need follow up information about today's clinic visit or your schedule please contact [facility] directly at [phone].  Normal or non-critical lab and imaging results will be communicated to you by MyChart, letter or phone within 4 business days after the clinic has received the results. If you do not hear from us within 7 days, please contact the clinic through MyChart or phone. If you have a critical or abnormal lab result, we will notify you by phone as soon as possible.  Submit refill requests through [employer] or call your pharmacy and they will forward the refill request to us. Please allow 3 business days for your refill to be completed.          Additional Information About Your Visit        [employer]ymphany Information     [employer] gives you secure access to your electronic health record. If you see a primary care provider, you can also send messages to your care team and make appointments. If you have questions, please call your primary care clinic.  If you do not have a primary care provider, please call [phone] and they will assist you.        Care EveryWhere ID     This is your Care EveryWhere ID. This could be used by other organizations to access your [place] medical records  [id number]        Your Vitals Were     Pulse Temperature Height Pulse Oximetry BMI (Body Mass Index)       60 97.9  F (36.6  C) (Oral) 1.778 m (5' 10\") 96% 29.36 kg/m2        Blood Pressure from Last 3 Encounters:   11/08/18 117/77   11/08/18 132/76   10/30/18 118/80    Weight from Last 3 Encounters:   11/08/18 92.8 kg (204 lb 9.6 oz)   10/29/18 88.5 kg (195 lb)   09/19/18 91.8 kg (202 lb 4.8 oz)              Today, you had the following     No orders found for display       Primary Care Provider Office Phone # Fax #    Belem Joshua [phone] [address] [facility] [address]        Equal Access to Services     JOSÉ ROGERS AH: Hadii aad ku hadasho Soomaali, waaxda luqadaha, qaybta kaalmada adeegyawellington, daisha koo . So [facility] " [phone].    ATENCIÓN: Si rose mary ribeiro, tiene a casper disposición servicios gratuitos de asistencia lingüística. Maye cabrera [phone].    We comply with applicable federal civil rights laws and [place] laws. We do not discriminate on the basis of race, color, national origin, age, disability, sex, sexual orientation, or gender identity.            Thank you!     Thank you for choosing [facility]  for your care. Our goal is always to provide you with excellent care. Hearing back from our patients is one way we can continue to improve our services. Please take a few minutes to complete the written survey that you may receive in the mail after your visit with us. Thank you!             Your Updated Medication List - Protect others around you: Learn how to safely use, store and throw away your medicines at www.disposemymeds.org.          This list is accurate as of 11/8/18 11:59 PM.  Always use your most recent med list.                   Brand Name Dispense Instructions for use Diagnosis    atorvastatin 20 MG tablet    LIPITOR    90 tablet    Take 1 tablet (20 mg) by mouth daily    Hyperlipidemia LDL goal <130       azithromycin 500 MG tablet    ZITHROMAX    3 tablet    Take one tablet daily for up to 3 days as needed for traveler's diarrhea    Need for vaccination       COENZYME Q-10 PO      Take 20 mg by mouth daily        fenofibrate 160 MG tablet     90 tablet    TAKE 1 TABLET DAILY (DUE FOR APPOINTMENT NOW)    Hyperlipidemia LDL goal <130       FISH OIL PO

## 2018-11-08 NOTE — PROGRESS NOTES
Infusion Nursing Note:  Alpesh GALEAS Leticiashelley presents today for Therapeutic Phlebotomy .    Patient seen by provider today: Yes: Donna Gonzalez   present during visit today: Not Applicable.    Note: Pt assessed by CELINE Simpson prior to infusion appointment. Ferritin 844 today. Therapeutic Phlebotomy performed w/o issue. 536cc removed. Pt tolerated procedure, denied dizziness or lightheadedness post procedure. VSS. Ambulated off unit, stable condition.     Intravenous Access:  Peripheral IV placed.    Treatment Conditions:  Ferritin Level 844    Post Infusion Assessment:  Patient tolerated therapeutic phlebotomy.   Blood return noted pre and post infusion.  Site patent and intact, free from redness, edema or discomfort.  Access discontinued per protocol.    Discharge Plan:   Patient declined prescription refills.  AVS to patient via [employer].  Patient will return 12/12/18 for next appointment.   Patient discharged in stable condition accompanied by: self.  Departure Mode: Ambulatory.    Belem Underwood RN

## 2018-11-08 NOTE — NURSING NOTE
Chief Complaint   Patient presents with     Blood Draw     labs drawn via venipuncture by Rn     /76 (BP Location: Left arm, Patient Position: Chair, Cuff Size: Adult Large)  Pulse 60  Temp 97.9  F (36.6  C) (Oral)  Wt 92.8 kg (204 lb 9.6 oz)  SpO2 96%  BMI 29.36 kg/m2    Vitals taken.  Labs collected and sent from right antecubital venipuncture. Pt tolerated well. Pt checked in for next appointment.    Kia Ceballos RN

## 2018-11-09 NOTE — PROGRESS NOTES
"Hematology-Oncology Visit  Nov 8, 2018    Reason for Visit: follow-up elevated ferritin     HPI: Alpesh Winston is a 48 year old gentleman with past medical history of hypertriglyceridemia, prostatitis with elevated ferritin most likely related to fatty liver disease. He was seen by Dr. Cornejo in August and had negative genetic testing for hereditary hemochromatosis, normal inflammatory markers. He had a liver MRI showing no liver deposition. They discussed pros and cons of therapeutic phlebotomy, and he initiated this end of August at monthly intervals.     He presents in 3 month follow-up.     Interval History: Alpesh is feeling well. He is tolerating phlebotomies well without any adverse effects, dizziness, lightheadedness, decrease in stamina. He has not had any recent fevers/chills or infectious concerns. Wonders if his statin has anything to do with elevated ferritin and wants to review work-up again. No other concerns today.     Current Outpatient Prescriptions   Medication     atorvastatin (LIPITOR) 20 MG tablet     azithromycin (ZITHROMAX) 500 MG tablet     COENZYME Q-10 PO     fenofibrate 160 MG tablet     Omega-3 Fatty Acids (FISH OIL PO)     No current facility-administered medications for this visit.      PHYSICAL EXAM:  /76 (BP Location: Left arm, Patient Position: Chair, Cuff Size: Adult Large)  Pulse 60  Temp 97.9  F (36.6  C) (Oral)  Ht 1.778 m (5' 10\")  Wt 92.8 kg (204 lb 9.6 oz)  SpO2 96%  BMI 29.36 kg/m2  General: Alert, oriented, pleasant, NAD  Lungs: CTA bilaterally, normal work of breathing  Cardiac: RRR, S1, S2, no murmurs  Neuro: CNII-XII grossly intact    Labs:    8/22/2018 14:16 9/19/2018 13:50 10/17/2018 13:05 11/8/2018 13:07   Hemoglobin 14.9 14.8 13.7 15.0      8/22/2018 14:16 9/19/2018 13:50 10/17/2018 13:05 11/8/2018 13:07   Ferritin 1526 (H) 1098 (H) 953 (H) 844 (H)       Assessment & Plan:     1. Elevated ferritin likely related to hepatic steatosis: Has had 3 " monthly phlebotomies and ferritin is trending down nicely to 844 today. Reviewed plan to continue monthly treatments until ferritin is around 100 or he is not tolerating phlebotomies. His Hgb initially trended down slightly however has rebounded to his baseline range again. Follow-up in 3 months with Dr. Cornejo.     Donna Gonzalez PA-C    [facility] [address] [address] [phone]

## 2018-12-12 ENCOUNTER — APPOINTMENT (OUTPATIENT)
Dept: LAB | Facility: CLINIC | Age: 48
End: 2018-12-12
Attending: INTERNAL MEDICINE
Payer: COMMERCIAL

## 2018-12-12 ENCOUNTER — INFUSION THERAPY VISIT (OUTPATIENT)
Dept: ONCOLOGY | Facility: CLINIC | Age: 48
End: 2018-12-12
Attending: INTERNAL MEDICINE
Payer: COMMERCIAL

## 2018-12-12 VITALS
DIASTOLIC BLOOD PRESSURE: 79 MMHG | TEMPERATURE: 97.8 F | OXYGEN SATURATION: 98 % | BODY MASS INDEX: 29.5 KG/M2 | SYSTOLIC BLOOD PRESSURE: 134 MMHG | HEART RATE: 72 BPM | RESPIRATION RATE: 16 BRPM | WEIGHT: 205.6 LBS

## 2018-12-12 DIAGNOSIS — R79.89 ELEVATED FERRITIN: Primary | ICD-10-CM

## 2018-12-12 LAB
FERRITIN SERPL-MCNC: 861 NG/ML (ref 26–388)
HGB BLD-MCNC: 14.7 G/DL (ref 13.3–17.7)

## 2018-12-12 PROCEDURE — 36415 COLL VENOUS BLD VENIPUNCTURE: CPT

## 2018-12-12 PROCEDURE — 82728 ASSAY OF FERRITIN: CPT | Performed by: INTERNAL MEDICINE

## 2018-12-12 PROCEDURE — 85018 HEMOGLOBIN: CPT | Performed by: INTERNAL MEDICINE

## 2018-12-12 PROCEDURE — 99195 PHLEBOTOMY: CPT

## 2018-12-12 ASSESSMENT — ENCOUNTER SYMPTOMS
ARTHRALGIAS: 0
SORE THROAT: 0
PARESTHESIAS: 0
EYE PAIN: 0
DIZZINESS: 0
FREQUENCY: 0
CHILLS: 0
NERVOUS/ANXIOUS: 0
COUGH: 0
ABDOMINAL PAIN: 0
JOINT SWELLING: 0
SHORTNESS OF BREATH: 0
HEMATOCHEZIA: 0
HEMATURIA: 0
CONSTIPATION: 0
WEAKNESS: 0
FEVER: 0
DYSURIA: 0
HEARTBURN: 0
HEADACHES: 0
NAUSEA: 0
MYALGIAS: 0
DIARRHEA: 0
PALPITATIONS: 0

## 2018-12-12 ASSESSMENT — PAIN SCALES - GENERAL: PAINLEVEL: NO PAIN (0)

## 2018-12-12 NOTE — PATIENT INSTRUCTIONS
Contact Numbers    [facility] Main Line: [phone]  [facility] Triage and after hours / weekends / holidays:  [phone]      Please call the triage or after hours line if you experience a temperature greater than or equal to 100.5, shaking chills, have uncontrolled nausea, vomiting and/or diarrhea, dizziness, shortness of breath, chest pain, bleeding, unexplained bruising, or if you have any other new/concerning symptoms, questions or concerns.      If you are having any concerning symptoms or wish to speak to a provider before your next infusion visit, please call your care coordinator or triage to notify them so we can adequately serve you.     If you need a refill on a narcotic prescription or other medication, please call before your infusion appointment.

## 2018-12-12 NOTE — NURSING NOTE
Chief Complaint   Patient presents with     Blood Draw     labs drawn via venipuncture by RN     /77 (BP Location: Left arm, Patient Position: Chair, Cuff Size: Adult Large)   Pulse 66   Temp 97.6  F (36.4  C) (Oral)   Wt 93.3 kg (205 lb 9.6 oz)   SpO2 100%   BMI 29.50 kg/m      Vitals taken.  Labs collected and sent from left antecubital venipuncture in lab by RN. Pt tolerated well. Pt checked in for next appointment.    Kia Ceballos RN

## 2018-12-12 NOTE — PROGRESS NOTES
Infusion Nursing Note:  Alpesh Winston presents today for Therapeutic Phlebotomy.  Patient seen by provider today: No    Note: Patient presents to infusion with no new complaints. States his energy levels are good. No concerns at this time.     Phlebotomy started at 1405 and completed at 1425. 500mL phlebotomized with no complications. Patient drank po fluids during phlebotomy. Patient denied lightheadedness, dizziness. Vitals stable before and after phlebotomy. Patient educated to increase fluid intake for the next few days. Verbalized understanding.     Intravenous Access:  Peripheral IV placed.    Treatment Conditions:  Lab Results   Component Value Date    HGB 14.7 12/12/2018     Ferritin level: 861 today (resulted after phlebotomy complete)    Post Phlebotomy Assessment:  Patient tolerated phlebotomy without incident.  Access discontinued per protocol.    Discharge Plan:   Patient declined prescription refills.  Discharge instructions reviewed with: Patient.  Patient verbalized understanding of discharge instructions and all questions answered.  AVS to patient via [employer].  Patient will return 1/9/19 for next infusion appointment.   Patient discharged in stable condition accompanied by: self.  Departure Mode: Ambulatory  Face to Face time: 0.    ED MENDIETA RN

## 2018-12-19 ENCOUNTER — OFFICE VISIT (OUTPATIENT)
Dept: FAMILY MEDICINE | Facility: CLINIC | Age: 48
End: 2018-12-19
Payer: COMMERCIAL

## 2018-12-19 ENCOUNTER — MYC MEDICAL ADVICE (OUTPATIENT)
Dept: FAMILY MEDICINE | Facility: CLINIC | Age: 48
End: 2018-12-19

## 2018-12-19 VITALS
WEIGHT: 201.38 LBS | BODY MASS INDEX: 28.83 KG/M2 | RESPIRATION RATE: 18 BRPM | TEMPERATURE: 98.6 F | HEART RATE: 61 BPM | DIASTOLIC BLOOD PRESSURE: 83 MMHG | SYSTOLIC BLOOD PRESSURE: 131 MMHG | HEIGHT: 70 IN | OXYGEN SATURATION: 98 %

## 2018-12-19 DIAGNOSIS — E78.5 HYPERLIPIDEMIA LDL GOAL <130: ICD-10-CM

## 2018-12-19 DIAGNOSIS — R79.89 ELEVATED FERRITIN: ICD-10-CM

## 2018-12-19 DIAGNOSIS — Z12.5 SCREENING PSA (PROSTATE SPECIFIC ANTIGEN): ICD-10-CM

## 2018-12-19 DIAGNOSIS — R73.09 ELEVATED GLUCOSE: Primary | ICD-10-CM

## 2018-12-19 DIAGNOSIS — Z00.00 ROUTINE GENERAL MEDICAL EXAMINATION AT A HEALTH CARE FACILITY: Primary | ICD-10-CM

## 2018-12-19 LAB
CHOLEST SERPL-MCNC: 208 MG/DL
FERRITIN SERPL-MCNC: 660 NG/ML (ref 26–388)
GLUCOSE SERPL-MCNC: 112 MG/DL (ref 70–99)
HDLC SERPL-MCNC: 35 MG/DL
LDLC SERPL CALC-MCNC: 121 MG/DL
NONHDLC SERPL-MCNC: 173 MG/DL
PSA SERPL-ACNC: 1.15 UG/L (ref 0–4)
TRIGL SERPL-MCNC: 260 MG/DL

## 2018-12-19 PROCEDURE — 82728 ASSAY OF FERRITIN: CPT | Performed by: NURSE PRACTITIONER

## 2018-12-19 PROCEDURE — G0103 PSA SCREENING: HCPCS | Performed by: NURSE PRACTITIONER

## 2018-12-19 PROCEDURE — 80061 LIPID PANEL: CPT | Performed by: NURSE PRACTITIONER

## 2018-12-19 PROCEDURE — 82947 ASSAY GLUCOSE BLOOD QUANT: CPT | Performed by: NURSE PRACTITIONER

## 2018-12-19 PROCEDURE — 99396 PREV VISIT EST AGE 40-64: CPT | Performed by: NURSE PRACTITIONER

## 2018-12-19 PROCEDURE — 36415 COLL VENOUS BLD VENIPUNCTURE: CPT | Performed by: NURSE PRACTITIONER

## 2018-12-19 RX ORDER — ATORVASTATIN CALCIUM 20 MG/1
20 TABLET, FILM COATED ORAL DAILY
Qty: 90 TABLET | Status: SHIPPED | OUTPATIENT
Start: 2018-12-19 | End: 2019-12-10

## 2018-12-19 RX ORDER — FENOFIBRATE 160 MG/1
TABLET ORAL
Qty: 90 TABLET | Status: SHIPPED | OUTPATIENT
Start: 2018-12-19 | End: 2019-03-04

## 2018-12-19 ASSESSMENT — MIFFLIN-ST. JEOR: SCORE: 1799.68

## 2018-12-19 ASSESSMENT — ENCOUNTER SYMPTOMS
DYSURIA: 0
ARTHRALGIAS: 0
FEVER: 0
HEMATOCHEZIA: 0
CHILLS: 0
COUGH: 0
WEAKNESS: 0
FREQUENCY: 0
MYALGIAS: 0
HEARTBURN: 0
SHORTNESS OF BREATH: 0
JOINT SWELLING: 0
HEADACHES: 0
NAUSEA: 0
NERVOUS/ANXIOUS: 0
ABDOMINAL PAIN: 0
PALPITATIONS: 0
HEMATURIA: 0
CONSTIPATION: 0
PARESTHESIAS: 0
DIZZINESS: 0
EYE PAIN: 0
DIARRHEA: 0
SORE THROAT: 0

## 2018-12-19 NOTE — PROGRESS NOTES
SUBJECTIVE:   CC: Alpesh Winston is an 48 year old male who presents for preventative health visit.     Physical   Annual:     Getting at least 3 servings of Calcium per day:  Yes    Bi-annual eye exam:  Yes    Dental care twice a year:  Yes    Sleep apnea or symptoms of sleep apnea:  None    Diet:  Regular (no restrictions)    Frequency of exercise:  2-3 days/week    Duration of exercise:  15-30 minutes    Taking medications regularly:  Yes    Medication side effects:  Muscle aches    Additional concerns today:  No    PHQ-2 Total Score: 0    Seeing hematologist for high iron               Today's PHQ-2 Score:   PHQ-2 ( 1999 [employer]) 12/12/2018   Q1: Little interest or pleasure in doing things 0   Q2: Feeling down, depressed or hopeless 0   PHQ-2 Score 0   Q1: Little interest or pleasure in doing things Not at all   Q2: Feeling down, depressed or hopeless Not at all   PHQ-2 Score 0       Abuse: Current or Past(Physical, Sexual or Emotional)- No  Do you feel safe in your environment? Yes    Social History     Tobacco Use     Smoking status: Never Smoker     Smokeless tobacco: Current User     Types: Chew     Tobacco comment: chewing less   Substance Use Topics     Alcohol use: Yes     Alcohol/week: 0.0 oz     Comment: 6-8 drinks a week      Alcohol Use 12/12/2018   If you drink alcohol do you typically have greater than 3 drinks per day OR greater than 7 drinks per week? Yes   AUDIT SCORE  7       Last PSA:   PSA   Date Value Ref Range Status   12/19/2018 1.15 0 - 4 ug/L Final     Comment:     Assay Method:  Chemiluminescence using Siemens Vista analyzer       Reviewed orders with patient. Reviewed health maintenance and updated orders accordingly - Yes      Reviewed and updated as needed this visit by clinical staff  Tobacco  Allergies  Meds  Med Hx  Surg Hx  Fam Hx  Soc Hx        Reviewed and updated as needed this visit by Provider            Review of Systems   Constitutional: Negative for chills and  "fever.   HENT: Negative for congestion, ear pain, hearing loss and sore throat.    Eyes: Negative for pain and visual disturbance.   Respiratory: Negative for cough and shortness of breath.    Cardiovascular: Negative for chest pain, palpitations and peripheral edema.   Gastrointestinal: Negative for abdominal pain, constipation, diarrhea, heartburn, hematochezia and nausea.   Genitourinary: Positive for impotence. Negative for discharge, dysuria, frequency, genital sores, hematuria and urgency.   Musculoskeletal: Negative for arthralgias, joint swelling and myalgias.   Skin: Negative for rash.   Neurological: Negative for dizziness, weakness, headaches and paresthesias.   Psychiatric/Behavioral: Negative for mood changes. The patient is not nervous/anxious.        OBJECTIVE:   /83   Pulse 61   Temp 98.6  F (37  C) (Oral)   Resp 18   Ht 1.778 m (5' 10\")   Wt 91.3 kg (201 lb 6 oz)   SpO2 98%   BMI 28.89 kg/m      Physical Exam  GENERAL: healthy, alert and no distress  EYES: Eyes grossly normal to inspection, PERRL and conjunctivae and sclerae normal  HENT: ear canals and TM's normal, nose and mouth without ulcers or lesions  NECK: no adenopathy, no asymmetry, masses, or scars and thyroid normal to palpation  RESP: lungs clear to auscultation - no rales, rhonchi or wheezes  CV: regular rate and rhythm, normal S1 S2, no S3 or S4, no murmur, click or rub, no peripheral edema and peripheral pulses strong  ABDOMEN: soft, nontender, no hepatosplenomegaly, no masses and bowel sounds normal  MS: no gross musculoskeletal defects noted, no edema  SKIN: no suspicious lesions or rashes  NEURO: Normal strength and tone, mentation intact and speech normal  PSYCH: mentation appears normal, affect normal/bright        ASSESSMENT/PLAN:   1. Routine general medical examination at a health care facility    - Glucose    2. Elevated ferritin    - Ferritin    3. Hyperlipidemia LDL goal <130  The current medical regimen is " "effective;  continue present plan and medications.   - fenofibrate (TRIGLIDE/LOFIBRA) 160 MG tablet; TAKE 1 TABLET DAILY  Dispense: 90 tablet; Refill: PRN  - atorvastatin (LIPITOR) 20 MG tablet; Take 1 tablet (20 mg) by mouth daily  Dispense: 90 tablet; Refill: PRN    COUNSELING:   Reviewed preventive health counseling, as reflected in patient instructions    BP Readings from Last 1 Encounters:   12/19/18 131/83     Estimated body mass index is 28.89 kg/m  as calculated from the following:    Height as of this encounter: 1.778 m (5' 10\").    Weight as of this encounter: 91.3 kg (201 lb 6 oz).      Weight management plan: Discussed healthy diet and exercise guidelines     reports that  has never smoked. His smokeless tobacco use includes chew.      Counseling Resources:  ATP IV Guidelines  Pooled Cohorts Equation Calculator  FRAX Risk Assessment  ICSI Preventive Guidelines  Dietary Guidelines for Americans, 2010  USDA's MyPlate  ASA Prophylaxis  Lung CA Screening    Belem Joshua NP  [facility]  "

## 2018-12-26 NOTE — TELEPHONE ENCOUNTER
Since he was fasting, this result would be considered in the pre-diabetes range.  We should recheck this in 6-12 months.

## 2018-12-26 NOTE — TELEPHONE ENCOUNTER
Belem Adames NP   Patient was fasting for the glucose test 12/19/18 - result note stated glucose okay for non fasting lab     Please advise if you would like to add anything additional now that it is known to be a fasting lab     Thank you   Lizbeth Hogan Registered Nurse   [facility]

## 2019-01-09 ENCOUNTER — APPOINTMENT (OUTPATIENT)
Dept: LAB | Facility: CLINIC | Age: 49
End: 2019-01-09
Attending: INTERNAL MEDICINE
Payer: COMMERCIAL

## 2019-01-09 ENCOUNTER — INFUSION THERAPY VISIT (OUTPATIENT)
Dept: ONCOLOGY | Facility: CLINIC | Age: 49
End: 2019-01-09
Attending: INTERNAL MEDICINE
Payer: COMMERCIAL

## 2019-01-09 VITALS
SYSTOLIC BLOOD PRESSURE: 134 MMHG | DIASTOLIC BLOOD PRESSURE: 83 MMHG | TEMPERATURE: 97.9 F | OXYGEN SATURATION: 97 % | HEART RATE: 60 BPM | WEIGHT: 206.3 LBS | BODY MASS INDEX: 29.6 KG/M2 | RESPIRATION RATE: 24 BRPM

## 2019-01-09 DIAGNOSIS — R79.89 ELEVATED FERRITIN: Primary | ICD-10-CM

## 2019-01-09 LAB
FERRITIN SERPL-MCNC: 790 NG/ML (ref 26–388)
HGB BLD-MCNC: 14.3 G/DL (ref 13.3–17.7)

## 2019-01-09 PROCEDURE — 85018 HEMOGLOBIN: CPT | Performed by: INTERNAL MEDICINE

## 2019-01-09 PROCEDURE — 82728 ASSAY OF FERRITIN: CPT | Performed by: INTERNAL MEDICINE

## 2019-01-09 PROCEDURE — 99195 PHLEBOTOMY: CPT

## 2019-01-09 PROCEDURE — 40000556 ZZH STATISTIC PERIPHERAL IV START W US GUIDANCE: Mod: ZF

## 2019-01-09 ASSESSMENT — PAIN SCALES - GENERAL: PAINLEVEL: NO PAIN (0)

## 2019-01-09 NOTE — NURSING NOTE
Chief Complaint   Patient presents with     Blood Draw     PIV placed by [facility] acc. Labs collected by RN.

## 2019-01-09 NOTE — PATIENT INSTRUCTIONS
Contact Numbers  [facility]: [phone] (for scheduling changes)  Triage: [phone] (for symptoms)    Please call the [facility] Triage line if you experience a temperature greater than or equal to 100.4, shaking chills, have uncontrolled nausea, vomiting and/or diarrhea, dizziness, shortness of breath, chest pain, bleeding, unexplained bruising, or if you have any other new/concerning symptoms, questions or concerns.     If you are having any concerning symptoms or wish to speak to a provider before your next infusion visit, please call your care coordinator or triage to notify them so we can adequately serve you.     If you need a refill on a narcotic prescription or other medication, please call triage before your infusion appointment.

## 2019-01-09 NOTE — PROGRESS NOTES
Infusion Nursing Note:  Alpesh Winston presents today for Phlebotomy.    Patient seen by provider today: No   present during visit today: Not Applicable.    Note: Patient feels well today.  Phlebotomy of 500 mL of blood completed in about 30 minutes.  Patient drank water and ate cookies during the infusion.  Patient felt well after infusion and denied any dizziness today.  VSS stable.    Intravenous Access:  Peripheral IV placed by vascular access.    Treatment Conditions:  Hgb: 14.3  Ferritin: 790    Post Infusion Assessment:  Access discontinued per protocol.    Discharge Plan:   Patient declined prescription refills.  Discharge instructions reviewed with: Patient.  Patient and/or family verbalized understanding of discharge instructions and all questions answered.  AVS to patient via [employer]ychatT.  Patient will return 2/6/19 for next appointment.   Patient discharged in stable condition accompanied by: self.  Departure Mode: Ambulatory.    Karen Jones RN

## 2019-02-06 ENCOUNTER — INFUSION THERAPY VISIT (OUTPATIENT)
Dept: ONCOLOGY | Facility: CLINIC | Age: 49
End: 2019-02-06
Attending: INTERNAL MEDICINE
Payer: COMMERCIAL

## 2019-02-06 ENCOUNTER — APPOINTMENT (OUTPATIENT)
Dept: LAB | Facility: CLINIC | Age: 49
End: 2019-02-06
Attending: INTERNAL MEDICINE
Payer: COMMERCIAL

## 2019-02-06 VITALS
OXYGEN SATURATION: 98 % | TEMPERATURE: 98.9 F | DIASTOLIC BLOOD PRESSURE: 78 MMHG | HEART RATE: 66 BPM | RESPIRATION RATE: 18 BRPM | BODY MASS INDEX: 29.63 KG/M2 | WEIGHT: 206.5 LBS | SYSTOLIC BLOOD PRESSURE: 122 MMHG

## 2019-02-06 DIAGNOSIS — R79.89 ELEVATED FERRITIN: Primary | ICD-10-CM

## 2019-02-06 LAB — FERRITIN SERPL-MCNC: 655 NG/ML (ref 26–388)

## 2019-02-06 PROCEDURE — 99195 PHLEBOTOMY: CPT

## 2019-02-06 PROCEDURE — 82728 ASSAY OF FERRITIN: CPT | Performed by: INTERNAL MEDICINE

## 2019-02-06 ASSESSMENT — PAIN SCALES - GENERAL: PAINLEVEL: NO PAIN (0)

## 2019-02-06 NOTE — NURSING NOTE
Chief Complaint   Patient presents with     Blood Draw     PIV placed by [facility] acc. Labs collected.

## 2019-02-06 NOTE — PATIENT INSTRUCTIONS
Contact Numbers    [facility] Main Line: [phone]  [facility] Triage and after hours / weekends / holidays:  [phone]      Please call the triage or after hours line if you experience a temperature greater than or equal to 100.5, shaking chills, have uncontrolled nausea, vomiting and/or diarrhea, dizziness, shortness of breath, chest pain, bleeding, unexplained bruising, or if you have any other new/concerning symptoms, questions or concerns.      If you are having any concerning symptoms or wish to speak to a provider before your next infusion visit, please call your care coordinator or triage to notify them so we can adequately serve you.     If you need a refill on a narcotic prescription or other medication, please call before your infusion appointment.          Recent Results (from the past 24 hour(s))   Ferritin    Collection Time: 02/06/19  1:03 PM   Result Value Ref Range    Ferritin 655 (H) 26 - 388 ng/mL

## 2019-02-06 NOTE — PROGRESS NOTES
Infusion Nursing Note:  Alpesh Winston presents today for Therapeutic Phlebotomy.    Patient seen by provider today: No    Note: Patient states he feels good today. Denies any concerns. 500mL phlebotomized in about 45 minutes.     Per lab, hemoglobin tube clotted and was unable to be resulted. Dr. Cornejo notified.    TORB 2/6/19 1350 Dr. Cornejo/Grisel Prado RN: Hemoglobin redraw not needed. Ok for patient to not have hemoglobin level today.    Intravenous Access:  Peripheral IV placed in lab.    Treatment Conditions:  Ferritin level: 790 ng/mL from 1/9/19    Post Phlebotomy Assessment:  Patient tolerated phlebotomy without incident. Denied lightheadedness, dizziness. Patient drink water during phleb. Vitals stable.  Access discontinued per protocol.    Discharge Plan:   Patient declined prescription refills.  Discharge instructions reviewed with: Patient.  Patient verbalized understanding of discharge instructions and all questions answered.  AVS to patient via Joyride[employer]ART.  Patient will return 3/6/19 for next appointment.   Patient discharged in stable condition accompanied by: self.  Face to Face time: 0.    ED MENDIETA, RN

## 2019-02-24 DIAGNOSIS — E78.5 HYPERLIPIDEMIA LDL GOAL <130: ICD-10-CM

## 2019-02-24 NOTE — TELEPHONE ENCOUNTER
"Requested Prescriptions   Pending Prescriptions Disp Refills     atorvastatin (LIPITOR) 20 MG tablet [Pharmacy Med Name: ATORVASTATIN TABS 20MG]  Last Written Prescription Date:  12/19/18  Last Fill Quantity: 90,  # refills: prn   Last office visit: 12/19/2018 with prescribing provider:     Future Office Visit:     90 tablet 50     Sig: TAKE 1 TABLET DAILY    Statins Protocol Passed - 2/24/2019  5:45 AM       Passed - LDL on file in past 12 months    Recent Labs   Lab Test 12/19/18  0933   *          Passed - No abnormal creatine kinase in past 12 months    Recent Labs   Lab Test 01/11/17  0908   CKT 90           Passed - Recent (12 mo) or future (30 days) visit within the authorizing provider's specialty    Patient had office visit in the last 12 months or has a visit in the next 30 days with authorizing provider or within the authorizing provider's specialty.  See \"Patient Info\" tab in inbasket, or \"Choose Columns\" in Meds & Orders section of the refill encounter.           Passed - Medication is active on med list       Passed - Patient is age 18 or older          "

## 2019-02-26 RX ORDER — ATORVASTATIN CALCIUM 20 MG/1
TABLET, FILM COATED ORAL
Qty: 90 TABLET | Refills: 2 | Status: SHIPPED | OUTPATIENT
Start: 2019-02-26 | End: 2019-06-13

## 2019-03-04 DIAGNOSIS — E78.5 HYPERLIPIDEMIA LDL GOAL <130: ICD-10-CM

## 2019-03-05 RX ORDER — FENOFIBRATE 160 MG/1
TABLET ORAL
Qty: 90 TABLET | Refills: 2 | Status: SHIPPED | OUTPATIENT
Start: 2019-03-05 | End: 2019-06-13

## 2019-03-05 NOTE — TELEPHONE ENCOUNTER
"Requested Prescriptions   Pending Prescriptions Disp Refills     fenofibrate (TRIGLIDE/LOFIBRA) 160 MG tablet [Pharmacy Med Name: FENOFIBRATE TABS 160MG]  This maybe a DUPLICATE.   Last Written Prescription Date:  12-19-18  Last Fill Quantity: 90 tab,  # refills: PRN   Last office visit: 12/19/2018 with prescribing provider:  Aileen Joshua    Future Office Visit:    90 tablet 50     Sig: TAKE 1 TABLET DAILY (DUE FOR APPOINTMENT NOW)    Fibrates Passed - 3/4/2019 11:49 PM       Passed - Lipid panel on file in past 12 months    Recent Labs   Lab Test 12/19/18  0933  09/25/15  0933   CHOL 208*   < > 215*   TRIG 260*   < > 243*   HDL 35*   < > 34*   *   < > 132*   NHDL 173*   < >  --    VLDL  --   --  49*   CHOLHDLRATIO  --   --  6.3*    < > = values in this interval not displayed.          Passed - No abnormal creatine kinase in past 12 months    Recent Labs   Lab Test 01/11/17  0908   CKT 90          Passed - Recent (12 mo) or future (30 days) visit within the authorizing provider's specialty    Patient had office visit in the last 12 months or has a visit in the next 30 days with authorizing provider or within the authorizing provider's specialty.  See \"Patient Info\" tab in inbasket, or \"Choose Columns\" in Meds & Orders section of the refill encounter.         Passed - Medication is active on med list       Passed - Patient is age 18 or older         "

## 2019-03-05 NOTE — TELEPHONE ENCOUNTER
"LOV: 12/19/2018    Will replace original Rx as Pharmacies often do not recognize \"PRN\"    Thanks! Anna Marie Guerrero RN        "

## 2019-03-06 ENCOUNTER — INFUSION THERAPY VISIT (OUTPATIENT)
Dept: ONCOLOGY | Facility: CLINIC | Age: 49
End: 2019-03-06
Attending: INTERNAL MEDICINE
Payer: COMMERCIAL

## 2019-03-06 ENCOUNTER — APPOINTMENT (OUTPATIENT)
Dept: LAB | Facility: CLINIC | Age: 49
End: 2019-03-06
Attending: INTERNAL MEDICINE
Payer: COMMERCIAL

## 2019-03-06 VITALS
SYSTOLIC BLOOD PRESSURE: 118 MMHG | TEMPERATURE: 98.5 F | DIASTOLIC BLOOD PRESSURE: 77 MMHG | HEIGHT: 70 IN | OXYGEN SATURATION: 97 % | WEIGHT: 207.38 LBS | RESPIRATION RATE: 16 BRPM | BODY MASS INDEX: 29.69 KG/M2 | HEART RATE: 60 BPM

## 2019-03-06 DIAGNOSIS — R79.89 ELEVATED FERRITIN: Primary | ICD-10-CM

## 2019-03-06 LAB
FERRITIN SERPL-MCNC: 475 NG/ML (ref 26–388)
HGB BLD-MCNC: 13.5 G/DL (ref 13.3–17.7)

## 2019-03-06 PROCEDURE — 82728 ASSAY OF FERRITIN: CPT | Performed by: INTERNAL MEDICINE

## 2019-03-06 PROCEDURE — 40000556 ZZH STATISTIC PERIPHERAL IV START W US GUIDANCE: Mod: ZF

## 2019-03-06 PROCEDURE — 99195 PHLEBOTOMY: CPT

## 2019-03-06 PROCEDURE — 85018 HEMOGLOBIN: CPT | Performed by: INTERNAL MEDICINE

## 2019-03-06 ASSESSMENT — PAIN SCALES - GENERAL: PAINLEVEL: NO PAIN (0)

## 2019-03-06 ASSESSMENT — MIFFLIN-ST. JEOR: SCORE: 1821.9

## 2019-03-06 NOTE — PATIENT INSTRUCTIONS
Contact Numbers  [facility] Main Line: [phone]  [facility] NurseTriage and After Hours:  [phone]    Call triage with chills and/or temperature greater than or equal to 100.5, uncontrolled nausea/vomiting, diarrhea, constipation, dizziness, shortness of breath, chest pain, bleeding, unexplained bruising, or any new/concerning symptoms, questions/concerns.     If after hours, weekends, or holidays, call the nurse triage number. Calls may be forwarded to the hospital , please ask for the adult oncology doctor on call.     If you are having any concerning symptoms or wish to speak to a provider before your next infusion visit, please call your care coordinator or triage to notify them so we can adequately serve you.     If you need a refill on a narcotic prescription, please call triage or your care coordinator before your infusion appointment.           March 2019 Sunday Monday Tuesday Wednesday Thursday Friday Saturday                            1     2       3     4     5     6    [facility]   1:00 PM   (15 min.)    [facility]   [facility] Lab Draw    [facility] ONC INFUSION 120   1:30 PM   (120 min.)    ONCOLOGY INFUSION   [facility] 7     8     9       10     11     12     13     14     15     16       17     18     19     20     21     22     23       24     25     26     27     28     29     30       31                                               April 2019 Sunday Monday Tuesday Wednesday Thursday Friday Saturday        1     2     3     4     5     6       7     8     9     10    [facility]   1:00 PM   (15 min.)    [facility] DRAW   [facility] Lab Draw    [facility] ONC INFUSION 120   1:30 PM   (120 min.)    ONCOLOGY INFUSION   [facility] 11     12     13       14     15     16     17     18     19     20       21     22     23     24     25     26     27       28     29     30                                         Lab  Results:  Recent Results (from the past 12 hour(s))   Hemoglobin    Collection Time: 03/06/19  1:48 PM   Result Value Ref Range    Hemoglobin 13.5 13.3 - 17.7 g/dL   Ferritin    Collection Time: 03/06/19  1:48 PM   Result Value Ref Range    Ferritin 475 (H) 26 - 388 ng/mL

## 2019-03-06 NOTE — NURSING NOTE
Chief Complaint   Patient presents with     Blood Draw      labs drawn; vitals taken; checked in for infusion appt.      Oneil Singh CMA ([facility])

## 2019-03-06 NOTE — PROGRESS NOTES
Infusion Nursing Note:  Alpesh Winston presents today for phlebotomy.    Patient seen by provider today: No   present during visit today: Not Applicable.    Note: Alpesh arrives to infusion today doing well. He offers no concerns or complaints today.     Intravenous Access:  Peripheral IV placed by vascular access.    Treatment Conditions:  Ferritin 475    Hemoglobin  13.5    OK to proceed with treatment today.     Post Infusion Assessment:  500 mL blood removed.   Patient tolerated infusion without incident. Denies dizziness or lightheadedness.   Post /82  Blood return noted pre and post infusion.  Access discontinued per protocol.    Discharge Plan:   [facility]  Patient will return 4/10 for next appointment.   Patient discharged in stable condition accompanied by: self.  Departure Mode: Ambulatory.    Lizbeth Sarmiento RN

## 2019-04-10 ENCOUNTER — INFUSION THERAPY VISIT (OUTPATIENT)
Dept: ONCOLOGY | Facility: CLINIC | Age: 49
End: 2019-04-10
Attending: INTERNAL MEDICINE
Payer: COMMERCIAL

## 2019-04-10 ENCOUNTER — APPOINTMENT (OUTPATIENT)
Dept: LAB | Facility: CLINIC | Age: 49
End: 2019-04-10
Attending: INTERNAL MEDICINE
Payer: COMMERCIAL

## 2019-04-10 VITALS
SYSTOLIC BLOOD PRESSURE: 127 MMHG | RESPIRATION RATE: 18 BRPM | OXYGEN SATURATION: 98 % | BODY MASS INDEX: 29.7 KG/M2 | TEMPERATURE: 98.1 F | WEIGHT: 207 LBS | HEART RATE: 65 BPM | DIASTOLIC BLOOD PRESSURE: 60 MMHG

## 2019-04-10 DIAGNOSIS — R79.89 ELEVATED FERRITIN: Primary | ICD-10-CM

## 2019-04-10 LAB
FERRITIN SERPL-MCNC: 504 NG/ML (ref 26–388)
HGB BLD-MCNC: 14.8 G/DL (ref 13.3–17.7)

## 2019-04-10 PROCEDURE — 85018 HEMOGLOBIN: CPT | Performed by: INTERNAL MEDICINE

## 2019-04-10 PROCEDURE — 82728 ASSAY OF FERRITIN: CPT | Performed by: INTERNAL MEDICINE

## 2019-04-10 PROCEDURE — 99195 PHLEBOTOMY: CPT

## 2019-04-10 ASSESSMENT — PAIN SCALES - GENERAL: PAINLEVEL: NO PAIN (0)

## 2019-04-10 NOTE — PROGRESS NOTES
Infusion Nursing Note:  Alpesh Winston presents today for therapeutic phlebotomy.    Patient seen by provider today: No   present during visit today: Not Applicable.    Note: Pt assessed prior to initiating therapeutic phlebotomy. No new issues or symptoms to report. Pt tolerated removal of 501ml w/o issue. VSS post phleb.     Intravenous Access:  Peripheral IV placed.    Treatment Conditions:  Lab Results   Component Value Date    HGB 14.8 04/10/2019     Lab Results   Component Value Date    WBC 5.0 08/22/2018      Lab Results   Component Value Date    ANEU 2.4 08/22/2018     Lab Results   Component Value Date     08/22/2018      Lab Results   Component Value Date     08/06/2018                   Lab Results   Component Value Date    POTASSIUM 4.2 08/06/2018           No results found for: MAG         Lab Results   Component Value Date    CR 0.91 08/06/2018                   Lab Results   Component Value Date    NAOMI 9.2 08/06/2018                Lab Results   Component Value Date    BILITOTAL 0.3 08/06/2018           Lab Results   Component Value Date    ALBUMIN 4.2 08/06/2018                    Lab Results   Component Value Date    ALT 47 08/06/2018           Lab Results   Component Value Date    AST 27 08/06/2018     Post Infusion Assessment:  Tolerated therapeutic phlebotomy w/o issue.  Site patent and intact, free from redness, edema or discomfort.  Access discontinued per protocol.     Discharge Plan:   Patient declined prescription refills.  AVS to patient via [employer].  Patient will return 5/8 for next appointment.   Patient discharged in stable condition accompanied by: self.  Departure Mode: Ambulatory.    Belem Underwood RN

## 2019-04-10 NOTE — PATIENT INSTRUCTIONS
Contact Numbers    [facility] Main Line: [phone]  [facility] Triage and after hours / weekends / holidays:  [phone]      Please call the triage or after hours line if you experience a temperature greater than or equal to 100.5, shaking chills, have uncontrolled nausea, vomiting and/or diarrhea, dizziness, shortness of breath, chest pain, bleeding, unexplained bruising, or if you have any other new/concerning symptoms, questions or concerns.      If you are having any concerning symptoms or wish to speak to a provider before your next infusion visit, please call your care coordinator or triage to notify them so we can adequately serve you.     If you need a refill on a narcotic prescription or other medication, please call before your infusion appointment.                 April 2019 Sunday Monday Tuesday Wednesday Thursday Friday Saturday        1     2     3     4     5     6       7     8     9     10    [facility]   1:00 PM   (15 min.)    [facility]    [facility] 120   1:30 PM   (120 min.)    ONCOLOGY INFUSION   [facility] 11     12     13       14     15     16     17     18     19     20       21     22     23     24     25     26     27       28     29     30                                     May 2019      [place] Monday Tuesday Wednesday Thursday Friday Saturday                  1     2     3     4       5     6     7     8    [facility]   1:00 PM   (15 min.)    [facility] DRAW   [facility] Lab Draw    [facility] ONC INFUSION 120   1:30 PM   (120 min.)    ONCOLOGY INFUSION   [facility] 9     10     11       12     13     14     15    [facility] RETURN   9:15 AM   (30 min.)   Suma Cornejo MD   [facility] 16     17     18       19     20     21     22     23     24     25       26     27     28     29     30     31                          Lab Results:  Recent Results (from the past  12 hour(s))   Ferritin    Collection Time: 04/10/19 12:49 PM   Result Value Ref Range    Ferritin 504 (H) 26 - 388 ng/mL   Hemoglobin    Collection Time: 04/10/19 12:49 PM   Result Value Ref Range    Hemoglobin 14.8 13.3 - 17.7 g/dL

## 2019-04-10 NOTE — NURSING NOTE
Chief Complaint   Patient presents with     Blood Draw     Labs drawn via  by RN in lab. VS taken.      Ann Gonzalez RN

## 2019-05-08 ENCOUNTER — INFUSION THERAPY VISIT (OUTPATIENT)
Dept: ONCOLOGY | Facility: CLINIC | Age: 49
End: 2019-05-08
Attending: INTERNAL MEDICINE
Payer: COMMERCIAL

## 2019-05-08 ENCOUNTER — APPOINTMENT (OUTPATIENT)
Dept: LAB | Facility: CLINIC | Age: 49
End: 2019-05-08
Attending: INTERNAL MEDICINE
Payer: COMMERCIAL

## 2019-05-08 VITALS
TEMPERATURE: 98.6 F | BODY MASS INDEX: 29.74 KG/M2 | HEART RATE: 63 BPM | DIASTOLIC BLOOD PRESSURE: 76 MMHG | OXYGEN SATURATION: 97 % | SYSTOLIC BLOOD PRESSURE: 119 MMHG | WEIGHT: 207.3 LBS | RESPIRATION RATE: 16 BRPM

## 2019-05-08 DIAGNOSIS — R79.89 ELEVATED FERRITIN: Primary | ICD-10-CM

## 2019-05-08 LAB
FERRITIN SERPL-MCNC: 308 NG/ML (ref 26–388)
HGB BLD-MCNC: 13.6 G/DL (ref 13.3–17.7)

## 2019-05-08 PROCEDURE — 85018 HEMOGLOBIN: CPT | Performed by: INTERNAL MEDICINE

## 2019-05-08 PROCEDURE — 36415 COLL VENOUS BLD VENIPUNCTURE: CPT

## 2019-05-08 PROCEDURE — 99195 PHLEBOTOMY: CPT

## 2019-05-08 PROCEDURE — 40000556 ZZH STATISTIC PERIPHERAL IV START W US GUIDANCE: Mod: ZF

## 2019-05-08 PROCEDURE — 82728 ASSAY OF FERRITIN: CPT | Performed by: INTERNAL MEDICINE

## 2019-05-08 ASSESSMENT — PAIN SCALES - GENERAL: PAINLEVEL: NO PAIN (0)

## 2019-05-08 NOTE — NURSING NOTE
Chief Complaint   Patient presents with     Blood Draw     Labs drawn via  by RN in lab. VS taken. Patient checked in for next appointment.     Labs collected from venipuncture by RN. Vitals taken. Checked in for appointment. Patient asked for IV to be placed in infusion. May need to page vascular access.    Iliana Linares RN

## 2019-05-08 NOTE — PATIENT INSTRUCTIONS
Contact Numbers    [facility] Main Line: [phone]  [facility] Triage and after hours / weekends / holidays:  [phone]      Please call the triage or after hours line if you experience a temperature greater than or equal to 100.5, shaking chills, have uncontrolled nausea, vomiting and/or diarrhea, dizziness, shortness of breath, chest pain, bleeding, unexplained bruising, or if you have any other new/concerning symptoms, questions or concerns.      If you are having any concerning symptoms or wish to speak to a provider before your next infusion visit, please call your care coordinator or triage to notify them so we can adequately serve you.     If you need a refill on a narcotic prescription or other medication, please call before your infusion appointment.           May 2019      [place] Monday Tuesday Wednesday Thursday Friday Saturday                  1     2     3     4       5     6     7     8    [facility]   1:00 PM   (15 min.)    [facility] DRAW   [facility] Lab Draw    [facility] ONC INFUSION 120   1:30 PM   (120 min.)    ONCOLOGY INFUSION   [facility] 9     10     11       12     13     14     15    [facility] RETURN   9:15 AM   (30 min.)   Suma Cornejo MD   [facility] 16     17     18       19     20     21     22     23     24     25       26     27     28     29     30     31 June 2019 Sunday Monday Tuesday Wednesday Thursday Friday Saturday                                 1       2     3     4     5     6     7     8       9     10     11     12     13     14     15       16     17     18     19     20     21     22       23     24     25     26     27     28     29       30                                                  Recent Results (from the past 24 hour(s))   Ferritin    Collection Time: 05/08/19  1:09 PM   Result Value Ref Range    Ferritin 308 26 - 388 ng/mL   Hemoglobin    Collection Time: 05/08/19  1:09 PM   Result  Value Ref Range    Hemoglobin 13.6 13.3 - 17.7 g/dL

## 2019-05-08 NOTE — PROGRESS NOTES
Infusion Nursing Note:  Alpesh GALEAS Jewelmick presents today for therapeutic phlebotomy.    Patient seen by provider today: No   present during visit today: Not Applicable.    Note: N/A.    Intravenous Access:  Peripheral IV placed.    Treatment Conditions:  Lab Results   Component Value Date    HGB 13.6 05/08/2019   Last Ferritin on 4/10/19 was 504  Labs reviewed and met treatment parameters    Post Infusion Assessment:  Patient tolerated phlebotomy without incident.  500cc whole blood removed. VS stable post phlebotomy. Patient denied any dizziness prior to discharge  Access discontinued per protocol.       Discharge Plan:   Patient declined prescription refills.  Discharge instructions reviewed with: Patient.  Patient and/or family verbalized understanding of discharge instructions and all questions answered.  AVS to patient via [employer].  Patient will return 5/15 for next appointment with Dr Cornejo  Patient discharged in stable condition accompanied by: self.  Departure Mode: Ambulatory.  Face to Face time: 0.    Eula Felix RN

## 2019-05-15 ENCOUNTER — ONCOLOGY VISIT (OUTPATIENT)
Dept: ONCOLOGY | Facility: CLINIC | Age: 49
End: 2019-05-15
Attending: INTERNAL MEDICINE
Payer: COMMERCIAL

## 2019-05-15 VITALS
SYSTOLIC BLOOD PRESSURE: 137 MMHG | WEIGHT: 206.57 LBS | TEMPERATURE: 98.4 F | RESPIRATION RATE: 16 BRPM | HEART RATE: 61 BPM | DIASTOLIC BLOOD PRESSURE: 84 MMHG | BODY MASS INDEX: 29.64 KG/M2 | OXYGEN SATURATION: 95 %

## 2019-05-15 DIAGNOSIS — R79.89 ELEVATED FERRITIN: Primary | ICD-10-CM

## 2019-05-15 PROCEDURE — G0463 HOSPITAL OUTPT CLINIC VISIT: HCPCS | Mod: ZF

## 2019-05-15 PROCEDURE — 99214 OFFICE O/P EST MOD 30 MIN: CPT | Mod: ZP | Performed by: INTERNAL MEDICINE

## 2019-05-15 ASSESSMENT — PAIN SCALES - GENERAL: PAINLEVEL: NO PAIN (0)

## 2019-05-15 NOTE — PROGRESS NOTES
PROBLEM LIST:  1.  Elevated ferritin, negative HFE gene testing, likely related to fatty liver disease.  On intermittent phlebotomy to lower ferritin.  MRI for liver iron on 8/15/2018 showed no iron overload.  2.  Hypertriglyceridemia diagnosed age 21    Interim History: Mr. Winston is here for follow up of elevated ferritin.  He has had 9 therapeutic phlebotomies since September 2018.  He tolerates the phlebotomy well.  The ferritin has dropped from 1098 ng/ml on 9/19/2018 to 308 ng/mL on 5/8/2019.  He is not sick this winter.  His arm is sore from the outside of his house.  No other concerns.  He is wondering how much more therapeutic phlebotomy he will need.      PHYSICAL EXAMINATION:  /84   Pulse 61   Temp 98.4  F (36.9  C) (Oral)   Resp 16   Wt 93.7 kg (206 lb 9.1 oz)   SpO2 95%   BMI 29.64 kg/m      General appearance:  Patient is 50 yo man in no acute distress.     HEENT:  No pallor, icterus, or mucositis.  No thyromegaly.   Lymph nodes:  No cervical, supraclavicular, axillary, or inguinal lymphadenopathy.   Lungs:  Clear to auscultation bilaterally.   Heart:  Regular rate and rhythm; no S3 S4 or murmer.     Abdomen:  Positive bowel sounds, soft and nontender, nondistended.  No hepatomegaly. No splenomegaly appreciated.    Extremities:  No joint swelling or tenderness.  No ankle edema.     Skin:  No rash, no petechiae or ecchymoses.      Assessment recommendation: Elevated ferritin related to fatty liver.  Good improvement with therapeutic phlebotomy.  I recommend 1 more therapeutic phlebotomy here.  After that he can contact [facility] to have phlebotomy there, and they will use the donated blood..  I believe he can donate every 6 weeks.  If they need information from us can call us and we can fax it over.  He should have repeat CBC with ferritin and 6 in 12 months and see me in 12 months.    Suma Cornejo MD  Hematology

## 2019-05-15 NOTE — NURSING NOTE
"Oncology Rooming Note    May 15, 2019 9:52 AM   Alpesh Winston is a 49 year old male who presents for:    Chief Complaint   Patient presents with     Oncology Clinic Visit     Elevated Ferritin     Initial Vitals: /84   Pulse 61   Temp 98.4  F (36.9  C) (Oral)   Resp 16   Wt 93.7 kg (206 lb 9.1 oz)   SpO2 95%   BMI 29.64 kg/m   Estimated body mass index is 29.64 kg/m  as calculated from the following:    Height as of 3/6/19: 1.778 m (5' 10\").    Weight as of this encounter: 93.7 kg (206 lb 9.1 oz). Body surface area is 2.15 meters squared.  No Pain (0) Comment: Data Unavailable   No LMP for male patient.  Allergies reviewed: Yes  Medications reviewed: Yes    Medications: Medication refills not needed today.  Pharmacy name entered into EPIC:    [id number] - [place], [place] - [address] AT [facility] [address]  [facility] - [address]    Clinical concerns: levels  Clemente was notified.      Reanna Cast MA              "

## 2019-05-28 NOTE — PROGRESS NOTES
Discharge Note    Progress reporting period is from initial evaluation note on  Oct. 9, 2018 to Oct 23, 2018.    Alpesh failed to follow up and current status is unknown.  Please see information below for last relevant information on current status.  Patient seen for 3 visits.    SUBJECTIVE  Subjective changes noted by patient:  Drinking herbal tea; HS soreness after skating this weekend.  Left abdominal/groin pain 2-3 days ago, similar to last several months   .  Current pain level is 4/10.     Previous pain level was   .   Changes in function:  Yes (See Goal flowsheet attached for changes in current functional level)  Adverse reaction to treatment or activity: None    OBJECTIVE  Changes noted in objective findings: No specific groin pain skating; less strain with urination; weak stream only at night.  Sustained resisted testing to abd, hip flexors, lumbar spine w/o reproduction.  Tenderness to palpation deep left lower quadrant.  Cont to dem stressors, question UTI     ASSESSMENT/PLAN  Diagnosis: Bladder pain   Updated problem list and treatment plan:   Pain - HEP  Decreased function - HEP  Impaired muscle performance - HEP  Impaired posture - HEP  STG/LTGs have been met or progress has been made towards goals:  Yes, please see goal flowsheet for most current information  Assessment of Progress: current status is unknown.    Last current status: Pt has not made progress   Self Management Plans:  HEP  I have re-evaluated this patient and find that the nature, scope, duration and intensity of the therapy is appropriate for the medical condition of the patient.  Alpesh continues to require the following intervention to meet STG and LTG's:  HEP.    Recommendations:  Discharge with current home program.  Patient to follow up with MD as needed.    Please refer to the daily flowsheet for treatment today, total treatment time and time spent performing 1:1 timed codes.    Sammi Arthur, PT

## 2019-06-03 PROBLEM — R39.89 BLADDER PAIN: Status: RESOLVED | Noted: 2018-10-10 | Resolved: 2019-06-03

## 2019-06-05 ENCOUNTER — TELEPHONE (OUTPATIENT)
Dept: FAMILY MEDICINE | Facility: CLINIC | Age: 49
End: 2019-06-05

## 2019-06-05 ENCOUNTER — INFUSION THERAPY VISIT (OUTPATIENT)
Dept: ONCOLOGY | Facility: CLINIC | Age: 49
End: 2019-06-05
Attending: INTERNAL MEDICINE
Payer: COMMERCIAL

## 2019-06-05 VITALS
SYSTOLIC BLOOD PRESSURE: 142 MMHG | TEMPERATURE: 98.5 F | OXYGEN SATURATION: 98 % | HEART RATE: 68 BPM | DIASTOLIC BLOOD PRESSURE: 85 MMHG | RESPIRATION RATE: 16 BRPM

## 2019-06-05 DIAGNOSIS — R79.89 ELEVATED FERRITIN: ICD-10-CM

## 2019-06-05 DIAGNOSIS — R73.09 ELEVATED GLUCOSE: ICD-10-CM

## 2019-06-05 DIAGNOSIS — R79.89 ELEVATED FERRITIN: Primary | ICD-10-CM

## 2019-06-05 DIAGNOSIS — E78.5 HYPERLIPIDEMIA LDL GOAL <130: Primary | ICD-10-CM

## 2019-06-05 LAB
BASOPHILS # BLD AUTO: 0 10E9/L (ref 0–0.2)
BASOPHILS NFR BLD AUTO: 0.8 %
DIFFERENTIAL METHOD BLD: NORMAL
EOSINOPHIL # BLD AUTO: 0.2 10E9/L (ref 0–0.7)
EOSINOPHIL NFR BLD AUTO: 3.2 %
ERYTHROCYTE [DISTWIDTH] IN BLOOD BY AUTOMATED COUNT: 12.8 % (ref 10–15)
FERRITIN SERPL-MCNC: 289 NG/ML (ref 26–388)
GLUCOSE SERPL-MCNC: 116 MG/DL (ref 70–99)
HBA1C MFR BLD: 5.6 % (ref 0–5.6)
HCT VFR BLD AUTO: 44.6 % (ref 40–53)
HGB BLD-MCNC: 14.6 G/DL (ref 13.3–17.7)
LYMPHOCYTES # BLD AUTO: 2.1 10E9/L (ref 0.8–5.3)
LYMPHOCYTES NFR BLD AUTO: 44.2 %
MCH RBC QN AUTO: 29.8 PG (ref 26.5–33)
MCHC RBC AUTO-ENTMCNC: 32.7 G/DL (ref 31.5–36.5)
MCV RBC AUTO: 91 FL (ref 78–100)
MONOCYTES # BLD AUTO: 0.5 10E9/L (ref 0–1.3)
MONOCYTES NFR BLD AUTO: 11.5 %
NEUTROPHILS # BLD AUTO: 1.9 10E9/L (ref 1.6–8.3)
NEUTROPHILS NFR BLD AUTO: 40.3 %
PLATELET # BLD AUTO: 262 10E9/L (ref 150–450)
RBC # BLD AUTO: 4.9 10E12/L (ref 4.4–5.9)
WBC # BLD AUTO: 4.7 10E9/L (ref 4–11)

## 2019-06-05 PROCEDURE — 83036 HEMOGLOBIN GLYCOSYLATED A1C: CPT | Performed by: INTERNAL MEDICINE

## 2019-06-05 PROCEDURE — 40000556 ZZH STATISTIC PERIPHERAL IV START W US GUIDANCE: Mod: ZF

## 2019-06-05 PROCEDURE — 36415 COLL VENOUS BLD VENIPUNCTURE: CPT | Performed by: INTERNAL MEDICINE

## 2019-06-05 PROCEDURE — 99195 PHLEBOTOMY: CPT

## 2019-06-05 PROCEDURE — 85025 COMPLETE CBC W/AUTO DIFF WBC: CPT | Performed by: INTERNAL MEDICINE

## 2019-06-05 PROCEDURE — 82728 ASSAY OF FERRITIN: CPT | Performed by: INTERNAL MEDICINE

## 2019-06-05 PROCEDURE — 80061 LIPID PANEL: CPT | Performed by: NURSE PRACTITIONER

## 2019-06-05 PROCEDURE — 82947 ASSAY GLUCOSE BLOOD QUANT: CPT | Performed by: INTERNAL MEDICINE

## 2019-06-05 ASSESSMENT — PAIN SCALES - GENERAL: PAINLEVEL: NO PAIN (0)

## 2019-06-05 NOTE — PATIENT INSTRUCTIONS
Contact Numbers  [facility]: [phone]    After Hours:  [phone]  Triage: [phone]    Please call the [facility] Triage line if you experience a temperature greater than or equal to 100.5, shaking chills, have uncontrolled nausea, vomiting and/or diarrhea, dizziness, shortness of breath, chest pain, bleeding, unexplained bruising, or if you have any other new/concerning symptoms, questions or concerns.     If it is after hours, weekends, or holidays, please call the main hospital  at  [phone] and ask to speak to the Oncology doctor on call.     If you are having any concerning symptoms or wish to speak to a provider before your next infusion visit, please call your care coordinator or triage to notify them so we can adequately serve you.     If you need a refill on a narcotic prescription or other medication, please call triage before your infusion appointment.       June 2019 Sunday Monday Tuesday Wednesday Thursday Friday Saturday                                 1       2     3     4     5    LAB   9:00 AM   (15 min.)    LAB   [facility] ONC INFUSION 120   2:30 PM   (120 min.)    ONCOLOGY INFUSION   [facility] 6     7     8       9     10     11     12     13     14     15       16     17     18     19     20     21     22       23     24     25     26     27     28     29       30                                               July 2019 Sunday Monday Tuesday Wednesday Thursday Friday Saturday        1     2     3     4     5     6       7     8     9     10     11     12     13       14     15     16     17     18     19     20       21     22     23     24     25     26     27       28     29     30     31                                    Lab Results:  Recent Results (from the past 12 hour(s))   CBC with platelets differential    Collection Time: 06/05/19  9:16 AM   Result Value Ref Range    WBC 4.7 4.0 - 11.0 10e9/L     RBC Count 4.90 4.4 - 5.9 10e12/L    Hemoglobin 14.6 13.3 - 17.7 g/dL    Hematocrit 44.6 40.0 - 53.0 %    MCV 91 78 - 100 fl    MCH 29.8 26.5 - 33.0 pg    MCHC 32.7 31.5 - 36.5 g/dL    RDW 12.8 10.0 - 15.0 %    Platelet Count 262 150 - 450 10e9/L    % Neutrophils 40.3 %    % Lymphocytes 44.2 %    % Monocytes 11.5 %    % Eosinophils 3.2 %    % Basophils 0.8 %    Absolute Neutrophil 1.9 1.6 - 8.3 10e9/L    Absolute Lymphocytes 2.1 0.8 - 5.3 10e9/L    Absolute Monocytes 0.5 0.0 - 1.3 10e9/L    Absolute Eosinophils 0.2 0.0 - 0.7 10e9/L    Absolute Basophils 0.0 0.0 - 0.2 10e9/L    Diff Method Automated Method    **A1C FUTURE anytime    Collection Time: 06/05/19  9:16 AM   Result Value Ref Range    Hemoglobin A1C 5.6 0 - 5.6 %

## 2019-06-05 NOTE — TELEPHONE ENCOUNTER
Pt was here for a lab only appointment. Devora from lab said he would also like his cholesterol checked. They jocelin an extra tube for this but needs order.     Pended orders please sign off.     Crystal Riley MA

## 2019-06-05 NOTE — PROGRESS NOTES
Infusion Nursing Note:  Alpesh GALEAS Tish presents today for phlebotomy.    Patient seen by provider today: No   present during visit today: Not Applicable.    Note: Patient arrives to infusion with no issues or complaints. Reports feeling well today.    Intravenous Access:  Peripheral IV placed by Vascular access.    Treatment Conditions:  Labs within last month (5/8/19):   Ferritin of 308  Today's Hemoglobin 14.6    Per order, if ferritin>100 within the previous month, then phlebotomize 500 mL.     Post Infusion Assessment:  Patient tolerated phlebotomy infusion without incident.  Blood return noted pre and post infusion.  500mL phlebotomized  Vital signs stable post. See in flowsheets  Access discontinued per protocol.     Discharge Plan:   Patient declined prescription refills.  Discharge instructions reviewed with: Patient.  Patient and/or family verbalized understanding of discharge instructions and all questions answered.  AVS to patient via [employer].  No return days, this is patient's last phlebotomy.  Patient discharged in stable condition accompanied by: self.  Departure Mode: Ambulatory.    Padma Bernal RN

## 2019-06-06 LAB
CHOLEST SERPL-MCNC: 201 MG/DL
HDLC SERPL-MCNC: 29 MG/DL
LDLC SERPL CALC-MCNC: 104 MG/DL
NONHDLC SERPL-MCNC: 172 MG/DL
TRIGL SERPL-MCNC: 340 MG/DL

## 2019-06-13 ENCOUNTER — MYC MEDICAL ADVICE (OUTPATIENT)
Dept: FAMILY MEDICINE | Facility: CLINIC | Age: 49
End: 2019-06-13

## 2019-06-13 DIAGNOSIS — E78.5 HYPERLIPIDEMIA LDL GOAL <130: ICD-10-CM

## 2019-06-13 RX ORDER — ATORVASTATIN CALCIUM 20 MG/1
20 TABLET, FILM COATED ORAL DAILY
Qty: 90 TABLET | Refills: 1 | Status: SHIPPED | OUTPATIENT
Start: 2019-06-13 | End: 2020-03-23

## 2019-06-13 RX ORDER — FENOFIBRATE 160 MG/1
TABLET ORAL
Qty: 90 TABLET | Refills: 1 | Status: SHIPPED | OUTPATIENT
Start: 2019-06-13 | End: 2020-03-23

## 2019-06-19 ENCOUNTER — TELEPHONE (OUTPATIENT)
Dept: FAMILY MEDICINE | Facility: CLINIC | Age: 49
End: 2019-06-19

## 2019-06-19 NOTE — TELEPHONE ENCOUNTER
Returned call to [facility], unable to connect with live representative, left voicemail to return call to clinic    Sun Hendrix, RN  Triage Nurse

## 2019-06-19 NOTE — TELEPHONE ENCOUNTER
Reason for Call:  Medication or medication refill:    Do you use a [facility]?  Name of the pharmacy and phone number for the current request:  [facility] - [place], [place] - [address] AT PORTAL TO REGISTERED [facility] S[address]    Name of the medication requested: fenofibrate (TRIGLIDE/LOFIBRA) 160 MG tablet    Other request: Pharmacy is wondering which brand should be prescribed. States Triglide and Lofibra are two different brands. Please confirm. Reference number #[id number] Thanks!    Can we leave a detailed message on this number? YES    Phone number patient can be reached at: Other phone number: [phone]    Best Time: Any    Call taken on 6/19/2019 at 12:31 PM by Liliana Kamara

## 2019-06-26 NOTE — TELEPHONE ENCOUNTER
Spoke with patient, relayed provider message below. Patient verbalized understanding, per patient, he has a change in insurance so he thinks that's where the issue came from. Patient states he has received the medication and has no further issues or concerns at this time    Sun Hendrix, RN  Triage Nurse

## 2019-07-30 ENCOUNTER — OFFICE VISIT (OUTPATIENT)
Dept: FAMILY MEDICINE | Facility: CLINIC | Age: 49
End: 2019-07-30
Payer: COMMERCIAL

## 2019-07-30 VITALS
TEMPERATURE: 98.1 F | HEART RATE: 59 BPM | WEIGHT: 207.25 LBS | DIASTOLIC BLOOD PRESSURE: 72 MMHG | SYSTOLIC BLOOD PRESSURE: 130 MMHG | HEIGHT: 70 IN | BODY MASS INDEX: 29.67 KG/M2 | OXYGEN SATURATION: 96 %

## 2019-07-30 DIAGNOSIS — M25.521 RIGHT ELBOW PAIN: ICD-10-CM

## 2019-07-30 DIAGNOSIS — Z12.5 SCREENING FOR PROSTATE CANCER: ICD-10-CM

## 2019-07-30 DIAGNOSIS — Z00.00 ROUTINE GENERAL MEDICAL EXAMINATION AT A HEALTH CARE FACILITY: Primary | ICD-10-CM

## 2019-07-30 PROCEDURE — 99396 PREV VISIT EST AGE 40-64: CPT | Performed by: FAMILY MEDICINE

## 2019-07-30 ASSESSMENT — ENCOUNTER SYMPTOMS
DIZZINESS: 0
DIARRHEA: 0
FEVER: 0
HEMATURIA: 0
COUGH: 0
NERVOUS/ANXIOUS: 0
CONSTIPATION: 0
CHILLS: 0
HEMATOCHEZIA: 0
EYE PAIN: 0
ABDOMINAL PAIN: 0

## 2019-07-30 ASSESSMENT — MIFFLIN-ST. JEOR: SCORE: 1824.51

## 2019-07-30 NOTE — PROGRESS NOTES
SUBJECTIVE:   CC: Alpesh Winston is an 49 year old male who presents for preventative health visit.     Healthy Habits:     Getting at least 3 servings of Calcium per day:  Yes    Bi-annual eye exam:  Yes    Dental care twice a year:  Yes    Sleep apnea or symptoms of sleep apnea:  None    Diet:  Regular (no restrictions)    Frequency of exercise:  2-3 days/week    Duration of exercise:  15-30 minutes    Taking medications regularly:  Yes    Medication side effects:  Muscle aches    PHQ-2 Total Score: 0    Additional concerns today:  No    Today's PHQ-2 Score:   PHQ-2 ( 1999 Pf[employer]) 7/30/2019   Q1: Little interest or pleasure in doing things 0   Q2: Feeling down, depressed or hopeless 0   PHQ-2 Score 0   Q1: Little interest or pleasure in doing things Not at all   Q2: Feeling down, depressed or hopeless Not at all   PHQ-2 Score 0     Abuse: Current or Past(Physical, Sexual or Emotional)- No  Do you feel safe in your environment? Yes    Social History     Tobacco Use     Smoking status: Never Smoker     Smokeless tobacco: Current User     Types: Chew     Tobacco comment: chewing less   Substance Use Topics     Alcohol use: Yes     Alcohol/week: 0.0 oz     Comment: 6-8 drinks a week      If you drink alcohol do you typically have >3 drinks per day or >7 drinks per week? No    Alcohol Use 7/30/2019   Prescreen: >3 drinks/day or >7 drinks/week? No   Prescreen: >3 drinks/day or >7 drinks/week? -   AUDIT SCORE  -     AUDIT - Alcohol Use Disorders Identification Test - Reproduced from the World Health Organization Audit 2001 (Second Edition) 12/12/2018   1.  How often do you have a drink containing alcohol? 2 to 3 times a week   2.  How many drinks containing alcohol do you have on a typical day when you are drinking? 3 or 4   3.  How often do you have five or more drinks on one occasion? Weekly   4.  How often during the last year have you found that you were not able to stop drinking once you had started? Never    5.  How often during the last year have you failed to do what was normally expected of you because of drinking? Never   6.  How often during the last year have you needed a first drink in the morning to get yourself going after a heavy drinking session? Never   7.  How often during the last year have you had a feeling of guilt or remorse after drinking? Never   8.  How often during the last year have you been unable to remember what happened the night before because of your drinking? Never   9.  Have you or someone else been injured because of your drinking? No   10. Has a relative, friend, doctor or other health care worker been concerned about your drinking or suggested you cut down? No   TOTAL SCORE 7     Last PSA:   PSA   Date Value Ref Range Status   12/19/2018 1.15 0 - 4 ug/L Final     Comment:     Assay Method:  Chemiluminescence using Siemens Vista analyzer     Reviewed orders with patient. Reviewed health maintenance and updated orders accordingly - Yes       Reviewed and updated as needed this visit by clinical staff  Tobacco  Meds       Reviewed and updated as needed this visit by Provider    Right elbow - some soreness. Hold post and accidentally pulled and it is improving significantly but not back to normal.   Right handed.   Seeing hematologist for high ferritin.   Getting statin.     Father with prostate cancer in his 50s.     Review of Systems   Constitutional: Negative for chills and fever.   HENT: Negative for congestion and ear pain.    Eyes: Negative for pain.   Respiratory: Negative for cough.    Cardiovascular: Negative for chest pain.   Gastrointestinal: Negative for abdominal pain, constipation, diarrhea and hematochezia.   Genitourinary: Negative for hematuria.   Neurological: Negative for dizziness.   Psychiatric/Behavioral: The patient is not nervous/anxious.      OBJECTIVE:   /72 (BP Location: Left arm, Patient Position: Sitting, Cuff Size: Adult Large)   Pulse 59   Temp 98.1  " F (36.7  C) (Oral)   Ht 1.783 m (5' 10.2\")   Wt 94 kg (207 lb 4 oz)   SpO2 96%   BMI 29.57 kg/m      Physical Exam  GENERAL: healthy, alert and no distress  EYES: Eyes grossly normal to inspection, PERRL and conjunctivae and sclerae normal  HENT: ear canals and TM's normal, nose and mouth without ulcers or lesions  NECK: no adenopathy, no asymmetry, masses, or scars and thyroid normal to palpation  RESP: lungs clear to auscultation - no rales, rhonchi or wheezes  CV: regular rate and rhythm, normal S1 S2, no S3 or S4, no murmur, click or rub, no peripheral edema and peripheral pulses strong  ABDOMEN: soft, nontender, no hepatosplenomegaly, no masses and bowel sounds normal   (male): deferred/declined.  MS: no gross musculoskeletal defects noted, no edema,  Right elbow in antecubital fossa mild diffuse tenderness.  No palpable deformity.  , strength, reflexes are normal.  SKIN: no suspicious lesions or rashes  NEURO: Normal strength and tone, mentation intact and speech normal  PSYCH: mentation appears normal, affect normal/bright    ASSESSMENT/PLAN:   1. Routine general medical examination at a health care facility  He had recent lab test done.  We will skip the labs except for PSA as per his request.  Filled out his form.  -Offered him to see sports medicine for his right elbow but he would like to hold off.  He will call for referral if needed.  He feels it is slowly but improving.    2. Screening for prostate cancer     - PSA, screen; Future    COUNSELING:   Reviewed preventive health counseling, as reflected in patient instructions  Special attention given to:        Regular exercise       Healthy diet/nutrition       Vision screening       Hearing screening       Colon cancer screening       Prostate cancer screening    Estimated body mass index is 29.57 kg/m  as calculated from the following:    Height as of this encounter: 1.783 m (5' 10.2\").    Weight as of this encounter: 94 kg (207 lb 4 oz).   "   Weight management plan: Discussed healthy diet and exercise guidelines     reports that he has never smoked. His smokeless tobacco use includes chew.      Counseling Resources:  ATP IV Guidelines  Pooled Cohorts Equation Calculator  FRAX Risk Assessment  ICSI Preventive Guidelines  Dietary Guidelines for Americans, 2010  USDA's MyPlate  ASA Prophylaxis  Lung CA Screening    Layton Omalley MD, MD  [facility]

## 2019-09-25 ENCOUNTER — TELEPHONE (OUTPATIENT)
Dept: FAMILY MEDICINE | Facility: CLINIC | Age: 49
End: 2019-09-25

## 2019-09-25 DIAGNOSIS — E78.1 HYPERTRIGLYCERIDEMIA: Primary | ICD-10-CM

## 2019-09-25 NOTE — TELEPHONE ENCOUNTER
Reason for Call:  Other lab order    Detailed comments: Patient is scheduled for a lab only appointment at  on 9/27/2019 and would like an order placed for lipid panel. Please assist. Thanks!    Phone Number Patient can be reached at: Home number on file [phone] (home)    Best Time: Any    Can we leave a detailed message on this number? YES    Call taken on 9/25/2019 at 12:38 PM by Liliana Kamara

## 2019-09-27 DIAGNOSIS — Z12.5 SCREENING FOR PROSTATE CANCER: ICD-10-CM

## 2019-09-27 DIAGNOSIS — E78.1 HYPERTRIGLYCERIDEMIA: ICD-10-CM

## 2019-09-27 DIAGNOSIS — R79.89 ELEVATED FERRITIN: ICD-10-CM

## 2019-09-27 LAB
BASOPHILS # BLD AUTO: 0 10E9/L (ref 0–0.2)
BASOPHILS NFR BLD AUTO: 0.8 %
CHOLEST SERPL-MCNC: 202 MG/DL
DIFFERENTIAL METHOD BLD: NORMAL
EOSINOPHIL # BLD AUTO: 0.2 10E9/L (ref 0–0.7)
EOSINOPHIL NFR BLD AUTO: 3.3 %
ERYTHROCYTE [DISTWIDTH] IN BLOOD BY AUTOMATED COUNT: 13 % (ref 10–15)
FERRITIN SERPL-MCNC: 343 NG/ML (ref 26–388)
HCT VFR BLD AUTO: 44.5 % (ref 40–53)
HDLC SERPL-MCNC: 28 MG/DL
HGB BLD-MCNC: 14.9 G/DL (ref 13.3–17.7)
LDLC SERPL CALC-MCNC: 102 MG/DL
LYMPHOCYTES # BLD AUTO: 1.9 10E9/L (ref 0.8–5.3)
LYMPHOCYTES NFR BLD AUTO: 39.8 %
MCH RBC QN AUTO: 30.5 PG (ref 26.5–33)
MCHC RBC AUTO-ENTMCNC: 33.5 G/DL (ref 31.5–36.5)
MCV RBC AUTO: 91 FL (ref 78–100)
MONOCYTES # BLD AUTO: 0.6 10E9/L (ref 0–1.3)
MONOCYTES NFR BLD AUTO: 11.6 %
NEUTROPHILS # BLD AUTO: 2.1 10E9/L (ref 1.6–8.3)
NEUTROPHILS NFR BLD AUTO: 44.5 %
NONHDLC SERPL-MCNC: 174 MG/DL
PLATELET # BLD AUTO: 248 10E9/L (ref 150–450)
PSA SERPL-ACNC: 1.11 UG/L (ref 0–4)
RBC # BLD AUTO: 4.89 10E12/L (ref 4.4–5.9)
TRIGL SERPL-MCNC: 361 MG/DL
WBC # BLD AUTO: 4.8 10E9/L (ref 4–11)

## 2019-09-27 PROCEDURE — 85025 COMPLETE CBC W/AUTO DIFF WBC: CPT | Performed by: NURSE PRACTITIONER

## 2019-09-27 PROCEDURE — G0103 PSA SCREENING: HCPCS | Performed by: NURSE PRACTITIONER

## 2019-09-27 PROCEDURE — 82728 ASSAY OF FERRITIN: CPT | Performed by: NURSE PRACTITIONER

## 2019-09-27 PROCEDURE — 36415 COLL VENOUS BLD VENIPUNCTURE: CPT | Performed by: NURSE PRACTITIONER

## 2019-09-27 PROCEDURE — 80061 LIPID PANEL: CPT | Performed by: NURSE PRACTITIONER

## 2019-09-30 ENCOUNTER — MYC MEDICAL ADVICE (OUTPATIENT)
Dept: FAMILY MEDICINE | Facility: CLINIC | Age: 49
End: 2019-09-30

## 2019-09-30 NOTE — RESULT ENCOUNTER NOTE
Marck Mr. Winston,  Your results came back and are within acceptable limits. -PSA (prostate specific antigen) test is normal.  This indicates a low likelihood of prostate cancer.  ADVISE: rechecking this in 1 year.. If you have any further concerns please do not hesitate to contact us by message, phone or making an appointment.  Have a good day   Dr Sandro DAVILA

## 2019-10-03 ENCOUNTER — HEALTH MAINTENANCE LETTER (OUTPATIENT)
Age: 49
End: 2019-10-03

## 2019-10-22 ENCOUNTER — APPOINTMENT (OUTPATIENT)
Dept: LAB | Facility: CLINIC | Age: 49
End: 2019-10-22
Attending: INTERNAL MEDICINE
Payer: COMMERCIAL

## 2019-10-22 ENCOUNTER — INFUSION THERAPY VISIT (OUTPATIENT)
Dept: ONCOLOGY | Facility: CLINIC | Age: 49
End: 2019-10-22
Attending: INTERNAL MEDICINE
Payer: COMMERCIAL

## 2019-10-22 VITALS
RESPIRATION RATE: 18 BRPM | WEIGHT: 208.1 LBS | DIASTOLIC BLOOD PRESSURE: 78 MMHG | BODY MASS INDEX: 29.69 KG/M2 | SYSTOLIC BLOOD PRESSURE: 123 MMHG | HEART RATE: 74 BPM | TEMPERATURE: 97.3 F | OXYGEN SATURATION: 96 %

## 2019-10-22 DIAGNOSIS — R79.89 ELEVATED FERRITIN: Primary | ICD-10-CM

## 2019-10-22 LAB
ALT SERPL W P-5'-P-CCNC: 63 U/L (ref 0–70)
BASOPHILS # BLD AUTO: 0.1 10E9/L (ref 0–0.2)
BASOPHILS NFR BLD AUTO: 1.7 %
DIFFERENTIAL METHOD BLD: NORMAL
EOSINOPHIL # BLD AUTO: 0.1 10E9/L (ref 0–0.7)
EOSINOPHIL NFR BLD AUTO: 1.9 %
ERYTHROCYTE [DISTWIDTH] IN BLOOD BY AUTOMATED COUNT: 12.3 % (ref 10–15)
FERRITIN SERPL-MCNC: 406 NG/ML (ref 26–388)
HCT VFR BLD AUTO: 45.4 % (ref 40–53)
HGB BLD-MCNC: 15.1 G/DL (ref 13.3–17.7)
IMM GRANULOCYTES # BLD: 0 10E9/L (ref 0–0.4)
IMM GRANULOCYTES NFR BLD: 0.4 %
LYMPHOCYTES # BLD AUTO: 1.8 10E9/L (ref 0.8–5.3)
LYMPHOCYTES NFR BLD AUTO: 36.6 %
MCH RBC QN AUTO: 29.9 PG (ref 26.5–33)
MCHC RBC AUTO-ENTMCNC: 33.3 G/DL (ref 31.5–36.5)
MCV RBC AUTO: 90 FL (ref 78–100)
MONOCYTES # BLD AUTO: 0.4 10E9/L (ref 0–1.3)
MONOCYTES NFR BLD AUTO: 7.9 %
NEUTROPHILS # BLD AUTO: 2.5 10E9/L (ref 1.6–8.3)
NEUTROPHILS NFR BLD AUTO: 51.5 %
NRBC # BLD AUTO: 0 10*3/UL
NRBC BLD AUTO-RTO: 0 /100
PLATELET # BLD AUTO: 296 10E9/L (ref 150–450)
RBC # BLD AUTO: 5.05 10E12/L (ref 4.4–5.9)
WBC # BLD AUTO: 4.8 10E9/L (ref 4–11)

## 2019-10-22 PROCEDURE — G0463 HOSPITAL OUTPT CLINIC VISIT: HCPCS | Mod: 25

## 2019-10-22 PROCEDURE — 84460 ALANINE AMINO (ALT) (SGPT): CPT | Performed by: NURSE PRACTITIONER

## 2019-10-22 PROCEDURE — 25000128 H RX IP 250 OP 636: Mod: ZF | Performed by: INTERNAL MEDICINE

## 2019-10-22 PROCEDURE — 40000556 ZZH STATISTIC PERIPHERAL IV START W US GUIDANCE: Mod: ZF

## 2019-10-22 PROCEDURE — 99195 PHLEBOTOMY: CPT

## 2019-10-22 PROCEDURE — 96360 HYDRATION IV INFUSION INIT: CPT

## 2019-10-22 PROCEDURE — 82728 ASSAY OF FERRITIN: CPT | Performed by: INTERNAL MEDICINE

## 2019-10-22 PROCEDURE — 85025 COMPLETE CBC W/AUTO DIFF WBC: CPT | Performed by: INTERNAL MEDICINE

## 2019-10-22 RX ADMIN — SODIUM CHLORIDE 500 ML: 9 INJECTION, SOLUTION INTRAVENOUS at 14:43

## 2019-10-22 ASSESSMENT — PAIN SCALES - GENERAL: PAINLEVEL: NO PAIN (0)

## 2019-10-22 NOTE — PROGRESS NOTES
Infusion Nursing Note:  Alpesh Winston presents today for Therapeutic Phlebotomy.  Patient seen by provider today: No   present during visit today: Not Applicable.    Note: .        Patient presents to infusion clinic feeling well. He has no pain and states no changes to overall health. Patient's Ferritin was 406. Patient states concerns and questions about Ferritin trends. He plans to to go to [facility] next month to donate blood since his history of international travel restriction will have been completed. He inquires whether he can donate blood and have Ferritin blood checks every 4 weeks instead of every 6 weeks. In basket message sent to Dr. Cornejo and his care coordinator to help facilitate questions.         Intravenous Access:  Peripheral IV placed. 20G in lab    Treatment Conditions:  Lab Results   Component Value Date    HGB 15.1 10/22/2019     Lab Results   Component Value Date    WBC 4.8 10/22/2019      Lab Results   Component Value Date    ANEU 2.5 10/22/2019     Lab Results   Component Value Date     10/22/2019      Lab Results   Component Value Date     08/06/2018                   Lab Results   Component Value Date    POTASSIUM 4.2 08/06/2018           No results found for: MAG         Lab Results   Component Value Date    CR 0.91 08/06/2018                   Lab Results   Component Value Date    NAOMI 9.2 08/06/2018                Lab Results   Component Value Date    BILITOTAL 0.3 08/06/2018           Lab Results   Component Value Date    ALBUMIN 4.2 08/06/2018                    Lab Results   Component Value Date    ALT 63 10/22/2019           Lab Results   Component Value Date    AST 27 08/06/2018       Results reviewed, labs MET treatment parameters, ok to proceed with treatment. Ferritin 343 at time of treatment plan initiation. 500cc's of whole blood removed. Heat applied to IV site to help facilitate blood removal. Do to absence of adequate/very  sluggish peripheral IV flow, 200cc's of blood were manually removed via 10cc syringes increments      Post Infusion Assessment:  Patient tolerated infusion without incident. Mild dizziness noted post phlebotomy, therefore 500cc NS given with resolution of symptoms. vss throughout entire encounter.   Blood return noted pre and post infusion.  Site patent and intact, free from redness, edema or discomfort.  No evidence of extravasations.  Access discontinued per protocol.       Discharge Plan:   Patient declined prescription refills.  Discharge instructions reviewed with: Patient.  Patient and/or family verbalized understanding of discharge instructions and all questions answered.  AVS to patient via beqomHART.  Patient blood work follow-up to be determined per Dr. Ash  Patient discharged in stable condition accompanied by: self.  Departure Mode: Ambulatory.  Face to Face time: 10 minutes.    Nj Barker RN

## 2019-10-22 NOTE — NURSING NOTE
Chief Complaint   Patient presents with     Blood Draw     Labs drawn via PIV placed by vascular access. VS taken. Patient checked in for next appt.     Labs drawn from PIV placed by vascular access RN. Line flushed with saline. Vitals taken. Pt checked in for appointment.    Iliana Linares RN

## 2019-10-22 NOTE — PATIENT INSTRUCTIONS
Have Ferritin checked in 1 month  Expect to come back May 2019 to see Dr. Clemente Juarez Triage and after hours / weekends / holidays:  [phone]    Please call the triage or after hours line if you experience a temperature greater than or equal to 100.5, shaking chills, have uncontrolled nausea, vomiting and/or diarrhea, dizziness, shortness of breath, chest pain, bleeding, unexplained bruising, or if you have any other new/concerning symptoms, questions or concerns.      If you are having any concerning symptoms or wish to speak to a provider before your next infusion visit, please call your care coordinator or triage to notify them so we can adequately serve you.     If you need a refill on a narcotic prescription or other medication, please call before your infusion appointment.           October 2019 Sunday Monday Tuesday Wednesday Thursday Friday Saturday             1     2     3     4     5       6     7     8     9     10     11     12       13     14     15     16     17     18     19       20     21     22    [facility]   1[facility]00 [facility]   (15 min.)   [facility]   [facility] Lab Draw    [facility] ONC INFUSION 120   1:30 PM   (120 min.)    ONCOLOGY INFUSION   [facility] 23     24     25     26       27     28     29     30     31 November 2019 Sunday Monday Tuesday Wednesday Thursday Friday Saturday                            1     2       3     4     5     6     7     8     9       10     11     12     13     14     15     16       17     18     19     20     21     22     23       24     25     26     27     28     29     30                     Lab Results:  Recent Results (from the past 12 hour(s))   CBC with platelets differential    Collection Time: 10/22/19  1:15 PM   Result Value Ref Range    WBC 4.8 4.0 - 11.0 10e9/L    RBC Count 5.05 4.4 - 5.9 10e12/L    Hemoglobin 15.1 13.3 - 17.7 g/dL    Hematocrit 45.4 40.0 - 53.0 %     MCV 90 78 - 100 fl    MCH 29.9 26.5 - 33.0 pg    MCHC 33.3 31.5 - 36.5 g/dL    RDW 12.3 10.0 - 15.0 %    Platelet Count 296 150 - 450 10e9/L    Diff Method Automated Method     % Neutrophils 51.5 %    % Lymphocytes 36.6 %    % Monocytes 7.9 %    % Eosinophils 1.9 %    % Basophils 1.7 %    % Immature Granulocytes 0.4 %    Nucleated RBCs 0 0 /100    Absolute Neutrophil 2.5 1.6 - 8.3 10e9/L    Absolute Lymphocytes 1.8 0.8 - 5.3 10e9/L    Absolute Monocytes 0.4 0.0 - 1.3 10e9/L    Absolute Eosinophils 0.1 0.0 - 0.7 10e9/L    Absolute Basophils 0.1 0.0 - 0.2 10e9/L    Abs Immature Granulocytes 0.0 0 - 0.4 10e9/L    Absolute Nucleated RBC 0.0    **ALT FUTURE anytime    Collection Time: 10/22/19  1:15 PM   Result Value Ref Range    ALT 63 0 - 70 U/L

## 2019-10-25 ENCOUNTER — TELEPHONE (OUTPATIENT)
Dept: ONCOLOGY | Facility: CLINIC | Age: 49
End: 2019-10-25

## 2019-10-25 NOTE — TELEPHONE ENCOUNTER
Spoke with Alpesh re: Alpesh's preference to have phlebotomy completed at [facility] every four weeks. Informed Alpesh that he cannot have donate blood every four weeks at [facility] because he has tested negative for Hereditary Hemochromatosis. Explained he can have it done very eight weeks as a regular donation. If he needs additional phlebotomy done at the four week clyde, he can have it done at a [place] facility of his choice. This will not impact his ability to give at [facility].     Alpesh verbalized understanding. Encouraged him to call or contact us via [employer] with any additional questions or concerns.     France Read, ADITIN, RN  RN Care Coordinator  Dr. Cunningham

## 2019-12-10 ENCOUNTER — OFFICE VISIT (OUTPATIENT)
Dept: FAMILY MEDICINE | Facility: CLINIC | Age: 49
End: 2019-12-10
Payer: COMMERCIAL

## 2019-12-10 VITALS
TEMPERATURE: 98.5 F | DIASTOLIC BLOOD PRESSURE: 80 MMHG | OXYGEN SATURATION: 97 % | RESPIRATION RATE: 23 BRPM | SYSTOLIC BLOOD PRESSURE: 136 MMHG | BODY MASS INDEX: 30.67 KG/M2 | WEIGHT: 215 LBS | HEART RATE: 77 BPM

## 2019-12-10 DIAGNOSIS — L81.2 FRECKLES: ICD-10-CM

## 2019-12-10 DIAGNOSIS — L91.8 SKIN TAG: Primary | ICD-10-CM

## 2019-12-10 PROCEDURE — 99213 OFFICE O/P EST LOW 20 MIN: CPT | Performed by: PHYSICIAN ASSISTANT

## 2019-12-10 NOTE — PROGRESS NOTES
Subjective     Alpesh Winston is a 49 year old male who presents to clinic today for the following health issues:    HPI     Derm Issue      Duration: Noticed over the summer    Description (location/character/radiation): right shoulder     Intensity:  mild    Accompanying signs and symptoms: small, itchy     History (similar episodes/previous evaluation): None    Therapies tried and outcome: pulling it off, squeezing it         Patient Active Problem List   Diagnosis     ALLERGIC RHINITIS      HYPERLIPIDEMIA LDL GOAL <130     Family history of diabetes mellitus     Hypertriglyceridemia     Family history of coronary artery disease     Chews tobacco     Gastroesophageal reflux disease without esophagitis     Elevated ferritin     Past Surgical History:   Procedure Laterality Date     ABDOMEN SURGERY      Hernia surgery     ENT SURGERY       EYE SURGERY      SURGERY X 2 BILAT TEAR DUCTS      HEAD & NECK SURGERY       HERNIA REPAIR       LAPAROSCOPIC HERNIORRHAPHY INGUINAL BILATERAL Bilateral 10/27/2016    Procedure: LAPAROSCOPIC HERNIORRHAPHY INGUINAL BILATERAL;  Surgeon: Nellie Duron MD;  Location: [facility] OR     SOFT TISSUE SURGERY      Many lypoma removed - Multiple dates     SURGICAL HISTORY OF -       removal of lipoma X 4     SURGICAL HISTORY OF -   10/00    left inguinal hernia repair     SURGICAL HISTORY OF -       wisdom teeth extraction       Social History     Tobacco Use     Smoking status: Never Smoker     Smokeless tobacco: Current User     Types: Chew     Tobacco comment: chewing less   Substance Use Topics     Alcohol use: Yes     Alcohol/week: 0.0 standard drinks     Comment: 6-8 drinks a week      Family History   Problem Relation Age of Onset     C.A.D. Maternal Grandmother         MI X 2 ,  in her 60's      Coronary Artery Disease Maternal Grandmother      Hypertension Maternal Grandmother      Hyperlipidemia Maternal Grandmother      Other Cancer Maternal Grandmother          cervical     Breast Cancer Maternal Grandmother      Heart Disease Father      Diabetes Father      Prostate Cancer Father      Hypertension Father      Diabetes Paternal Grandmother         type 2     Breast Cancer Sister      Alzheimer Disease Mother 74        CBD     Hypertension Mother      Alcohol/Drug Maternal Uncle      C.A.D. Maternal Uncle         MI age 56     Substance Abuse Other      Cerebrovascular Disease No family hx of      Cancer - colorectal No family hx of          Current Outpatient Medications   Medication Sig Dispense Refill     atorvastatin (LIPITOR) 20 MG tablet Take 1 tablet (20 mg) by mouth daily 90 tablet 1     COENZYME Q-10 PO Take 20 mg by mouth daily       fenofibrate (TRIGLIDE/LOFIBRA) 160 MG tablet TAKE 1 TABLET DAILY (DUE FOR APPOINTMENT NOW) 90 tablet 1     Omega-3 Fatty Acids (FISH OIL PO)        Allergies   Allergen Reactions     Omnicef Hives     Sulfa Drugs Rash     Recent Labs   Lab Test 10/22/19  1315 09/27/19  0923 06/05/19  0916 12/19/18  0933 08/06/18  1516 05/16/18  0906 03/21/18  0907  01/05/12  1527   A1C  --   --  5.6  --   --  5.7* 5.7   < >  --    LDL  --  102* 104* 121*  --   --   --    < >  --    HDL  --  28* 29* 35*  --   --   --    < >  --    TRIG  --  361* 340* 260*  --   --   --    < >  --    ALT 63  --   --   --  47 51  --    < >  --    CR  --   --   --   --  0.91 0.87  --    < >  --    GFRESTIMATED  --   --   --   --  89 >90  --    < >  --    GFRESTBLACK  --   --   --   --  >90 >90  --    < >  --    POTASSIUM  --   --   --   --  4.2 4.6  --    < >  --    TSH  --   --   --   --  1.67  --   --   --  1.72    < > = values in this interval not displayed.      BP Readings from Last 3 Encounters:   12/10/19 136/80   10/22/19 123/78   07/30/19 130/72    Wt Readings from Last 3 Encounters:   12/10/19 97.5 kg (215 lb)   10/22/19 94.4 kg (208 lb 1.6 oz)   07/30/19 94 kg (207 lb 4 oz)                    Reviewed and updated as needed this visit by Provider  Tobacco   "Allergies  Meds  Problems  Med Hx  Surg Hx  Fam Hx         Review of Systems   ROS COMP: Constitutional, HEENT, cardiovascular, pulmonary, GI, , musculoskeletal, neuro, skin, endocrine and psych systems are negative, except as otherwise noted.      Objective    /80   Pulse 77   Temp 98.5  F (36.9  C) (Oral)   Resp 23   Wt 97.5 kg (215 lb)   SpO2 97%   BMI 30.67 kg/m    Body mass index is 30.67 kg/m .  Physical Exam   GENERAL: healthy, alert and no distress  RESP: lungs clear to auscultation - no rales, rhonchi or wheezes  CV: regular rate and rhythm, normal S1 S2, no S3 or S4, no murmur, click or rub, no peripheral edema and peripheral pulses strong  MS: no gross musculoskeletal defects noted, no edema  SKIN: no suspicious lesions or rashes; two benign appearing skin tags on right shoulder and left clavicle area  PSYCH: mentation appears normal, affect normal/bright    Diagnostic Test Results:  Labs reviewed in Epic        Assessment & Plan       ICD-10-CM    1. Skin tag L91.8    2. Freckles L81.2 DERMATOLOGY REFERRAL        Tobacco Cessation:   reports that he has never smoked. His smokeless tobacco use includes chew.        BMI:   Estimated body mass index is 30.67 kg/m  as calculated from the following:    Height as of 7/30/19: 1.783 m (5' 10.2\").    Weight as of this encounter: 97.5 kg (215 lb).       Patient Instructions   Reassured today.  Referral for routine skin check encouraged though.  Return to clinic with any worsening or changes in symptoms and follow up with PCP for routine care.       Return in about 1 year (around 12/10/2020) for Routine visit, or sooner with worsening symptoms.    Bridgette Recinos PA-C  [facility]    "

## 2019-12-10 NOTE — PATIENT INSTRUCTIONS
Reassured today.  Referral for routine skin check encouraged though.  Return to clinic with any worsening or changes in symptoms and follow up with PCP for routine care.

## 2020-01-07 ENCOUNTER — MYC MEDICAL ADVICE (OUTPATIENT)
Dept: FAMILY MEDICINE | Facility: CLINIC | Age: 50
End: 2020-01-07

## 2020-01-08 ENCOUNTER — VIRTUAL VISIT (OUTPATIENT)
Dept: FAMILY MEDICINE | Facility: OTHER | Age: 50
End: 2020-01-08

## 2020-01-08 NOTE — PROGRESS NOTES
"Date: 2020 11:55:46  Clinician: Alee Camacho  Clinician NPI: [id number]  Patient: Alpesh Winston  Patient : 1970  Patient Address: [address] [place] [id number]  Patient Phone: [phone]  Visit Protocol: URI  Patient Summary:  Alpesh is a 50 year old ( : 1970 ) male who initiated a Visit for cold, sinus infection, or influenza. When asked the question \"Please sign me up to receive news, health information and promotions. \", Alpesh responded \"No\".    Alpesh states his symptoms started gradually 7-9 days ago.   His symptoms consist of a headache, a sore throat, ear pain, nasal congestion, malaise, rhinitis, tooth pain, a cough, facial pain or pressure, and myalgia. He is experiencing difficulty breathing due to nasal congestion but he is not short of breath.   Symptom details     Nasal secretions: The color of his mucus is green and blood-tinged.    Cough: Alpesh coughs every 5-10 minutes and his cough is more bothersome at night. Phlegm comes into his throat when he coughs. He believes the phlegm causes the cough. The color of the phlegm is green and yellow.     Sore throat: Alpesh reports having moderate throat pain (4-6 on a 10 point pain scale), does not have exudate on his tonsils, and can swallow liquids. He is not sure if the lymph nodes in his neck are enlarged. A rash has not appeared on the skin since the sore throat started.     Facial pain or pressure: The facial pain or pressure feels worse when bending over or leaning forward.     Headache: He states the headache is mild (1-3 on a 10 point pain scale).     Tooth pain: The tooth pain is not caused by a cavity, recent dental work, or other mouth problems.      Alpesh denies having chills, fever, and wheezing. He also denies double sickening (worsening symptoms after initial improvement), taking antibiotic medication for the symptoms, having recent facial or sinus surgery in the past 60 days, and having a sinus " infection within the past year.   Precipitating events  Alpesh is not sure if he has been exposed to someone with strep throat. He has not recently been exposed to someone with influenza. Alpesh has not been in close contact with any high risk individuals.   Pertinent medical history  Weight: 200 lbs   Alpesh does not smoke or use smokeless tobacco.   Additional information as reported by the patient (free text): Had cold for weeks before the holidays then developed sinus infection about a week ago.   Weight: 200 lbs    MEDICATIONS: atorvastatin oral, fenofibrate oral, ALLERGIES: Omnicef  Clinician Response:  Dear Alpesh,  Based on the information provided, you have acute bacterial sinusitis, also known as a sinus infection. Sinus infections are caused by bacteria or a virus and symptoms are almost always identical. The difference between the 2 types of infections is timing.  Sinus infections start as viral infections and symptoms improve on their own in about 7 days. If symptoms have not improved after 7 days or have even worsened, a bacterial infection may have developed.  Medication information  I am prescribing:     Doxycycline hyclate 100 mg oral tablet. Take 1 tablet by mouth 2 times per day for 7 days. There are no refills with this prescription.   Certain antibiotics such as Doxycycline, levofloxacin, and moxifloxacin can cause your skin to be more sensitive to the sun. Exposure to the sun when taking these antibiotics may cause skin rash, itching, redness, or a severe sunburn. Be sure to avoid prolonged or direct exposure to the sun, especially between 10 am and 3 pm, if possible. Wear protective clothing and use sunscreen of SPF 30 or higher when you are outdoors.  Self care  The following tips will keep you as comfortable as possible while you recover:     Rest    Drink plenty of water and other liquids    Take a hot shower to loosen congestion    Use throat lozenges    Gargle with warm salt water (1/4  teaspoon of salt per 8 ounce glass of water)    Suck on frozen items such as popsicles or ice cubes    Drink hot tea with lemon and honey    Take a spoonful of honey to reduce your cough     When to seek care  Please be seen in a clinic or urgent care if any of the following occur:     Symptoms do not start to improve after 3 days of treatment    New symptoms develop, or symptoms become worse     It is possible to have an allergic reaction to an antibiotic even if you have not had one in the past. If you notice a new rash, significant swelling, or difficulty breathing, stop taking this medication immediately and go to a clinic or urgent care.  Call 911 or go to the emergency room if you feel that your throat is closing off, you suddenly develop a rash, you are unable to swallow fluids, you are drooling, or you are having difficulty breathing.   Diagnosis: Acute bacterial sinusitis  Diagnosis ICD: J01.90  Prescription: doxycycline hyclate 100 mg oral tablet 14 tablet, 7 days supply. Take 1 tablet by mouth 2 times per day for 7 days. Refills: 0, Refill as needed: no, Allow substitutions: yes  Pharmacy: [id number] - [phone] - [address], [place] [address]

## 2020-03-11 ENCOUNTER — NURSE TRIAGE (OUTPATIENT)
Dept: FAMILY MEDICINE | Facility: CLINIC | Age: 50
End: 2020-03-11

## 2020-03-11 ENCOUNTER — OFFICE VISIT (OUTPATIENT)
Dept: INTERNAL MEDICINE | Facility: CLINIC | Age: 50
End: 2020-03-11
Payer: COMMERCIAL

## 2020-03-11 ENCOUNTER — ANCILLARY PROCEDURE (OUTPATIENT)
Dept: GENERAL RADIOLOGY | Facility: CLINIC | Age: 50
End: 2020-03-11
Attending: PHYSICIAN ASSISTANT
Payer: COMMERCIAL

## 2020-03-11 VITALS
BODY MASS INDEX: 30.17 KG/M2 | DIASTOLIC BLOOD PRESSURE: 84 MMHG | TEMPERATURE: 97.3 F | HEART RATE: 64 BPM | WEIGHT: 211.5 LBS | OXYGEN SATURATION: 97 % | SYSTOLIC BLOOD PRESSURE: 134 MMHG

## 2020-03-11 DIAGNOSIS — R05.9 COUGH: Primary | ICD-10-CM

## 2020-03-11 DIAGNOSIS — J32.9 BACTERIAL SINUSITIS: ICD-10-CM

## 2020-03-11 DIAGNOSIS — B96.89 BACTERIAL SINUSITIS: ICD-10-CM

## 2020-03-11 PROCEDURE — 99213 OFFICE O/P EST LOW 20 MIN: CPT | Performed by: PHYSICIAN ASSISTANT

## 2020-03-11 PROCEDURE — 71046 X-RAY EXAM CHEST 2 VIEWS: CPT

## 2020-03-11 RX ORDER — DOXYCYCLINE 100 MG/1
100 TABLET ORAL 2 TIMES DAILY
Qty: 14 TABLET | Refills: 0 | Status: SHIPPED | OUTPATIENT
Start: 2020-03-11 | End: 2020-03-18

## 2020-03-11 NOTE — TELEPHONE ENCOUNTER
Patient calling with recurrent cold sx off and on for the last 3 weeks. History of sinusitis. No has ongoing dry cough with shortness of breath after coughing. Has nasal congestion and pressure in ears.    Patient reports he has not exactly checked his temp. But has had the chills off and on for about 1 week. Feels feverish. Wheezing off and on more noticeable at night, will cough and then it will go away. No history of Asthma.      Stephy PENNINGTONN, RN, PHN

## 2020-03-11 NOTE — TELEPHONE ENCOUNTER
"  Additional Information    Negative: Bluish (or gray) lips or face    Negative: Severe difficulty breathing (e.g., struggling for each breath, speaks in single words)    Negative: Rapid onset of cough and has hives    Negative: Coughing started suddenly after medicine, an allergic food or bee sting    Negative: Difficulty breathing after exposure to flames, smoke, or fumes    Negative: Sounds like a life-threatening emergency to the triager    Negative: Chest pain present when not coughing    Negative: Difficulty breathing    Negative: Passed out (i.e., fainted, collapsed and was not responding)    Negative: Patient sounds very sick or weak to the triager    Wheezing is present    Negative: Coughed up > 1 tablespoon (15 ml) blood (Exception: blood-tinged sputum)    Negative: Fever > 103 F (39.4 C)    Negative: Fever > 101 F (38.3 C) and over 60 years of age    Negative: Fever > 100.0 F (37.8 C) and has diabetes mellitus or a weak immune system (e.g., HIV positive, cancer chemotherapy, organ transplant, splenectomy, chronic steroids)    Negative: Fever > 100.0 F (37.8 C) and bedridden (e.g., nursing home patient, stroke, chronic illness, recovering from surgery)    Negative: Increasing ankle swelling    Answer Assessment - Initial Assessment Questions  1. ONSET: \"When did the cough begin?\"       3 weeks ago; off and on   2. SEVERITY: \"How bad is the cough today?\"       Moderate, can ocassionally hear a wheeze  3. RESPIRATORY DISTRESS: \"Describe your breathing.\"       Short of breath after coughing   4. FEVER: \"Do you have a fever?\" If so, ask: \"What is your temperature, how was it measured, and when did it start?\"      Have not checked temp. Chills off and on last week  5. HEMOPTYSIS: \"Are you coughing up any blood?\" If so ask: \"How much?\" (flecks, streaks, tablespoons, etc.)      No  6. TREATMENT: \"What have you done so far to treat the cough?\" (e.g., meds, fluids, humidifier)      OTC Dayquil/Nyquil, Advil, " "Robitussin  7. CARDIAC HISTORY: \"Do you have any history of heart disease?\" (e.g., heart attack, congestive heart failure)       No. Family history of heart disease. Pt. Has history of HLD  8. LUNG HISTORY: \"Do you have any history of lung disease?\"  (e.g., pulmonary embolus, asthma, emphysema)      No  9. PE RISK FACTORS: \"Do you have a history of blood clots?\" (or: recent major surgery, recent prolonged travel, bedridden )      No  10. OTHER SYMPTOMS: \"Do you have any other symptoms? (e.g., runny nose, wheezing, chest pain)        Nasal congestion, fatigue, pressure in ears  11. PREGNANCY: \"Is there any chance you are pregnant?\" \"When was your last menstrual period?\"        No  12. TRAVEL: \"Have you traveled out of the country in the last month?\" (e.g., travel history, exposures)        No    Protocols used: COUGH-A-OH    "

## 2020-03-11 NOTE — PROGRESS NOTES
Subjective     Alpesh Winston is a 50 year old male who presents to clinic today for the following health issues:    HPI   RESPIRATORY SYMPTOMS      Duration: 3 weeks     Description  nasal congestion, rhinorrhea, sore throat, facial pain/pressure, cough, wheezing, chills, ear pain both, fatigue/malaise and myalgias  Ears are plugged  Having some sinus pain   Took OTCs - some  Relief   Feeling tired   Cough mostly dry   Feels short of breath     Severity: moderate    Accompanying signs and symptoms: None     History (predisposing factors):  none    Precipitating or alleviating factors: None    Therapies tried and outcome:  Robitussin, Advil, dayquil/ Nyquil, sudafed - temporary relief       Reviewed and updated as needed this visit by Provider  Meds  Problems             Objective    /84   Pulse 64   Temp 97.3  F (36.3  C)   Wt 95.9 kg (211 lb 8 oz)   SpO2 97%   BMI 30.17 kg/m    Body mass index is 30.17 kg/m .  Physical Exam   GENERAL: healthy, alert and no distress  EYES: Eyes grossly normal to inspection, PERRL and conjunctivae and sclerae normal  HENT: ear canals and TM's normal, nose and mouth without ulcers or lesions  NECK: bilateral anterior cervical adenopathy  RESP: lungs clear to auscultation - no rales, rhonchi or wheezes  CV: regular rates and rhythm, normal S1 S2, no S3 or S4 and no murmur, click or rub    Diagnostic Test Results:  Labs reviewed in Epic        Assessment & Plan     1. Cough  - CXR to r/o pneumonia   - XR Chest 2 Views    2. Bacterial sinusitis  - treatment as below  - reviewed if symptoms do not start to improve or anytime worse, urgent follow up recommended   - doxycycline monohydrate (ADOXA) 100 MG tablet; Take 1 tablet (100 mg) by mouth 2 times daily for 7 days  Dispense: 14 tablet; Refill: 0     No follow-ups on file.    Leslie Read PA-C  [facility]

## 2020-03-21 DIAGNOSIS — E78.5 HYPERLIPIDEMIA LDL GOAL <130: ICD-10-CM

## 2020-03-22 NOTE — TELEPHONE ENCOUNTER
"Requested Prescriptions   Pending Prescriptions Disp Refills     fenofibrate (TRIGLIDE/LOFIBRA) 160 MG tablet [Pharmacy Med Name: FENOFIBRATE  TAB 160MG] 90 tablet 1     Sig: TAKE 1 TABLET DAILY (DUE   FOR AN APPOINTMENT NOW)  Last Written Prescription Date:  06/13/2019  Last Fill Quantity: 90 tablet,  # refills: 1   Last Office Visit: 12/10/2019 [facility]  Future Office Visit:            Fibrates Failed - 3/21/2020  3:19 PM        Failed - Recent (12 mo) or future (30 days) visit within the authorizing provider's specialty     Patient has had an office visit with the authorizing provider or a provider within the authorizing providers department within the previous 12 mos or has a future within next 30 days. See \"Patient Info\" tab in inbasket, or \"Choose Columns\" in Meds & Orders section of the refill encounter.              Passed - Lipid panel on file in past 12 months     Recent Labs   Lab Test 09/27/19  0923  09/25/15  0933   CHOL 202*   < > 215*   TRIG 361*   < > 243*   HDL 28*   < > 34*   *   < > 132*   NHDL 174*   < >  --    VLDL  --   --  49*   CHOLHDLRATIO  --   --  6.3*    < > = values in this interval not displayed.               Passed - No abnormal creatine kinase in past 12 months     Recent Labs   Lab Test 01/11/17  0908   CKT 90                Passed - Medication is active on med list        Passed - Patient is age 18 or older      ________________________________________________________________________       atorvastatin (LIPITOR) 20 MG tablet [Pharmacy Med Name: ATORVASTATIN TAB 20MG] 90 tablet 1     Sig: TAKE 1 TABLET DAILY  Last Written Prescription Date:  06/13/2019  Last Fill Quantity: 90 tablet,  # refills: 1   Last Office Visit: 12/10/2019 [facility]  Future Office Visit:            Statins Protocol Failed - 3/21/2020  3:19 PM        Failed - Recent (12 mo) or future (30 days) visit within the authorizing provider's specialty     Patient has had an office visit with the authorizing provider " "or a provider within the authorizing providers department within the previous 12 mos or has a future within next 30 days. See \"Patient Info\" tab in inbasket, or \"Choose Columns\" in Meds & Orders section of the refill encounter.              Passed - LDL on file in past 12 months     Recent Labs   Lab Test 09/27/19  0923   *             Passed - No abnormal creatine kinase in past 12 months     Recent Labs   Lab Test 01/11/17  0908   CKT 90                Passed - Medication is active on med list        Passed - Patient is age 18 or older           "

## 2020-03-23 RX ORDER — ATORVASTATIN CALCIUM 20 MG/1
TABLET, FILM COATED ORAL
Qty: 90 TABLET | Refills: 1 | Status: SHIPPED | OUTPATIENT
Start: 2020-03-23 | End: 2020-10-27

## 2020-03-23 RX ORDER — FENOFIBRATE 160 MG/1
TABLET ORAL
Qty: 90 TABLET | Refills: 1 | Status: SHIPPED | OUTPATIENT
Start: 2020-03-23 | End: 2020-10-27

## 2020-03-23 NOTE — TELEPHONE ENCOUNTER
Annual office visit 7/30/19 - Epic error    Signed Prescriptions:                        Disp   Refills    fenofibrate (TRIGLIDE/LOFIBRA) 160 MG tabl*90 tab*1        Sig: TAKE 1 TABLET DAILY (DUE   FOR AN APPOINTMENT NOW)  Authorizing Provider: PHOENIX AUSTIN  Ordering User: ИВАН BRANCH    atorvastatin (LIPITOR) 20 MG tablet        90 tab*1        Sig: TAKE 1 TABLET DAILY  Authorizing Provider: PHOENIX AUSTIN  Ordering User: ИВАН BRANCH

## 2020-04-08 ENCOUNTER — MYC MEDICAL ADVICE (OUTPATIENT)
Dept: FAMILY MEDICINE | Facility: CLINIC | Age: 50
End: 2020-04-08

## 2020-04-08 DIAGNOSIS — R79.89 ELEVATED FERRITIN: Primary | ICD-10-CM

## 2020-04-08 DIAGNOSIS — E78.1 HYPERTRIGLYCERIDEMIA: Primary | ICD-10-CM

## 2020-04-08 DIAGNOSIS — Z83.3 FAMILY HISTORY OF DIABETES MELLITUS: ICD-10-CM

## 2020-04-09 DIAGNOSIS — Z83.3 FAMILY HISTORY OF DIABETES MELLITUS: ICD-10-CM

## 2020-04-09 DIAGNOSIS — R79.89 ELEVATED FERRITIN: ICD-10-CM

## 2020-04-09 DIAGNOSIS — E78.1 HYPERTRIGLYCERIDEMIA: ICD-10-CM

## 2020-04-09 LAB
BASOPHILS # BLD AUTO: 0.1 10E9/L (ref 0–0.2)
BASOPHILS NFR BLD AUTO: 1 %
CHOLEST SERPL-MCNC: 216 MG/DL
DIFFERENTIAL METHOD BLD: NORMAL
EOSINOPHIL # BLD AUTO: 0.2 10E9/L (ref 0–0.7)
EOSINOPHIL NFR BLD AUTO: 3.8 %
ERYTHROCYTE [DISTWIDTH] IN BLOOD BY AUTOMATED COUNT: 12.5 % (ref 10–15)
FERRITIN SERPL-MCNC: 244 NG/ML (ref 26–388)
HBA1C MFR BLD: 6 % (ref 0–5.6)
HCT VFR BLD AUTO: 45.2 % (ref 40–53)
HDLC SERPL-MCNC: 30 MG/DL
HGB BLD-MCNC: 15 G/DL (ref 13.3–17.7)
LDLC SERPL CALC-MCNC: 122 MG/DL
LYMPHOCYTES # BLD AUTO: 2 10E9/L (ref 0.8–5.3)
LYMPHOCYTES NFR BLD AUTO: 39.8 %
MCH RBC QN AUTO: 30.2 PG (ref 26.5–33)
MCHC RBC AUTO-ENTMCNC: 33.2 G/DL (ref 31.5–36.5)
MCV RBC AUTO: 91 FL (ref 78–100)
MONOCYTES # BLD AUTO: 0.5 10E9/L (ref 0–1.3)
MONOCYTES NFR BLD AUTO: 10.9 %
NEUTROPHILS # BLD AUTO: 2.2 10E9/L (ref 1.6–8.3)
NEUTROPHILS NFR BLD AUTO: 44.5 %
NONHDLC SERPL-MCNC: 186 MG/DL
PLATELET # BLD AUTO: 285 10E9/L (ref 150–450)
RBC # BLD AUTO: 4.96 10E12/L (ref 4.4–5.9)
TRIGL SERPL-MCNC: 320 MG/DL
WBC # BLD AUTO: 5 10E9/L (ref 4–11)

## 2020-04-09 PROCEDURE — 85025 COMPLETE CBC W/AUTO DIFF WBC: CPT | Performed by: INTERNAL MEDICINE

## 2020-04-09 PROCEDURE — 80061 LIPID PANEL: CPT | Performed by: NURSE PRACTITIONER

## 2020-04-09 PROCEDURE — 82728 ASSAY OF FERRITIN: CPT | Performed by: INTERNAL MEDICINE

## 2020-04-09 PROCEDURE — 36415 COLL VENOUS BLD VENIPUNCTURE: CPT | Performed by: INTERNAL MEDICINE

## 2020-04-09 PROCEDURE — 83036 HEMOGLOBIN GLYCOSYLATED A1C: CPT | Performed by: NURSE PRACTITIONER

## 2020-04-09 NOTE — TELEPHONE ENCOUNTER
Are you able to add on the A1C and or FLP that was requested by patient this morning?    Thanks! Anna Marie Guerrero RN

## 2020-04-09 NOTE — TELEPHONE ENCOUNTER
I'm just seeing this message now.  I did sign the order, but it looks like he already got his blood drawn.  I don't know if lab would have drawn the correct tubes?

## 2020-04-09 NOTE — TELEPHONE ENCOUNTER
Patient requesting A1C and FLP labs. Patient coming in this morning for labs(Ferritin and CBC) unsure if these are essential as they were ordered by a different provider.    Last FLP 9/27/2019   Family hx of diabetes. Last A1C 6/5/2019  5.6    Please see pended labs.  Sign if agree.     Thanks! Anna Marie Guerrero RN

## 2020-04-15 DIAGNOSIS — R79.89 ELEVATED FERRITIN: Primary | ICD-10-CM

## 2020-04-16 ENCOUNTER — TELEPHONE (OUTPATIENT)
Dept: ONCOLOGY | Facility: CLINIC | Age: 50
End: 2020-04-16

## 2020-04-16 NOTE — TELEPHONE ENCOUNTER
----- Message from Suma Cornejo MD sent at 4/15/2020  6:18 PM CDT -----  Regarding: RE: Lab results from 4/9  He can delay until August  Thanks  Suma  ----- Message -----  From: [place] Read, RN  Sent: 4/10/2020   8:24 AM CDT  To: Suma Cornejo MD, [place] Read RN  Subject: Lab results from 4/9                             Helio Betts had his labs checked on 4/9. He wanted to know if, based on his results, he should make an appt with you or if he can can delay his visit. He was is due for a RTC visit in May. He was last seen in May 2019.     Please advise. Thanks - France       Results for SHIRA ENRIQUEZ (MRN [id number]) as of 4/10/2020 08:23    4/9/2020 09:45  Ferritin: 244  WBC: 5.0  Hemoglobin: 15.0  Hematocrit: 45.2  Platelet Count: 285  RBC Count: 4.96  MCV: 91  MCH: 30.2  MCHC: 33.2  RDW: 12.5  Diff Method: Automated Method  % Neutrophils: 44.5  % Lymphocytes: 39.8  % Monocytes: 10.9  % Eosinophils: 3.8  % Basophils: 1.0  Absolute Neutrophil: 2.2  Absolute Lymphocytes: 2.0  Absolute Monocytes: 0.5  Absolute Eosinophils: 0.2  Absolute Basophils: 0.1    4/9/2020 11:05  Hemoglobin A1C: 6.0 (H)  Cholesterol: 216 (H)  HDL Cholesterol: 30 (L)  LDL Cholesterol Calculated: 122 (H)  Non HDL Cholesterol: 186 (H)  Triglycerides: 320 (H)

## 2020-04-16 NOTE — TELEPHONE ENCOUNTER
Spoke with Alpesh and informed him Dr. Cornejo reviewed his labs and she is comfortable following up with him in August instead of May, as originally planned. Plan made to schedule appt in clinic visit in August. Alpesh verbalized understanding of plan.     Encouraged Alpesh to call with any questions or concerns.     ADITI RestrepoN, RN  RN Care Coordinator  [facility]

## 2020-05-11 ENCOUNTER — MYC MEDICAL ADVICE (OUTPATIENT)
Dept: FAMILY MEDICINE | Facility: CLINIC | Age: 50
End: 2020-05-11

## 2020-07-16 ENCOUNTER — TELEPHONE (OUTPATIENT)
Dept: FAMILY MEDICINE | Facility: CLINIC | Age: 50
End: 2020-07-16

## 2020-07-16 DIAGNOSIS — E78.1 HYPERTRIGLYCERIDEMIA: Primary | ICD-10-CM

## 2020-07-16 NOTE — TELEPHONE ENCOUNTER
Reason for Call: Request for an order or referral:    Order or referral being requested: Lipid Lab Order    Date needed: as soon as possible    Has the patient been seen by the PCP for this problem? YES    Additional comments: has physical on 7-27-20, wants lab done first    Phone number Patient can be reached at:  Home number on file [phone] (home)    Best Time:  asap    Can we leave a detailed message on this number?  YES    Call taken on 7/16/2020 at 3:22 PM by Elli Beltran

## 2020-07-27 ENCOUNTER — OFFICE VISIT (OUTPATIENT)
Dept: FAMILY MEDICINE | Facility: CLINIC | Age: 50
End: 2020-07-27
Payer: COMMERCIAL

## 2020-07-27 VITALS
WEIGHT: 203 LBS | BODY MASS INDEX: 29.06 KG/M2 | HEIGHT: 70 IN | RESPIRATION RATE: 16 BRPM | HEART RATE: 60 BPM | TEMPERATURE: 97.6 F | DIASTOLIC BLOOD PRESSURE: 72 MMHG | SYSTOLIC BLOOD PRESSURE: 116 MMHG | OXYGEN SATURATION: 97 %

## 2020-07-27 DIAGNOSIS — N52.9 ERECTILE DYSFUNCTION, UNSPECIFIED ERECTILE DYSFUNCTION TYPE: ICD-10-CM

## 2020-07-27 DIAGNOSIS — Z72.0 CHEWS TOBACCO: ICD-10-CM

## 2020-07-27 DIAGNOSIS — E78.1 HYPERTRIGLYCERIDEMIA: ICD-10-CM

## 2020-07-27 DIAGNOSIS — R73.03 PREDIABETES: ICD-10-CM

## 2020-07-27 DIAGNOSIS — Z00.00 ROUTINE GENERAL MEDICAL EXAMINATION AT A HEALTH CARE FACILITY: Primary | ICD-10-CM

## 2020-07-27 DIAGNOSIS — E78.5 HYPERLIPIDEMIA LDL GOAL <130: ICD-10-CM

## 2020-07-27 DIAGNOSIS — Z12.5 SCREENING PSA (PROSTATE SPECIFIC ANTIGEN): ICD-10-CM

## 2020-07-27 DIAGNOSIS — Z12.83 SKIN CANCER SCREENING: ICD-10-CM

## 2020-07-27 DIAGNOSIS — R79.89 ELEVATED FERRITIN: ICD-10-CM

## 2020-07-27 LAB
ALBUMIN SERPL-MCNC: 4.1 G/DL (ref 3.4–5)
ALP SERPL-CCNC: 68 U/L (ref 40–150)
ALT SERPL W P-5'-P-CCNC: 51 U/L (ref 0–70)
ANION GAP SERPL CALCULATED.3IONS-SCNC: 9 MMOL/L (ref 3–14)
AST SERPL W P-5'-P-CCNC: 25 U/L (ref 0–45)
BILIRUB SERPL-MCNC: 0.4 MG/DL (ref 0.2–1.3)
BUN SERPL-MCNC: 24 MG/DL (ref 7–30)
CALCIUM SERPL-MCNC: 9 MG/DL (ref 8.5–10.1)
CHLORIDE SERPL-SCNC: 109 MMOL/L (ref 94–109)
CHOLEST SERPL-MCNC: 222 MG/DL
CO2 SERPL-SCNC: 21 MMOL/L (ref 20–32)
CREAT SERPL-MCNC: 0.97 MG/DL (ref 0.66–1.25)
FERRITIN SERPL-MCNC: 220 NG/ML (ref 26–388)
GFR SERPL CREATININE-BSD FRML MDRD: >90 ML/MIN/{1.73_M2}
GLUCOSE SERPL-MCNC: 127 MG/DL (ref 70–99)
HBA1C MFR BLD: 6 % (ref 0–5.6)
HDLC SERPL-MCNC: 22 MG/DL
HGB BLD-MCNC: 14.7 G/DL (ref 13.3–17.7)
LDLC SERPL CALC-MCNC: ABNORMAL MG/DL
LDLC SERPL DIRECT ASSAY-MCNC: 131 MG/DL
NONHDLC SERPL-MCNC: 200 MG/DL
POTASSIUM SERPL-SCNC: 4.3 MMOL/L (ref 3.4–5.3)
PROT SERPL-MCNC: 7.8 G/DL (ref 6.8–8.8)
PSA SERPL-ACNC: 1.36 UG/L (ref 0–4)
SODIUM SERPL-SCNC: 139 MMOL/L (ref 133–144)
TRIGL SERPL-MCNC: 508 MG/DL

## 2020-07-27 PROCEDURE — 99214 OFFICE O/P EST MOD 30 MIN: CPT | Mod: 25 | Performed by: FAMILY MEDICINE

## 2020-07-27 PROCEDURE — 80053 COMPREHEN METABOLIC PANEL: CPT | Performed by: FAMILY MEDICINE

## 2020-07-27 PROCEDURE — 85018 HEMOGLOBIN: CPT | Performed by: INTERNAL MEDICINE

## 2020-07-27 PROCEDURE — G0103 PSA SCREENING: HCPCS | Performed by: FAMILY MEDICINE

## 2020-07-27 PROCEDURE — 80061 LIPID PANEL: CPT | Performed by: FAMILY MEDICINE

## 2020-07-27 PROCEDURE — 83036 HEMOGLOBIN GLYCOSYLATED A1C: CPT | Performed by: FAMILY MEDICINE

## 2020-07-27 PROCEDURE — 36415 COLL VENOUS BLD VENIPUNCTURE: CPT | Performed by: FAMILY MEDICINE

## 2020-07-27 PROCEDURE — 83721 ASSAY OF BLOOD LIPOPROTEIN: CPT | Mod: 59 | Performed by: FAMILY MEDICINE

## 2020-07-27 PROCEDURE — 99396 PREV VISIT EST AGE 40-64: CPT | Performed by: FAMILY MEDICINE

## 2020-07-27 PROCEDURE — 82728 ASSAY OF FERRITIN: CPT | Performed by: INTERNAL MEDICINE

## 2020-07-27 RX ORDER — TADALAFIL 10 MG/1
10 TABLET ORAL DAILY PRN
Qty: 10 TABLET | Refills: 0 | Status: SHIPPED | OUTPATIENT
Start: 2020-07-27 | End: 2022-09-20

## 2020-07-27 ASSESSMENT — MIFFLIN-ST. JEOR: SCORE: 1797.05

## 2020-07-27 NOTE — PROGRESS NOTES
3  SUBJECTIVE:   CC: Alpesh Winston is an 50 year old male who presents for preventive health visit.     Healthy Habits:    Do you get at least three servings of calcium containing foods daily (dairy, green leafy vegetables, etc.)? yes    Amount of exercise or daily activities, outside of work: 3 day(s) per week    Problems taking medications regularly No    Medication side effects: No    Have you had an eye exam in the past two years? yes    Do you see a dentist twice per year? yes    Do you have sleep apnea, excessive snoring or daytime drowsiness?no    Hyperlipidemia Follow-Up    Are you regularly taking any medication or supplement to lower your cholesterol?   Yes- atorvastatin    Are you having muscle aches or other side effects that you think could be caused by your cholesterol lowering medication?  No    Today's PHQ-2 Score:   PHQ-2 ( 1999 Pfizer) 7/27/2020 7/30/2019   Q1: Little interest or pleasure in doing things 0 0   Q2: Feeling down, depressed or hopeless 0 0   PHQ-2 Score 0 0   Q1: Little interest or pleasure in doing things - Not at all   Q2: Feeling down, depressed or hopeless - Not at all   PHQ-2 Score - 0     Abuse: Current or Past(Physical, Sexual or Emotional)- No  Do you feel safe in your environment? Yes    Social History     Tobacco Use     Smoking status: Never Smoker     Smokeless tobacco: Current User     Types: Chew     Tobacco comment: chewing less   Substance Use Topics     Alcohol use: Yes     Alcohol/week: 0.0 standard drinks     Comment: 10-12 drinks a week      If you drink alcohol do you typically have >3 drinks per day or >7 drinks per week? Yes - AUDIT SCORE:  7  AUDIT - Alcohol Use Disorders Identification Test - Reproduced from the World Health Organization Audit 2001 (Second Edition) 7/27/2020   1.  How often do you have a drink containing alcohol? 2 to 3 times a week   2.  How many drinks containing alcohol do you have on a typical day when you are drinking? 3 or 4    3.  How often do you have five or more drinks on one occasion? Weekly   4.  How often during the last year have you found that you were not able to stop drinking once you had started? Never   5.  How often during the last year have you failed to do what was normally expected of you because of drinking? Never   6.  How often during the last year have you needed a first drink in the morning to get yourself going after a heavy drinking session? Never   7.  How often during the last year have you had a feeling of guilt or remorse after drinking? Never   8.  How often during the last year have you been unable to remember what happened the night before because of your drinking? Never   9.  Have you or someone else been injured because of your drinking? No   10. Has a relative, friend, doctor or other health care worker been concerned about your drinking or suggested you cut down? No   TOTAL SCORE 7                         Last PSA:   PSA   Date Value Ref Range Status   09/27/2019 1.11 0 - 4 ug/L Final     Comment:     Assay Method:  Chemiluminescence using Siemens Vista analyzer       Reviewed orders with patient. Reviewed health maintenance and updated orders accordingly - Yes       Reviewed and updated as needed this visit by clinical staff  Tobacco  Allergies  Meds  Med Hx  Surg Hx  Fam Hx  Soc Hx        Reviewed and updated as needed this visit by Provider    PSA   Date Value Ref Range Status   09/27/2019 1.11 0 - 4 ug/L Final     Comment:     Assay Method:  Chemiluminescence using Siemens Vista analyzer     Has family history of early prostate cancer.     Alcohol - 10-12 drinks per week. Mostly beer.     Erectile dysfunction.   viagra and cialis - did not help.     ROS:  CONSTITUTIONAL: NEGATIVE for fever, chills, change in weight  INTEGUMENTARY/SKIN: NEGATIVE for worrisome rashes, moles or lesions  EYES: NEGATIVE for vision changes or irritation  ENT: NEGATIVE for ear, mouth and throat problems  RESP:  "NEGATIVE for significant cough or SOB  CV: NEGATIVE for chest pain, palpitations or peripheral edema  GI: NEGATIVE for nausea, abdominal pain, heartburn, or change in bowel habits   male: negative for dysuria, hematuria, decreased urinary stream, erectile dysfunction, urethral discharge  MUSCULOSKELETAL: NEGATIVE for significant arthralgias or myalgia  NEURO: NEGATIVE for weakness, dizziness or paresthesias  ENDOCRINE: NEGATIVE for temperature intolerance, skin/hair changes  PSYCHIATRIC: NEGATIVE for changes in mood or affect    OBJECTIVE:   /72 (BP Location: Left arm, Patient Position: Sitting, Cuff Size: Adult Large)   Pulse 60   Temp 97.6  F (36.4  C) (Oral)   Resp 16   Ht 1.778 m (5' 10\")   Wt 92.1 kg (203 lb)   SpO2 97%   BMI 29.13 kg/m    EXAM:  GENERAL: healthy, alert and no distress  EYES: Eyes grossly normal to inspection, PERRL and conjunctivae and sclerae normal  HENT: ear canals and TM's normal, nose and mouth without ulcers or lesions  NECK: no adenopathy, no asymmetry, masses, or scars and thyroid normal to palpation  RESP: lungs clear to auscultation - no rales, rhonchi or wheezes  CV: regular rate and rhythm, normal S1 S2, no S3 or S4, no murmur, click or rub, no peripheral edema and peripheral pulses strong  ABDOMEN: soft, nontender, no hepatosplenomegaly, no masses and bowel sounds normal   (male): normal male genitalia without lesions or urethral discharge, no hernia  MS: no gross musculoskeletal defects noted, no edema  SKIN: no suspicious lesions or rashes  NEURO: Normal strength and tone, mentation intact and speech normal  PSYCH: mentation appears normal, affect normal/bright    ASSESSMENT/PLAN:   1. Routine general medical examination at a health care facility       2. Screening PSA (prostate specific antigen)     - PSA, screen    3. Prediabetes  We discussed he should actively be working on his lifestyle including cutting down on carbs and alcohol.  He drinks beer and admits " "to drinking more than usual due to COVID.  - Hemoglobin A1c    4. Erectile dysfunction, unspecified erectile dysfunction type  As per his record he has tried Cialis and Viagra.  I do not see prescription of Cialis.  He was referred to urologist in the past.  He would like to try Cialis.  He understand it is not covered by insurance.  Cream topical can help.  We discussed that.  - tadalafil (CIALIS) 10 MG tablet; Take 1 tablet (10 mg) by mouth daily as needed  Dispense: 10 tablet; Refill: 0    5. Skin cancer screening     - DERMATOLOGY REFERRAL      6. Chews tobacco  Comment:    Plan: Strongly encouraged him to quit completely.    7. Hyperlipidemia LDL goal <130  Comment:   Plan: With the hypertriglyceridemia.  Continue to work on lifestyle, weight loss and cut down on plain carbohydrate and beer.  He is on statin and his numbers are still not significantly improved and we discussed about going up on the dose of statin if his results are still abnormal.  He would like to work on his lifestyle before he can pursue it further.      COUNSELING:  Reviewed preventive health counseling, as reflected in patient instructions  Special attention given to:        Regular exercise       Healthy diet/nutrition       Vision screening       Hearing screening    Estimated body mass index is 30.17 kg/m  as calculated from the following:    Height as of 7/30/19: 1.783 m (5' 10.2\").    Weight as of 3/11/20: 95.9 kg (211 lb 8 oz).    Weight management plan: Discussed healthy diet and exercise guidelines     reports that he has never smoked. His smokeless tobacco use includes chew.  Tobacco Cessation Action Plan: Self help information given to patient    Counseling Resources:  ATP IV Guidelines  Pooled Cohorts Equation Calculator  FRAX Risk Assessment  ICSI Preventive Guidelines  Dietary Guidelines for Americans, 2010  USDA's MyPlate  ASA Prophylaxis  Lung CA Screening    Layton Omalley MD, MD  [facility]  "

## 2020-08-20 ENCOUNTER — OFFICE VISIT (OUTPATIENT)
Dept: DERMATOLOGY | Facility: CLINIC | Age: 50
End: 2020-08-20
Attending: FAMILY MEDICINE
Payer: COMMERCIAL

## 2020-08-20 DIAGNOSIS — L91.8 ACROCHORDON: ICD-10-CM

## 2020-08-20 DIAGNOSIS — D48.5 NEOPLASM OF UNCERTAIN BEHAVIOR OF SKIN: Primary | ICD-10-CM

## 2020-08-20 ASSESSMENT — PAIN SCALES - GENERAL: PAINLEVEL: NO PAIN (0)

## 2020-08-20 NOTE — PATIENT INSTRUCTIONS
Sun protective clothing and Resources     L[place] (www.[email or web address].[email or web address])  Athleta ([email or web address])  Mendor Life ([email or web address])  Carve Designs (VSS Monitoring) - affordable  Skinz ([email or web address].com)    Long sleeve - Raj Cool DRI UPF 50 or [place] PFG UPF 50  Hoodie - [place] PFG UPF 50  Swimshirt/Rash Guard - Julee UPF 50 (on Amazon)  Neck - Outdoor Research Ubertubes ([email or web address])  Wound Care After a Biopsy    What is a skin biopsy?  A skin biopsy allows the doctor to examine a very small piece of tissue under the microscope to determine the diagnosis and the best treatment for the skin condition. A local anesthetic (numbing medicine)  is injected with a very small needle into the skin area to be tested. A small piece of skin is taken from the area. Sometimes a suture (stitch) is used.     What are the risks of a skin biopsy?  I will experience scar, bleeding, swelling, pain, crusting and redness. I may experience incomplete removal or recurrence. Risks of this procedure are excessive bleeding, bruising, infection, nerve damage, numbness, thick (hypertrophic or keloidal) scar and non-diagnostic biopsy.    How should I care for my wound for the first 24 hours?    Keep the wound dry and covered for 24 hours    If it bleeds, hold direct pressure on the area for 15 minutes. If bleeding does not stop then go to the emergency room    Avoid strenuous exercise the first 1-2 days or as your doctor instructs you    How should I care for the wound after 24 hours?    After 24 hours, remove the bandage    You may bathe or shower as normal    If you had a scalp biopsy, you can shampoo as usual and can use shower water to clean the biopsy site daily    Clean the wound twice a day with gentle soap and water    Do not scrub, be gentle    Apply white petroleum/Vaseline after cleaning the wound with a cotton swab or a clean finger, and keep the site covered with a Bandaid /bandage. Bandages are not necessary  with a scalp biopsy    If you are unable to cover the site with a Bandaid /bandage, re-apply ointment 2-3 times a day to keep the site moist. Moisture will help with healing    Avoid strenuous activity for first 1-2 days    Avoid lakes, rivers, pools, and oceans until the stitches are removed or the site is healed    How do I clean my wound?    Wash hands thoroughly with soap or use hand  before all wound care    Clean the wound with gentle soap and water    Apply white petroleum/Vaseline  to wound after it is clean    Replace the Bandaid /bandage to keep the wound covered for the first few days or as instructed by your doctor    If you had a scalp biopsy, warm shower water to the area on a daily basis should suffice    What should I use to clean my wound?     Cotton-tipped applicators (Qtips )    White petroleum jelly (Vaseline ). Use a clean new container and use Q-tips to apply.    Bandaids   as needed    Gentle soap     How should I care for my wound long term?    Do not get your wound dirty    Keep up with wound care for one week or until the area is healed.    A small scab will form and fall off by itself when the area is completely healed. The area will be red and will become pink in color as it heals. Sun protection is very important for how your scar will turn out. Sunscreen with an SPF 30 or greater is recommended once the area is healed.    If you have stitches, stitches need to be removed in  days. You may return to our clinic for this or you may have it done locally at your doctor s office.    You should have some soreness but it should be mild and slowly go away over several days. Talk to your doctor about using tylenol for pain,    When should I call my doctor?  If you have increased:     Pain or swelling    Pus or drainage (clear or slightly yellow drainage is ok)    Temperature over 100F    Spreading redness or warmth around wound    When will I hear about my results?  The biopsy results  can take 2-3 weeks to come back. The clinic will call you with the results, send you a mychart message, or have you schedule a follow-up clinic or phone time to discuss the results. Contact our clinics if you do not hear from us in 3 weeks.     Who should I call with questions?    [facility]: [phone]     [facility]: [phone]    For urgent needs outside of business hours call the [facility] at [phone] and ask for the dermatology resident on call

## 2020-08-20 NOTE — PROGRESS NOTES
"[facility] Dermatology Note      Dermatology Problem List:  FBSE 8/20/20  1. Ephilides  2. Benign nevi  3. SKs.  4. Irritated acrochordons s/p LN2  5. NUB, right abdomen, s/p shave biopsy 8/20/20, r/o atypical nevus vs other     Encounter Date: Aug 20, 2020    CC:   Chief Complaint   Patient presents with     Derm Problem     Alpesh is here today for a skin check. He states \" I have moles on my back that concern me today\"     History of Present Illness:  Mr. Alpesh Winston is a 50 year old male who presents as a referral from Dr. Omalley for skin check. No personal or family history of skin cancer. Used to . Now wears sunscreen or sun protective clothing in the sun. States he has a mole on his abdomen which may be growing a little more recently. He works indoors as an . He would like a full skin check today. The patient has not noticed any addtional new, growing, changing, itching, bleeding areas on the skin.     Past Medical History:   Patient Active Problem List   Diagnosis     ALLERGIC RHINITIS      HYPERLIPIDEMIA LDL GOAL <130     Family history of diabetes mellitus     Hypertriglyceridemia     Family history of coronary artery disease     Chews tobacco     Gastroesophageal reflux disease without esophagitis     Elevated ferritin     Past Medical History:   Diagnosis Date     Allergic rhinitis due to other allergen      History of chest pain     TO ER - EVALULATED, STRESS TEST ETC, ALL NEGATIVE     Hyperlipidaemia      Lipoma of other skin and subcutaneous tissue 5/2012    Chest wall     Mass 5/3/2012     Other and unspecified hyperlipidemia      Tibia/fibula fracture 6/28/2016     Past Surgical History:   Procedure Laterality Date     ABDOMEN SURGERY  2000    Hernia surgery     ENT SURGERY  1989     EYE SURGERY      SURGERY X 2 BILAT TEAR DUCTS      HEAD & NECK SURGERY       HERNIA REPAIR  2000     LAPAROSCOPIC HERNIORRHAPHY INGUINAL BILATERAL Bilateral 10/27/2016    " Procedure: LAPAROSCOPIC HERNIORRHAPHY INGUINAL BILATERAL;  Surgeon: Nellie Duron MD;  Location: [facility] OR     SOFT TISSUE SURGERY      Many lypoma removed - Multiple dates     SURGICAL HISTORY OF -       removal of lipoma X 4     SURGICAL HISTORY OF -   10/00    left inguinal hernia repair     SURGICAL HISTORY OF -       wisdom teeth extraction       Social History:  Patient reports that he has never smoked. His smokeless tobacco use includes chew. He reports current alcohol use. He reports that he does not use drugs.    Family History:  Family History   Problem Relation Age of Onset     C.A.D. Maternal Grandmother         MI X 2 ,  in her 60's      Coronary Artery Disease Maternal Grandmother      Hypertension Maternal Grandmother      Hyperlipidemia Maternal Grandmother      Other Cancer Maternal Grandmother         cervical     Breast Cancer Maternal Grandmother      Heart Disease Father      Diabetes Father      Prostate Cancer Father      Hypertension Father      Diabetes Paternal Grandmother         type 2     Breast Cancer Sister      Alzheimer Disease Mother 74        CBD     Hypertension Mother      Alcohol/Drug Maternal Uncle      C.A.D. Maternal Uncle         MI age 56     Substance Abuse Other      Cerebrovascular Disease No family hx of      Cancer - colorectal No family hx of        Medications:  Current Outpatient Medications   Medication Sig Dispense Refill     atorvastatin (LIPITOR) 20 MG tablet TAKE 1 TABLET DAILY 90 tablet 1     COENZYME Q-10 PO Take 20 mg by mouth daily       fenofibrate (TRIGLIDE/LOFIBRA) 160 MG tablet TAKE 1 TABLET DAILY (DUE   FOR AN APPOINTMENT NOW) 90 tablet 1     Omega-3 Fatty Acids (FISH OIL PO)        tadalafil (CIALIS) 10 MG tablet Take 1 tablet (10 mg) by mouth daily as needed 10 tablet 0        Allergies   Allergen Reactions     Omnicef Hives     Sulfa Drugs Rash     Review of Systems:  -As per HPI  -Constitutional: Otherwise feeling well today, in usual state of  health.  -Skin: As above in HPI. No additional skin concerns.    Physical exam:  Vitals: There were no vitals taken for this visit.  GEN: This is a well developed, well-nourished male in no acute distress, in a pleasant mood.    SKIN: Full skin, which includes the head/face, both arms, chest, back, abdomen,both legs, genitalia and/or groin buttocks, digits and/or nails, was examined.  -There are waxy stuck on tan to brown papules on the trunk and extremities.  -Scattered brown macules on sun exposed areas.  -Multiple regular brown pigmented macules and papules are identified on the trunk, extremities, face.  -Right abdomen with 8 mm raised pink structure less papule with peripheral light tan reticular pigment network   -Small pedunculated papules on the neck   -No other lesions of concern on areas examined.     Impression/Plan:  1. Neoplasm of uncertain behavior on the right abdomen. The differential diagnosis includes dysplastic nevus vs other.   - Shave biopsy:  After discussion of benefits and risks including but not limited to bleeding/bruising, pain/swelling, infection, scar, incomplete removal, nerve damage/numbness, recurrence, and non-diagnostic biopsy, written consent, verbal consent and photographs were obtained. Time-out was performed. The area was cleaned with isopropyl alcohol. 0.5ml of 1% lidocaine with 1:100,000 epinephrine was injected to obtain adequate anesthesia. A shave biopsy was performed. Hemostasis was achieved with aluminium chloride. Vaseline and a sterile dressing were applied. The patient tolerated the procedure and no complications were noted. The patient was provided with verbal and written post care instructions.    2. Acrochordon, irritated  - Cryotherapy procedure note: After verbal consent and discussion of risks and benefits including but no limited to dyspigmentation/scar, blister, and pain, 2 was(were) treated with 1-2mm freeze border for 2 cycles with liquid nitrogen. Post  cryotherapy instructions were provided.    3. Multiple clinically benign nevi on the trunk and extremities  - Sun precaution was advised including the use of sun screens of SPF 30 or higher, sun protective clothing, and avoidance of tanning beds.    4. Seborrheic keratosis, non irritated  - No further intervention required. Patient to report changes.     CC Layton Omalley MD  [address] [place], [place] [id number] on close of this encounter.  Follow-up in 1 year, earlier for new or changing lesions.     Dr. Morris staffed the patient.    Staff Involved:  Resident(Daysi)/Staff  I was present for key portions of the biopsy and the entire cryotherapy procedure. Leyla DAVILA I, Leyla Morris MD, saw this patient with the resident and agree with the resident s findings and plan of care as documented in the resident s note.

## 2020-08-20 NOTE — LETTER
"8/20/2020       RE: Alpesh Winston  [address] [place] [address]     Dear Colleague,    Thank you for referring your patient, Alpesh Winston, to the [facility] DERMATOLOGY at [facility]. Please see a copy of my visit note below.    [facility] Dermatology Note      Dermatology Problem List:  FBSE 8/20/20  1. Ephilides  2. Benign nevi  3. SKs.  4. Irritated acrochordons s/p LN2  5. NUB, right abdomen, s/p shave biopsy 8/20/20, r/o atypical nevus vs other     Encounter Date: Aug 20, 2020    CC:   Chief Complaint   Patient presents with     Derm Problem     Alpesh is here today for a skin check. He states \" I have moles on my back that concern me today\"     History of Present Illness:  Mr. Alpesh Winston is a 50 year old male who presents as a referral from Dr. Omalley for skin check. No personal or family history of skin cancer. Used to . Now wears sunscreen or sun protective clothing in the sun. States he has a mole on his abdomen which may be growing a little more recently. He works indoors as an . He would like a full skin check today. The patient has not noticed any addtional new, growing, changing, itching, bleeding areas on the skin.     Past Medical History:   Patient Active Problem List   Diagnosis     ALLERGIC RHINITIS      HYPERLIPIDEMIA LDL GOAL <130     Family history of diabetes mellitus     Hypertriglyceridemia     Family history of coronary artery disease     Chews tobacco     Gastroesophageal reflux disease without esophagitis     Elevated ferritin     Past Medical History:   Diagnosis Date     Allergic rhinitis due to other allergen      History of chest pain     TO ER - EVALULATED, STRESS TEST ETC, ALL NEGATIVE     Hyperlipidaemia      Lipoma of other skin and subcutaneous tissue 5/2012    Chest wall     Mass 5/3/2012     Other and unspecified hyperlipidemia      Tibia/fibula fracture 6/28/2016 "     Past Surgical History:   Procedure Laterality Date     ABDOMEN SURGERY      Hernia surgery     ENT SURGERY       EYE SURGERY      SURGERY X 2 BILAT TEAR DUCTS      HEAD & NECK SURGERY       HERNIA REPAIR       LAPAROSCOPIC HERNIORRHAPHY INGUINAL BILATERAL Bilateral 10/27/2016    Procedure: LAPAROSCOPIC HERNIORRHAPHY INGUINAL BILATERAL;  Surgeon: Nellie Duron MD;  Location: [facility] OR     SOFT TISSUE SURGERY      Many lypoma removed - Multiple dates     SURGICAL HISTORY OF -       removal of lipoma X 4     SURGICAL HISTORY OF -   10/00    left inguinal hernia repair     SURGICAL HISTORY OF -       wisdom teeth extraction       Social History:  Patient reports that he has never smoked. His smokeless tobacco use includes chew. He reports current alcohol use. He reports that he does not use drugs.    Family History:  Family History   Problem Relation Age of Onset     C.A.D. Maternal Grandmother         MI X 2 ,  in her 60's      Coronary Artery Disease Maternal Grandmother      Hypertension Maternal Grandmother      Hyperlipidemia Maternal Grandmother      Other Cancer Maternal Grandmother         cervical     Breast Cancer Maternal Grandmother      Heart Disease Father      Diabetes Father      Prostate Cancer Father      Hypertension Father      Diabetes Paternal Grandmother         type 2     Breast Cancer Sister      Alzheimer Disease Mother 74        CBD     Hypertension Mother      Alcohol/Drug Maternal Uncle      C.A.D. Maternal Uncle         MI age 56     Substance Abuse Other      Cerebrovascular Disease No family hx of      Cancer - colorectal No family hx of        Medications:  Current Outpatient Medications   Medication Sig Dispense Refill     atorvastatin (LIPITOR) 20 MG tablet TAKE 1 TABLET DAILY 90 tablet 1     COENZYME Q-10 PO Take 20 mg by mouth daily       fenofibrate (TRIGLIDE/LOFIBRA) 160 MG tablet TAKE 1 TABLET DAILY (DUE   FOR AN APPOINTMENT NOW) 90 tablet 1     Omega-3 Fatty  Acids (FISH OIL PO)        tadalafil (CIALIS) 10 MG tablet Take 1 tablet (10 mg) by mouth daily as needed 10 tablet 0        Allergies   Allergen Reactions     Omnicef Hives     Sulfa Drugs Rash     Review of Systems:  -As per HPI  -Constitutional: Otherwise feeling well today, in usual state of health.  -Skin: As above in HPI. No additional skin concerns.    Physical exam:  Vitals: There were no vitals taken for this visit.  GEN: This is a well developed, well-nourished male in no acute distress, in a pleasant mood.    SKIN: Full skin, which includes the head/face, both arms, chest, back, abdomen,both legs, genitalia and/or groin buttocks, digits and/or nails, was examined.  -There are waxy stuck on tan to brown papules on the trunk and extremities.  -Scattered brown macules on sun exposed areas.  -Multiple regular brown pigmented macules and papules are identified on the trunk, extremities, face.  -Right abdomen with 8 mm raised pink structure less papule with peripheral light tan reticular pigment network   -Small pedunculated papules on the neck   -No other lesions of concern on areas examined.     Impression/Plan:  1. Neoplasm of uncertain behavior on the right abdomen. The differential diagnosis includes dysplastic nevus vs other.   - Shave biopsy:  After discussion of benefits and risks including but not limited to bleeding/bruising, pain/swelling, infection, scar, incomplete removal, nerve damage/numbness, recurrence, and non-diagnostic biopsy, written consent, verbal consent and photographs were obtained. Time-out was performed. The area was cleaned with isopropyl alcohol. 0.5ml of 1% lidocaine with 1:100,000 epinephrine was injected to obtain adequate anesthesia. A shave biopsy was performed. Hemostasis was achieved with aluminium chloride. Vaseline and a sterile dressing were applied. The patient tolerated the procedure and no complications were noted. The patient was provided with verbal and written post  care instructions.    2. Acrochordon, irritated  - Cryotherapy procedure note: After verbal consent and discussion of risks and benefits including but no limited to dyspigmentation/scar, blister, and pain, 2 was(were) treated with 1-2mm freeze border for 2 cycles with liquid nitrogen. Post cryotherapy instructions were provided.    3. Multiple clinically benign nevi on the trunk and extremities  - Sun precaution was advised including the use of sun screens of SPF 30 or higher, sun protective clothing, and avoidance of tanning beds.    4. Seborrheic keratosis, non irritated  - No further intervention required. Patient to report changes.     CC Layton Omalley MD  [address] [place], [place] [id number] on close of this encounter.  Follow-up in 1 year, earlier for new or changing lesions.     Dr. Morris staffed the patient.    Staff Involved:  Resident(Daysi)/Staff  I was present for key portions of the biopsy and the entire cryotherapy procedure. Leyla Morris MD  I, Leyla Morris MD, saw this patient with the resident and agree with the resident s findings and plan of care as documented in the resident s note.

## 2020-08-20 NOTE — NURSING NOTE
"Chief Complaint   Patient presents with     Derm Problem     Alpesh is here today for a skin check. He states \" I have moles on my back that concern me today\"     Kita Dyson, RMA  "

## 2020-08-21 ASSESSMENT — PAIN SCALES - GENERAL: PAINLEVEL: NO PAIN (0)

## 2020-08-25 LAB — COPATH REPORT: NORMAL

## 2020-08-27 ENCOUNTER — VIRTUAL VISIT (OUTPATIENT)
Dept: ONCOLOGY | Facility: CLINIC | Age: 50
End: 2020-08-27
Attending: INTERNAL MEDICINE
Payer: COMMERCIAL

## 2020-08-27 DIAGNOSIS — R79.89 ELEVATED FERRITIN: Primary | ICD-10-CM

## 2020-08-27 PROCEDURE — 40001009 ZZH VIDEO/TELEPHONE VISIT; NO CHARGE

## 2020-08-27 PROCEDURE — 99213 OFFICE O/P EST LOW 20 MIN: CPT | Mod: GT | Performed by: INTERNAL MEDICINE

## 2020-08-27 NOTE — PROGRESS NOTES
"Alpesh Winston is a 50 year old male who is being evaluated via a billable video visit.      The patient has been notified of following:     \"This video visit will be conducted via a call between you and your physician/provider. We have found that certain health care needs can be provided without the need for an in-person physical exam.  This service lets us provide the care you need with a video conversation.  If a prescription is necessary we can send it directly to your pharmacy.  If lab work is needed we can place an order for that and you can then stop by our lab to have the test done at a later time.    Video visits are billed at different rates depending on your insurance coverage.  Please reach out to your insurance provider with any questions.    If during the course of the call the physician/provider feels a video visit is not appropriate, you will not be charged for this service.\"    Patient has given verbal consent for Video visit? Yes    How would you like to obtain your AVS? MyChart     If you are dropped from the video visit, the video invite should be resent to: Text to cell phone: [phone]     Will anyone else be joining your video visit? No         I have reviewed and updated the patient's allergies and medication list. Patient was asked to provide any patient recorded vital signs, height and/or weight.  Please see \"Patient Reported Vital Signs\" tab for that information.        Concerns: Patient has no new concerns.      Refills: None         RANJANA Lynch            Video-Visit Details    Type of service:  Video Visit    Video Start Time: 8:33 am  Video End Time: 8:42 am    Originating Location (pt. Location): Home    Distant Location (provider location):  [facility] CANCER [facility]     Platform used for Video Visit: Rafael Cornejo MD  "

## 2020-08-27 NOTE — LETTER
8/27/2020         RE: Alpesh Enriquez  [address] [place] [address]        Dear Colleague,    Thank you for referring your patient, Alpesh Enriquez, to the [facility] CANCER CLINIC. Please see a copy of my visit note below.    Hematology Clinic Visit    Video    PROBLEM LIST:  1.  Elevated ferritin, negative HFE gene testing, likely related to fatty liver disease.  On intermittent phlebotomy to lower ferritin.  MRI for liver iron on 8/15/2018 showed no iron overload.  2.  Hypertriglyceridemia diagnosed age 21     Interim History: Mr. Enriquez is seen by video for follow up of elevated ferritin. I last saw him 5/15/19.  He is feeling well.  He has had numerous  therapeutic phlebotomies since September 2018.  Recently he has been donating at [facility] every 8 weeks. He tolerates the phlebotomy well. He recently had lipd profile that showed high cholesterol and triglycerides. He says he is altering his diet and exercising 3-5 x per week to see if he can lower the lipids without medications. He will have a recheck in ~ 6 months.    Physical exam:    General appearance:  Patient is 50 year old man in no acute distress.     HEENT:  No pallor, icterus.   Lungs: Normal respirations, no cough or audible wheezes.  Skin:  No rash, no petechiae or ecchymoses of face, neck, forearms.    The rest of a comprehensive physical examination is deferred due to public health emergency video visit restrictions.    Labs:    Results for ALPESH ENRIQUEZ (MRN [id number]) as of 8/27/2020 08:28   Ref. Range 6/5/2019 09:16 9/27/2019 09:23 10/22/2019 13:15 4/9/2020 09:45 7/27/2020 09:10   Ferritin Latest Ref Range: 26 - 388 ng/mL 289 343 406 (H) 244 220     Assessment recommendation: Elevated ferritin related to fatty liver. Ferritin is in mid normal range with the frequent blood donation. He plans on conintuing every 8 week blood donation for the rest of the calendar year, will then go to blood  "donation 4x per year. I agree with that plan.     We discussed that the elevated ferritin is related to the fatty liver, but we don't know why some people with fatty liver have elevated ferritin and others do not. I encouraged him to continue with dietary changes- more fruits and vegetables, less red meat. This will help his liver.     He does not need routine follow up with me, but can have ferritin checked every 6-12 months through his PCP. He can be referred back if questions.     Video   Start: 8:33 am  Stop 8:42 am  Time 9 min     Suma Cornejo MD  Hematology      Alpesh Winston is a 50 year old male who is being evaluated via a billable video visit.      The patient has been notified of following:     \"This video visit will be conducted via a call between you and your physician/provider. We have found that certain health care needs can be provided without the need for an in-person physical exam.  This service lets us provide the care you need with a video conversation.  If a prescription is necessary we can send it directly to your pharmacy.  If lab work is needed we can place an order for that and you can then stop by our lab to have the test done at a later time.    Video visits are billed at different rates depending on your insurance coverage.  Please reach out to your insurance provider with any questions.    If during the course of the call the physician/provider feels a video visit is not appropriate, you will not be charged for this service.\"    Patient has given verbal consent for Video visit? Yes    How would you like to obtain your AVS? MyChart     If you are dropped from the video visit, the video invite should be resent to: Text to cell phone: [phone]     Will anyone else be joining your video visit? No         I have reviewed and updated the patient's allergies and medication list. Patient was asked to provide any patient recorded vital signs, height and/or weight.  Please see " "\"Patient Reported Vital Signs\" tab for that information.        Concerns: Patient has no new concerns.      Refills: None         RANJANA Lynch      Video-Visit Details    Type of service:  Video Visit    Video Start Time: 8:33 am  Video End Time: 8:42 am    Originating Location (pt. Location): Home    Distant Location (provider location):  [facility] CANCER [facility]     Platform used for Video Visit: Rafael Cornejo MD      "

## 2020-08-27 NOTE — PROGRESS NOTES
Hematology Clinic Visit    Video    PROBLEM LIST:  1.  Elevated ferritin, negative HFE gene testing, likely related to fatty liver disease.  On intermittent phlebotomy to lower ferritin.  MRI for liver iron on 8/15/2018 showed no iron overload.  2.  Hypertriglyceridemia diagnosed age 21     Interim History: Mr. Enriquez is seen by video for follow up of elevated ferritin. I last saw him 5/15/19.  He is feeling well.  He has had numerous  therapeutic phlebotomies since September 2018.  Recently he has been donating at [facility] every 8 weeks. He tolerates the phlebotomy well. He recently had lipd profile that showed high cholesterol and triglycerides. He says he is altering his diet and exercising 3-5 x per week to see if he can lower the lipids without medications. He will have a recheck in ~ 6 months.    Physical exam:    General appearance:  Patient is 50 year old man in no acute distress.     HEENT:  No pallor, icterus.   Lungs: Normal respirations, no cough or audible wheezes.  Skin:  No rash, no petechiae or ecchymoses of face, neck, forearms.    The rest of a comprehensive physical examination is deferred due to public health emergency video visit restrictions.    Labs:    Results for SHIRA ENRIQUEZ (MRN [id number]) as of 8/27/2020 08:28   Ref. Range 6/5/2019 09:16 9/27/2019 09:23 10/22/2019 13:15 4/9/2020 09:45 7/27/2020 09:10   Ferritin Latest Ref Range: 26 - 388 ng/mL 289 343 406 (H) 244 220     Assessment recommendation: Elevated ferritin related to fatty liver. Ferritin is in mid normal range with the frequent blood donation. He plans on conintuing every 8 week blood donation for the rest of the calendar year, will then go to blood donation 4x per year. I agree with that plan.     We discussed that the elevated ferritin is related to the fatty liver, but we don't know why some people with fatty liver have elevated ferritin and others do not. I encouraged him to continue with dietary changes-  more fruits and vegetables, less red meat. This will help his liver.     He does not need routine follow up with me, but can have ferritin checked every 6-12 months through his PCP. He can be referred back if questions.     Video   Start: 8:33 am  Stop 8:42 am  Time 9 min     Suma Cornejo MD  Hematology

## 2020-08-28 ENCOUNTER — TELEPHONE (OUTPATIENT)
Dept: DERMATOLOGY | Facility: CLINIC | Age: 50
End: 2020-08-28

## 2020-08-28 NOTE — PROGRESS NOTES
Called patient with results of biopsy. No changes to management. Staffed with Dr. Morris.    Daysi Menon  PGY4 dermatology

## 2020-09-03 NOTE — RESULT ENCOUNTER NOTE
Called patient with results of biopsy showing compound dysplastic nevus with moderate atypia. No further evaluation/mgmt for this site required. Patient has been called with results as previously documented in epic.     Daysi Menon  PGY-4 Dermatology Resident  [facility] Department of Dermatology

## 2021-02-11 ENCOUNTER — TELEPHONE (OUTPATIENT)
Dept: FAMILY MEDICINE | Facility: CLINIC | Age: 51
End: 2021-02-11

## 2021-02-11 DIAGNOSIS — E78.5 HYPERLIPIDEMIA LDL GOAL <130: Primary | ICD-10-CM

## 2021-02-11 DIAGNOSIS — R79.89 ELEVATED FERRITIN: ICD-10-CM

## 2021-02-11 NOTE — TELEPHONE ENCOUNTER
Reason for Call:  Other     Detailed comments: Patient calling. States he is due for lab work for lipid profile and ferritin level check. No orders in epic.     Phone Number Patient can be reached at: Home number on file [phone] (home)    Best Time: any    Can we leave a detailed message on this number? YES    Call taken on 2/11/2021 at 1:33 PM by Layton Andre MD   7/28/2020  9:04 AM CDT      Your A1c is still in prediabetic range.  Active weight loss, working on cutting down carbohydrate, quitting tobacco use and cutting back significantly on alcohol use is going to improve your chances against diabetes.  Your bad cholesterol is stable but your triglycerides are high.  Your good cholesterol is low.  As we discussed going up on the dose of statin would be helpful.  If I remember correctly, you would like to work on your lifestyle before we adjust cholesterol medication which is OK.  I would recommend rechecking your cholesterol in 6 months.     Layton Omalley MD  [facility]     8/27/20 Oncology note from Dr Cornejo  Interim History: Mr. Winston is seen by video for follow up of elevated ferritin. I last saw him 5/15/19.  He is feeling well.  He has had numerous  therapeutic phlebotomies since September 2018.  Recently he has been donating at [employer] every 8 weeks. He tolerates the phlebotomy well. He recently had lipd profile that showed high cholesterol and triglycerides. He says he is altering his diet and exercising 3-5 x per week to see if he can lower the lipids without medications. He will have a recheck in ~ 6 months.

## 2021-02-12 NOTE — TELEPHONE ENCOUNTER
Signed.  I am more than happy to sign his order as I saw him last but check with patient and update pcp so future orders can be routed to his pcp.

## 2021-03-04 DIAGNOSIS — R79.89 ELEVATED FERRITIN: ICD-10-CM

## 2021-03-04 DIAGNOSIS — E78.5 HYPERLIPIDEMIA LDL GOAL <130: ICD-10-CM

## 2021-03-04 LAB
CHOLEST SERPL-MCNC: 213 MG/DL
FERRITIN SERPL-MCNC: 290 NG/ML (ref 26–388)
HDLC SERPL-MCNC: 27 MG/DL
HGB BLD-MCNC: 15.2 G/DL (ref 13.3–17.7)
LDLC SERPL CALC-MCNC: 130 MG/DL
NONHDLC SERPL-MCNC: 186 MG/DL
TRIGL SERPL-MCNC: 279 MG/DL

## 2021-03-04 PROCEDURE — 85018 HEMOGLOBIN: CPT | Performed by: FAMILY MEDICINE

## 2021-03-04 PROCEDURE — 82728 ASSAY OF FERRITIN: CPT | Performed by: FAMILY MEDICINE

## 2021-03-04 PROCEDURE — 36415 COLL VENOUS BLD VENIPUNCTURE: CPT | Performed by: FAMILY MEDICINE

## 2021-03-04 PROCEDURE — 80061 LIPID PANEL: CPT | Performed by: FAMILY MEDICINE

## 2021-06-01 ENCOUNTER — MYC MEDICAL ADVICE (OUTPATIENT)
Dept: FAMILY MEDICINE | Facility: CLINIC | Age: 51
End: 2021-06-01

## 2021-06-01 ENCOUNTER — TELEPHONE (OUTPATIENT)
Dept: FAMILY MEDICINE | Facility: CLINIC | Age: 51
End: 2021-06-01

## 2021-06-01 ENCOUNTER — OFFICE VISIT (OUTPATIENT)
Dept: ORTHOPEDICS | Facility: CLINIC | Age: 51
End: 2021-06-01
Payer: COMMERCIAL

## 2021-06-01 VITALS — HEIGHT: 70 IN | WEIGHT: 203 LBS | BODY MASS INDEX: 29.06 KG/M2

## 2021-06-01 DIAGNOSIS — M25.562 ACUTE PAIN OF LEFT KNEE: Primary | ICD-10-CM

## 2021-06-01 PROCEDURE — 99203 OFFICE O/P NEW LOW 30 MIN: CPT | Performed by: FAMILY MEDICINE

## 2021-06-01 ASSESSMENT — MIFFLIN-ST. JEOR: SCORE: 1792.05

## 2021-06-01 NOTE — PROGRESS NOTES
CHIEF COMPLAINT:  Consult (left knee)       HISTORY OF PRESENT ILLNESS  Mr. Winston is a pleasant 51 year old year old male history of tibial plateau fracture of right knee who presents to clinic today with left knee.  Alpesh explains that on Sunday he was lifting cast iron radiator in and out of house and noticed that evening he had a moderate amount of swelling in the knee, primarily posterior/medial. Worse with pivoting.    Onset: sudden  Location: left medial and deep knee  Quality:  aching, dull and sharp  Duration: 2 days   Severity: 5/10 at worst  Timing:constant  Modifying factors:  resting and non-use makes it better, movement and use makes it worse  Associated signs & symptoms: pain, tenderness and swelling  Previous similar pain: No  Treatments to date: ice, Advil, rest and  Compression wrap.     Additional history: as documented    Review of Systems:    Have you recently had a a fever, chills, weight loss? No    Do you have any vision problems? No    Do you have any chest pain or edema? No    Do you have any shortness of breath or wheezing?  No    Do you have stomach problems? No    Do you have any numbness or focal weakness? No    Do you have diabetes? No    Do you have problems with bleeding or clotting? No    Do you have an rashes or other skin lesions? No    MEDICAL HISTORY  Patient Active Problem List   Diagnosis     ALLERGIC RHINITIS      HYPERLIPIDEMIA LDL GOAL <130     Family history of diabetes mellitus     Hypertriglyceridemia     Family history of coronary artery disease     Chews tobacco     Gastroesophageal reflux disease without esophagitis     Elevated ferritin       Current Outpatient Medications   Medication Sig Dispense Refill     atorvastatin (LIPITOR) 20 MG tablet TAKE 1 TABLET DAILY 90 tablet 1     COENZYME Q-10 PO Take 20 mg by mouth daily       fenofibrate (TRIGLIDE/LOFIBRA) 160 MG tablet TAKE 1 TABLET DAILY (DUE   FOR AN APPOINTMENT NOW) 90 tablet 1     Omega-3 Fatty Acids  "(FISH OIL PO)        tadalafil (CIALIS) 10 MG tablet Take 1 tablet (10 mg) by mouth daily as needed 10 tablet 0       Allergies   Allergen Reactions     Omnicef Hives     Sulfa Drugs Rash       Family History   Problem Relation Age of Onset     C.A.D. Maternal Grandmother         MI X 2 ,  in her 60's      Coronary Artery Disease Maternal Grandmother      Hypertension Maternal Grandmother      Hyperlipidemia Maternal Grandmother      Other Cancer Maternal Grandmother         cervical     Breast Cancer Maternal Grandmother      Heart Disease Father      Diabetes Father      Prostate Cancer Father      Hypertension Father      Diabetes Paternal Grandmother         type 2     Breast Cancer Sister      Alzheimer Disease Mother 74        CBD     Hypertension Mother      Alcohol/Drug Maternal Uncle      C.A.D. Maternal Uncle         MI age 56     Substance Abuse Other      Cerebrovascular Disease No family hx of      Cancer - colorectal No family hx of        Additional medical/Social/Surgical histories reviewed in [facility] and updated as appropriate.       PHYSICAL EXAM  Ht 1.778 m (5' 10\")   Wt 92.1 kg (203 lb)   BMI 29.13 kg/m      General  - normal appearance, in no obvious distress  Musculoskeletal - left knee  - stance: normal gait without limp, no obvious leg length discrepancy  - inspection: mild effusion, no ecchymosis, normal muscle tone, normal bone and joint alignment, no obvious deformity  - palpation: medial joint line tenderness primarily posteromedial joint line, patella and patellar tendon non-tender, normal popliteal pulse, nontender to plateau, nontender medial femoral condyle.  - ROM: 135 degrees flexion, -5 degrees extension  - strength: 5/5 in flexion, 5/5 in extension  - neuro: no sensory or motor deficit  - special tests:  (-) Lachman  (-) anterior drawer  (-) posterior drawer  (-) Sky  (-) varus at 0 and 30 degrees flexion  (-) valgus at 0 and 30 degrees flexion  Neuro  - no sensory or " motor deficit, grossly normal coordination, normal muscle tone  Skin  - no ecchymosis, erythema, warmth, or induration, no obvious rash  Psych  - interactive, appropriate, normal mood and affect    IMAGING : Deferred     ASSESSMENT & PLAN  Mr. Winston is a 51 year old year old male who presents to clinic today with acute medial joint line pain after lifting a Radiator as well as car batteries last week. No fall or trauma, no immediate onset of pain.  History and clinical exam most suggestive of possible meniscal injury, possibly degenerative.  X-rays discussed at this time he would like to hold off.  I do think this is very appropriate given lack of red flag or concerning symptoms.    Diagnosis: Acute pain of left knee, effusion of left knee    Treat options discussed this time I would like him to start a meniscal home exercise program.  Continue with knee sleeve.  We may weight-bear as tolerated and progress as his knee allows.  Continue icing, Advil use and dosage reviewed.    If pain persist in 2 weeks I would then highly encouraged an x-ray as well as discussion of advanced imaging versus diagnostic/therapeutic injection depending on exam.    It was a pleasure seeing Alpesh today.    Abilio Lofton DO, CAM  Primary Care Sports Medicine

## 2021-06-01 NOTE — TELEPHONE ENCOUNTER
S-(situation): knee problem on left, over weekend hauling cast iron radiators and hurt knee. Rates pain , can't  . Wearing brace and when takes it off he is limping an it is swelling, he doesn't want to walk on it and cause further damage. He is icing and compression     B-(background): 5 years ago upper tibia plateau fx on the right side, but this kind of feels the same    A-(assessment): needs ortho assessment     R-(recommendations):advised to go to walk in [facility] clinic on [address], advised he call first to [phone] as since covid restrictions that has been the request. He agrees to do this.   If he has trouble getting into that clinic I advise he call his insurance to see if there another walkin ortho clinic he can go to and be see      Cornelia Dutton, RN, BSN  [facility]

## 2021-06-01 NOTE — TELEPHONE ENCOUNTER
Pt called on phone as well (see phone encounter of 6/1/2021)    He has been advised to go to walk in ortho clinic on Venessa Dutton RN, BSN  [facility]

## 2021-06-01 NOTE — LETTER
6/1/2021      RE: Alpesh Winston  [address] [place] [address]       CHIEF COMPLAINT:  Consult (left knee)       HISTORY OF PRESENT ILLNESS  Mr. Winston is a pleasant 51 year old year old male history of tibial plateau fracture of right knee who presents to clinic today with left knee.  Alpesh explains that on Sunday he was lifting cast iron radiator in and out of house and noticed that evening he had a moderate amount of swelling in the knee, primarily posterior/medial. Worse with pivoting.    Onset: sudden  Location: left medial and deep knee  Quality:  aching, dull and sharp  Duration: 2 days   Severity: 5/10 at worst  Timing:constant  Modifying factors:  resting and non-use makes it better, movement and use makes it worse  Associated signs & symptoms: pain, tenderness and swelling  Previous similar pain: No  Treatments to date: ice, Advil, rest and  Compression wrap.     Additional history: as documented    Review of Systems:    Have you recently had a a fever, chills, weight loss? No    Do you have any vision problems? No    Do you have any chest pain or edema? No    Do you have any shortness of breath or wheezing?  No    Do you have stomach problems? No    Do you have any numbness or focal weakness? No    Do you have diabetes? No    Do you have problems with bleeding or clotting? No    Do you have an rashes or other skin lesions? No    MEDICAL HISTORY  Patient Active Problem List   Diagnosis     ALLERGIC RHINITIS      HYPERLIPIDEMIA LDL GOAL <130     Family history of diabetes mellitus     Hypertriglyceridemia     Family history of coronary artery disease     Chews tobacco     Gastroesophageal reflux disease without esophagitis     Elevated ferritin       Current Outpatient Medications   Medication Sig Dispense Refill     atorvastatin (LIPITOR) 20 MG tablet TAKE 1 TABLET DAILY 90 tablet 1     COENZYME Q-10 PO Take 20 mg by mouth daily       fenofibrate (TRIGLIDE/LOFIBRA) 160 MG  "tablet TAKE 1 TABLET DAILY (DUE   FOR AN APPOINTMENT NOW) 90 tablet 1     Omega-3 Fatty Acids (FISH OIL PO)        tadalafil (CIALIS) 10 MG tablet Take 1 tablet (10 mg) by mouth daily as needed 10 tablet 0       Allergies   Allergen Reactions     Omnicef Hives     Sulfa Drugs Rash       Family History   Problem Relation Age of Onset     C.A.D. Maternal Grandmother         MI X 2 ,  in her 60's      Coronary Artery Disease Maternal Grandmother      Hypertension Maternal Grandmother      Hyperlipidemia Maternal Grandmother      Other Cancer Maternal Grandmother         cervical     Breast Cancer Maternal Grandmother      Heart Disease Father      Diabetes Father      Prostate Cancer Father      Hypertension Father      Diabetes Paternal Grandmother         type 2     Breast Cancer Sister      Alzheimer Disease Mother 74        CBD     Hypertension Mother      Alcohol/Drug Maternal Uncle      C.A.D. Maternal Uncle         MI age 56     Substance Abuse Other      Cerebrovascular Disease No family hx of      Cancer - colorectal No family hx of        Additional medical/Social/Surgical histories reviewed in [facility] and updated as appropriate.       PHYSICAL EXAM  Ht 1.778 m (5' 10\")   Wt 92.1 kg (203 lb)   BMI 29.13 kg/m      General  - normal appearance, in no obvious distress  Musculoskeletal - left knee  - stance: normal gait without limp, no obvious leg length discrepancy  - inspection: mild effusion, no ecchymosis, normal muscle tone, normal bone and joint alignment, no obvious deformity  - palpation: medial joint line tenderness primarily posteromedial joint line, patella and patellar tendon non-tender, normal popliteal pulse, nontender to plateau, nontender medial femoral condyle.  - ROM: 135 degrees flexion, -5 degrees extension  - strength: 5/5 in flexion, 5/5 in extension  - neuro: no sensory or motor deficit  - special tests:  (-) Lachman  (-) anterior drawer  (-) posterior drawer  (-) Sky  (-) varus at " 0 and 30 degrees flexion  (-) valgus at 0 and 30 degrees flexion  Neuro  - no sensory or motor deficit, grossly normal coordination, normal muscle tone  Skin  - no ecchymosis, erythema, warmth, or induration, no obvious rash  Psych  - interactive, appropriate, normal mood and affect    IMAGING : Deferred     ASSESSMENT & PLAN  Mr. Winston is a 51 year old year old male who presents to clinic today with acute medial joint line pain after lifting a Radiator as well as car batteries last week. No fall or trauma, no immediate onset of pain.  History and clinical exam most suggestive of possible meniscal injury, possibly degenerative.  X-rays discussed at this time he would like to hold off.  I do think this is very appropriate given lack of red flag or concerning symptoms.    Diagnosis: Acute pain of left knee, effusion of left knee    Treat options discussed this time I would like him to start a meniscal home exercise program.  Continue with knee sleeve.  We may weight-bear as tolerated and progress as his knee allows.  Continue icing, Advil use and dosage reviewed.    If pain persist in 2 weeks I would then highly encouraged an x-ray as well as discussion of advanced imaging versus diagnostic/therapeutic injection depending on exam.    It was a pleasure seeing Alpesh today.    Abilio Lofton DO, [facility]  Primary Care Sports Medicine      Abilio Lofton DO

## 2021-07-07 DIAGNOSIS — E78.5 HYPERLIPIDEMIA LDL GOAL <130: ICD-10-CM

## 2021-07-07 RX ORDER — FENOFIBRATE 160 MG/1
TABLET ORAL
Qty: 90 TABLET | Refills: 0 | Status: SHIPPED | OUTPATIENT
Start: 2021-07-07 | End: 2021-08-18

## 2021-07-12 DIAGNOSIS — E78.5 HYPERLIPIDEMIA LDL GOAL <130: ICD-10-CM

## 2021-07-15 RX ORDER — ATORVASTATIN CALCIUM 20 MG/1
TABLET, FILM COATED ORAL
Qty: 90 TABLET | Refills: 0 | Status: SHIPPED | OUTPATIENT
Start: 2021-07-15 | End: 2021-08-18

## 2021-07-15 NOTE — TELEPHONE ENCOUNTER
--Last visit:  7/27/2020     --Future Visit: NONE    ---Prescription approved per [facility] Refill Protocol.       Bonnie Angela RN BSN     [facility]

## 2021-07-21 ENCOUNTER — MYC MEDICAL ADVICE (OUTPATIENT)
Dept: FAMILY MEDICINE | Facility: CLINIC | Age: 51
End: 2021-07-21

## 2021-07-21 DIAGNOSIS — E78.5 HYPERLIPIDEMIA LDL GOAL <130: Primary | ICD-10-CM

## 2021-07-21 DIAGNOSIS — R73.03 PREDIABETES: ICD-10-CM

## 2021-07-21 DIAGNOSIS — Z12.5 SCREENING PSA (PROSTATE SPECIFIC ANTIGEN): ICD-10-CM

## 2021-08-11 ENCOUNTER — LAB (OUTPATIENT)
Dept: LAB | Facility: CLINIC | Age: 51
End: 2021-08-11
Payer: COMMERCIAL

## 2021-08-11 DIAGNOSIS — E78.5 HYPERLIPIDEMIA LDL GOAL <130: ICD-10-CM

## 2021-08-11 DIAGNOSIS — R73.03 PREDIABETES: ICD-10-CM

## 2021-08-11 DIAGNOSIS — Z12.5 SCREENING PSA (PROSTATE SPECIFIC ANTIGEN): ICD-10-CM

## 2021-08-11 LAB
ALBUMIN SERPL-MCNC: 4.1 G/DL (ref 3.4–5)
ALP SERPL-CCNC: 69 U/L (ref 40–150)
ALT SERPL W P-5'-P-CCNC: 52 U/L (ref 0–70)
ANION GAP SERPL CALCULATED.3IONS-SCNC: 4 MMOL/L (ref 3–14)
AST SERPL W P-5'-P-CCNC: 30 U/L (ref 0–45)
BILIRUB SERPL-MCNC: 0.5 MG/DL (ref 0.2–1.3)
BUN SERPL-MCNC: 18 MG/DL (ref 7–30)
CALCIUM SERPL-MCNC: 9.6 MG/DL (ref 8.5–10.1)
CHLORIDE BLD-SCNC: 111 MMOL/L (ref 94–109)
CHOLEST SERPL-MCNC: 228 MG/DL
CO2 SERPL-SCNC: 24 MMOL/L (ref 20–32)
CREAT SERPL-MCNC: 0.98 MG/DL (ref 0.66–1.25)
FASTING STATUS PATIENT QL REPORTED: YES
GFR SERPL CREATININE-BSD FRML MDRD: 90 ML/MIN/1.73M2
GLUCOSE BLD-MCNC: 130 MG/DL (ref 70–99)
HBA1C MFR BLD: 6.1 % (ref 0–5.6)
HDLC SERPL-MCNC: 19 MG/DL
LDLC SERPL CALC-MCNC: 108 MG/DL
LDLC SERPL CALC-MCNC: ABNORMAL MG/DL
NONHDLC SERPL-MCNC: 209 MG/DL
POTASSIUM BLD-SCNC: 5.4 MMOL/L (ref 3.4–5.3)
PROT SERPL-MCNC: 7.5 G/DL (ref 6.8–8.8)
PSA SERPL-MCNC: 1.39 UG/L (ref 0–4)
SODIUM SERPL-SCNC: 139 MMOL/L (ref 133–144)
TRIGL SERPL-MCNC: 696 MG/DL

## 2021-08-11 PROCEDURE — 80053 COMPREHEN METABOLIC PANEL: CPT

## 2021-08-11 PROCEDURE — 36415 COLL VENOUS BLD VENIPUNCTURE: CPT

## 2021-08-11 PROCEDURE — 80061 LIPID PANEL: CPT

## 2021-08-11 PROCEDURE — 83036 HEMOGLOBIN GLYCOSYLATED A1C: CPT

## 2021-08-11 PROCEDURE — G0103 PSA SCREENING: HCPCS

## 2021-08-11 PROCEDURE — 83721 ASSAY OF BLOOD LIPOPROTEIN: CPT | Mod: 59

## 2021-08-18 ENCOUNTER — OFFICE VISIT (OUTPATIENT)
Dept: FAMILY MEDICINE | Facility: CLINIC | Age: 51
End: 2021-08-18
Payer: COMMERCIAL

## 2021-08-18 VITALS
TEMPERATURE: 98 F | DIASTOLIC BLOOD PRESSURE: 84 MMHG | WEIGHT: 205 LBS | HEART RATE: 64 BPM | SYSTOLIC BLOOD PRESSURE: 126 MMHG | BODY MASS INDEX: 30.36 KG/M2 | OXYGEN SATURATION: 98 % | RESPIRATION RATE: 16 BRPM | HEIGHT: 69 IN

## 2021-08-18 DIAGNOSIS — Z12.11 SCREEN FOR COLON CANCER: ICD-10-CM

## 2021-08-18 DIAGNOSIS — E78.5 HYPERLIPIDEMIA LDL GOAL <130: ICD-10-CM

## 2021-08-18 DIAGNOSIS — R73.03 PREDIABETES: ICD-10-CM

## 2021-08-18 DIAGNOSIS — E87.5 HYPERKALEMIA: ICD-10-CM

## 2021-08-18 DIAGNOSIS — Z00.00 ROUTINE HISTORY AND PHYSICAL EXAMINATION OF ADULT: Primary | ICD-10-CM

## 2021-08-18 PROCEDURE — 80048 BASIC METABOLIC PNL TOTAL CA: CPT | Performed by: FAMILY MEDICINE

## 2021-08-18 PROCEDURE — 99396 PREV VISIT EST AGE 40-64: CPT | Performed by: FAMILY MEDICINE

## 2021-08-18 PROCEDURE — 36415 COLL VENOUS BLD VENIPUNCTURE: CPT | Performed by: FAMILY MEDICINE

## 2021-08-18 RX ORDER — ATORVASTATIN CALCIUM 20 MG/1
20 TABLET, FILM COATED ORAL DAILY
Qty: 90 TABLET | Refills: 2 | Status: SHIPPED | OUTPATIENT
Start: 2021-10-01 | End: 2022-09-20

## 2021-08-18 RX ORDER — FENOFIBRATE 160 MG/1
TABLET ORAL
Qty: 90 TABLET | Refills: 2 | Status: SHIPPED | OUTPATIENT
Start: 2021-10-01 | End: 2021-11-03

## 2021-08-18 ASSESSMENT — MIFFLIN-ST. JEOR: SCORE: 1785.25

## 2021-08-18 ASSESSMENT — ENCOUNTER SYMPTOMS
DIZZINESS: 0
NAUSEA: 0
ARTHRALGIAS: 0
HEMATOCHEZIA: 0
HEMATURIA: 0
PARESTHESIAS: 0
DYSURIA: 0
SORE THROAT: 0
MYALGIAS: 0
CONSTIPATION: 0
CHILLS: 0
COUGH: 1
HEARTBURN: 0
ABDOMINAL PAIN: 1
NERVOUS/ANXIOUS: 0
PALPITATIONS: 0
JOINT SWELLING: 0
EYE PAIN: 0
SHORTNESS OF BREATH: 0
FREQUENCY: 1
DIARRHEA: 0
WEAKNESS: 0
HEADACHES: 0
FEVER: 0

## 2021-08-18 NOTE — PATIENT INSTRUCTIONS
Did you know?      You can schedule a video visit for follow-up appointments as well as future appointments for certain conditions.  Please see the below link.     [email or web address]    If you have not already done so,  I encourage you to sign up for [facility] ([email or web address]  This will allow you to review your results, securely communicate with a provider, and schedule virtual visits as well.

## 2021-08-18 NOTE — PROGRESS NOTES
SUBJECTIVE:   CC: Alpesh Winston is an 51 year old male who presents for preventative health visit.     Patient has been advised of split billing requirements and indicates understanding: Yes  Healthy Habits:     Getting at least 3 servings of Calcium per day:  Yes    Bi-annual eye exam:  Yes    Dental care twice a year:  Yes    Sleep apnea or symptoms of sleep apnea:  None    Diet:  Regular (no restrictions)    Frequency of exercise:  2-3 days/week    Duration of exercise:  30-45 minutes    Taking medications regularly:  Yes    Medication side effects:  None    PHQ-2 Total Score: 0    Additional concerns today:  No    Today's PHQ-2 Score:   PHQ-2 ( 1999 [employer]) 8/18/2021   Q1: Little interest or pleasure in doing things 0   Q2: Feeling down, depressed or hopeless 0   PHQ-2 Score 0   Q1: Little interest or pleasure in doing things Not at all   Q2: Feeling down, depressed or hopeless Not at all   PHQ-2 Score 0       Abuse: Current or Past(Physical, Sexual or Emotional)- No  Do you feel safe in your environment? Yes    Have you ever done Advance Care Planning? (For example, a Health Directive, POLST, or a discussion with a medical provider or your loved ones about your wishes): No, advance care planning information given to patient to review.  Advanced care planning was discussed at today's visit.    Social History     Tobacco Use     Smoking status: Never Smoker     Smokeless tobacco: Current User     Types: Chew     Tobacco comment: chewing less   Substance Use Topics     Alcohol use: Yes     Alcohol/week: 0.0 standard drinks     Comment: 10-12 drinks a week      If you drink alcohol do you typically have >3 drinks per day or >7 drinks per week? Yes      Alcohol Use 8/18/2021   Prescreen: >3 drinks/day or >7 drinks/week? Yes   Prescreen: >3 drinks/day or >7 drinks/week? -   AUDIT SCORE  7     AUDIT - Alcohol Use Disorders Identification Test - Reproduced from the World Health Organization Audit 2001 (Second  Edition) 8/18/2021   1.  How often do you have a drink containing alcohol? 2 to 3 times a week   2.  How many drinks containing alcohol do you have on a typical day when you are drinking? 5 or 6   3.  How often do you have five or more drinks on one occasion? Monthly   4.  How often during the last year have you found that you were not able to stop drinking once you had started? Never   5.  How often during the last year have you failed to do what was normally expected of you because of drinking? Never   6.  How often during the last year have you needed a first drink in the morning to get yourself going after a heavy drinking session? Never   7.  How often during the last year have you had a feeling of guilt or remorse after drinking? Never   8.  How often during the last year have you been unable to remember what happened the night before because of your drinking? Never   9.  Have you or someone else been injured because of your drinking? No   10. Has a relative, friend, doctor or other health care worker been concerned about your drinking or suggested you cut down? No   TOTAL SCORE 7       Last PSA:   PSA   Date Value Ref Range Status   07/27/2020 1.36 0 - 4 ug/L Final     Comment:     Assay Method:  Chemiluminescence using Siemens Vista analyzer     Prostate Specific Antigen Screen   Date Value Ref Range Status   08/11/2021 1.39 0.00 - 4.00 ug/L Final       Reviewed orders with patient. 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   Wife is working from home.     Was on vacation and missed rx for a week. Also admits may not be taking his medications daily - forgetting rx.    For last 2 months - sleep is not great. Remodeling bathroom.    Review of Systems  "  Constitutional: Negative for chills and fever.   HENT: Negative for congestion, ear pain, hearing loss and sore throat.    Eyes: Negative for pain and visual disturbance.   Respiratory: Positive for cough. Negative for shortness of breath.    Cardiovascular: Negative for chest pain, palpitations and peripheral edema.   Gastrointestinal: Positive for abdominal pain. Negative for constipation, diarrhea, heartburn, hematochezia and nausea.   Genitourinary: Positive for frequency and impotence. Negative for discharge, dysuria, genital sores, hematuria and urgency.   Musculoskeletal: Negative for arthralgias, joint swelling and myalgias.   Skin: Negative for rash.   Neurological: Negative for dizziness, weakness, headaches and paresthesias.   Psychiatric/Behavioral: Negative for mood changes. The patient is not nervous/anxious.      OBJECTIVE:   /84 (BP Location: Right arm, Patient Position: Sitting, Cuff Size: Adult Regular)   Pulse 64   Temp 98  F (36.7  C) (Oral)   Resp 16   Ht 1.753 m (5' 9\")   Wt 93 kg (205 lb)   SpO2 98%   BMI 30.27 kg/m      Physical Exam  GENERAL: healthy, alert and no distress  EYES: Eyes grossly normal to inspection, PERRL and conjunctivae and sclerae normal  HENT: ear canals and TM's normal, nose and mouth without ulcers or lesions  NECK: no adenopathy, no asymmetry, masses, or scars and thyroid normal to palpation  RESP: lungs clear to auscultation - no rales, rhonchi or wheezes  CV: regular rate and rhythm, normal S1 S2, no S3 or S4, no murmur, click or rub, no peripheral edema and peripheral pulses strong  ABDOMEN: soft, nontender, no hepatosplenomegaly, no masses and bowel sounds normal   (male): normal male genitalia without lesions or urethral discharge, no hernia  MS: no gross musculoskeletal defects noted, no edema  SKIN: no suspicious lesions or rashes  NEURO: Normal strength and tone, mentation intact and speech normal  PSYCH: mentation appears normal, affect " "normal/bright        ASSESSMENT/PLAN:   (Z00.00) Routine history and physical examination of adult  (primary encounter diagnosis)  Comment:    Plan:      (E87.5) Hyperkalemia  Comment: Unclear etiology.  Not using any medication.  Per patient could be from dehydration reasonable to recheck it.  Plan: Basic metabolic panel  (Ca, Cl, CO2, Creat,         Gluc, K, Na, BUN)             (E78.5) Hyperlipidemia LDL goal <130  Comment: Triglycerides still high.  We may want to consider either Lipitor or fenofibrate dose if it persist in future.  He is going to work on his lifestyle.  Plan: fenofibrate (TRIGLIDE/LOFIBRA) 160 MG tablet,         atorvastatin (LIPITOR) 20 MG tablet             (Z12.11) Screen for colon cancer  Comment:    Plan: Adult Gastro Ref - Procedure Only             (R73.03) Prediabetes  Comment:    Plan: Strongly recommended him to continue to focus on weight loss, diet and exercise.        Patient has been advised of split billing requirements and indicates understanding: No  COUNSELING:   Reviewed preventive health counseling, as reflected in patient instructions  Special attention given to:        Regular exercise       Healthy diet/nutrition       Vision screening       Hearing screening    Estimated body mass index is 29.13 kg/m  as calculated from the following:    Height as of 6/1/21: 1.778 m (5' 10\").    Weight as of 6/1/21: 92.1 kg (203 lb).     Weight management plan: Discussed healthy diet and exercise guidelines    He reports that he has never smoked. His smokeless tobacco use includes chew.  Tobacco Cessation Action Plan:   Self help information given to patient      Counseling Resources:  ATP IV Guidelines  Pooled Cohorts Equation Calculator  FRAX Risk Assessment  ICSI Preventive Guidelines  Dietary Guidelines for Americans, 2010  USDA's MyPlate  ASA Prophylaxis  Lung CA Screening    Layton Omalley MD  [facility]  "

## 2021-08-19 LAB
ANION GAP SERPL CALCULATED.3IONS-SCNC: 8 MMOL/L (ref 3–14)
BUN SERPL-MCNC: 18 MG/DL (ref 7–30)
CALCIUM SERPL-MCNC: 9.7 MG/DL (ref 8.5–10.1)
CHLORIDE BLD-SCNC: 107 MMOL/L (ref 94–109)
CO2 SERPL-SCNC: 23 MMOL/L (ref 20–32)
CREAT SERPL-MCNC: 1.03 MG/DL (ref 0.66–1.25)
GFR SERPL CREATININE-BSD FRML MDRD: 85 ML/MIN/1.73M2
GLUCOSE BLD-MCNC: 129 MG/DL (ref 70–99)
POTASSIUM BLD-SCNC: 4 MMOL/L (ref 3.4–5.3)
SODIUM SERPL-SCNC: 138 MMOL/L (ref 133–144)

## 2021-08-21 PROBLEM — R73.03 PREDIABETES: Status: ACTIVE | Noted: 2021-08-21

## 2021-09-05 ENCOUNTER — HEALTH MAINTENANCE LETTER (OUTPATIENT)
Age: 51
End: 2021-09-05

## 2021-10-04 ENCOUNTER — ANCILLARY PROCEDURE (OUTPATIENT)
Dept: GENERAL RADIOLOGY | Facility: CLINIC | Age: 51
End: 2021-10-04
Attending: FAMILY MEDICINE
Payer: COMMERCIAL

## 2021-10-04 ENCOUNTER — OFFICE VISIT (OUTPATIENT)
Dept: ORTHOPEDICS | Facility: CLINIC | Age: 51
End: 2021-10-04
Payer: COMMERCIAL

## 2021-10-04 DIAGNOSIS — M25.562 ACUTE PAIN OF LEFT KNEE: Primary | ICD-10-CM

## 2021-10-04 PROCEDURE — 73562 X-RAY EXAM OF KNEE 3: CPT | Mod: LT | Performed by: RADIOLOGY

## 2021-10-04 PROCEDURE — 99213 OFFICE O/P EST LOW 20 MIN: CPT | Performed by: FAMILY MEDICINE

## 2021-10-04 NOTE — LETTER
10/4/2021      RE: Alpesh Winston  [address] [place] [address]         [facility] AND SUR[facility]  SPORTS & ORTHOPEDIC CLINIC VISIT     Oct 4, 2021        ASSESSMENT & PLAN    51-year-old with recurrent medial left knee pain concerning for meniscal injury    Reviewed imaging and assessment with patient in detail  Discussed that is okay to pursue repeat course of conservative treatment as he did last time which includes rest, NSAIDs, knee sleeve for comfort and icing with gradual return to activity and possibly physical therapy.  Alternatively, given that this is a recurrence within the past 3 to 4 months would be appropriate to pursue MRI for further evaluation of what is suspected to be a meniscus injury.  Patient would like to consider his options.  An MRI order was placed and he was given scheduling instructions should he desire to pursue further imaging.  He will contact us if he has any other questions or concerns.    Raulito Liu MD  [facility] SPORTS MEDICINE CLINIC [place]    -----  Chief Complaint   Patient presents with     Left Knee - Pain       SUBJECTIVE  Alpesh Winston is a/an 51 year old male who is seen as a self referral for evaluation of  Left knee pain.     The patient is seen by themselves.  The patient is Right handed    Onset: June 2021. Patient describes injury as playing tennis last week and hurting his knee again.The initial injury after seeing  was doing well and healed. The knee swelled up and has been painful,  Location of Pain: left medial and posterior knee and with some pain along the joint line  Worsened by: Walking, bending, squatting, pivoting,   Better with: Ice, advil   Treatments tried: ice, casting/splinting/bracing and advil   Associated symptoms: swelling, Hot     Orthopedic/Surgical history: NO  Social History/Occupation:        REVIEW OF SYSTEMS:    Do you have fever, chills, weight loss? No    Do you  have any vision problems? No    Do you have any chest pain or edema? No    Do you have any shortness of breath or wheezing?  No    Do you have stomach problems? No    Do you have any numbness or focal weakness? No    Do you have diabetes? No    Do you have problems with bleeding or clotting? No    Do you have an rashes or other skin lesions? No    OBJECTIVE:  There were no vitals taken for this visit.     Patient is alert, No acute distress, pleasant and conversational.    Gait: nonantalgic. Normal heel toe gait.    left knee:   Skin intact. No erythema or ecchymosis.  No effusion or soft tissue swelling.    AROM: Zero to approximately 135  with pain on terminal flexion    Palpation: No medial or lateral facet joint tenderness.  TTP along the medial joint line.  No TTP along the lateral joint line    Special Tests:  Negative bounce test, positive forced flexion and positive Sky's for pain.  No ligamentous laxity or pain with valgus or varus stress.  Negative Lachman's, Anterior Drawer and Posterior Drawer     Full Isometric quad strength, extensor mechanism in place     Neurovascularly intact in the lower extremity    Hip and Ankle with full AROM and nontender      RADIOLOGY:    3 view xrays of left knee performed and reviewed independently demonstrating no acute fracture or dislocation.  No significant DJD. See EMR for formal radiology report.       Raulito Liu MD

## 2021-10-04 NOTE — PROGRESS NOTES
[facility] AND SURGERY C[facility]  SPORTS & ORTHOPEDIC CLINIC VISIT     Oct 4, 2021        ASSESSMENT & PLAN    51-year-old with recurrent medial left knee pain concerning for meniscal injury    Reviewed imaging and assessment with patient in detail  Discussed that is okay to pursue repeat course of conservative treatment as he did last time which includes rest, NSAIDs, knee sleeve for comfort and icing with gradual return to activity and possibly physical therapy.  Alternatively, given that this is a recurrence within the past 3 to 4 months would be appropriate to pursue MRI for further evaluation of what is suspected to be a meniscus injury.  Patient would like to consider his options.  An MRI order was placed and he was given scheduling instructions should he desire to pursue further imaging.  He will contact us if he has any other questions or concerns.    Raulito Liu MD  [facility] SPORTS MEDICINE CLINIC [place]    -----  Chief Complaint   Patient presents with     Left Knee - Pain       SUBJECTIVE  Alpesh Winston is a/an 51 year old male who is seen as a self referral for evaluation of  Left knee pain.     The patient is seen by themselves.  The patient is Right handed    Onset: June 2021. Patient describes injury as playing tennis last week and hurting his knee again.The initial injury after seeing  was doing well and healed. The knee swelled up and has been painful,  Location of Pain: left medial and posterior knee and with some pain along the joint line  Worsened by: Walking, bending, squatting, pivoting,   Better with: Ice, advil   Treatments tried: ice, casting/splinting/bracing and advil   Associated symptoms: swelling, Hot     Orthopedic/Surgical history: NO  Social History/Occupation:        REVIEW OF SYSTEMS:    Do you have fever, chills, weight loss? No    Do you have any vision problems? No    Do you have any chest pain or edema? No    Do you have any shortness  of breath or wheezing?  No    Do you have stomach problems? No    Do you have any numbness or focal weakness? No    Do you have diabetes? No    Do you have problems with bleeding or clotting? No    Do you have an rashes or other skin lesions? No    OBJECTIVE:  There were no vitals taken for this visit.     Patient is alert, No acute distress, pleasant and conversational.    Gait: nonantalgic. Normal heel toe gait.    left knee:   Skin intact. No erythema or ecchymosis.  No effusion or soft tissue swelling.    AROM: Zero to approximately 135  with pain on terminal flexion    Palpation: No medial or lateral facet joint tenderness.  TTP along the medial joint line.  No TTP along the lateral joint line    Special Tests:  Negative bounce test, positive forced flexion and positive Sky's for pain.  No ligamentous laxity or pain with valgus or varus stress.  Negative Lachman's, Anterior Drawer and Posterior Drawer     Full Isometric quad strength, extensor mechanism in place     Neurovascularly intact in the lower extremity    Hip and Ankle with full AROM and nontender      RADIOLOGY:    3 view xrays of left knee performed and reviewed independently demonstrating no acute fracture or dislocation.  No significant DJD. See EMR for formal radiology report.

## 2021-10-13 ENCOUNTER — ANCILLARY PROCEDURE (OUTPATIENT)
Dept: MRI IMAGING | Facility: CLINIC | Age: 51
End: 2021-10-13
Attending: FAMILY MEDICINE
Payer: COMMERCIAL

## 2021-10-13 DIAGNOSIS — M25.562 ACUTE PAIN OF LEFT KNEE: ICD-10-CM

## 2021-10-13 PROCEDURE — 73721 MRI JNT OF LWR EXTRE W/O DYE: CPT | Mod: LT | Performed by: RADIOLOGY

## 2021-10-14 ENCOUNTER — OFFICE VISIT (OUTPATIENT)
Dept: ORTHOPEDICS | Facility: CLINIC | Age: 51
End: 2021-10-14
Payer: COMMERCIAL

## 2021-10-14 DIAGNOSIS — M25.562 ACUTE PAIN OF LEFT KNEE: Primary | ICD-10-CM

## 2021-10-14 PROCEDURE — 99213 OFFICE O/P EST LOW 20 MIN: CPT | Performed by: FAMILY MEDICINE

## 2021-10-14 NOTE — LETTER
10/14/2021      RE: Alpesh Winston  [address] [place] [address]         [facility]  SPORTS & ORTHOPEDIC CLINIC VISIT     Oct 14, 2021        ASSESSMENT & PLAN    52 yo with left meniscus injury but no significant tearing evident on MRI    Reviewed imaging and assessment with patient in detail  Consider PT and/or steroid injection  If he continues to have recurrent injury/pain, may consider surgery referral    Raulito Liu MD  [facility] SPORTS MEDICINE CLINIC [place]    Face-to-face time:10 minutes  Record review time: 1Minutes  Documentation time: 1Minutes  Total time: 12 Minutes    -----  Chief Complaint   Patient presents with     Left Knee - Follow Up       SUBJECTIVE  Alpesh Winston is a/an 51 year old male who is seen for follow up of left knee pain. Here today for MRI results. Twisting bothers him, otherwise nothing has changed     The patient is seen by themselves.    Date of injury: June 2021  Date of Last Visit: 10/4/21   Symptoms: no change  Worsened by: Stairs, twisting  Better with: Ice   Treatment to date: ice and casting/splinting/bracing  Associated symptoms: swelling and warmth        REVIEW OF SYSTEMS:    See HPI     OBJECTIVE:  There were no vitals taken for this visit.     No exam this visit    RADIOLOGY:    MRI left knee dated 10/13/2021. Per radiology report:   IMPRESSION:  1. Small to moderate  size left knee joint effusion.   2. Very subtle free edge and undersurface irregularity of the  posterior horn of the left knee medial meniscus without displaced  fragment.  3. Mild fissuring of the articular cartilage along the median ridge of  the patella.  4. The anterior and posterior cruciate ligaments, medial and lateral  supporting structures, and lateral meniscus are intact.         Raulito Liu MD

## 2021-10-14 NOTE — PROGRESS NOTES
[facility]  SPORTS & ORTHOPEDIC CLINIC VISIT     Oct 14, 2021        ASSESSMENT & PLAN    50 yo with left meniscus injury but no significant tearing evident on MRI    Reviewed imaging and assessment with patient in detail  Consider PT and/or steroid injection  If he continues to have recurrent injury/pain, may consider surgery referral    Raulito Liu MD  [facility] SPORTS MEDICINE CLINIC [place]    Face-to-face time:10 minutes  Record review time: 1Minutes  Documentation time: 1Minutes  Total time: 12 Minutes    -----  Chief Complaint   Patient presents with     Left Knee - Follow Up       SUBJECTIVE  Alpesh Winston is a/an 51 year old male who is seen for follow up of left knee pain. Here today for MRI results. Twisting bothers him, otherwise nothing has changed     The patient is seen by themselves.    Date of injury: June 2021  Date of Last Visit: 10/4/21   Symptoms: no change  Worsened by: Stairs, twisting  Better with: Ice   Treatment to date: ice and casting/splinting/bracing  Associated symptoms: swelling and warmth        REVIEW OF SYSTEMS:    See HPI     OBJECTIVE:  There were no vitals taken for this visit.     No exam this visit    RADIOLOGY:    MRI left knee dated 10/13/2021. Per radiology report:   IMPRESSION:  1. Small to moderate  size left knee joint effusion.   2. Very subtle free edge and undersurface irregularity of the  posterior horn of the left knee medial meniscus without displaced  fragment.  3. Mild fissuring of the articular cartilage along the median ridge of  the patella.  4. The anterior and posterior cruciate ligaments, medial and lateral  supporting structures, and lateral meniscus are intact.

## 2021-11-01 DIAGNOSIS — E78.5 HYPERLIPIDEMIA LDL GOAL <130: ICD-10-CM

## 2021-11-03 RX ORDER — FENOFIBRATE 160 MG/1
TABLET ORAL
Qty: 90 TABLET | Refills: 1 | Status: SHIPPED | OUTPATIENT
Start: 2021-11-03 | End: 2022-09-23

## 2022-01-05 ENCOUNTER — TELEPHONE (OUTPATIENT)
Dept: GASTROENTEROLOGY | Facility: CLINIC | Age: 52
End: 2022-01-05
Payer: COMMERCIAL

## 2022-01-05 NOTE — TELEPHONE ENCOUNTER
Screening Questions  1. Are you active on mychart? Y    2. What insurance is in the chart? [employer]-new but did not have insurance card yet    2.  Ordering/Referring Provider: Lyssa    3. BMI 28.7, If greater than 40 review exclusion criteria also will need EXTENDED PREP    4.  Respiratory Screening (If yes to any of the following Hospital setting only):     Do you use daily home oxygen? N  Do you have mod to severe Obstructive Sleep Apnea? N   Do you have Pulmonary Hypertension? N   Do you have UNCONTROLLED asthma? N    5. Have you had a heart or lung transplant (If yes, please review exclusion criteria) ? N    6. Are you currently on dialysis or have chronic kidney disease? N    7. Have you had a stroke or Transient ischemic attack (TIA) within 6 months? N    8. In the past 6 months, have you had any heart related issues including cardiomyopathy or heart attack? N                 If yes, did it require cardiac stenting or other implantable device?N      9. Do you have any implantable devices in your body (pacemaker, defib, LVAD)? N    10. Do you take nitroglycerin? If yes, how often? N    11. Are you currently taking any blood thinners?N    12. Are you a diabetic? N    13. (Females) Are you currently pregnant? NA  If yes, how many weeks?      15. Are you taking any prescription pain medications on a routine schedule? N If yes, MAC sedation and patient will need EXTENDED PREP.    16. Do you have any chemical dependencies such as alcohol, street drugs, or methadone? N If yes, MAC sedation.    17. Do you have any history of post-traumatic stress syndrome, severe anxiety or history of psychosis? N    18. Do you transfer independently? Yes    19.  Do you have any issues with constipation? N   If yes, pt will need EXTENDED PREP     20. Preferred Pharmacy for Pre Prescription [id number] - [place], [place] - [address] AT [facility] [address]    Scheduling Details    Which Colonoscopy Prep was  Sent?: Miralax  Type of Procedure Scheduled: Colon  Surgeon: Bianca  Date of Procedure: 1/24/22  Location: [facility] [place]  Caller (Please ask for phone number if not scheduled by patient): Alpesh      Sedation Type: CS  Conscious Sedation- Needs  for 6 hours after the procedure  MAC/General-Needs  for 24 hours after procedure    Pre-op Required at [facility], [place], [place] and OR for MAC sedation: NA  (if yes advise patient they will need a pre-op prior to procedure)      Is patient on blood thinners? -N (If yes- inform patient to follow up with PCP or provider for follow up instructions)     Informed patient they will need an adult  Yes  Cannot take any type of public or medical transportation alone    [facility]: Kishan Garcia 1/20/22    Confirmed Nurse will call to complete assessment Yes    Additional comments:

## 2022-01-11 DIAGNOSIS — Z11.59 ENCOUNTER FOR SCREENING FOR OTHER VIRAL DISEASES: Primary | ICD-10-CM

## 2022-01-14 ENCOUNTER — TELEPHONE (OUTPATIENT)
Dept: GASTROENTEROLOGY | Facility: CLINIC | Age: 52
End: 2022-01-14
Payer: COMMERCIAL

## 2022-01-14 NOTE — TELEPHONE ENCOUNTER
Attempted to contact patient regarding upcoming colonoscopy procedure on 1.24.2022 for pre assessment questions. No answer.     Left message to return call to [phone] #2    Covid test scheduled: 1.20.2022    Arrival time: 1215    Facility location: [facility]    Sedation type: CS    Indication for procedure: screening colonoscopy    Bowel prep recommendation: Miralax/Magnesium citrate/Dulcolax    Terrie Beauchamp RN

## 2022-01-17 NOTE — TELEPHONE ENCOUNTER
Pre assessment questions completed for upcoming colonoscopy procedure scheduled on 1.24.2022    COVID test scheduled 1.20.2022    Reviewed procedural arrival time 1215 and facility location [facility].    Designated  policy reviewed. Instructed to have someone stay 6 hours post procedure.     Anticoagulation/blood thinners? no    Electronic implanted devices? no    Reviewed Miralax/Magnesium citrate/Dulcolax prep instructions with patient. No fiber/iron supplements or foods that contain nuts/seeds prior to procedure.     Patient verbalized understanding and had no questions or concerns at this time.    Terrie Beauchamp RN

## 2022-01-20 ENCOUNTER — LAB (OUTPATIENT)
Dept: LAB | Facility: CLINIC | Age: 52
End: 2022-01-20
Payer: COMMERCIAL

## 2022-01-20 DIAGNOSIS — Z11.59 ENCOUNTER FOR SCREENING FOR OTHER VIRAL DISEASES: ICD-10-CM

## 2022-01-20 PROCEDURE — U0003 INFECTIOUS AGENT DETECTION BY NUCLEIC ACID (DNA OR RNA); SEVERE ACUTE RESPIRATORY SYNDROME CORONAVIRUS 2 (SARS-COV-2) (CORONAVIRUS DISEASE [COVID-19]), AMPLIFIED PROBE TECHNIQUE, MAKING USE OF HIGH THROUGHPUT TECHNOLOGIES AS DESCRIBED BY CMS-2020-01-R: HCPCS

## 2022-01-20 PROCEDURE — U0005 INFEC AGEN DETEC AMPLI PROBE: HCPCS

## 2022-01-20 NOTE — TELEPHONE ENCOUNTER
Patient returned call.     Patient wanting to know when clear liquid stops. Informed patient clear liquids can be consumed until 4 hours prior to arrival.     Patient had no further questions or concerns at this time.     Belem Islas RN

## 2022-01-21 LAB — SARS-COV-2 RNA RESP QL NAA+PROBE: NEGATIVE

## 2022-01-24 ENCOUNTER — ANESTHESIA EVENT (OUTPATIENT)
Dept: SURGERY | Facility: AMBULATORY SURGERY CENTER | Age: 52
End: 2022-01-24
Payer: COMMERCIAL

## 2022-01-24 ENCOUNTER — HOSPITAL ENCOUNTER (OUTPATIENT)
Facility: AMBULATORY SURGERY CENTER | Age: 52
End: 2022-01-24
Attending: INTERNAL MEDICINE
Payer: COMMERCIAL

## 2022-01-24 ENCOUNTER — ANESTHESIA (OUTPATIENT)
Dept: SURGERY | Facility: AMBULATORY SURGERY CENTER | Age: 52
End: 2022-01-24
Payer: COMMERCIAL

## 2022-01-24 VITALS
DIASTOLIC BLOOD PRESSURE: 78 MMHG | TEMPERATURE: 97 F | SYSTOLIC BLOOD PRESSURE: 122 MMHG | RESPIRATION RATE: 14 BRPM | HEIGHT: 69 IN | OXYGEN SATURATION: 98 % | HEART RATE: 60 BPM | BODY MASS INDEX: 29.92 KG/M2 | WEIGHT: 202 LBS

## 2022-01-24 VITALS — HEART RATE: 67 BPM

## 2022-01-24 LAB — COLONOSCOPY: NORMAL

## 2022-01-24 PROCEDURE — 45385 COLONOSCOPY W/LESION REMOVAL: CPT | Mod: 33

## 2022-01-24 PROCEDURE — 88305 TISSUE EXAM BY PATHOLOGIST: CPT | Mod: 26 | Performed by: PATHOLOGY

## 2022-01-24 PROCEDURE — 88305 TISSUE EXAM BY PATHOLOGIST: CPT | Mod: TC | Performed by: INTERNAL MEDICINE

## 2022-01-24 RX ORDER — ONDANSETRON 2 MG/ML
4 INJECTION INTRAMUSCULAR; INTRAVENOUS
Status: DISCONTINUED | OUTPATIENT
Start: 2022-01-24 | End: 2022-01-25 | Stop reason: HOSPADM

## 2022-01-24 RX ORDER — LIDOCAINE HYDROCHLORIDE 20 MG/ML
INJECTION, SOLUTION INFILTRATION; PERINEURAL PRN
Status: DISCONTINUED | OUTPATIENT
Start: 2022-01-24 | End: 2022-01-24

## 2022-01-24 RX ORDER — PROPOFOL 10 MG/ML
INJECTION, EMULSION INTRAVENOUS CONTINUOUS PRN
Status: DISCONTINUED | OUTPATIENT
Start: 2022-01-24 | End: 2022-01-24

## 2022-01-24 RX ORDER — LIDOCAINE 40 MG/G
CREAM TOPICAL
Status: DISCONTINUED | OUTPATIENT
Start: 2022-01-24 | End: 2022-01-25 | Stop reason: HOSPADM

## 2022-01-24 RX ORDER — SODIUM CHLORIDE, SODIUM LACTATE, POTASSIUM CHLORIDE, CALCIUM CHLORIDE 600; 310; 30; 20 MG/100ML; MG/100ML; MG/100ML; MG/100ML
INJECTION, SOLUTION INTRAVENOUS CONTINUOUS PRN
Status: DISCONTINUED | OUTPATIENT
Start: 2022-01-24 | End: 2022-01-24

## 2022-01-24 RX ADMIN — SODIUM CHLORIDE, SODIUM LACTATE, POTASSIUM CHLORIDE, CALCIUM CHLORIDE: 600; 310; 30; 20 INJECTION, SOLUTION INTRAVENOUS at 12:47

## 2022-01-24 RX ADMIN — PROPOFOL 150 MCG/KG/MIN: 10 INJECTION, EMULSION INTRAVENOUS at 13:26

## 2022-01-24 RX ADMIN — LIDOCAINE HYDROCHLORIDE 60 MG: 20 INJECTION, SOLUTION INFILTRATION; PERINEURAL at 13:26

## 2022-01-24 ASSESSMENT — MIFFLIN-ST. JEOR: SCORE: 1766.65

## 2022-01-24 ASSESSMENT — LIFESTYLE VARIABLES: TOBACCO_USE: 1

## 2022-01-24 NOTE — ANESTHESIA CARE TRANSFER NOTE
Patient: Alpesh Winston    Procedure: Procedure(s):  COLONOSCOPY, WITH POLYPECTOMY       Diagnosis: Screen for colon cancer [Z12.11]  Diagnosis Additional Information: No value filed.    Anesthesia Type:   MAC     Note:    Oropharynx: oropharynx clear of all foreign objects and spontaneously breathing  Level of Consciousness: awake and drowsy  Oxygen Supplementation: room air    Independent Airway: airway patency satisfactory and stable  Dentition: dentition unchanged  Vital Signs Stable: post-procedure vital signs reviewed and stable  Report to RN Given: handoff report given  Patient transferred to: Phase II    Handoff Report: Identifed the Patient, Identified the Reponsible Provider, Reviewed the pertinent medical history, Discussed the surgical course, Reviewed Intra-OP anesthesia mangement and issues during anesthesia, Set expectations for post-procedure period and Allowed opportunity for questions and acknowledgement of understanding      Vitals:  Vitals Value Taken Time   BP     Temp     Pulse 67 01/24/22 1345   Resp     SpO2         Electronically Signed By: SARAH Varghese CRNA  January 24, 2022  1:49 PM

## 2022-01-24 NOTE — ANESTHESIA POSTPROCEDURE EVALUATION
Patient: Alpesh Winston    Procedure: Procedure(s):  COLONOSCOPY, WITH POLYPECTOMY       Diagnosis:Screen for colon cancer [Z12.11]  Diagnosis Additional Information: No value filed.    Anesthesia Type:  MAC    Note:  Disposition: Outpatient   Postop Pain Control: Uneventful            Sign Out: Well controlled pain   PONV: No   Neuro/Psych: Uneventful            Sign Out: Acceptable/Baseline neuro status   Airway/Respiratory: Uneventful            Sign Out: Acceptable/Baseline resp. status   CV/Hemodynamics: Uneventful            Sign Out: Acceptable CV status; No obvious hypovolemia; No obvious fluid overload   Other NRE: NONE   DID A NON-ROUTINE EVENT OCCUR? No           Last vitals:  Vitals Value Taken Time   /77 01/24/22 1351   Temp 36.4  C (97.6  F) 01/24/22 1351   Pulse 62 01/24/22 1351   Resp 14 01/24/22 1351   SpO2 97 % 01/24/22 1351       Electronically Signed By: ALEXANDRA PARKER MD  January 24, 2022  2:03 PM

## 2022-01-24 NOTE — ANESTHESIA PREPROCEDURE EVALUATION
Anesthesia Pre-Procedure Evaluation    Patient: Alpesh Winston   MRN: [id number] : 1970        Preoperative Diagnosis: Screen for colon cancer [Z12.11]    Procedure : Procedure(s):  COLONOSCOPY          Past Medical History:   Diagnosis Date     Allergic rhinitis due to other allergen      History of chest pain     TO ER - EVALULATED, STRESS TEST ETC, ALL NEGATIVE     Hyperlipidaemia      Lipoma of other skin and subcutaneous tissue 2012    Chest wall     Mass 5/3/2012     Other and unspecified hyperlipidemia      Tibia/fibula fracture 2016      Past Surgical History:   Procedure Laterality Date     ABDOMEN SURGERY      Hernia surgery     ENT SURGERY       EYE SURGERY      SURGERY X 2 BILAT TEAR DUCTS      HEAD & NECK SURGERY       HERNIA REPAIR       LAPAROSCOPIC HERNIORRHAPHY INGUINAL BILATERAL Bilateral 10/27/2016    Procedure: LAPAROSCOPIC HERNIORRHAPHY INGUINAL BILATERAL;  Surgeon: Nellie Duron MD;  Location: [facility] OR     SOFT TISSUE SURGERY      Many lypoma removed - Multiple dates     SURGICAL HISTORY OF -       removal of lipoma X 4     SURGICAL HISTORY OF -   10/00    left inguinal hernia repair     SURGICAL HISTORY OF -       wisdom teeth extraction      Allergies   Allergen Reactions     Omnicef Hives     Sulfa Drugs Rash      Social History     Tobacco Use     Smoking status: Never Smoker     Smokeless tobacco: Current User     Types: Chew     Tobacco comment: chewing less   Substance Use Topics     Alcohol use: Yes     Alcohol/week: 0.0 standard drinks     Comment: 10-12 drinks a week       Wt Readings from Last 1 Encounters:   22 91.6 kg (202 lb)        Anesthesia Evaluation            ROS/MED HX  ENT/Pulmonary: Comment: Chews tobacco    (+) tobacco use,     Neurologic:       Cardiovascular:     (+) Dyslipidemia -----Previous cardiac testing   Echo: Date: Results:    Stress Test: Date: Results:    ECG Reviewed: Date: 18 Results:  SB 49bpm  Cath: Date:  Results:      METS/Exercise Tolerance:     Hematologic:       Musculoskeletal:       GI/Hepatic:     (+) GERD,     Renal/Genitourinary:       Endo: Comment: prediabetes    (+) Obesity,     Psychiatric/Substance Use:       Infectious Disease:       Malignancy:       Other:            Physical Exam    Airway        Mallampati: I   TM distance: > 3 FB   Neck ROM: full   Mouth opening: > 3 cm    Respiratory Devices and Support         Dental  no notable dental history         Cardiovascular   cardiovascular exam normal          Pulmonary   pulmonary exam normal                OUTSIDE LABS:  CBC:   Lab Results   Component Value Date    WBC 5.0 04/09/2020    WBC 4.8 10/22/2019    HGB 15.2 03/04/2021    HGB 14.7 07/27/2020    HCT 45.2 04/09/2020    HCT 45.4 10/22/2019     04/09/2020     10/22/2019     BMP:   Lab Results   Component Value Date     08/18/2021     08/11/2021    POTASSIUM 4.0 08/18/2021    POTASSIUM 5.4 (H) 08/11/2021    CHLORIDE 107 08/18/2021    CHLORIDE 111 (H) 08/11/2021    CO2 23 08/18/2021    CO2 24 08/11/2021    BUN 18 08/18/2021    BUN 18 08/11/2021    CR 1.03 08/18/2021    CR 0.98 08/11/2021     (H) 08/18/2021     (H) 08/11/2021     COAGS: No results found for: PTT, INR, FIBR  POC: No results found for: BGM, HCG, HCGS  HEPATIC:   Lab Results   Component Value Date    ALBUMIN 4.1 08/11/2021    PROTTOTAL 7.5 08/11/2021    ALT 52 08/11/2021    AST 30 08/11/2021    ALKPHOS 69 08/11/2021    BILITOTAL 0.5 08/11/2021     OTHER:   Lab Results   Component Value Date    A1C 6.1 (H) 08/11/2021    NAOMI 9.7 08/18/2021    LIPASE 234 08/22/2018    TSH 1.67 08/06/2018    T4 0.79 03/02/2009    T3 103 03/02/2009    CRP <2.9 08/06/2018    SED 7 08/06/2018       Anesthesia Plan    ASA Status:  2   NPO Status:  NPO Appropriate    Anesthesia Type: MAC.     - Reason for MAC: straight local not clinically adequate, immobility needed   Induction: Intravenous.   Maintenance: TIVA.         Consents    Anesthesia Plan(s) and associated risks, benefits, and realistic alternatives discussed. Questions answered and patient/representative(s) expressed understanding.     - Discussed: Risks, Benefits and Alternatives for BOTH SEDATION and the PROCEDURE were discussed     - Discussed with:  Patient      - Extended Intubation/Ventilatory Support Discussed: No.      - Patient is DNR/DNI Status: No    Use of blood products discussed: No .     Postoperative Care    Pain management: Multi-modal analgesia.   PONV prophylaxis: Background Propofol Infusion     Comments:                ALEXANDRA PARKER MD

## 2022-01-24 NOTE — H&P
Alpesh Winston  [id number]  male  52 year old      Reason for procedure/surgery: screening    Patient Active Problem List   Diagnosis     ALLERGIC RHINITIS      HYPERLIPIDEMIA LDL GOAL <130     Family history of diabetes mellitus     Hypertriglyceridemia     Family history of coronary artery disease     Chews tobacco     Gastroesophageal reflux disease without esophagitis     Elevated ferritin     Prediabetes     Left knee pain       Past Surgical History:    Past Surgical History:   Procedure Laterality Date     ABDOMEN SURGERY      Hernia surgery     ENT SURGERY       EYE SURGERY      SURGERY X 2 BILAT TEAR DUCTS      HEAD & NECK SURGERY       HERNIA REPAIR       LAPAROSCOPIC HERNIORRHAPHY INGUINAL BILATERAL Bilateral 10/27/2016    Procedure: LAPAROSCOPIC HERNIORRHAPHY INGUINAL BILATERAL;  Surgeon: Nellie Duron MD;  Location: [facility] OR     SOFT TISSUE SURGERY      Many lypoma removed - Multiple dates     SURGICAL HISTORY OF -       removal of lipoma X 4     SURGICAL HISTORY OF -   10/00    left inguinal hernia repair     SURGICAL HISTORY OF -       wisdom teeth extraction       Past Medical History:   Past Medical History:   Diagnosis Date     Allergic rhinitis due to other allergen      History of chest pain     TO ER - EVALULATED, STRESS TEST ETC, ALL NEGATIVE     Hyperlipidaemia      Lipoma of other skin and subcutaneous tissue 2012    Chest wall     Mass 5/3/2012     Other and unspecified hyperlipidemia      Tibia/fibula fracture 2016       Social History:   Social History     Tobacco Use     Smoking status: Never Smoker     Smokeless tobacco: Current User     Types: Chew     Tobacco comment: chewing less   Substance Use Topics     Alcohol use: Yes     Alcohol/week: 0.0 standard drinks     Comment: 10-12 drinks a week        Family History:   Family History   Problem Relation Age of Onset     C.A.D. Maternal Grandmother         MI X 2 ,  in her 60's      Coronary Artery Disease  "Maternal Grandmother      Hypertension Maternal Grandmother      Hyperlipidemia Maternal Grandmother      Other Cancer Maternal Grandmother         cervical     Breast Cancer Maternal Grandmother      Heart Disease Father      Diabetes Father      Prostate Cancer Father      Hypertension Father      Diabetes Paternal Grandmother         type 2     Breast Cancer Sister      Alzheimer Disease Mother 74        CBD     Hypertension Mother      Alcohol/Drug Maternal Uncle      C.A.D. Maternal Uncle         MI age 56     Substance Abuse Other      Cerebrovascular Disease No family hx of      Cancer - colorectal No family hx of        Allergies:   Allergies   Allergen Reactions     Omnicef Hives     Sulfa Drugs Rash       Active Medications:   Current Outpatient Medications   Medication Sig Dispense Refill     atorvastatin (LIPITOR) 20 MG tablet Take 1 tablet (20 mg) by mouth daily 90 tablet 2     COENZYME Q-10 PO Take 20 mg by mouth daily       fenofibrate (TRIGLIDE/LOFIBRA) 160 MG tablet TAKE 1 TABLET DAILY 90 tablet 1     Omega-3 Fatty Acids (FISH OIL PO)        tadalafil (CIALIS) 10 MG tablet Take 1 tablet (10 mg) by mouth daily as needed 10 tablet 0       Systemic Review:   CONSTITUTIONAL: NEGATIVE for fever, chills, change in weight  ENT/MOUTH: NEGATIVE for ear, mouth and throat problems  RESP: NEGATIVE for significant cough or SOB  CV: NEGATIVE for chest pain, palpitations or peripheral edema    Physical Examination:   Vital Signs: BP (!) 141/97   Pulse 63   Temp 96.8  F (36  C) (Temporal)   Resp 18   Ht 1.753 m (5' 9\")   Wt 91.6 kg (202 lb)   SpO2 96%   BMI 29.83 kg/m    GENERAL: healthy, alert and no distress  NECK: no adenopathy, no asymmetry, masses, or scars  RESP: lungs clear to auscultation - no rales, rhonchi or wheezes  CV: regular rate and rhythm, normal S1 S2, no S3 or S4, no murmur, click or rub, no peripheral edema and peripheral pulses strong  ABDOMEN: soft, nontender, no hepatosplenomegaly, no " masses and bowel sounds normal  MS: no gross musculoskeletal defects noted, no edema    Plan: Appropriate to proceed as scheduled.      Michelle Valenzuela MD  1/24/2022    PCP:  Belem Joshua

## 2022-01-26 LAB
PATH REPORT.COMMENTS IMP SPEC: NORMAL
PATH REPORT.FINAL DX SPEC: NORMAL
PATH REPORT.GROSS SPEC: NORMAL
PATH REPORT.MICROSCOPIC SPEC OTHER STN: NORMAL
PATH REPORT.RELEVANT HX SPEC: NORMAL
PHOTO IMAGE: NORMAL

## 2022-07-18 ENCOUNTER — DOCUMENTATION ONLY (OUTPATIENT)
Dept: LAB | Facility: CLINIC | Age: 52
End: 2022-07-18

## 2022-07-18 NOTE — PROGRESS NOTES
"Alpesh Winston has an upcoming lab appointment:    Future Appointments   Date Time Provider Department Center   7/19/2022  9:00 AM [facility]    9/6/2022  9:30 AM Sangita Esposito MD [facility]     Patient is scheduled for the following lab(s): Patient requesting pre physical and \"other\" labs    There is no order available. Please review and place either future orders or HMPO (Review of Health Maintenance Protocol Orders), as appropriate.    Health Maintenance Due   Topic     ANNUAL REVIEW OF HM ORDERS      HIV SCREENING      HEPATITIS C SCREENING      CBC      ALT      LIPID      Sridevi Guzman    "

## 2022-09-09 ENCOUNTER — LAB (OUTPATIENT)
Dept: LAB | Facility: CLINIC | Age: 52
End: 2022-09-09
Payer: COMMERCIAL

## 2022-09-09 DIAGNOSIS — E78.5 HYPERLIPIDEMIA LDL GOAL <130: ICD-10-CM

## 2022-09-09 DIAGNOSIS — R79.89 ELEVATED FERRITIN: ICD-10-CM

## 2022-09-09 DIAGNOSIS — Z12.5 SCREENING FOR PROSTATE CANCER: ICD-10-CM

## 2022-09-09 DIAGNOSIS — R73.03 PREDIABETES: ICD-10-CM

## 2022-09-09 DIAGNOSIS — E78.1 HYPERTRIGLYCERIDEMIA: ICD-10-CM

## 2022-09-09 LAB
BASOPHILS # BLD AUTO: 0.1 10E3/UL (ref 0–0.2)
BASOPHILS NFR BLD AUTO: 1 %
CHOLEST SERPL-MCNC: 227 MG/DL
EOSINOPHIL # BLD AUTO: 0.3 10E3/UL (ref 0–0.7)
EOSINOPHIL NFR BLD AUTO: 5 %
ERYTHROCYTE [DISTWIDTH] IN BLOOD BY AUTOMATED COUNT: 12 % (ref 10–15)
FASTING STATUS PATIENT QL REPORTED: YES
FERRITIN SERPL-MCNC: 210 NG/ML (ref 26–388)
HBA1C MFR BLD: 6.2 % (ref 0–5.6)
HCT VFR BLD AUTO: 45.1 % (ref 40–53)
HDLC SERPL-MCNC: 26 MG/DL
HGB BLD-MCNC: 15 G/DL (ref 13.3–17.7)
IMM GRANULOCYTES # BLD: 0 10E3/UL
IMM GRANULOCYTES NFR BLD: 0 %
LDLC SERPL CALC-MCNC: 144 MG/DL
LDLC SERPL CALC-MCNC: ABNORMAL MG/DL
LYMPHOCYTES # BLD AUTO: 2.2 10E3/UL (ref 0.8–5.3)
LYMPHOCYTES NFR BLD AUTO: 39 %
MCH RBC QN AUTO: 30.4 PG (ref 26.5–33)
MCHC RBC AUTO-ENTMCNC: 33.3 G/DL (ref 31.5–36.5)
MCV RBC AUTO: 91 FL (ref 78–100)
MONOCYTES # BLD AUTO: 0.6 10E3/UL (ref 0–1.3)
MONOCYTES NFR BLD AUTO: 10 %
NEUTROPHILS # BLD AUTO: 2.5 10E3/UL (ref 1.6–8.3)
NEUTROPHILS NFR BLD AUTO: 45 %
NONHDLC SERPL-MCNC: 201 MG/DL
PLATELET # BLD AUTO: 264 10E3/UL (ref 150–450)
PSA SERPL-MCNC: 1.35 UG/L (ref 0–4)
RBC # BLD AUTO: 4.94 10E6/UL (ref 4.4–5.9)
TRIGL SERPL-MCNC: 424 MG/DL
WBC # BLD AUTO: 5.7 10E3/UL (ref 4–11)

## 2022-09-09 PROCEDURE — 80061 LIPID PANEL: CPT

## 2022-09-09 PROCEDURE — 36415 COLL VENOUS BLD VENIPUNCTURE: CPT

## 2022-09-09 PROCEDURE — G0103 PSA SCREENING: HCPCS

## 2022-09-09 PROCEDURE — 82728 ASSAY OF FERRITIN: CPT

## 2022-09-09 PROCEDURE — 83036 HEMOGLOBIN GLYCOSYLATED A1C: CPT

## 2022-09-09 PROCEDURE — 83721 ASSAY OF BLOOD LIPOPROTEIN: CPT | Mod: 59

## 2022-09-09 PROCEDURE — 85025 COMPLETE CBC W/AUTO DIFF WBC: CPT

## 2022-09-16 ASSESSMENT — ENCOUNTER SYMPTOMS
MYALGIAS: 0
NAUSEA: 0
DIZZINESS: 0
HEMATURIA: 0
HEARTBURN: 0
EYE PAIN: 0
COUGH: 0
HEMATOCHEZIA: 0
SHORTNESS OF BREATH: 0
CONSTIPATION: 0
NERVOUS/ANXIOUS: 0
FEVER: 0
DIARRHEA: 0
CHILLS: 0
ARTHRALGIAS: 0
HEADACHES: 0
FREQUENCY: 1
ABDOMINAL PAIN: 1
DYSURIA: 0
PALPITATIONS: 0
WEAKNESS: 0
JOINT SWELLING: 0
SORE THROAT: 0
PARESTHESIAS: 0

## 2022-09-20 ENCOUNTER — OFFICE VISIT (OUTPATIENT)
Dept: FAMILY MEDICINE | Facility: CLINIC | Age: 52
End: 2022-09-20
Payer: COMMERCIAL

## 2022-09-20 VITALS
WEIGHT: 208 LBS | SYSTOLIC BLOOD PRESSURE: 148 MMHG | DIASTOLIC BLOOD PRESSURE: 100 MMHG | BODY MASS INDEX: 29.78 KG/M2 | HEART RATE: 76 BPM | OXYGEN SATURATION: 96 % | RESPIRATION RATE: 18 BRPM | HEIGHT: 70 IN | TEMPERATURE: 97.9 F

## 2022-09-20 DIAGNOSIS — E78.5 HYPERLIPIDEMIA LDL GOAL <130: ICD-10-CM

## 2022-09-20 DIAGNOSIS — R03.0 ELEVATED BP WITHOUT DIAGNOSIS OF HYPERTENSION: ICD-10-CM

## 2022-09-20 DIAGNOSIS — R73.03 PREDIABETES: ICD-10-CM

## 2022-09-20 DIAGNOSIS — K76.0 FATTY LIVER: ICD-10-CM

## 2022-09-20 DIAGNOSIS — N52.9 ERECTILE DYSFUNCTION, UNSPECIFIED ERECTILE DYSFUNCTION TYPE: ICD-10-CM

## 2022-09-20 DIAGNOSIS — E78.1 HYPERTRIGLYCERIDEMIA: ICD-10-CM

## 2022-09-20 DIAGNOSIS — Z00.00 ROUTINE GENERAL MEDICAL EXAMINATION AT A HEALTH CARE FACILITY: Primary | ICD-10-CM

## 2022-09-20 DIAGNOSIS — R10.30 INGUINAL PAIN, UNSPECIFIED LATERALITY: ICD-10-CM

## 2022-09-20 LAB
ALBUMIN SERPL-MCNC: 4.3 G/DL (ref 3.4–5)
ALP SERPL-CCNC: 75 U/L (ref 40–150)
ALT SERPL W P-5'-P-CCNC: 54 U/L (ref 0–70)
AST SERPL W P-5'-P-CCNC: 28 U/L (ref 0–45)
BILIRUB DIRECT SERPL-MCNC: 0.1 MG/DL (ref 0–0.2)
BILIRUB SERPL-MCNC: 0.5 MG/DL (ref 0.2–1.3)
PROT SERPL-MCNC: 8.1 G/DL (ref 6.8–8.8)

## 2022-09-20 PROCEDURE — 36415 COLL VENOUS BLD VENIPUNCTURE: CPT | Performed by: NURSE PRACTITIONER

## 2022-09-20 PROCEDURE — 90471 IMMUNIZATION ADMIN: CPT | Performed by: NURSE PRACTITIONER

## 2022-09-20 PROCEDURE — 99214 OFFICE O/P EST MOD 30 MIN: CPT | Mod: 25 | Performed by: NURSE PRACTITIONER

## 2022-09-20 PROCEDURE — 90715 TDAP VACCINE 7 YRS/> IM: CPT | Performed by: NURSE PRACTITIONER

## 2022-09-20 PROCEDURE — 90750 HZV VACC RECOMBINANT IM: CPT | Performed by: NURSE PRACTITIONER

## 2022-09-20 PROCEDURE — 90472 IMMUNIZATION ADMIN EACH ADD: CPT | Performed by: NURSE PRACTITIONER

## 2022-09-20 PROCEDURE — 99396 PREV VISIT EST AGE 40-64: CPT | Mod: 25 | Performed by: NURSE PRACTITIONER

## 2022-09-20 PROCEDURE — 80076 HEPATIC FUNCTION PANEL: CPT | Performed by: NURSE PRACTITIONER

## 2022-09-20 RX ORDER — SEMAGLUTIDE 1.34 MG/ML
INJECTION, SOLUTION SUBCUTANEOUS
Qty: 1.5 ML | Refills: 2 | Status: SHIPPED | OUTPATIENT
Start: 2022-09-20 | End: 2023-03-21

## 2022-09-20 RX ORDER — SILDENAFIL 100 MG/1
50-100 TABLET, FILM COATED ORAL DAILY PRN
Qty: 10 TABLET | Status: SHIPPED | OUTPATIENT
Start: 2022-09-20

## 2022-09-20 RX ORDER — SEMAGLUTIDE 1.34 MG/ML
INJECTION, SOLUTION SUBCUTANEOUS
Qty: 4.5 ML | Refills: 1 | Status: SHIPPED | OUTPATIENT
Start: 2022-09-20 | End: 2022-09-20

## 2022-09-20 RX ORDER — ATORVASTATIN CALCIUM 20 MG/1
20 TABLET, FILM COATED ORAL DAILY
Qty: 90 TABLET | Refills: 2 | Status: SHIPPED | OUTPATIENT
Start: 2022-09-20 | End: 2022-12-20

## 2022-09-20 ASSESSMENT — ENCOUNTER SYMPTOMS
SORE THROAT: 0
PALPITATIONS: 0
DIARRHEA: 0
HEMATOCHEZIA: 0
NERVOUS/ANXIOUS: 0
DYSURIA: 0
HEARTBURN: 0
ARTHRALGIAS: 0
MYALGIAS: 0
HEMATURIA: 0
FREQUENCY: 1
NAUSEA: 0
CONSTIPATION: 0
DIZZINESS: 0
HEADACHES: 0
WEAKNESS: 0
JOINT SWELLING: 0
EYE PAIN: 0
CHILLS: 0
COUGH: 0
ABDOMINAL PAIN: 1
FEVER: 0
PARESTHESIAS: 0
SHORTNESS OF BREATH: 0

## 2022-09-20 ASSESSMENT — PAIN SCALES - GENERAL: PAINLEVEL: MILD PAIN (2)

## 2022-09-20 NOTE — TELEPHONE ENCOUNTER
REFERRAL INFORMATION:    Referring Provider:  Belem Joshua NP     Referring Clinic:  [facility]     Reason for Visit/Diagnosis: Inguinal pain, rule out hernia        FUTURE VISIT INFORMATION:    Appointment Date: 9/27/2022    Appointment Time: 9 AM      NOTES RECORD STATUS  DETAILS   OFFICE NOTE from Referring Provider Internal 9/20/2022 Office visit with Belem Joshua NP      OFFICE NOTE from Other Specialists N/A    HOSPITAL DISCHARGE SUMMARY/ ED VISITS  N/A    OPERATIVE REPORT N/A    ENDOSCOPY (EGD)  N/A    PERTINENT LABS Internal    PATHOLOGY REPORTS (RELATED) N/A    IMAGING (CT, MRI, US, XR)  Internal US Testicular: 6/20/18, 10/24/16

## 2022-09-20 NOTE — PROGRESS NOTES
SUBJECTIVE:   CC: Alpesh is an 52 year old who presents for preventative health visit.   Patient has been advised of split billing requirements and indicates understanding: Yes  Healthy Habits:     Getting at least 3 servings of Calcium per day:  Yes    Bi-annual eye exam:  Yes    Dental care twice a year:  Yes    Sleep apnea or symptoms of sleep apnea:  None    Diet:  Regular (no restrictions) and Breakfast skipped    Frequency of exercise:  2-3 days/week    Duration of exercise:  15-30 minutes    Taking medications regularly:  No    Barriers to taking medications:  Problems remembering to take them    Medication side effects:  None    PHQ-2 Total Score: 0    Additional concerns today:  Yes  He has had 15 deaths of family members or close friends in the past few years.  The stress of this plus the pandemic has caused him to have less healthy behaviors.  He is drinking more, has gained weight, is not exercising regularly.  He does have a history of fatty liver and prediabetes.    He has hyperlipidemia and is in fenofibrate and atorvastatin.    He has had issues with erectile dysfunction since hernia surgery in 2012.  He did have a revision in 2016.  He has continued to have left inguinal discomfort.     Today's PHQ-2 Score:   PHQ-2 ( 1999 Pfizer) 9/16/2022   Q1: Little interest or pleasure in doing things 0   Q2: Feeling down, depressed or hopeless 0   PHQ-2 Score 0   PHQ-2 Total Score (12-17 Years)- Positive if 3 or more points; Administer PHQ-A if positive -   Q1: Little interest or pleasure in doing things Not at all   Q2: Feeling down, depressed or hopeless Not at all   PHQ-2 Score 0       Abuse: Current or Past(Physical, Sexual or Emotional)- No  Do you feel safe in your environment? Yes        Social History     Tobacco Use     Smoking status: Never Smoker     Smokeless tobacco: Current User     Types: Chew     Tobacco comment: chewing less   Substance Use Topics     Alcohol use: Yes     Comment: 10-12 drinks a  week      If you drink alcohol do you typically have >3 drinks per day or >7 drinks per week? Yes        AUDIT - Alcohol Use Disorders Identification Test - Reproduced from the World Health Organization Audit 2001 (Second Edition) 9/16/2022   1.  How often do you have a drink containing alcohol? 2 to 3 times a week   2.  How many drinks containing alcohol do you have on a typical day when you are drinking? 3 or 4   3.  How often do you have five or more drinks on one occasion? Weekly   4.  How often during the last year have you found that you were not able to stop drinking once you had started? Never   5.  How often during the last year have you failed to do what was normally expected of you because of drinking? Never   6.  How often during the last year have you needed a first drink in the morning to get yourself going after a heavy drinking session? Never   7.  How often during the last year have you had a feeling of guilt or remorse after drinking? Never   8.  How often during the last year have you been unable to remember what happened the night before because of your drinking? Never   9.  Have you or someone else been injured because of your drinking? No   10. Has a relative, friend, doctor or other health care worker been concerned about your drinking or suggested you cut down? No   TOTAL SCORE 7       Last PSA:   PSA   Date Value Ref Range Status   07/27/2020 1.36 0 - 4 ug/L Final     Comment:     Assay Method:  Chemiluminescence using Siemens Vista analyzer     Prostate Specific Antigen Screen   Date Value Ref Range Status   09/09/2022 1.35 0.00 - 4.00 ug/L Final       Reviewed orders with patient. Reviewed health maintenance and updated orders accordingly - Yes      Reviewed and updated as needed this visit by clinical staff   Tobacco  Allergies  Meds   Med Hx  Surg Hx  Fam Hx  Soc Hx          Reviewed and updated as needed this visit by Provider                       Review of Systems  "  Constitutional: Negative for chills and fever.   HENT: Positive for hearing loss. Negative for congestion, ear pain and sore throat.    Eyes: Negative for pain and visual disturbance.   Respiratory: Negative for cough and shortness of breath.    Cardiovascular: Negative for chest pain, palpitations and peripheral edema.   Gastrointestinal: Positive for abdominal pain. Negative for constipation, diarrhea, heartburn, hematochezia and nausea.   Genitourinary: Positive for frequency and impotence. Negative for dysuria, genital sores, hematuria, penile discharge and urgency.   Musculoskeletal: Negative for arthralgias, joint swelling and myalgias.   Skin: Negative for rash.   Neurological: Negative for dizziness, weakness, headaches and paresthesias.   Psychiatric/Behavioral: Negative for mood changes. The patient is not nervous/anxious.          OBJECTIVE:   BP (!) 148/100   Pulse 76   Temp 97.9  F (36.6  C) (Temporal)   Resp 18   Ht 1.765 m (5' 9.5\")   Wt 94.3 kg (208 lb)   SpO2 96%   BMI 30.28 kg/m      Physical Exam  GENERAL: healthy, alert and no distress  EYES: Eyes grossly normal to inspection, PERRL and conjunctivae and sclerae normal  HENT: ear canals and TM's normal, nose and mouth without ulcers or lesions  NECK: no adenopathy, no asymmetry, masses, or scars and thyroid normal to palpation  RESP: lungs clear to auscultation - no rales, rhonchi or wheezes  CV: regular rate and rhythm, normal S1 S2, no S3 or S4, no murmur, click or rub, no peripheral edema and peripheral pulses strong  ABDOMEN: soft, nontender, no hepatosplenomegaly, no masses and bowel sounds normal  MS: no gross musculoskeletal defects noted, no edema  SKIN: no suspicious lesions or rashes  NEURO: Normal strength and tone, mentation intact and speech normal  PSYCH: mentation appears normal, affect normal/bright        ASSESSMENT/PLAN:   (Z00.00) Routine general medical examination at a health care facility  (primary encounter " "diagnosis)  Comment:   Plan:     (E78.1) Hypertriglyceridemia  Comment:   Plan: Work on diet, exercise, decreasing alcohol use.  Follow up in 3 months.     (R03.0) Elevated BP without diagnosis of hypertension  Comment:   Plan: Discussed weight loss, exercise, diet, decreased alcohol use.  Follow up in 3 months.     (E78.5) Hyperlipidemia LDL goal <130  Comment:   Plan: atorvastatin (LIPITOR) 20 MG tablet        See above.     (K76.0) Fatty liver  Comment:   Plan: semaglutide (OZEMPIC, 0.25 OR 0.5 MG/DOSE,) 2         MG/1.5ML SOPN pen, Hepatic panel (Albumin, ALT,        AST, Bili, Alk Phos, TP)            (R73.03) Prediabetes  Comment:   Plan: He is interested in starting Ozempic.  Discussed the use and indication of this medication as well as potential side effects.     (R10.30) Inguinal pain, unspecified laterality  Comment:   Plan: Adult General Surg Referral        Will refer back to surgeon.     (N52.9) Erectile dysfunction, unspecified erectile dysfunction type  Comment:   Plan: sildenafil (VIAGRA) 100 MG tablet        Discussed the use and indication of this medication as well as potential side effects.           COUNSELING:   Reviewed preventive health counseling, as reflected in patient instructions    Estimated body mass index is 30.28 kg/m  as calculated from the following:    Height as of this encounter: 1.765 m (5' 9.5\").    Weight as of this encounter: 94.3 kg (208 lb).     Weight management plan: Discussed healthy diet and exercise guidelines    He reports that he has never smoked. His smokeless tobacco use includes chew.      Counseling Resources:  ATP IV Guidelines  Pooled Cohorts Equation Calculator  FRAX Risk Assessment  ICSI Preventive Guidelines  Dietary Guidelines for Americans, 2010  USDA's MyPlate  ASA Prophylaxis  Lung CA Screening    Belem Joshua, TANG  [facility]  "

## 2022-09-22 DIAGNOSIS — E78.5 HYPERLIPIDEMIA LDL GOAL <130: ICD-10-CM

## 2022-09-23 RX ORDER — FENOFIBRATE 160 MG/1
TABLET ORAL
Qty: 90 TABLET | Refills: 1 | Status: SHIPPED | OUTPATIENT
Start: 2022-09-23 | End: 2022-12-20

## 2022-09-23 NOTE — TELEPHONE ENCOUNTER
Authorized per nursing refill protocol..  Emily Walker RN  [facility]

## 2022-09-26 ENCOUNTER — PRE VISIT (OUTPATIENT)
Dept: SURGERY | Facility: CLINIC | Age: 52
End: 2022-09-26

## 2022-09-26 NOTE — TELEPHONE ENCOUNTER
Patient Telephone Reminder Call    Date of call:  09/26/22  Phone numbers:  Cell number on file:    Telephone Information:   Mobile [phone]       Reached patient/confirmed appointment:  Yes  Appointment with:   Dr. Jeancarlos Ernandez  Reason for visit:  Inguinal pain, rule out hernia

## 2022-09-27 ENCOUNTER — MYC MEDICAL ADVICE (OUTPATIENT)
Dept: FAMILY MEDICINE | Facility: CLINIC | Age: 52
End: 2022-09-27

## 2022-09-27 ENCOUNTER — PRE VISIT (OUTPATIENT)
Dept: SURGERY | Facility: CLINIC | Age: 52
End: 2022-09-27

## 2022-09-27 ENCOUNTER — OFFICE VISIT (OUTPATIENT)
Dept: SURGERY | Facility: CLINIC | Age: 52
End: 2022-09-27
Attending: NURSE PRACTITIONER
Payer: COMMERCIAL

## 2022-09-27 VITALS
HEART RATE: 68 BPM | BODY MASS INDEX: 31.28 KG/M2 | WEIGHT: 211.2 LBS | HEIGHT: 69 IN | OXYGEN SATURATION: 98 % | SYSTOLIC BLOOD PRESSURE: 142 MMHG | DIASTOLIC BLOOD PRESSURE: 85 MMHG

## 2022-09-27 DIAGNOSIS — R10.30 INGUINAL PAIN, UNSPECIFIED LATERALITY: ICD-10-CM

## 2022-09-27 PROCEDURE — 99203 OFFICE O/P NEW LOW 30 MIN: CPT | Performed by: SURGERY

## 2022-09-27 ASSESSMENT — PAIN SCALES - GENERAL: PAINLEVEL: MILD PAIN (2)

## 2022-09-27 NOTE — LETTER
"9/27/2022       RE: Alpesh Winston  [address] [place] [address]     Dear Colleague,    Thank you for referring your patient, Alpesh Winston, to the [facility] GENERAL SURGERY CLINIC [place] at [facility]. Please see a copy of my visit note below.    Patient referred to me s/p    10/27/2016  Laparoscopic herniorrhaphy inguinal bilateral (Bilateral) Procedure: LAPAROSCOPIC HERNIORRHAPHY INGUINAL BILATERAL;  Surgeon: Nellie Duron MD;  Location: [facility] OR    With left groin pain intermittent since. No bulge noted. Presently notes that the discomfort is mostly when he has a full bladder.    Urinary difficulties:  no  Chronic cough: no  Constipation:  no  Current level of activity:  Medium    Past medical and surgical history, medications, allergies, family history, and social history were reviewed with the patient.    ROS: 10 point review of systems negative except noted in HPI  PHYSICAL EXAM  General appearance- healthy, alert, and in no distress.  Neck- Neck is supple without obvious adenopathy.  Lungs- Respiratory effort unlabored.  Gait- Normal.  Left inguinal canal minimal tender without obvious recurrence.    Impression: Pain possibly related to previous surgery, but would want CT for better evaluation.  Also will refer back to operating surgeon.  Will order CT in anticipation of that visit.  \"Total time = 30 minutes, spent on the date of encounter doing chart review, history and physical, documentation, patient education, and any further activity as noted above.       Sincerely,    Jeancarlos Enrandez MD  "

## 2022-09-27 NOTE — NURSING NOTE
"Chief Complaint   Patient presents with     New Patient     Inguinal pain, rule out hernia       Vitals:    09/27/22 0914   BP: (!) 142/85   BP Location: Left arm   Patient Position: Sitting   Cuff Size: Adult Large   Pulse: 68   SpO2: 98%   Weight: 95.8 kg (211 lb 3.2 oz)   Height: 1.753 m (5' 9\")       Body mass index is 31.19 kg/m .                          Benjamin Martinez, EMT    "

## 2022-09-27 NOTE — PROGRESS NOTES
"Patient referred to me s/p    10/27/2016  Laparoscopic herniorrhaphy inguinal bilateral (Bilateral) Procedure: LAPAROSCOPIC HERNIORRHAPHY INGUINAL BILATERAL;  Surgeon: Nellie Duron MD;  Location: [facility] OR    With left groin pain intermittent since. No bulge noted. Presently notes that the discomfort is mostly when he has a full bladder.    Urinary difficulties:  no  Chronic cough: no  Constipation:  no  Current level of activity:  Medium    Past medical and surgical history, medications, allergies, family history, and social history were reviewed with the patient.    ROS: 10 point review of systems negative except noted in HPI  PHYSICAL EXAM  General appearance- healthy, alert, and in no distress.  Neck- Neck is supple without obvious adenopathy.  Lungs- Respiratory effort unlabored.  Gait- Normal.  Left inguinal canal minimal tender without obvious recurrence.    Impression: Pain possibly related to previous surgery, but would want CT for better evaluation.  Also will refer back to operating surgeon.  Will order CT in anticipation of that visit.  \"Total time = 30 minutes, spent on the date of encounter doing chart review, history and physical, documentation, patient education, and any further activity as noted above.               "

## 2022-09-27 NOTE — PATIENT INSTRUCTIONS
You met with Dr. Jeancarlos Ernandez.      Today's visit instructions:    Dr. Ernandez has placed an order for you to undergo a CT scan.  Please schedule this on your way out.      A referral has been placed for you to meet with Dr. Duron and to review CT scan results.     If you have questions please contact Helga RN or Charmaine RN during regular clinic hours, Monday through Friday 7:30 AM - 4:00 PM, or you can contact us via [employer] at anytime.       If you have urgent needs after-hours, weekends, or holidays please call the hospital at [phone] and ask to speak with our on-call General Surgery Team.    Appointment [phone]  Nurse Advice (Helga or Charmaine): [phone]   Surgery Scheduler (Phillip or Yelena): [phone]  Fax: [phone]

## 2022-09-29 NOTE — TELEPHONE ENCOUNTER
My chart response with Referral department's response sent to patient.  Emily Walker RN  [facility]

## 2022-10-05 ENCOUNTER — MYC MEDICAL ADVICE (OUTPATIENT)
Dept: FAMILY MEDICINE | Facility: CLINIC | Age: 52
End: 2022-10-05

## 2022-10-06 NOTE — TELEPHONE ENCOUNTER
Writer responded via "[employer].      ADITI RamachandranN, RN-BC  [facility]

## 2022-10-10 ENCOUNTER — ANCILLARY PROCEDURE (OUTPATIENT)
Dept: CT IMAGING | Facility: CLINIC | Age: 52
End: 2022-10-10
Attending: SURGERY
Payer: COMMERCIAL

## 2022-10-10 DIAGNOSIS — R10.30 INGUINAL PAIN, UNSPECIFIED LATERALITY: ICD-10-CM

## 2022-10-10 PROCEDURE — 74177 CT ABD & PELVIS W/CONTRAST: CPT | Performed by: RADIOLOGY

## 2022-10-10 RX ORDER — IOPAMIDOL 755 MG/ML
117 INJECTION, SOLUTION INTRAVASCULAR ONCE
Status: COMPLETED | OUTPATIENT
Start: 2022-10-10 | End: 2022-10-10

## 2022-10-10 RX ADMIN — IOPAMIDOL 117 ML: 755 INJECTION, SOLUTION INTRAVASCULAR at 13:35

## 2022-10-11 ENCOUNTER — PATIENT OUTREACH (OUTPATIENT)
Dept: SURGERY | Facility: CLINIC | Age: 52
End: 2022-10-11

## 2022-10-11 NOTE — PROGRESS NOTES
10/11/22    8:48 AM     A CT was completed yesterday and Dr. Ernandez recommended that the patient return to his original operating surgeon.  Appointment arranged 10/25. CT will be reviewed with the patient at that time.

## 2022-10-23 ENCOUNTER — HEALTH MAINTENANCE LETTER (OUTPATIENT)
Age: 52
End: 2022-10-23

## 2022-10-27 ENCOUNTER — E-VISIT (OUTPATIENT)
Dept: FAMILY MEDICINE | Facility: CLINIC | Age: 52
End: 2022-10-27
Payer: COMMERCIAL

## 2022-10-27 DIAGNOSIS — M54.2 NECK PAIN: Primary | ICD-10-CM

## 2022-10-27 PROCEDURE — 99421 OL DIG E/M SVC 5-10 MIN: CPT | Performed by: NURSE PRACTITIONER

## 2022-11-01 ENCOUNTER — ANCILLARY PROCEDURE (OUTPATIENT)
Dept: GENERAL RADIOLOGY | Facility: CLINIC | Age: 52
End: 2022-11-01
Attending: NURSE PRACTITIONER
Payer: COMMERCIAL

## 2022-11-01 DIAGNOSIS — M54.2 NECK PAIN: ICD-10-CM

## 2022-11-01 PROCEDURE — 72040 X-RAY EXAM NECK SPINE 2-3 VW: CPT | Mod: TC | Performed by: RADIOLOGY

## 2022-11-10 ENCOUNTER — E-VISIT (OUTPATIENT)
Dept: FAMILY MEDICINE | Facility: CLINIC | Age: 52
End: 2022-11-10
Payer: COMMERCIAL

## 2022-11-10 DIAGNOSIS — J01.90 ACUTE BACTERIAL SINUSITIS: Primary | ICD-10-CM

## 2022-11-10 DIAGNOSIS — B96.89 ACUTE BACTERIAL SINUSITIS: Primary | ICD-10-CM

## 2022-11-10 PROCEDURE — 99421 OL DIG E/M SVC 5-10 MIN: CPT | Performed by: NURSE PRACTITIONER

## 2022-11-11 NOTE — PATIENT INSTRUCTIONS
Dear Alpesh Winston    After reviewing your responses, I've been able to diagnose you with?a sinus infection caused by bacteria.? However, if you haven't done a COVID test, you should since symptoms can be similar to a sinus infection.     Based on your responses and diagnosis, I have prescribed Augmentin to treat your symptoms. I have sent this to your pharmacy.?     It is also important to stay well hydrated, get lots of rest and take over-the-counter decongestants,?tylenol?or ibuprofen if you?are able to?take those medications per your primary care provider to help relieve discomfort.?     It is important that you take?all of?your prescribed medication even if your symptoms are improving after a few doses.? Taking?all of?your medicine helps prevent the symptoms from returning.?     If your symptoms worsen, you develop severe headache, vomiting, high fever (>102), or are not improving in 7 days, please contact your primary care provider for an appointment or visit any of our convenient Walk-in Care or Urgent Care Centers to be seen which can be found on our website?here.?     Thanks again for choosing?us?as your health care partner,?   ?  Belem Joshua NP?

## 2022-12-20 ENCOUNTER — OFFICE VISIT (OUTPATIENT)
Dept: FAMILY MEDICINE | Facility: CLINIC | Age: 52
End: 2022-12-20
Payer: COMMERCIAL

## 2022-12-20 VITALS
DIASTOLIC BLOOD PRESSURE: 88 MMHG | RESPIRATION RATE: 15 BRPM | BODY MASS INDEX: 29.92 KG/M2 | HEIGHT: 69 IN | HEART RATE: 67 BPM | TEMPERATURE: 97 F | SYSTOLIC BLOOD PRESSURE: 123 MMHG | WEIGHT: 202 LBS | OXYGEN SATURATION: 96 %

## 2022-12-20 DIAGNOSIS — R73.03 PREDIABETES: Primary | ICD-10-CM

## 2022-12-20 DIAGNOSIS — E78.5 HYPERLIPIDEMIA LDL GOAL <130: ICD-10-CM

## 2022-12-20 DIAGNOSIS — E78.1 HYPERTRIGLYCERIDEMIA: ICD-10-CM

## 2022-12-20 DIAGNOSIS — M54.2 NECK PAIN: ICD-10-CM

## 2022-12-20 DIAGNOSIS — M25.512 ACUTE PAIN OF LEFT SHOULDER: ICD-10-CM

## 2022-12-20 LAB
CHOLEST SERPL-MCNC: 204 MG/DL
HBA1C MFR BLD: 5.8 % (ref 0–5.6)
HDLC SERPL-MCNC: 33 MG/DL
LDLC SERPL CALC-MCNC: 121 MG/DL
NONHDLC SERPL-MCNC: 171 MG/DL
TRIGL SERPL-MCNC: 251 MG/DL

## 2022-12-20 PROCEDURE — 36415 COLL VENOUS BLD VENIPUNCTURE: CPT | Performed by: NURSE PRACTITIONER

## 2022-12-20 PROCEDURE — 90471 IMMUNIZATION ADMIN: CPT | Performed by: NURSE PRACTITIONER

## 2022-12-20 PROCEDURE — 90750 HZV VACC RECOMBINANT IM: CPT | Performed by: NURSE PRACTITIONER

## 2022-12-20 PROCEDURE — 99214 OFFICE O/P EST MOD 30 MIN: CPT | Mod: 25 | Performed by: NURSE PRACTITIONER

## 2022-12-20 PROCEDURE — 80061 LIPID PANEL: CPT | Performed by: NURSE PRACTITIONER

## 2022-12-20 PROCEDURE — 83036 HEMOGLOBIN GLYCOSYLATED A1C: CPT | Performed by: NURSE PRACTITIONER

## 2022-12-20 RX ORDER — ATORVASTATIN CALCIUM 20 MG/1
20 TABLET, FILM COATED ORAL DAILY
Qty: 90 TABLET | Refills: 3 | Status: SHIPPED | OUTPATIENT
Start: 2022-12-20 | End: 2023-10-09

## 2022-12-20 RX ORDER — FENOFIBRATE 160 MG/1
160 TABLET ORAL DAILY
Qty: 90 TABLET | Refills: 3 | Status: SHIPPED | OUTPATIENT
Start: 2022-12-20 | End: 2023-10-09

## 2022-12-20 ASSESSMENT — PAIN SCALES - GENERAL: PAINLEVEL: NO PAIN (0)

## 2022-12-20 NOTE — PROGRESS NOTES
Assessment & Plan     (R73.03) Prediabetes  (primary encounter diagnosis)  Comment: improving  Plan: Hemoglobin A1c        Will continue on 0.5 mg of Ozempic and follow up in 3 months.     (E78.1) Hypertriglyceridemia  Comment:   Plan: Lipid panel reflex to direct LDL Fasting            (E78.5) Hyperlipidemia LDL goal <130  Comment:   Plan: fenofibrate (TRIGLIDE/LOFIBRA) 160 MG tablet,         atorvastatin (LIPITOR) 20 MG tablet    Neck Pain  Plan: He declines a PT referral right now, plans to try to get a massage first.    Acute pain of the left shoulder   Likely bicep tendonitis  Plan: He declines a PT referral at this time, he will message me if symptoms are not improving over the next few weeks.                 36 minutes spent on the date of the encounter doing chart review, patient visit and documentation            No follow-ups on file.    Belem Joshua NP  [facility]    Susan Betts is a 52 year old, presenting for the following health issues:  Follow Up (Follow Up from Medication ) and Neck Pain (Shoulder and Neck issues )      History of Present Illness       Reason for visit:  Pre diabetes check, second shingles vaccine, injury of left shoulder, continuation of neck pain/head aches from previous x-rays    He eats 2-3 servings of fruits and vegetables daily.He consumes 0 sweetened beverage(s) daily.He exercises with enough effort to increase his heart rate 10 to 19 minutes per day.  He exercises with enough effort to increase his heart rate 3 or less days per week.   He is taking medications regularly.       He was lifting a tv off a shelf about 6 weeks ago, it started to fall and he had to catch it.  Since then he has had left shoulder pain, sharp pain with certain movements.    He started Ozempic in October for prediabetes and hypertriglyceridemia.  He is having some mild nausea, but overall tolerating it well.     Cracking noise in neck when he turns his head.  "He did see a chiropractor and got an xray, which was normal.            Review of Systems         Objective    /88 (BP Location: Left arm, Patient Position: Sitting, Cuff Size: Adult Large)   Pulse 67   Temp 97  F (36.1  C) (Temporal)   Resp 15   Ht 1.753 m (5' 9\")   Wt 91.6 kg (202 lb)   SpO2 96%   BMI 29.83 kg/m    Body mass index is 29.83 kg/m .  Physical Exam   GENERAL: healthy, alert and no distress  MS: tenderness at the left proximal bicep tendon; pain with abduction of the shoulder; negative empty can test, normal strength; tendon snapping palpated and auscultated posterior neck  NEURO: Normal strength and tone, mentation intact and speech normal  PSYCH: mentation appears normal, affect normal/bright                    "

## 2022-12-28 DIAGNOSIS — E78.1 HYPERTRIGLYCERIDEMIA: ICD-10-CM

## 2022-12-28 DIAGNOSIS — R73.03 PREDIABETES: ICD-10-CM

## 2022-12-30 RX ORDER — SEMAGLUTIDE 1.34 MG/ML
INJECTION, SOLUTION SUBCUTANEOUS
Qty: 4.5 ML | Refills: 0 | OUTPATIENT
Start: 2022-12-30

## 2022-12-30 NOTE — TELEPHONE ENCOUNTER
Duplicate. Patient has another Rx ordered on 9/20/2022 to replace the 0.25 mg.     ADITI RamonN RN  [facility]

## 2023-01-16 DIAGNOSIS — M25.512 LEFT SHOULDER PAIN: Primary | ICD-10-CM

## 2023-01-16 NOTE — TELEPHONE ENCOUNTER
DIAGNOSIS: L shoulder pain   APPOINTMENT DATE: 1.17.23   NOTES STATUS DETAILS   OFFICE NOTE from referring provider Internal 1.10.23 Belem Joshua NP     OFFICE NOTE from other specialist Internal 12.20.22 Belem Joshua NP    1.11.17 Layton Omalley MD   MEDICATION LIST Internal

## 2023-01-17 ENCOUNTER — ANCILLARY PROCEDURE (OUTPATIENT)
Dept: GENERAL RADIOLOGY | Facility: CLINIC | Age: 53
End: 2023-01-17
Attending: FAMILY MEDICINE
Payer: COMMERCIAL

## 2023-01-17 ENCOUNTER — OFFICE VISIT (OUTPATIENT)
Dept: ORTHOPEDICS | Facility: CLINIC | Age: 53
End: 2023-01-17
Payer: COMMERCIAL

## 2023-01-17 ENCOUNTER — PRE VISIT (OUTPATIENT)
Dept: ORTHOPEDICS | Facility: CLINIC | Age: 53
End: 2023-01-17

## 2023-01-17 DIAGNOSIS — M25.512 LEFT SHOULDER PAIN: ICD-10-CM

## 2023-01-17 DIAGNOSIS — M25.512 ACUTE PAIN OF LEFT SHOULDER: ICD-10-CM

## 2023-01-17 DIAGNOSIS — M24.812 INTERNAL DERANGEMENT OF LEFT SHOULDER: Primary | ICD-10-CM

## 2023-01-17 PROCEDURE — 73030 X-RAY EXAM OF SHOULDER: CPT | Mod: LT | Performed by: RADIOLOGY

## 2023-01-17 PROCEDURE — 99214 OFFICE O/P EST MOD 30 MIN: CPT | Performed by: FAMILY MEDICINE

## 2023-01-17 NOTE — LETTER
1/17/2023      RE: Alpesh Winston  [address] [place] [address]     Dear Colleague,    Thank you for referring your patient, Alpesh Winston, to the [facility] SPORTS MEDICINE CLINIC [place]. Please see a copy of my visit note below.    CHIEF COMPLAINT:  Consult of the Left Shoulder     HISTORY OF PRESENT ILLNESS  Mr. Winston is a pleasant 53 year old year old male who presents to clinic today with left shoulder pain.  Alpesh explains that while moving a TV from his living room to [facility], the TV almost fell and he had to catch the TV with his left shoulder. This caused immediate pain and since this he has had pain in that shoulder with motions that involve mostly reaching above his head or lifting small amounts of weight in front of him. He notes that when he lifts his arm he feels a sharp pain and as though the joint gets stuck, catching, clicking. This requires him to manually use his other arm to unstick the joint. He has been icing and taking Aleve. He was seen at his PCP for his left shoulder pain and was seen at his chiropractor for this issue too. He is right hand dominant. He is an .     Onset: sudden  Location: left shoulder  Quality:  aching, dull, sharp and shooting  Duration: 4 weeks   Severity: 8/10 at worst  Timing:constant, he will have an increase of pain with pulling motion, and overhead motion.   Modifying factors:  resting and non-use makes it better, movement and use makes it worse  Associated signs & symptoms: pain, tenderness, swelling, catching and locking.   Previous similar pain: No  Treatments to date: hot packs, cold packs, ibuprofen prn and rest.     Additional history: as documented    Review of Systems:    Have you recently had a a fever, chills, weight loss? No    Do you have any vision problems? No    Do you have any chest pain or edema? No    Do you have any shortness of breath or wheezing?  No    Do you have stomach problems?  No    Do you have any numbness or focal weakness? No    Do you have diabetes? No    Do you have problems with bleeding or clotting? No    Do you have an rashes or other skin lesions? No    MEDICAL HISTORY  Patient Active Problem List   Diagnosis     ALLERGIC RHINITIS      HYPERLIPIDEMIA LDL GOAL <130     Family history of diabetes mellitus     Hypertriglyceridemia     Family history of coronary artery disease     Chews tobacco     Gastroesophageal reflux disease without esophagitis     Elevated ferritin     Prediabetes     Left knee pain     Fatty liver       Current Outpatient Medications   Medication Sig Dispense Refill     atorvastatin (LIPITOR) 20 MG tablet Take 1 tablet (20 mg) by mouth daily 90 tablet 3     COENZYME Q-10 PO Take 20 mg by mouth daily       fenofibrate (TRIGLIDE/LOFIBRA) 160 MG tablet Take 1 tablet (160 mg) by mouth daily 90 tablet 3     Omega-3 Fatty Acids (FISH OIL PO)        semaglutide (OZEMPIC) 2 MG/1.5ML SOPN pen Inject 0.5 mg Subcutaneous every 7 days 4.5 mL 0     semaglutide (OZEMPIC, 0.25 OR 0.5 MG/DOSE,) 2 MG/1.5ML SOPN pen Inject 0.25 mg weekly for 4 weeks, then increase to 0.5 mg weekly 1.5 mL 2     sildenafil (VIAGRA) 100 MG tablet Take 0.5-1 tablets ( mg) by mouth daily as needed 10 tablet PRN       Allergies   Allergen Reactions     Omnicef Hives     Sulfa Drugs Rash       Family History   Problem Relation Age of Onset     C.A.D. Maternal Grandmother         MI X 2 ,  in her 60's      Coronary Artery Disease Maternal Grandmother      Hypertension Maternal Grandmother      Hyperlipidemia Maternal Grandmother      Other Cancer Maternal Grandmother         cervical     Breast Cancer Maternal Grandmother      Heart Disease Father      Diabetes Father      Prostate Cancer Father      Hypertension Father      Diabetes Paternal Grandmother         type 2     Breast Cancer Sister      Alzheimer Disease Mother 74        CBD     Hypertension Mother      Alcohol/Drug Maternal Uncle       BERKLEY Maternal Uncle         MI age 56     Substance Abuse Other      Colon Cancer Other          in  stage 4 colon cancer     Cerebrovascular Disease No family hx of      Cancer - colorectal No family hx of        Additional medical/Social/Surgical histories reviewed in [facility] and updated as appropriate.       PHYSICAL EXAM  There were no vitals taken for this visit.    General  - normal appearance, in no obvious distress  Musculoskeletal - Left shoulder  - inspection: normal bone and joint alignment, no obvious deformity, no scapular winging, no AC step-off  - palpation: Tenderness around anterior insertion of supraspinatus and anterior joint line, nontender biceps tendon, normal clavicle, non-tender AC  - ROM: active abduction reduced to around 90 degrees  - strength: 5/5  strength, 4/5 shoulder abduction in left. 4-Supraspinatus strength with pain.  - special tests:  (-) Speed's  (-) Neer  (+) Hawkin's  (+) Emiliano's  (+) Mesa's  (+) Drop test  (-) apprehension    Neuro  - no sensory or motor deficit, grossly normal coordination, normal muscle tone  Skin  - no ecchymosis, erythema, warmth, or induration, no obvious rash  Psych  - interactive, appropriate, normal mood and affect    IMAGING : XR shoulder left 3V. Final results and radiologist's interpretation, available in the [facility] health record. Images were reviewed with the patient/family members in the office today. My personal interpretation of the performed imaging is no acute osseous abnormality or significant degenerative changes of joint.       ASSESSMENT & PLAN  Mr. Winston is a 53 year old year old male who presents to clinic today with 3 weeks of left shoulder pain after mechanical injury while lifting a television 4 weeks  Ago.      Continued significant weakness, limited overhead motion, mechanical symptoms and pain 4 weeks later.     Acute pain of left shoulder  Patient potentially has an injury to the labrum of his left shoulder  as well as injuring a component of his rotator cuff. Could not rule this out as in particular functioning of his supraspinatus muscle seemed to be reduced. His XR revealed no evidence of osteoarthritis and his exam was more indicative of a soft tissue injury so further imaging is recommended.    - Left shoulder MRI ordered  - Continue icing and use of OTC pain medications.  - Activity modification reviewed  - Will call with results and update treatment plan    It was a pleasure seeing Alpesh today.    Patient was seen in conjunction with my shadowing medical student:   ANNE APARICIO on 1/17/2023 at 4:56 PM    Abilio Lofton DO, CAQSM  Primary Care Sports Medicine

## 2023-01-17 NOTE — PROGRESS NOTES
CHIEF COMPLAINT:  Consult of the Left Shoulder     HISTORY OF PRESENT ILLNESS  Mr. Winston is a pleasant 53 year old year old male who presents to clinic today with left shoulder pain.  Alpesh explains that while moving a TV from his living room to [facility], the TV almost fell and he had to catch the TV with his left shoulder. This caused immediate pain and since this he has had pain in that shoulder with motions that involve mostly reaching above his head or lifting small amounts of weight in front of him. He notes that when he lifts his arm he feels a sharp pain and as though the joint gets stuck, catching, clicking. This requires him to manually use his other arm to unstick the joint. He has been icing and taking Aleve. He was seen at his PCP for his left shoulder pain and was seen at his chiropractor for this issue too. He is right hand dominant. He is an .     Onset: sudden  Location: left shoulder  Quality:  aching, dull, sharp and shooting  Duration: 4 weeks   Severity: 8/10 at worst  Timing:constant, he will have an increase of pain with pulling motion, and overhead motion.   Modifying factors:  resting and non-use makes it better, movement and use makes it worse  Associated signs & symptoms: pain, tenderness, swelling, catching and locking.   Previous similar pain: No  Treatments to date: hot packs, cold packs, ibuprofen prn and rest.     Additional history: as documented    Review of Systems:    Have you recently had a a fever, chills, weight loss? No    Do you have any vision problems? No    Do you have any chest pain or edema? No    Do you have any shortness of breath or wheezing?  No    Do you have stomach problems? No    Do you have any numbness or focal weakness? No    Do you have diabetes? No    Do you have problems with bleeding or clotting? No    Do you have an rashes or other skin lesions? No    MEDICAL HISTORY  Patient Active Problem List   Diagnosis     ALLERGIC RHINITIS       HYPERLIPIDEMIA LDL GOAL <130     Family history of diabetes mellitus     Hypertriglyceridemia     Family history of coronary artery disease     Chews tobacco     Gastroesophageal reflux disease without esophagitis     Elevated ferritin     Prediabetes     Left knee pain     Fatty liver       Current Outpatient Medications   Medication Sig Dispense Refill     atorvastatin (LIPITOR) 20 MG tablet Take 1 tablet (20 mg) by mouth daily 90 tablet 3     COENZYME Q-10 PO Take 20 mg by mouth daily       fenofibrate (TRIGLIDE/LOFIBRA) 160 MG tablet Take 1 tablet (160 mg) by mouth daily 90 tablet 3     Omega-3 Fatty Acids (FISH OIL PO)        semaglutide (OZEMPIC) 2 MG/1.5ML SOPN pen Inject 0.5 mg Subcutaneous every 7 days 4.5 mL 0     semaglutide (OZEMPIC, 0.25 OR 0.5 MG/DOSE,) 2 MG/1.5ML SOPN pen Inject 0.25 mg weekly for 4 weeks, then increase to 0.5 mg weekly 1.5 mL 2     sildenafil (VIAGRA) 100 MG tablet Take 0.5-1 tablets ( mg) by mouth daily as needed 10 tablet PRN       Allergies   Allergen Reactions     Omnicef Hives     Sulfa Drugs Rash       Family History   Problem Relation Age of Onset     C.A.D. Maternal Grandmother         MI X 2 ,  in her 60's      Coronary Artery Disease Maternal Grandmother      Hypertension Maternal Grandmother      Hyperlipidemia Maternal Grandmother      Other Cancer Maternal Grandmother         cervical     Breast Cancer Maternal Grandmother      Heart Disease Father      Diabetes Father      Prostate Cancer Father      Hypertension Father      Diabetes Paternal Grandmother         type 2     Breast Cancer Sister      Alzheimer Disease Mother 74        CBD     Hypertension Mother      Alcohol/Drug Maternal Uncle      C.A.D. Maternal Uncle         MI age 56     Substance Abuse Other      Colon Cancer Other          in  stage 4 colon cancer     Cerebrovascular Disease No family hx of      Cancer - colorectal No family hx of        Additional medical/Social/Surgical  histories reviewed in EPIC and updated as appropriate.       PHYSICAL EXAM  There were no vitals taken for this visit.    General  - normal appearance, in no obvious distress  Musculoskeletal - Left shoulder  - inspection: normal bone and joint alignment, no obvious deformity, no scapular winging, no AC step-off  - palpation: Tenderness around anterior insertion of supraspinatus and anterior joint line, nontender biceps tendon, normal clavicle, non-tender AC  - ROM: active abduction reduced to around 90 degrees  - strength: 5/5  strength, 4/5 shoulder abduction in left. 4-Supraspinatus strength with pain.  - special tests:  (-) Speed's  (-) Neer  (+) Hawkin's  (+) Emiliano's  (+) Peoria's  (+) Drop test  (-) apprehension    Neuro  - no sensory or motor deficit, grossly normal coordination, normal muscle tone  Skin  - no ecchymosis, erythema, warmth, or induration, no obvious rash  Psych  - interactive, appropriate, normal mood and affect    IMAGING : XR shoulder left 3V. Final results and radiologist's interpretation, available in the [facility] health record. Images were reviewed with the patient/family members in the office today. My personal interpretation of the performed imaging is no acute osseous abnormality or significant degenerative changes of joint.       ASSESSMENT & PLAN  Mr. Winston is a 53 year old year old male who presents to clinic today with 3 weeks of left shoulder pain after mechanical injury while lifting a television 4 weeks  Ago.      Continued significant weakness, limited overhead motion, mechanical symptoms and pain 4 weeks later.     Acute pain of left shoulder  Patient potentially has an injury to the labrum of his left shoulder as well as injuring a component of his rotator cuff. Could not rule this out as in particular functioning of his supraspinatus muscle seemed to be reduced. His XR revealed no evidence of osteoarthritis and his exam was more indicative of a soft tissue injury so  further imaging is recommended.    - Left shoulder MRI ordered  - Continue icing and use of OTC pain medications.  - Activity modification reviewed  - Will call with results and update treatment plan    It was a pleasure seeing Alpesh today.    Patient was seen in conjunction with my shadowing medical student:   ANNE APARICIO on 1/17/2023 at 4:56 PM    Abilio Lofton DO, CAQSM  Primary Care Sports Medicine

## 2023-01-19 ENCOUNTER — HOSPITAL ENCOUNTER (OUTPATIENT)
Dept: MRI IMAGING | Facility: CLINIC | Age: 53
Discharge: HOME | End: 2023-01-19
Attending: FAMILY MEDICINE | Admitting: FAMILY MEDICINE
Payer: COMMERCIAL

## 2023-01-19 DIAGNOSIS — M25.512 ACUTE PAIN OF LEFT SHOULDER: ICD-10-CM

## 2023-01-19 PROCEDURE — 73221 MRI JOINT UPR EXTREM W/O DYE: CPT | Mod: 26 | Performed by: RADIOLOGY

## 2023-01-19 PROCEDURE — 73221 MRI JOINT UPR EXTREM W/O DYE: CPT | Mod: LT

## 2023-01-23 ENCOUNTER — MYC MEDICAL ADVICE (OUTPATIENT)
Dept: ORTHOPEDICS | Facility: CLINIC | Age: 53
End: 2023-01-23
Payer: COMMERCIAL

## 2023-01-23 DIAGNOSIS — M24.812 INTERNAL DERANGEMENT OF LEFT SHOULDER: ICD-10-CM

## 2023-01-23 DIAGNOSIS — M25.512 ACUTE PAIN OF LEFT SHOULDER: Primary | ICD-10-CM

## 2023-01-25 ENCOUNTER — THERAPY VISIT (OUTPATIENT)
Dept: PHYSICAL THERAPY | Facility: CLINIC | Age: 53
End: 2023-01-25
Attending: FAMILY MEDICINE
Payer: COMMERCIAL

## 2023-01-25 DIAGNOSIS — M25.512 ACUTE PAIN OF LEFT SHOULDER: ICD-10-CM

## 2023-01-25 DIAGNOSIS — M24.812 INTERNAL DERANGEMENT OF LEFT SHOULDER: ICD-10-CM

## 2023-01-25 PROCEDURE — 97110 THERAPEUTIC EXERCISES: CPT | Mod: GP | Performed by: PHYSICAL THERAPIST

## 2023-01-25 PROCEDURE — 97161 PT EVAL LOW COMPLEX 20 MIN: CPT | Mod: GP | Performed by: PHYSICAL THERAPIST

## 2023-01-25 PROCEDURE — 97140 MANUAL THERAPY 1/> REGIONS: CPT | Mod: GP | Performed by: PHYSICAL THERAPIST

## 2023-01-25 NOTE — PROGRESS NOTES
KEY PT FINDINGS:  1) Mechanical catch/click with attempts at forward elevation.   2) Mild pain with shoulder abduction resisted testing  3) Limited tolerance to exam and difficulty finding exercises that do not cause increased pain    Physical Therapy Initial Evaluation: Subjective History     Injury/Condition Details:  Presenting Complaint Left Shoulder Pain   Onset Timing/Date December 2022   Mechanism Was moving a TV and it started to fall, grabbed it with his left arm, felt something right away, went in after the holidays.   It is catching now, went to chiropractor, recommended going to MD. Sent to PT for first line of treatment.        Symptom Behavior Details    Primary Symptoms Constant symptoms; worsen with activity, pain (Location: Anterior lateral initially and then the more it gets sore, the entire shoulder starts to get sore, denies pain into the elbow, Quality: Sharp and Aching/Throbbing), stiffness, weakness   Worst Pain 8/10 (with see below)   Symptom Provocators Reaching out in the front of his body  Turning onto his left side  Reaching overhead   Catching over a dozen times per day.    Best Pain 2/10    Symptom Relievers Advil, biofreeze, ice packs in the armpit, occasionally heat packs.    Time of day dependent? Worse in evening after activity   Recent symptom change? 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   Usual physical activities  (within past year) Hockey and broomball if it wasn't for the shoulder  Previously played Tennis    Orthopaedic history See Epic Chart   Notable medical history See Epic Chart   Patient Reported Health good         Physical Therapy Initial Evaluation: Objective Testing  SHOULDER:    Cervical Screen: Normal range, no provocation of shoulder sx    Posture:  Slight forward shoulder    Shoulder:    ROM L ROM R MMT/Resisted Testing L MMT/Resisted Testing R   Flexion 80 active PAIN 175     Abduction 90 active PAIN 175 +    IR L3 T12     ER WNL WNL +    Mid Trap MMT: NT/5  Low Trap MMT: NT/5    Scapulothoracic Screen: deferred due to pain with elevation    Palpation: Posterior cuff, biceps/anterior shouldoer    Assessment/Plan:  Patient is a 53 year old male with left side shoulder complaints.    Patient has the following significant findings with corresponding treatment plan.                Diagnosis 1:  Left shoulder pain > symptomatic labrum  Pain -  hot/cold therapy, manual therapy, self management and education  Decreased ROM/flexibility - manual therapy, therapeutic exercise and home program  Decreased strength - therapeutic exercise, therapeutic activities and home program  Impaired balance - neuro re-education, therapeutic activities and home program  Decreased function - therapeutic activities and home program    Therapy Evaluation Codes:   1) History comprised of:   Personal factors that impact the plan of care:      None.    Comorbidity factors that impact the plan of care are:      None.     Medications impacting care: None.  2) Examination of Body Systems comprised of:   Body structures and functions that impact the plan of care:      Shoulder.   Activity limitations that impact the plan of care are:      Dressing and Lifting.  3) Clinical presentation characteristics are:   Stable/Uncomplicated.  4) Decision-Making    Low complexity using standardized patient assessment instrument and/or measureable assessment of functional outcome.  Cumulative Therapy Evaluation is: Low complexity.    Previous and current functional limitations:  (See Goal Flow Sheet for this information)    Short term and Long term goals: (See Goal Flow Sheet for this information)     Communication ability:  Patient appears to be able to clearly communicate and understand verbal and written  communication and follow directions correctly.  Treatment Explanation - The following has been discussed with the patient:   RX ordered/plan of care  Anticipated outcomes  Possible risks and side effects  This patient would benefit from PT intervention to resume normal activities.   Rehab potential is good.    Frequency:  1 X week, once daily  Duration:  for 6 weeks  Discharge Plan:  Achieve all LTG.  Independent in home treatment program.  Reach maximal therapeutic benefit.    Please refer to the daily flowsheet for treatment today, total treatment time and time spent performing 1:1 timed codes.

## 2023-02-08 ENCOUNTER — THERAPY VISIT (OUTPATIENT)
Dept: PHYSICAL THERAPY | Facility: CLINIC | Age: 53
End: 2023-02-08
Payer: COMMERCIAL

## 2023-02-08 DIAGNOSIS — M24.812 INTERNAL DERANGEMENT OF LEFT SHOULDER: ICD-10-CM

## 2023-02-08 DIAGNOSIS — M25.512 ACUTE PAIN OF LEFT SHOULDER: Primary | ICD-10-CM

## 2023-02-08 PROCEDURE — 97140 MANUAL THERAPY 1/> REGIONS: CPT | Mod: GP | Performed by: PHYSICAL THERAPIST

## 2023-02-08 PROCEDURE — 97110 THERAPEUTIC EXERCISES: CPT | Mod: GP | Performed by: PHYSICAL THERAPIST

## 2023-02-26 DIAGNOSIS — R73.03 PREDIABETES: ICD-10-CM

## 2023-02-26 DIAGNOSIS — E78.1 HYPERTRIGLYCERIDEMIA: ICD-10-CM

## 2023-02-28 NOTE — PROGRESS NOTES
CHIEF COMPLAINT: Left shoulder pain    DIAGNOSIS: Left shoulder SLAP tear    OCCUPATION/SPORT: Wishes to return to golfing, tennis    HPI:   Alpesh Winston is a very pleasant 53 year old, right-hand dominant male who presents for evaluation of left  shoulder pain.  Symptoms started in January  There was a precipitating event while moving a TV from his living room to garage the TV almost fell and he had to catch the TV with his left shoulder. Patient had immediate pain afterwrods. The pain is located to the anterior shoulder. Worst pain is rated a 10 of 10, and current pain is rated at 0 of 10. Symptoms are worsened by moving, extension, adduction, sleeping.Patient has tried aleve, icing and PT  with minimal relief. Associated symptoms include clicking, catching locking, sharp pain with certain motions .  Patient is very active and wishes to return to working out, golfing, tennis.  PAST MEDICAL HISTORY:  Past Medical History:   Diagnosis Date     Allergic rhinitis due to other allergen      History of chest pain     TO ER - EVALULATED, STRESS TEST ETC, ALL NEGATIVE     Hyperlipidaemia      Lipoma of other skin and subcutaneous tissue 5/2012    Chest wall     Mass 5/3/2012     Other and unspecified hyperlipidemia      Tibia/fibula fracture 6/28/2016       PAST SURGICAL HISTORY:  Past Surgical History:   Procedure Laterality Date     ABDOMEN SURGERY  2000    Hernia surgery     COLONOSCOPY N/A 1/24/2022    Procedure: COLONOSCOPY, WITH POLYPECTOMY;  Surgeon: Michelle Valenzuela MD;  Location: [facility] OR     ENT SURGERY  1989     EYE SURGERY      SURGERY X 2 BILAT TEAR DUCTS      HEAD & NECK SURGERY       HERNIA REPAIR  2000     LAPAROSCOPIC HERNIORRHAPHY INGUINAL BILATERAL Bilateral 10/27/2016    Procedure: LAPAROSCOPIC HERNIORRHAPHY INGUINAL BILATERAL;  Surgeon: Nellie Duron MD;  Location:  OR     SOFT TISSUE SURGERY      Many lypoma removed - Multiple dates     SURGICAL HISTORY OF -       removal of lipoma X 4      SURGICAL HISTORY OF -   10/00    left inguinal hernia repair     SURGICAL HISTORY OF -       wisdom teeth extraction       CURRENT MEDICATIONS:  Current Outpatient Medications   Medication Sig Dispense Refill     atorvastatin (LIPITOR) 20 MG tablet Take 1 tablet (20 mg) by mouth daily 90 tablet 3     COENZYME Q-10 PO Take 20 mg by mouth daily       fenofibrate (TRIGLIDE/LOFIBRA) 160 MG tablet Take 1 tablet (160 mg) by mouth daily 90 tablet 3     Omega-3 Fatty Acids (FISH OIL PO)        semaglutide (OZEMPIC) 2 MG/1.5ML SOPN pen Inject 0.5 mg Subcutaneous every 7 days 4.5 mL 0     semaglutide (OZEMPIC, 0.25 OR 0.5 MG/DOSE,) 2 MG/1.5ML SOPN pen Inject 0.25 mg weekly for 4 weeks, then increase to 0.5 mg weekly 1.5 mL 2     sildenafil (VIAGRA) 100 MG tablet Take 0.5-1 tablets ( mg) by mouth daily as needed 10 tablet PRN       ALLERGIES:      Allergies   Allergen Reactions     Omnicef Hives     Sulfa Drugs Rash         FAMILY HISTORY: No pertinent family history, reviewed in EMR.    SOCIAL HISTORY:   Social History     Socioeconomic History     Marital status:      Spouse name: Not on file     Number of children: Not on file     Years of education: Not on file     Highest education level: Not on file   Occupational History     Occupation:      Employer: [employer]   Tobacco Use     Smoking status: Never     Smokeless tobacco: Current     Types: Chew     Tobacco comments:     chewing less   Vaping Use     Vaping Use: Never used   Substance and Sexual Activity     Alcohol use: Yes     Comment: 10-12 drinks a week      Drug use: No     Sexual activity: Yes     Partners: Female     Birth control/protection: Male Surgical   Other Topics Concern     Parent/sibling w/ CABG, MI or angioplasty before 65F 55M? No   Social History Narrative    Dairy/d 1 servings/d.     Caffeine 3 20 oz bottles  servings/d    Exercise 2 x week    Sunscreen used - NA    Seatbelts used - Yes    Working smoke/CO  detectors in the home - Yes    Guns stored in the home - Yes    Self Breast Exams - NA    Self Testicular Exam - No    Eye Exam up to date - No    Dental Exam up to date - No    Pap Smear up to date - NA    Mammogram up to date - NA    PSA up to date - NA    Dexa Scan up to date - NA    Flex Sig / Colonoscopy up to date - NA    Immunizations up to date - No-1991    Abuse: Current or Past(Physical, Sexual or Emotional)- No    Do you feel safe in your environment - Yes             Social Determinants of Health     Financial Resource Strain: Not on file   Food Insecurity: Not on file   Transportation Needs: Not on file   Physical Activity: Not on file   Stress: Not on file   Social Connections: Not on file   Intimate Partner Violence: Not on file   Housing Stability: Not on file       REVIEW OF SYSTEMS: Positive for that noted in past medical history and history of present illness and otherwise reviewed in EMR    PHYSICAL EXAM:  Patient is Data Unavailable and weighs 0 lbs 0 oz There were no vitals taken for this visit.  There is no height or weight on file to calculate BMI.   Constitutional: Well-developed, well-nourished, healthy appearing male.  Skin: Warm, dry   HEENT: Normal  Cardiac: Well perfused extremities, strong 2+ peripheral pulses. No edema.   Pulmonary: Breathing room air    Musculoskeletal:   Left Shoulder:  AROM left shoulder: 170/170/60/T12   AROM right shoulder: 170/170/60/T12   5/5 supraspinatus, 5/5 infraspinatus, 5/5 subscapularis  no AC joint pain, negative cross body adduction  positive Neer and Sarmiento impingement signs  negative belly-press/lift-off  positive Speed's test  Negative  Apprehension  neagtive Jerk test   No atrophy  negative Hornblower's  negative ER lag  Neurovascular exam and cervical spine exam are normal.     IMAGING: I personally reviewed the prior x-rays and MRI which demonstrate no evidence of a full-thickness rotator cuff tear, there is a SLAP tear, normal rotator cuff  muscle bulk, edema surrounding the biceps tendon.    IMPRESSION: 53 year old-year-old right hand dominant male, with left shoulder SLAP tear.     PLAN:     I discussed with the patient the etiology of their condition. We discussed at length the options as noted above.  Went to the results of the MRI with the patient today.  We discussed that the patient does have a SLAP tear.  We discussed the significance of the superior labrum including that it is the anchor to the biceps tendon.  At this point the patient has tried conservative measures including activity modification, anti-inflammatories, physical therapy for several months.  At this point I gave the patient options including conservative versus surgical intervention.  Conservatively we discussed that patient could try to live with the shoulder as is, try cortisone injection, continued physical therapy.  Alternatively we discussed surgical intervention in the form of left shoulder arthroscopy with biceps tenodesis, labral debridement, possible subacromial decompression and rotator cuff repair if needed.  We did discuss that in a young active thrower that a superior labral repair is performed but otherwise a biceps tenodesis would be the indicated procedure.  We discussed the risks and benefits including the anticipated rehab course.  Specifically discussed risks of continued pain, some shoulder stiffness, that the biceps tendon does not heal or moves which could result in a Kong deformity, mild weakness, biceps cramping.  At this point the patient feels that conservative measures have been exhausted would like to proceed forward with surgery.    After going over these options, Alpesh would like to proceed with left shoulder arthroscopy, biceps tenodesis, labral debridement versus repair, possible subacromial decompression and rotator cuff repair and related procedures. We reviewed the risks and benefits of surgery including but not limited to the following:  Risk of anesthesia, including death; infection; nerve/tendon/vessel injury; deep venous thrombosis (DVT), pulmonary embolism (PE); shoulder stiffness, biceps tendon not healing; hardware- related problems;, potential of the procedure to not alleviate the condition and continued pain; and the potential need for further surgery in the future.     After going over these risks, benefits and alternatives Alpesh would like to proceed with surgery. All of his questions were answered satisfactorily and he signed the surgical consent form to proceed. All appropriate paperwork was completed and he will work with our surgical scheduling department to coordinate the surgery.    At the conclusion of the office visit, Alpesh verbally acknowledged that I answered all of his questions satisfactorily.    Gabriela Cunningham MD  Orthopedic Surgery Sports Medicine and Shoulder Surgery

## 2023-03-01 ENCOUNTER — OFFICE VISIT (OUTPATIENT)
Dept: ORTHOPEDICS | Facility: CLINIC | Age: 53
End: 2023-03-01
Payer: COMMERCIAL

## 2023-03-01 DIAGNOSIS — S43.432A SUPERIOR LABRUM ANTERIOR-TO-POSTERIOR (SLAP) TEAR OF LEFT SHOULDER: Primary | ICD-10-CM

## 2023-03-01 PROCEDURE — 99204 OFFICE O/P NEW MOD 45 MIN: CPT | Performed by: ORTHOPAEDIC SURGERY

## 2023-03-01 RX ORDER — SEMAGLUTIDE 1.34 MG/ML
INJECTION, SOLUTION SUBCUTANEOUS
Qty: 4.5 ML | Refills: 0 | Status: SHIPPED | OUTPATIENT
Start: 2023-03-01 | End: 2023-07-10

## 2023-03-01 NOTE — LETTER
3/1/2023         RE: Alpesh Winston  [address] [place] [address]        Dear Colleague,    Thank you for referring your patient, Alpesh Winston, to the [facility] [place]. Please see a copy of my visit note below.    CHIEF COMPLAINT: Left shoulder pain    DIAGNOSIS: Left shoulder SLAP tear    OCCUPATION/SPORT: Wishes to return to [employer], tennis    HPI:   Alpesh Winston is a very pleasant 53 year old, right-hand dominant male who presents for evaluation of left  shoulder pain.  Symptoms started in January  There was a precipitating event while moving a TV from his living room to garage the TV almost fell and he had to catch the TV with his left shoulder. Patient had immediate pain afterwrods. The pain is located to the anterior shoulder. Worst pain is rated a 10 of 10, and current pain is rated at 0 of 10. Symptoms are worsened by moving, extension, adduction, sleeping.Patient has tried aleve, icing and PT  with minimal relief. Associated symptoms include clicking, catching locking, sharp pain with certain motions .  Patient is very active and wishes to return to working out, [employer], tennis.  PAST MEDICAL HISTORY:  Past Medical History:   Diagnosis Date     Allergic rhinitis due to other allergen      History of chest pain     TO ER - EVALULATED, STRESS TEST ETC, ALL NEGATIVE     Hyperlipidaemia      Lipoma of other skin and subcutaneous tissue 5/2012    Chest wall     Mass 5/3/2012     Other and unspecified hyperlipidemia      Tibia/fibula fracture 6/28/2016       PAST SURGICAL HISTORY:  Past Surgical History:   Procedure Laterality Date     ABDOMEN SURGERY  2000    Hernia surgery     COLONOSCOPY N/A 1/24/2022    Procedure: COLONOSCOPY, WITH POLYPECTOMY;  Surgeon: Michelle Valenzuela MD;  Location: [facility] OR     ENT SURGERY  1989     EYE SURGERY      SURGERY X 2 BILAT TEAR DUCTS      HEAD & NECK SURGERY       HERNIA REPAIR  2000     LAPAROSCOPIC  HERNIORRHAPHY INGUINAL BILATERAL Bilateral 10/27/2016    Procedure: LAPAROSCOPIC HERNIORRHAPHY INGUINAL BILATERAL;  Surgeon: Nellie Duron MD;  Location: [facility] OR     SOFT TISSUE SURGERY      Many lypoma removed - Multiple dates     SURGICAL HISTORY OF -       removal of lipoma X 4     SURGICAL HISTORY OF -   10/00    left inguinal hernia repair     SURGICAL HISTORY OF -       wisdom teeth extraction       CURRENT MEDICATIONS:  Current Outpatient Medications   Medication Sig Dispense Refill     atorvastatin (LIPITOR) 20 MG tablet Take 1 tablet (20 mg) by mouth daily 90 tablet 3     COENZYME Q-10 PO Take 20 mg by mouth daily       fenofibrate (TRIGLIDE/LOFIBRA) 160 MG tablet Take 1 tablet (160 mg) by mouth daily 90 tablet 3     Omega-3 Fatty Acids (FISH OIL PO)        semaglutide (OZEMPIC) 2 MG/1.5ML SOPN pen Inject 0.5 mg Subcutaneous every 7 days 4.5 mL 0     semaglutide (OZEMPIC, 0.25 OR 0.5 MG/DOSE,) 2 MG/1.5ML SOPN pen Inject 0.25 mg weekly for 4 weeks, then increase to 0.5 mg weekly 1.5 mL 2     sildenafil (VIAGRA) 100 MG tablet Take 0.5-1 tablets ( mg) by mouth daily as needed 10 tablet PRN       ALLERGIES:      Allergies   Allergen Reactions     Omnicef Hives     Sulfa Drugs Rash         FAMILY HISTORY: No pertinent family history, reviewed in EMR.    SOCIAL HISTORY:   Social History     Socioeconomic History     Marital status:      Spouse name: Not on file     Number of children: Not on file     Years of education: Not on file     Highest education level: Not on file   Occupational History     Occupation:      Employer: [employer]   Tobacco Use     Smoking status: Never     Smokeless tobacco: Current     Types: Chew     Tobacco comments:     chewing less   Vaping Use     Vaping Use: Never used   Substance and Sexual Activity     Alcohol use: Yes     Comment: 10-12 drinks a week      Drug use: No     Sexual activity: Yes     Partners: Female     Birth control/protection:  Male Surgical   Other Topics Concern     Parent/sibling w/ CABG, MI or angioplasty before 65F 55M? No   Social History Narrative    Dairy/d 1 servings/d.     Caffeine 3 20 oz bottles  servings/d    Exercise 2 x week    Sunscreen used - NA    Seatbelts used - Yes    Working smoke/CO detectors in the home - Yes    Guns stored in the home - Yes    Self Breast Exams - NA    Self Testicular Exam - No    Eye Exam up to date - No    Dental Exam up to date - No    Pap Smear up to date - NA    Mammogram up to date - NA    PSA up to date - NA    Dexa Scan up to date - NA    Flex Sig / Colonoscopy up to date - NA    Immunizations up to date - No-1991    Abuse: Current or Past(Physical, Sexual or Emotional)- No    Do you feel safe in your environment - Yes             Social Determinants of Health     Financial Resource Strain: Not on file   Food Insecurity: Not on file   Transportation Needs: Not on file   Physical Activity: Not on file   Stress: Not on file   Social Connections: Not on file   Intimate Partner Violence: Not on file   Housing Stability: Not on file       REVIEW OF SYSTEMS: Positive for that noted in past medical history and history of present illness and otherwise reviewed in EMR    PHYSICAL EXAM:  Patient is Data Unavailable and weighs 0 lbs 0 oz There were no vitals taken for this visit.  There is no height or weight on file to calculate BMI.   Constitutional: Well-developed, well-nourished, healthy appearing male.  Skin: Warm, dry   HEENT: Normal  Cardiac: Well perfused extremities, strong 2+ peripheral pulses. No edema.   Pulmonary: Breathing room air    Musculoskeletal:   Left Shoulder:  AROM left shoulder: 170/170/60/T12   AROM right shoulder: 170/170/60/T12   5/5 supraspinatus, 5/5 infraspinatus, 5/5 subscapularis  no AC joint pain, negative cross body adduction  positive Neer and Sarmiento impingement signs  negative belly-press/lift-off  positive Speed's test  Negative  Apprehension  neagtive Jerk test    No atrophy  negative Hornblower's  negative ER lag  Neurovascular exam and cervical spine exam are normal.     IMAGING: I personally reviewed the prior x-rays and MRI which demonstrate evidence of a full-thickness rotator cuff tear, questionable SLAP tear, normal rotator cuff muscle bulk, surrounding the biceps tendon.    IMPRESSION: 53 year old-year-old right hand dominant male, with left shoulder SLAP tear.     PLAN:     I discussed with the patient the etiology of their condition. We discussed at length the options as noted above.  Went to the results of the MRI with the patient today.  We discussed that the patient does have a SLAP tear.  We discussed the significance of the superior labrum including that it is the anchor to the biceps tendon.  At this point the patient has tried conservative measures including activity modification, anti-inflammatories, physical therapy for several months.  At this point I gave the patient options including conservative versus surgical intervention.  Conservatively we discussed that patient could try to live with the shoulder as is, try cortisone injection, continued physical therapy.  Alternatively we discussed surgical intervention in the form of left shoulder arthroscopy with biceps tenodesis, labral debridement, possible subacromial decompression and rotator cuff repair if needed.  We did discuss that in a young active thrower that a superior labral repair is performed but otherwise a biceps tenodesis would be the indicated procedure.  We discussed the risks and benefits including the anticipated rehab course.  Specifically discussed risks of continued pain, some shoulder stiffness, that the biceps tendon does not heal or moves which could result in a Kong deformity, mild weakness, biceps cramping.  At this point the patient feels that conservative measures have been exhausted would like to proceed forward with surgery.    After going over these options, Alpesh would like  to proceed with left shoulder arthroscopy, biceps tenodesis, labral debridement versus repair, possible subacromial decompression and rotator cuff repair and related procedures. We reviewed the risks and benefits of surgery including but not limited to the following: Risk of anesthesia, including death; infection; nerve/tendon/vessel injury; deep venous thrombosis (DVT), pulmonary embolism (PE); shoulder stiffness, biceps tendon not healing; hardware- related problems;, potential of the procedure to not alleviate the condition and continued pain; and the potential need for further surgery in the future. We also discussed the possible use of biologic augmentation with a collagen scaffold or an allograft dermal (skin) graft depending on the tissue quality, tear size, and intraoperative determination of healing potential.     After going over these risks, benefits and alternatives Alpesh would like to proceed with surgery. All of his questions were answered satisfactorily and he signed the surgical consent form to proceed. All appropriate paperwork was completed and he will work with our surgical scheduling department to coordinate the surgery.    At the conclusion of the office visit, Alpesh verbally acknowledged that I answered all of his questions satisfactorily.    Gabriela Pacheco MD  Orthopedic Surgery Sports Medicine and Shoulder Surgery        Again, thank you for allowing me to participate in the care of your patient.        Sincerely,        GABRIELA PACHECO MD

## 2023-03-01 NOTE — TELEPHONE ENCOUNTER
Medication is being filled for 1 time refill only due to:  Patient needs labs creatinine.     GLP-1 Agonists Protocol Failed      Normal serum creatinine on file in past 12 months        Recent Labs   Lab Test 08/18/21  1351   CR 1.03     Last Written Prescription Date:  9/20/2022  Last Fill Quantity: 1.5 ml,  # refills: 2   Last office visit: 12/20/2022 with prescribing provider:    ANGELY Joshua   Future Office Visit:   Next 5 appointments (look out 90 days)    Mar 21, 2023  8:30 AM  (Arrive by 8:10 AM)  Provider Visit with Belem Joshua NP  [facility] [place] [address] [phone]         ADITI RamonN RN  [facility]

## 2023-03-01 NOTE — PATIENT INSTRUCTIONS
[facility] [address], [address]  [place], [place] [address] [place] [address]   Appointments: [phone] Appointments: [phone]   Fax: [phone] Fax: [phone]       Schedule Surgery:   -To schedule shoulder scope procedure, please contact my surgery scheduler at [phone].   Once surgery is scheduled you will need to schedule your pre-operative physical, to be completed within 30 days of the scheduled procedure.       Forms:   If you are needing any forms completed relating to your upcoming procedure, please send them to our office with a completed Release of Information.   Forms will be completed AFTER your procedure. A letter can be sent to your employer prior to surgery to inform them of your anticipated time off.    Please notify our staff if you would like a letter to do so.   Forms can be faxed directly to our clinic at [phone].     DO NOT BRING FORMS ON THE DATE OF SURGERY.     Medi    Please contact my office with any questions, [phone].

## 2023-03-02 ENCOUNTER — TELEPHONE (OUTPATIENT)
Dept: ORTHOPEDICS | Facility: CLINIC | Age: 53
End: 2023-03-02
Payer: COMMERCIAL

## 2023-03-02 DIAGNOSIS — S43.432A SUPERIOR LABRUM ANTERIOR-TO-POSTERIOR (SLAP) TEAR OF LEFT SHOULDER: Primary | ICD-10-CM

## 2023-03-14 RX ORDER — CEFAZOLIN SODIUM 2 G/50ML
2 SOLUTION INTRAVENOUS
Status: CANCELLED | OUTPATIENT
Start: 2023-03-27

## 2023-03-14 RX ORDER — CEFAZOLIN SODIUM 2 G/50ML
2 SOLUTION INTRAVENOUS SEE ADMIN INSTRUCTIONS
Status: CANCELLED | OUTPATIENT
Start: 2023-03-27

## 2023-03-21 ENCOUNTER — TELEPHONE (OUTPATIENT)
Dept: ORTHOPEDICS | Facility: CLINIC | Age: 53
End: 2023-03-21

## 2023-03-21 ENCOUNTER — OFFICE VISIT (OUTPATIENT)
Dept: FAMILY MEDICINE | Facility: CLINIC | Age: 53
End: 2023-03-21
Payer: COMMERCIAL

## 2023-03-21 VITALS
WEIGHT: 208 LBS | BODY MASS INDEX: 29.78 KG/M2 | OXYGEN SATURATION: 97 % | SYSTOLIC BLOOD PRESSURE: 126 MMHG | HEART RATE: 70 BPM | TEMPERATURE: 97.4 F | RESPIRATION RATE: 12 BRPM | DIASTOLIC BLOOD PRESSURE: 88 MMHG | HEIGHT: 70 IN

## 2023-03-21 DIAGNOSIS — R73.03 PREDIABETES: ICD-10-CM

## 2023-03-21 DIAGNOSIS — S43.432A SUPERIOR LABRUM ANTERIOR-TO-POSTERIOR (SLAP) TEAR OF LEFT SHOULDER: ICD-10-CM

## 2023-03-21 DIAGNOSIS — Z01.818 PREOP GENERAL PHYSICAL EXAM: Primary | ICD-10-CM

## 2023-03-21 LAB
HBA1C MFR BLD: 6.1 % (ref 0–5.6)
HGB BLD-MCNC: 14 G/DL (ref 13.3–17.7)

## 2023-03-21 PROCEDURE — 85018 HEMOGLOBIN: CPT | Performed by: NURSE PRACTITIONER

## 2023-03-21 PROCEDURE — 36415 COLL VENOUS BLD VENIPUNCTURE: CPT | Performed by: NURSE PRACTITIONER

## 2023-03-21 PROCEDURE — 99214 OFFICE O/P EST MOD 30 MIN: CPT | Performed by: NURSE PRACTITIONER

## 2023-03-21 PROCEDURE — 83036 HEMOGLOBIN GLYCOSYLATED A1C: CPT | Performed by: NURSE PRACTITIONER

## 2023-03-21 ASSESSMENT — PAIN SCALES - GENERAL: PAINLEVEL: EXTREME PAIN (8)

## 2023-03-21 NOTE — H&P (VIEW-ONLY)
[address]  [address]  [place] [address]  Phone: [phone]  Fax: [phone]  Primary Provider: Belem Joshua  Pre-op Performing Provider: BELEM JOSHUA      PREOPERATIVE EVALUATION:  Today's date: 3/21/2023    Alpesh Winston is a 53 year old male who presents for a preoperative evaluation.    Surgical Information:  Surgery/Procedure: Superior labrum anterior-to-posterior (SLAP) tear of left shoulder, LEFT SHOULDER ARTHROSCOPY W INTRA-ARTICULAR DEBRIDEMENT, BICEPS TENODESIS OR TENOTOMY, Labral repair verse debridement, subacromial decompression, possible rotator cuff repair  Surgery Location: [facility] [place]  Surgeon: Gabriela Cunningham MD  Surgery Date: 3/27/2023  Time of Surgery: 11:40 AM  Where patient plans to recover: At home with family  Fax number for surgical facility: Note does not need to be faxed, will be available electronically in Epic.    Type of Anesthesia Anticipated: General with Block    Assessment & Plan     The proposed surgical procedure is considered INTERMEDIATE risk.    (Z01.818) Preop general physical exam  (primary encounter diagnosis)  Comment:   Plan: Hemoglobin            (S43.432A) Superior labrum anterior-to-posterior (SLAP) tear of left shoulder  Comment:   Plan:     (R73.03) Prediabetes  Comment:   Plan: Hemoglobin A1c                     Risks and Recommendations:  The patient has the following additional risks and recommendations for perioperative complications:   - No identified additional risk factors other than previously addressed    Medication Instructions:  stop Omega 3 fatty acid    RECOMMENDATION:  APPROVAL GIVEN to proceed with proposed procedure, without further diagnostic evaluation.     :[id number]}    Subjective     HPI related to upcoming procedure: Left shoulder pain, failed conservative treatment.    Preop Questions 3/21/2023   1. Have you ever had a heart  attack or stroke? No   2. Have you ever had surgery on your heart or blood vessels, such as a stent placement, a coronary artery bypass, or surgery on an artery in your head, neck, heart, or legs? No   3. Do you have chest pain with activity? No   4. Do you have a history of  heart failure? No   5. Do you currently have a cold, bronchitis or symptoms of other infection? No   6. Do you have a cough, shortness of breath, or wheezing? No   7. Do you or anyone in your family have previous history of blood clots? No   8. Do you or does anyone in your family have a serious bleeding problem such as prolonged bleeding following surgeries or cuts? No   9. Have you ever had problems with anemia or been told to take iron pills? No   10. Have you had any abnormal blood loss such as black, tarry or bloody stools? No   11. Have you ever had a blood transfusion? No   12. Are you willing to have a blood transfusion if it is medically needed before, during, or after your surgery? Yes   13. Have you or any of your relatives ever had problems with anesthesia? No   14. Do you have sleep apnea, excessive snoring or daytime drowsiness? No   15. Do you have any artifical heart valves or other implanted medical devices like a pacemaker, defibrillator, or continuous glucose monitor? No   16. Do you have artificial joints? No   17. Are you allergic to latex? No       Health Care Directive:  Patient does not have a Health Care Directive or Living Will:     Preoperative Review of :   reviewed - no record of controlled substances prescribed.      Status of Chronic Conditions:  See problem list for active medical problems.  Problems all longstanding and stable, except as noted/documented.  See ROS for pertinent symptoms related to these conditions.      Review of Systems  CONSTITUTIONAL: NEGATIVE for fever, chills, change in weight  INTEGUMENTARY/SKIN: NEGATIVE for worrisome rashes, moles or lesions  EYES: NEGATIVE for vision changes or  irritation  ENT/MOUTH: NEGATIVE for ear, mouth and throat problems  RESP: NEGATIVE for significant cough or SOB  CV: NEGATIVE for chest pain, palpitations or peripheral edema  GI: NEGATIVE for nausea, abdominal pain, heartburn, or change in bowel habits  : NEGATIVE for frequency, dysuria, or hematuria  MUSCULOSKELETAL:see HPI  NEURO: NEGATIVE for weakness, dizziness or paresthesias  ENDOCRINE: NEGATIVE for temperature intolerance, skin/hair changes  HEME: NEGATIVE for bleeding problems  PSYCHIATRIC: NEGATIVE for changes in mood or affect    Patient Active Problem List    Diagnosis Date Noted     Superior labrum anterior-to-posterior (SLAP) tear of left shoulder 03/02/2023     Priority: Medium     Added automatically from request for surgery [id number]       Fatty liver 09/20/2022     Priority: Medium     Left knee pain 10/04/2021     Priority: Medium     Prediabetes 08/21/2021     Priority: Medium     Elevated ferritin 08/09/2018     Priority: Medium     Gastroesophageal reflux disease without esophagitis 11/10/2015     Priority: Medium     Chews tobacco 11/19/2014     Priority: Medium     Family history of coronary artery disease 01/05/2012     Priority: Medium     Family history of diabetes mellitus 12/22/2011     Priority: Medium     Hypertriglyceridemia 12/22/2011     Priority: Medium     HYPERLIPIDEMIA LDL GOAL <130 10/31/2010     Priority: Medium     ALLERGIC RHINITIS  10/12/2004     Priority: Medium      Past Medical History:   Diagnosis Date     Allergic rhinitis due to other allergen      History of chest pain     TO ER - EVALULATED, STRESS TEST ETC, ALL NEGATIVE     Hyperlipidaemia      Lipoma of other skin and subcutaneous tissue 5/2012    Chest wall     Mass 5/3/2012     Other and unspecified hyperlipidemia      Tibia/fibula fracture 6/28/2016     Past Surgical History:   Procedure Laterality Date     ABDOMEN SURGERY  2000    Hernia surgery     COLONOSCOPY N/A 1/24/2022    Procedure: COLONOSCOPY, WITH  "POLYPECTOMY;  Surgeon: Michelle Valenzuela MD;  Location: [facility] OR     ENT SURGERY  1989     EYE SURGERY      SURGERY X 2 BILAT TEAR DUCTS      HEAD & NECK SURGERY       HERNIA REPAIR  2000     LAPAROSCOPIC HERNIORRHAPHY INGUINAL BILATERAL Bilateral 10/27/2016    Procedure: LAPAROSCOPIC HERNIORRHAPHY INGUINAL BILATERAL;  Surgeon: Nellie Duron MD;  Location:  OR     SOFT TISSUE SURGERY      Many lypoma removed - Multiple dates     SURGICAL HISTORY OF -       removal of lipoma X 4     SURGICAL HISTORY OF -   10/00    left inguinal hernia repair     SURGICAL HISTORY OF -       wisdom teeth extraction     Current Outpatient Medications   Medication Sig Dispense Refill     atorvastatin (LIPITOR) 20 MG tablet Take 1 tablet (20 mg) by mouth daily 90 tablet 3     COENZYME Q-10 PO Take 20 mg by mouth daily       fenofibrate (TRIGLIDE/LOFIBRA) 160 MG tablet Take 1 tablet (160 mg) by mouth daily 90 tablet 3     Omega-3 Fatty Acids (FISH OIL PO)        OZEMPIC, 0.25 OR 0.5 MG/DOSE, 2 MG/1.5ML SOPN pen INJECT 0.5MG               SUBCUTANEOUSLYWEEKLY (EVERY7 DAYS) 4.5 mL 0     sildenafil (VIAGRA) 100 MG tablet Take 0.5-1 tablets ( mg) by mouth daily as needed 10 tablet PRN       Allergies   Allergen Reactions     Omnicef Hives     Sulfa Drugs Rash        Social History     Tobacco Use     Smoking status: Never     Smokeless tobacco: Current     Types: Chew     Tobacco comments:     chewing less   Substance Use Topics     Alcohol use: Yes     Comment: 10-12 drinks a week        History   Drug Use No         Objective     /88   Pulse 70   Temp 97.4  F (36.3  C) (Temporal)   Resp 12   Ht 1.778 m (5' 10\")   Wt 94.3 kg (208 lb)   SpO2 97%   BMI 29.84 kg/m      Physical Exam    GENERAL APPEARANCE: healthy, alert and no distress     EYES: EOMI,  PERRL     HENT: ear canals and TM's normal and nose and mouth without ulcers or lesions     NECK: no adenopathy, no asymmetry, masses, or scars and thyroid normal to " palpation     RESP: lungs clear to auscultation - no rales, rhonchi or wheezes     CV: regular rates and rhythm, normal S1 S2, no S3 or S4 and no murmur, click or rub     ABDOMEN:  soft, nontender, no HSM or masses and bowel sounds normal     MS: reduced ROM of left shoulder secondary to pain     SKIN: no suspicious lesions or rashes     NEURO: Normal strength and tone, sensory exam grossly normal, mentation intact and speech normal     PSYCH: mentation appears normal. and affect normal/bright     LYMPHATICS: No cervical adenopathy    Recent Labs   Lab Test 12/20/22  1031 09/09/22  0842 08/18/21  1351 08/11/21  0928   HGB  --  15.0  --   --    PLT  --  264  --   --    NA  --   --  138 139   POTASSIUM  --   --  4.0 5.4*   CR  --   --  1.03 0.98   A1C 5.8* 6.2*  --  6.1*        Diagnostics:  Labs pending at this time.  Results will be reviewed when available.   No EKG required, no history of coronary heart disease, significant arrhythmia, peripheral arterial disease or other structural heart disease.    Revised Cardiac Risk Index (RCRI):  The patient has the following serious cardiovascular risks for perioperative complications:   - No serious cardiac risks = 0 points     RCRI Interpretation: 0 points: Class I (very low risk - 0.4% complication rate)           Signed Electronically by: Belem Joshua NP  Copy of this evaluation report is provided to requesting physician.

## 2023-03-21 NOTE — PATIENT INSTRUCTIONS
For informational purposes only. Not to replace the advice of your health care provider. Copyright   2003,  [employer]rona [employer]otta[employer] [facility]. All rights reserved. Clinically reviewed by Jelena Currie MD. [employer] [id number] - REV .  Preparing for Your Surgery  Getting started  A nurse will call you to review your health history and instructions. They will give you an arrival time based on your scheduled surgery time. Please be ready to share:    Your doctor's clinic name and phone number    Your medical, surgical, and anesthesia history    A list of allergies and sensitivities    A list of medicines, including herbal treatments and over-the-counter drugs    Whether the patient has a legal guardian (ask how to send us the papers in advance)  Please tell us if you're pregnant--or if there's any chance you might be pregnant. Some surgeries may injure a fetus (unborn baby), so they require a pregnancy test. Surgeries that are safe for a fetus don't always need a test, and you can choose whether to have one.   If you have a child who's having surgery, please ask for a copy of Preparing for Your Child's Surgery.    Preparing for surgery    Within 10 to 30 days of surgery: Have a pre-op exam (sometimes called an H&P, or History and Physical). This can be done at a clinic or pre-operative center.  ? If you're having a , you may not need this exam. Talk to your care team.    At your pre-op exam, talk to your care team about all medicines you take. If you need to stop any medicines before surgery, ask when to start taking them again.  ? We do this for your safety. Many medicines can make you bleed too much during surgery. Some change how well surgery (anesthesia) drugs work.    Call your insurance company to let them know you're having surgery. (If you don't have insurance, call [phone].)    Call your clinic if there's any change in your health. This includes signs of a cold or flu (sore throat, runny nose,  cough, rash, fever). It also includes a scrape or scratch near the surgery site.    If you have questions on the day of surgery, call your hospital or surgery center.  Eating and drinking guidelines  For your safety: Unless your surgeon tells you otherwise, follow the guidelines below.    Eat and drink as usual until 8 hours before you arrive for surgery. After that, no food or milk.    Drink clear liquids until 2 hours before you arrive. These are liquids you can see through, like water, Gatorade, and Propel Water. They also include plain black coffee and tea (no cream or milk), candy, and breath mints. You can spit out gum when you arrive.    If you drink alcohol: Stop drinking it the night before surgery.    If your care team tells you to take medicine on the morning of surgery, it's okay to take it with a sip of water.  Preventing infection    Shower or bathe the night before and morning of your surgery. Follow the instructions your clinic gave you. (If no instructions, use regular soap.)    Don't shave or clip hair near your surgery site. We'll remove the hair if needed.    Don't smoke or vape the morning of surgery. You may chew nicotine gum up to 2 hours before surgery. A nicotine patch is okay.  ? Note: Some surgeries require you to completely quit smoking and nicotine. Check with your surgeon.    Your care team will make every effort to keep you safe from infection. We will:  ? Clean our hands often with soap and water (or an alcohol-based hand rub).  ? Clean the skin at your surgery site with a special soap that kills germs.  ? Give you a special gown to keep you warm. (Cold raises the risk of infection.)  ? Wear special hair covers, masks, gowns and gloves during surgery.  ? Give antibiotic medicine, if prescribed. Not all surgeries need antibiotics.  What to bring on the day of surgery    Photo ID and insurance card    Copy of your health care directive, if you have one    Glasses and hearing aids (bring  cases)  ? You can't wear contacts during surgery    Inhaler and eye drops, if you use them (tell us about these when you arrive)    CPAP machine or breathing device, if you use them    A few personal items, if spending the night    If you have . . .  ? A pacemaker, ICD (cardiac defibrillator) or other implant: Bring the ID card.  ? An implanted stimulator: Bring the remote control.  ? A legal guardian: Bring a copy of the certified (court-stamped) guardianship papers.  Please remove any jewelry, including body piercings. Leave jewelry and other valuables at home.  If you're going home the day of surgery    You must have a responsible adult drive you home. They should stay with you overnight as well.    If you don't have someone to stay with you, and you aren't safe to go home alone, we may keep you overnight. Insurance often won't pay for this.  After surgery  If it's hard to control your pain or you need more pain medicine, please call your surgeon's office.  Questions?   If you have any questions for your care team, list them here: _________________________________________________________________________________________________________________________________________________________________________ ____________________________________ ____________________________________ ____________________________________

## 2023-03-21 NOTE — TELEPHONE ENCOUNTER
After huddling with Dr. Cunningham, it was okay to offer a 9 am phone visit with patient. Patient is able to make the time work. Will schedule patient for 9 am phone visit on 3/22.    Zion Beaulieu ATC

## 2023-03-21 NOTE — PROGRESS NOTES
[address]  [address]  [place] [address]  Phone: [phone]  Fax: [phone]  Primary Provider: Belem Joshua  Pre-op Performing Provider: BELEM JOSHUA      PREOPERATIVE EVALUATION:  Today's date: 3/21/2023    Alpesh Winston is a 53 year old male who presents for a preoperative evaluation.    Surgical Information:  Surgery/Procedure: Superior labrum anterior-to-posterior (SLAP) tear of left shoulder, LEFT SHOULDER ARTHROSCOPY W INTRA-ARTICULAR DEBRIDEMENT, BICEPS TENODESIS OR TENOTOMY, Labral repair verse debridement, subacromial decompression, possible rotator cuff repair  Surgery Location: [facility] [place]  Surgeon: Gabriela Cunningham MD  Surgery Date: 3/27/2023  Time of Surgery: 11:40 AM  Where patient plans to recover: At home with family  Fax number for surgical facility: Note does not need to be faxed, will be available electronically in Epic.    Type of Anesthesia Anticipated: General with Block    Assessment & Plan     The proposed surgical procedure is considered INTERMEDIATE risk.    (Z01.818) Preop general physical exam  (primary encounter diagnosis)  Comment:   Plan: Hemoglobin            (S43.432A) Superior labrum anterior-to-posterior (SLAP) tear of left shoulder  Comment:   Plan:     (R73.03) Prediabetes  Comment:   Plan: Hemoglobin A1c                     Risks and Recommendations:  The patient has the following additional risks and recommendations for perioperative complications:   - No identified additional risk factors other than previously addressed    Medication Instructions:  stop Omega 3 fatty acid    RECOMMENDATION:  APPROVAL GIVEN to proceed with proposed procedure, without further diagnostic evaluation.     :[id number]}    Subjective     HPI related to upcoming procedure: Left shoulder pain, failed conservative treatment.    Preop Questions 3/21/2023   1. Have you ever had a heart  attack or stroke? No   2. Have you ever had surgery on your heart or blood vessels, such as a stent placement, a coronary artery bypass, or surgery on an artery in your head, neck, heart, or legs? No   3. Do you have chest pain with activity? No   4. Do you have a history of  heart failure? No   5. Do you currently have a cold, bronchitis or symptoms of other infection? No   6. Do you have a cough, shortness of breath, or wheezing? No   7. Do you or anyone in your family have previous history of blood clots? No   8. Do you or does anyone in your family have a serious bleeding problem such as prolonged bleeding following surgeries or cuts? No   9. Have you ever had problems with anemia or been told to take iron pills? No   10. Have you had any abnormal blood loss such as black, tarry or bloody stools? No   11. Have you ever had a blood transfusion? No   12. Are you willing to have a blood transfusion if it is medically needed before, during, or after your surgery? Yes   13. Have you or any of your relatives ever had problems with anesthesia? No   14. Do you have sleep apnea, excessive snoring or daytime drowsiness? No   15. Do you have any artifical heart valves or other implanted medical devices like a pacemaker, defibrillator, or continuous glucose monitor? No   16. Do you have artificial joints? No   17. Are you allergic to latex? No       Health Care Directive:  Patient does not have a Health Care Directive or Living Will:     Preoperative Review of :   reviewed - no record of controlled substances prescribed.      Status of Chronic Conditions:  See problem list for active medical problems.  Problems all longstanding and stable, except as noted/documented.  See ROS for pertinent symptoms related to these conditions.      Review of Systems  CONSTITUTIONAL: NEGATIVE for fever, chills, change in weight  INTEGUMENTARY/SKIN: NEGATIVE for worrisome rashes, moles or lesions  EYES: NEGATIVE for vision changes or  irritation  ENT/MOUTH: NEGATIVE for ear, mouth and throat problems  RESP: NEGATIVE for significant cough or SOB  CV: NEGATIVE for chest pain, palpitations or peripheral edema  GI: NEGATIVE for nausea, abdominal pain, heartburn, or change in bowel habits  : NEGATIVE for frequency, dysuria, or hematuria  MUSCULOSKELETAL:see HPI  NEURO: NEGATIVE for weakness, dizziness or paresthesias  ENDOCRINE: NEGATIVE for temperature intolerance, skin/hair changes  HEME: NEGATIVE for bleeding problems  PSYCHIATRIC: NEGATIVE for changes in mood or affect    Patient Active Problem List    Diagnosis Date Noted     Superior labrum anterior-to-posterior (SLAP) tear of left shoulder 03/02/2023     Priority: Medium     Added automatically from request for surgery [id number]       Fatty liver 09/20/2022     Priority: Medium     Left knee pain 10/04/2021     Priority: Medium     Prediabetes 08/21/2021     Priority: Medium     Elevated ferritin 08/09/2018     Priority: Medium     Gastroesophageal reflux disease without esophagitis 11/10/2015     Priority: Medium     Chews tobacco 11/19/2014     Priority: Medium     Family history of coronary artery disease 01/05/2012     Priority: Medium     Family history of diabetes mellitus 12/22/2011     Priority: Medium     Hypertriglyceridemia 12/22/2011     Priority: Medium     HYPERLIPIDEMIA LDL GOAL <130 10/31/2010     Priority: Medium     ALLERGIC RHINITIS  10/12/2004     Priority: Medium      Past Medical History:   Diagnosis Date     Allergic rhinitis due to other allergen      History of chest pain     TO ER - EVALULATED, STRESS TEST ETC, ALL NEGATIVE     Hyperlipidaemia      Lipoma of other skin and subcutaneous tissue 5/2012    Chest wall     Mass 5/3/2012     Other and unspecified hyperlipidemia      Tibia/fibula fracture 6/28/2016     Past Surgical History:   Procedure Laterality Date     ABDOMEN SURGERY  2000    Hernia surgery     COLONOSCOPY N/A 1/24/2022    Procedure: COLONOSCOPY, WITH  "POLYPECTOMY;  Surgeon: Michelle Valenzuela MD;  Location: [facility] OR     ENT SURGERY  1989     EYE SURGERY      SURGERY X 2 BILAT TEAR DUCTS      HEAD & NECK SURGERY       HERNIA REPAIR  2000     LAPAROSCOPIC HERNIORRHAPHY INGUINAL BILATERAL Bilateral 10/27/2016    Procedure: LAPAROSCOPIC HERNIORRHAPHY INGUINAL BILATERAL;  Surgeon: Nellie Duron MD;  Location:  OR     SOFT TISSUE SURGERY      Many lypoma removed - Multiple dates     SURGICAL HISTORY OF -       removal of lipoma X 4     SURGICAL HISTORY OF -   10/00    left inguinal hernia repair     SURGICAL HISTORY OF -       wisdom teeth extraction     Current Outpatient Medications   Medication Sig Dispense Refill     atorvastatin (LIPITOR) 20 MG tablet Take 1 tablet (20 mg) by mouth daily 90 tablet 3     COENZYME Q-10 PO Take 20 mg by mouth daily       fenofibrate (TRIGLIDE/LOFIBRA) 160 MG tablet Take 1 tablet (160 mg) by mouth daily 90 tablet 3     Omega-3 Fatty Acids (FISH OIL PO)        OZEMPIC, 0.25 OR 0.5 MG/DOSE, 2 MG/1.5ML SOPN pen INJECT 0.5MG               SUBCUTANEOUSLYWEEKLY (EVERY7 DAYS) 4.5 mL 0     sildenafil (VIAGRA) 100 MG tablet Take 0.5-1 tablets ( mg) by mouth daily as needed 10 tablet PRN       Allergies   Allergen Reactions     Omnicef Hives     Sulfa Drugs Rash        Social History     Tobacco Use     Smoking status: Never     Smokeless tobacco: Current     Types: Chew     Tobacco comments:     chewing less   Substance Use Topics     Alcohol use: Yes     Comment: 10-12 drinks a week        History   Drug Use No         Objective     /88   Pulse 70   Temp 97.4  F (36.3  C) (Temporal)   Resp 12   Ht 1.778 m (5' 10\")   Wt 94.3 kg (208 lb)   SpO2 97%   BMI 29.84 kg/m      Physical Exam    GENERAL APPEARANCE: healthy, alert and no distress     EYES: EOMI,  PERRL     HENT: ear canals and TM's normal and nose and mouth without ulcers or lesions     NECK: no adenopathy, no asymmetry, masses, or scars and thyroid normal to " palpation     RESP: lungs clear to auscultation - no rales, rhonchi or wheezes     CV: regular rates and rhythm, normal S1 S2, no S3 or S4 and no murmur, click or rub     ABDOMEN:  soft, nontender, no HSM or masses and bowel sounds normal     MS: reduced ROM of left shoulder secondary to pain     SKIN: no suspicious lesions or rashes     NEURO: Normal strength and tone, sensory exam grossly normal, mentation intact and speech normal     PSYCH: mentation appears normal. and affect normal/bright     LYMPHATICS: No cervical adenopathy    Recent Labs   Lab Test 12/20/22  1031 09/09/22  0842 08/18/21  1351 08/11/21  0928   HGB  --  15.0  --   --    PLT  --  264  --   --    NA  --   --  138 139   POTASSIUM  --   --  4.0 5.4*   CR  --   --  1.03 0.98   A1C 5.8* 6.2*  --  6.1*        Diagnostics:  Labs pending at this time.  Results will be reviewed when available.   No EKG required, no history of coronary heart disease, significant arrhythmia, peripheral arterial disease or other structural heart disease.    Revised Cardiac Risk Index (RCRI):  The patient has the following serious cardiovascular risks for perioperative complications:   - No serious cardiac risks = 0 points     RCRI Interpretation: 0 points: Class I (very low risk - 0.4% complication rate)           Signed Electronically by: Belem Joshua NP  Copy of this evaluation report is provided to requesting physician.

## 2023-03-22 ENCOUNTER — APPOINTMENT (OUTPATIENT)
Dept: ORTHOPEDICS | Facility: CLINIC | Age: 53
End: 2023-03-22
Payer: COMMERCIAL

## 2023-03-23 ENCOUNTER — ANESTHESIA EVENT (OUTPATIENT)
Dept: SURGERY | Facility: AMBULATORY SURGERY CENTER | Age: 53
End: 2023-03-23
Payer: COMMERCIAL

## 2023-03-27 ENCOUNTER — HOSPITAL ENCOUNTER (OUTPATIENT)
Facility: AMBULATORY SURGERY CENTER | Age: 53
Discharge: HOME | End: 2023-03-27
Attending: ORTHOPAEDIC SURGERY
Payer: COMMERCIAL

## 2023-03-27 ENCOUNTER — ANESTHESIA (OUTPATIENT)
Dept: SURGERY | Facility: AMBULATORY SURGERY CENTER | Age: 53
End: 2023-03-27
Payer: COMMERCIAL

## 2023-03-27 VITALS
DIASTOLIC BLOOD PRESSURE: 85 MMHG | OXYGEN SATURATION: 96 % | WEIGHT: 200 LBS | BODY MASS INDEX: 28.63 KG/M2 | SYSTOLIC BLOOD PRESSURE: 139 MMHG | HEART RATE: 65 BPM | RESPIRATION RATE: 14 BRPM | TEMPERATURE: 97.3 F | HEIGHT: 70 IN

## 2023-03-27 DIAGNOSIS — S43.432A SUPERIOR LABRUM ANTERIOR-TO-POSTERIOR (SLAP) TEAR OF LEFT SHOULDER: Primary | ICD-10-CM

## 2023-03-27 PROCEDURE — C9290 INJ, BUPIVACAINE LIPOSOME: HCPCS

## 2023-03-27 PROCEDURE — 29828 SHO ARTHRS SRG BICP TENODSIS: CPT | Mod: LT

## 2023-03-27 PROCEDURE — 29828 SHO ARTHRS SRG BICP TENODSIS: CPT | Mod: LT | Performed by: ORTHOPAEDIC SURGERY

## 2023-03-27 DEVICE — IMP SU ANCHOR ARTHREX REPAIR 1.3MM FIBERTAK AR-3671: Type: IMPLANTABLE DEVICE | Site: SHOULDER | Status: FUNCTIONAL

## 2023-03-27 RX ORDER — HYDROCODONE BITARTRATE AND ACETAMINOPHEN 5; 325 MG/1; MG/1
1 TABLET ORAL EVERY 6 HOURS PRN
Qty: 10 TABLET | Refills: 0 | Status: SHIPPED | OUTPATIENT
Start: 2023-03-27 | End: 2023-03-30

## 2023-03-27 RX ORDER — FENTANYL CITRATE 50 UG/ML
50 INJECTION, SOLUTION INTRAMUSCULAR; INTRAVENOUS EVERY 5 MIN PRN
Status: DISCONTINUED | OUTPATIENT
Start: 2023-03-27 | End: 2023-03-27 | Stop reason: HOSPADM

## 2023-03-27 RX ORDER — GABAPENTIN 300 MG/1
300 CAPSULE ORAL
Status: COMPLETED | OUTPATIENT
Start: 2023-03-27 | End: 2023-03-27

## 2023-03-27 RX ORDER — FENTANYL CITRATE 50 UG/ML
25-50 INJECTION, SOLUTION INTRAMUSCULAR; INTRAVENOUS
Status: DISCONTINUED | OUTPATIENT
Start: 2023-03-27 | End: 2023-03-27 | Stop reason: HOSPADM

## 2023-03-27 RX ORDER — HYDROMORPHONE HYDROCHLORIDE 1 MG/ML
0.2 INJECTION, SOLUTION INTRAMUSCULAR; INTRAVENOUS; SUBCUTANEOUS EVERY 5 MIN PRN
Status: DISCONTINUED | OUTPATIENT
Start: 2023-03-27 | End: 2023-03-27 | Stop reason: HOSPADM

## 2023-03-27 RX ORDER — LIDOCAINE 40 MG/G
CREAM TOPICAL
Status: DISCONTINUED | OUTPATIENT
Start: 2023-03-27 | End: 2023-03-27 | Stop reason: HOSPADM

## 2023-03-27 RX ORDER — NALOXONE HYDROCHLORIDE 0.4 MG/ML
0.4 INJECTION, SOLUTION INTRAMUSCULAR; INTRAVENOUS; SUBCUTANEOUS
Status: DISCONTINUED | OUTPATIENT
Start: 2023-03-27 | End: 2023-03-27 | Stop reason: HOSPADM

## 2023-03-27 RX ORDER — LIDOCAINE HYDROCHLORIDE 20 MG/ML
INJECTION, SOLUTION INFILTRATION; PERINEURAL PRN
Status: DISCONTINUED | OUTPATIENT
Start: 2023-03-27 | End: 2023-03-27

## 2023-03-27 RX ORDER — HYDROMORPHONE HYDROCHLORIDE 1 MG/ML
0.4 INJECTION, SOLUTION INTRAMUSCULAR; INTRAVENOUS; SUBCUTANEOUS EVERY 5 MIN PRN
Status: DISCONTINUED | OUTPATIENT
Start: 2023-03-27 | End: 2023-03-27 | Stop reason: HOSPADM

## 2023-03-27 RX ORDER — BUPIVACAINE HYDROCHLORIDE 5 MG/ML
INJECTION, SOLUTION EPIDURAL; INTRACAUDAL
Status: COMPLETED | OUTPATIENT
Start: 2023-03-27 | End: 2023-03-27

## 2023-03-27 RX ORDER — CEFAZOLIN SODIUM 2 G/50ML
2 SOLUTION INTRAVENOUS
Status: COMPLETED | OUTPATIENT
Start: 2023-03-27 | End: 2023-03-27

## 2023-03-27 RX ORDER — SODIUM CHLORIDE, SODIUM LACTATE, POTASSIUM CHLORIDE, CALCIUM CHLORIDE 600; 310; 30; 20 MG/100ML; MG/100ML; MG/100ML; MG/100ML
INJECTION, SOLUTION INTRAVENOUS CONTINUOUS PRN
Status: DISCONTINUED | OUTPATIENT
Start: 2023-03-27 | End: 2023-03-27

## 2023-03-27 RX ORDER — FENTANYL CITRATE 50 UG/ML
25 INJECTION, SOLUTION INTRAMUSCULAR; INTRAVENOUS EVERY 5 MIN PRN
Status: DISCONTINUED | OUTPATIENT
Start: 2023-03-27 | End: 2023-03-27 | Stop reason: HOSPADM

## 2023-03-27 RX ORDER — DOCUSATE SODIUM 100 MG/1
100 CAPSULE, LIQUID FILLED ORAL 2 TIMES DAILY
Qty: 30 CAPSULE | Refills: 0 | Status: SHIPPED | OUTPATIENT
Start: 2023-03-27 | End: 2023-10-09

## 2023-03-27 RX ORDER — ONDANSETRON 4 MG/1
4 TABLET, ORALLY DISINTEGRATING ORAL EVERY 30 MIN PRN
Status: DISCONTINUED | OUTPATIENT
Start: 2023-03-27 | End: 2023-03-27 | Stop reason: HOSPADM

## 2023-03-27 RX ORDER — FENTANYL CITRATE 50 UG/ML
INJECTION, SOLUTION INTRAMUSCULAR; INTRAVENOUS PRN
Status: DISCONTINUED | OUTPATIENT
Start: 2023-03-27 | End: 2023-03-27

## 2023-03-27 RX ORDER — ONDANSETRON 4 MG/1
4 TABLET, ORALLY DISINTEGRATING ORAL EVERY 8 HOURS PRN
Qty: 10 TABLET | Refills: 0 | Status: SHIPPED | OUTPATIENT
Start: 2023-03-27 | End: 2023-10-09

## 2023-03-27 RX ORDER — PROPOFOL 10 MG/ML
INJECTION, EMULSION INTRAVENOUS PRN
Status: DISCONTINUED | OUTPATIENT
Start: 2023-03-27 | End: 2023-03-27

## 2023-03-27 RX ORDER — ONDANSETRON 2 MG/ML
4 INJECTION INTRAMUSCULAR; INTRAVENOUS EVERY 30 MIN PRN
Status: DISCONTINUED | OUTPATIENT
Start: 2023-03-27 | End: 2023-03-27 | Stop reason: HOSPADM

## 2023-03-27 RX ORDER — NALOXONE HYDROCHLORIDE 0.4 MG/ML
0.2 INJECTION, SOLUTION INTRAMUSCULAR; INTRAVENOUS; SUBCUTANEOUS
Status: DISCONTINUED | OUTPATIENT
Start: 2023-03-27 | End: 2023-03-27 | Stop reason: HOSPADM

## 2023-03-27 RX ORDER — SODIUM CHLORIDE, SODIUM LACTATE, POTASSIUM CHLORIDE, CALCIUM CHLORIDE 600; 310; 30; 20 MG/100ML; MG/100ML; MG/100ML; MG/100ML
INJECTION, SOLUTION INTRAVENOUS CONTINUOUS
Status: DISCONTINUED | OUTPATIENT
Start: 2023-03-27 | End: 2023-03-27 | Stop reason: HOSPADM

## 2023-03-27 RX ORDER — ACETAMINOPHEN 325 MG/1
975 TABLET ORAL ONCE
Status: COMPLETED | OUTPATIENT
Start: 2023-03-27 | End: 2023-03-27

## 2023-03-27 RX ORDER — ONDANSETRON 2 MG/ML
INJECTION INTRAMUSCULAR; INTRAVENOUS PRN
Status: DISCONTINUED | OUTPATIENT
Start: 2023-03-27 | End: 2023-03-27

## 2023-03-27 RX ORDER — FLUMAZENIL 0.1 MG/ML
0.2 INJECTION, SOLUTION INTRAVENOUS
Status: DISCONTINUED | OUTPATIENT
Start: 2023-03-27 | End: 2023-03-27 | Stop reason: HOSPADM

## 2023-03-27 RX ORDER — PROPOFOL 10 MG/ML
INJECTION, EMULSION INTRAVENOUS CONTINUOUS PRN
Status: DISCONTINUED | OUTPATIENT
Start: 2023-03-27 | End: 2023-03-27

## 2023-03-27 RX ORDER — GLYCOPYRROLATE 0.2 MG/ML
INJECTION, SOLUTION INTRAMUSCULAR; INTRAVENOUS PRN
Status: DISCONTINUED | OUTPATIENT
Start: 2023-03-27 | End: 2023-03-27

## 2023-03-27 RX ORDER — DEXAMETHASONE SODIUM PHOSPHATE 4 MG/ML
INJECTION, SOLUTION INTRA-ARTICULAR; INTRALESIONAL; INTRAMUSCULAR; INTRAVENOUS; SOFT TISSUE PRN
Status: DISCONTINUED | OUTPATIENT
Start: 2023-03-27 | End: 2023-03-27

## 2023-03-27 RX ORDER — CEFAZOLIN SODIUM 2 G/50ML
2 SOLUTION INTRAVENOUS SEE ADMIN INSTRUCTIONS
Status: DISCONTINUED | OUTPATIENT
Start: 2023-03-27 | End: 2023-03-27 | Stop reason: HOSPADM

## 2023-03-27 RX ORDER — GABAPENTIN 100 MG/1
100 CAPSULE ORAL 3 TIMES DAILY
Qty: 21 CAPSULE | Refills: 0 | Status: SHIPPED | OUTPATIENT
Start: 2023-03-27 | End: 2023-10-09

## 2023-03-27 RX ADMIN — FENTANYL CITRATE 50 MCG: 50 INJECTION, SOLUTION INTRAMUSCULAR; INTRAVENOUS at 12:27

## 2023-03-27 RX ADMIN — HYDROMORPHONE HYDROCHLORIDE 0.2 MG: 1 INJECTION, SOLUTION INTRAMUSCULAR; INTRAVENOUS; SUBCUTANEOUS at 14:59

## 2023-03-27 RX ADMIN — ONDANSETRON 4 MG: 2 INJECTION INTRAMUSCULAR; INTRAVENOUS at 11:54

## 2023-03-27 RX ADMIN — BUPIVACAINE HYDROCHLORIDE 20 ML: 5 INJECTION, SOLUTION EPIDURAL; INTRACAUDAL at 11:04

## 2023-03-27 RX ADMIN — FENTANYL CITRATE 50 MCG: 50 INJECTION, SOLUTION INTRAMUSCULAR; INTRAVENOUS at 13:22

## 2023-03-27 RX ADMIN — DEXAMETHASONE SODIUM PHOSPHATE 4 MG: 4 INJECTION, SOLUTION INTRA-ARTICULAR; INTRALESIONAL; INTRAMUSCULAR; INTRAVENOUS; SOFT TISSUE at 11:54

## 2023-03-27 RX ADMIN — FENTANYL CITRATE 50 MCG: 50 INJECTION, SOLUTION INTRAMUSCULAR; INTRAVENOUS at 14:03

## 2023-03-27 RX ADMIN — FENTANYL CITRATE 50 MCG: 50 INJECTION, SOLUTION INTRAMUSCULAR; INTRAVENOUS at 13:44

## 2023-03-27 RX ADMIN — HYDROMORPHONE HYDROCHLORIDE 0.1 MG: 1 INJECTION, SOLUTION INTRAMUSCULAR; INTRAVENOUS; SUBCUTANEOUS at 14:48

## 2023-03-27 RX ADMIN — FENTANYL CITRATE 50 MCG: 50 INJECTION, SOLUTION INTRAMUSCULAR; INTRAVENOUS at 11:02

## 2023-03-27 RX ADMIN — LIDOCAINE HYDROCHLORIDE 100 MG: 20 INJECTION, SOLUTION INFILTRATION; PERINEURAL at 11:54

## 2023-03-27 RX ADMIN — GLYCOPYRROLATE 0.2 MG: 0.2 INJECTION, SOLUTION INTRAMUSCULAR; INTRAVENOUS at 11:54

## 2023-03-27 RX ADMIN — HYDROMORPHONE HYDROCHLORIDE 0.2 MG: 1 INJECTION, SOLUTION INTRAMUSCULAR; INTRAVENOUS; SUBCUTANEOUS at 14:42

## 2023-03-27 RX ADMIN — SODIUM CHLORIDE, SODIUM LACTATE, POTASSIUM CHLORIDE, CALCIUM CHLORIDE: 600; 310; 30; 20 INJECTION, SOLUTION INTRAVENOUS at 11:43

## 2023-03-27 RX ADMIN — HYDROMORPHONE HYDROCHLORIDE 0.2 MG: 1 INJECTION, SOLUTION INTRAMUSCULAR; INTRAVENOUS; SUBCUTANEOUS at 14:32

## 2023-03-27 RX ADMIN — ACETAMINOPHEN 975 MG: 325 TABLET ORAL at 10:23

## 2023-03-27 RX ADMIN — CEFAZOLIN SODIUM 2 G: 2 SOLUTION INTRAVENOUS at 11:43

## 2023-03-27 RX ADMIN — PROPOFOL 250 MG: 10 INJECTION, EMULSION INTRAVENOUS at 11:54

## 2023-03-27 RX ADMIN — FENTANYL CITRATE 50 MCG: 50 INJECTION, SOLUTION INTRAMUSCULAR; INTRAVENOUS at 14:09

## 2023-03-27 RX ADMIN — PROPOFOL 250 MCG/KG/MIN: 10 INJECTION, EMULSION INTRAVENOUS at 11:54

## 2023-03-27 RX ADMIN — GABAPENTIN 300 MG: 300 CAPSULE ORAL at 10:24

## 2023-03-27 RX ADMIN — FENTANYL CITRATE 50 MCG: 50 INJECTION, SOLUTION INTRAMUSCULAR; INTRAVENOUS at 11:50

## 2023-03-27 RX ADMIN — HYDROMORPHONE HYDROCHLORIDE 0.2 MG: 1 INJECTION, SOLUTION INTRAMUSCULAR; INTRAVENOUS; SUBCUTANEOUS at 15:05

## 2023-03-27 NOTE — ANESTHESIA PREPROCEDURE EVALUATION
Anesthesia Pre-Procedure Evaluation    Patient: Alpesh Winston   MRN: [id number] : 1970        Procedure : Procedure(s):  LEFT SHOULDER ARTHROSCOPY W INTRA-ARTICULAR DEBRIDEMENT, BICEPS TENODESIS OR TENOTOMY, Labral repair verse debridement, subacromial decompression, possible rotator cuff repair          Past Medical History:   Diagnosis Date     Allergic rhinitis due to other allergen      History of chest pain     TO ER - EVALULATED, STRESS TEST ETC, ALL NEGATIVE     Hyperlipidaemia      Lipoma of other skin and subcutaneous tissue 2012    Chest wall     Mass 5/3/2012     Other and unspecified hyperlipidemia      Tibia/fibula fracture 2016      Past Surgical History:   Procedure Laterality Date     ABDOMEN SURGERY      Hernia surgery     COLONOSCOPY N/A 2022    Procedure: COLONOSCOPY, WITH POLYPECTOMY;  Surgeon: Michelle Valenzuela MD;  [place] [facility]     ENT SURGERY       EYE SURGERY      SURGERY X 2 BILAT TEAR DUCTS      HEAD & NECK SURGERY       HERNIA REPAIR       LAPAROSCOPIC HERNIORRHAPHY INGUINAL BILATERAL Bilateral 10/27/2016    Procedure: LAPAROSCOPIC HERNIORRHAPHY INGUINAL BILATERAL;  Surgeon: Nellie Duron MD;  Location:  OR     SOFT TISSUE SURGERY      Many lypoma removed - Multiple dates     SURGICAL HISTORY OF -       removal of lipoma X 4     SURGICAL HISTORY OF -   10/00    left inguinal hernia repair     SURGICAL HISTORY OF -       wisdom teeth extraction      Allergies   Allergen Reactions     Omnicef Hives     Sulfa Drugs Rash      Social History     Tobacco Use     Smoking status: Never     Smokeless tobacco: Current     Types: Chew     Tobacco comments:     chewing less   Substance Use Topics     Alcohol use: Yes     Comment: 10-12 drinks a week       Wt Readings from Last 1 Encounters:   23 90.7 kg (200 lb)        Anesthesia Evaluation            ROS/MED HX  ENT/Pulmonary:       Neurologic:       Cardiovascular:       METS/Exercise Tolerance:      Hematologic:       Musculoskeletal:       GI/Hepatic:     (+) GERD, Asymptomatic on medication, liver disease,     Renal/Genitourinary:       Endo:       Psychiatric/Substance Use:       Infectious Disease:       Malignancy:       Other:               OUTSIDE LABS:  CBC:   Lab Results   Component Value Date    WBC 5.7 09/09/2022    WBC 5.0 04/09/2020    HGB 14.0 03/21/2023    HGB 15.0 09/09/2022    HCT 45.1 09/09/2022    HCT 45.2 04/09/2020     09/09/2022     04/09/2020     BMP:   Lab Results   Component Value Date     08/18/2021     08/11/2021    POTASSIUM 4.0 08/18/2021    POTASSIUM 5.4 (H) 08/11/2021    CHLORIDE 107 08/18/2021    CHLORIDE 111 (H) 08/11/2021    CO2 23 08/18/2021    CO2 24 08/11/2021    BUN 18 08/18/2021    BUN 18 08/11/2021    CR 1.03 08/18/2021    CR 0.98 08/11/2021     (H) 08/18/2021     (H) 08/11/2021     COAGS: No results found for: PTT, INR, FIBR  POC: No results found for: BGM, HCG, HCGS  HEPATIC:   Lab Results   Component Value Date    ALBUMIN 4.3 09/20/2022    PROTTOTAL 8.1 09/20/2022    ALT 54 09/20/2022    AST 28 09/20/2022    ALKPHOS 75 09/20/2022    BILITOTAL 0.5 09/20/2022     OTHER:   Lab Results   Component Value Date    A1C 6.1 (H) 03/21/2023    NAOMI 9.7 08/18/2021    LIPASE 234 08/22/2018    TSH 1.67 08/06/2018    T4 0.79 03/02/2009    T3 103 03/02/2009    CRP <2.9 08/06/2018    SED 7 08/06/2018       Anesthesia Plan    ASA Status:  2      Anesthesia Type: General.     - Airway: ETT              Consents    Anesthesia Plan(s) and associated risks, benefits, and realistic alternatives discussed. Questions answered and patient/representative(s) expressed understanding.     - Discussed: Risks, Benefits and Alternatives for BOTH SEDATION and the PROCEDURE were discussed     - Discussed with:  Patient      - Extended Intubation/Ventilatory Support Discussed: No.      - Patient is DNR/DNI Status: No         Postoperative Care    Pain management:  Oral pain medications, IV analgesics, Peripheral nerve block (Single Shot).   PONV prophylaxis: Ondansetron (or other 5HT-3), Dexamethasone or Solumedrol     Comments:                Thaddeus Sherman MD, MD

## 2023-03-27 NOTE — INTERVAL H&P NOTE
"I have reviewed the surgical (or preoperative) H&P that is linked to this encounter, and examined the patient. There are no significant changes    Clinical Conditions Present on Arrival:  Clinically Significant Risk Factors Present on Admission                    # Overweight: Estimated body mass index is 28.7 kg/m  as calculated from the following:    Height as of this encounter: 1.778 m (5' 10\").    Weight as of this encounter: 90.7 kg (200 lb).       "

## 2023-03-27 NOTE — ANESTHESIA PROCEDURE NOTES
Airway       Patient location during procedure: OR  Staff -        CRNA: Araceli De La Rosa APRN CRNA       Performed By: CRNAIndications and Patient Condition       Indications for airway management: waylon-procedural       Induction type:intravenous       Mask difficulty assessment: 1 - vent by mask    Final Airway Details       Final airway type: supraglottic airway    Supraglottic Airway Details        Type: LMA       Brand: I-Gel    Post intubation assessment        Placement verified by: capnometry, equal breath sounds and chest rise        Number of attempts at approach: 1       Number of other approaches attempted: 0       Secured with: pink tape       Ease of procedure: easy       Dentition: Intact and Unchanged

## 2023-03-27 NOTE — ANESTHESIA POSTPROCEDURE EVALUATION
Patient: Alpesh Winston    Procedure: Procedure(s):  LEFT SHOULDER ARTHROSCOPY W INTRA-ARTICULAR DEBRIDEMENT, BICEPS TENODESIS , Labral  debridement, subacromial decompression,       Anesthesia Type:  General    Note:  Disposition: Outpatient   Postop Pain Control: Uneventful            Sign Out: Well controlled pain   PONV: No   Neuro/Psych: Uneventful            Sign Out: Acceptable/Baseline neuro status   Airway/Respiratory: Uneventful            Sign Out: Acceptable/Baseline resp. status   CV/Hemodynamics: Uneventful            Sign Out: Acceptable CV status; No obvious hypovolemia; No obvious fluid overload   Other NRE: NONE   DID A NON-ROUTINE EVENT OCCUR? No           Last vitals:  Vitals Value Taken Time   /81 03/27/23 1430   Temp 36.2  C (97.2  F) 03/27/23 1430   Pulse 77 03/27/23 1430   Resp 16 03/27/23 1430   SpO2 94 % 03/27/23 1430       Electronically Signed By: Thaddeus Sherman MD, MD  March 27, 2023  2:30 PM

## 2023-03-27 NOTE — OP NOTE
PATIENT NAME: Alpesh Winston  1970  [id number]     DATE: March 27, 2023    PREOPERATIVE DIAGNOSES:   1. Left shoulder long head biceps disease.  2. Left shoulder subacromial bursitis    POSTOPERATIVE DIAGNOSES:   1. Left shoulder long head biceps disease.  2. Left shoulder subacromial bursitis     PROCEDURES:   1. Left arthroscopic glenohumeral debridement, extensive  2. Left subacromial bursectomy   3. Left shoulder arthroscopic biceps tenodesis    STAFF SURGEON: Gabriela Cunningham MD     ASSISTANT: Modesto Dolan PA-C     The assistance of Modesto Dolan was necessitated by the complexity of the case and need for an assistant including instrument management in repair of this tear.     ANESTHESIA: General endotracheal anesthesia with regional block.     ESTIMATED BLOOD LOSS: 5 mL.     COMPLICATIONS: None.     BRIEF PATIENT HISTORY: Alpesh Winston is a 53 year old male I have seen in clinic. Please refer to that documentation. He has an MRI which demonstrated significant biceps tendinitis. The patient has had nonoperative management including physical therapy. He has not had adequate lasting relief of pain and is at this time having lifestyle limiting symptoms. He wishes at this time to proceed with surgical intervention. We had discussed the risks and benefits of both non-operative and surgical management. We discussed the expected postoperative restrictions. We discussed the risks of surgery including failure of the biceps tendon to heal or risk of retear, risk of being no better or even worse if he  became stiff, infected, had an injury to the nerves or arteries which power the arm or hand, continued pain, clarice deformity, biceps cramping, need for future surgery, or had a reaction to anesthesia. We discussed the postoperative rehabilitation course. The patient wished to proceed with surgery and informed consent was completed.    DESCRIPTION OF PROCEDURE: The patient was identified in the preoperative  area and the correct Left shoulder was marked for surgery. The patient was provided an interscalene block by our Anesthesia colleagues and was taken to the operating room where general endotracheal anesthesia was induced. The patient was moved to the operating table in the beachchair position with all bony prominences well padded. The head was placed in a head fitzgerald with the neck in neutral position. The Left upper extremity was prepped and draped in the usual sterile fashion. A timeout was held in accordance with hospital policy, confirming correct patient, side, site, procedure and administration of IV antibiotics prior to incision.  Examination under anesthesia was performed which revealed full symmetric range of motion with no instability.    I initiated shoulder arthroscopy through a posterior viewing portal. I created an anterior working portal under direct visualization. I performed a diagnostic arthroscopy. There was no SLAP tear, there was significant tenosynovitis surrounding the biceps. I debrided the anterior, superior and posterior labrum. I placed 2 spinal needles into the biceps groove through the tendon to secure it. I cut the biceps tendon at the anchor in the superior labrum. I then percutaneously used an 11 blade to open the transverse humeral ligament over the tendon.     The trocar and cannula were then redirected to the subacromial space.  The arthroscope and inflow were inserted.  A anterolateral portal was established after localizing the subacromial space with a spinal needle.  There was significant bursal inflammation and thickening of the subacromial space.  Using both a shaver and an ArthroCare the bursa and tissue beneath the acromion were removed.  The coracoacromial ligament was divided.  There was no significant acromion spur so I did not perform an acromioplasty.     A spinal needle was localized into the subacromial space overlying the bicipital groove.  An accessory anterior  portal was established.  An ArthroCare was used to open the bicipital groove.  The biceps tendon was grasped through the anterior portal.  Working to the accessory anterior portal, I started by placing a 1.7 mm Arthrex all suture anchor in  the bicipital groove.  1 limb of the suture from this anchor was then passed around the biceps tendon in a lasso fashion and a sliding arthroscopic knots were then tied.  This was repeated with an additional 1.7 mm Arthrex all suture anchor slightly more proximal in the bicipital groove, with a lasso device used to spear the tendon and tied down with arthroscopic knots. I then placed .  1 limb of the suture was passed around the tendon and the lasso fashion a second sliding arthroscopic knot was tied.  This nicely secured the biceps tendon into the bicipital groove.  I placed a 3rd anchor and again used the lasso device to penetrate the tendon and tied down arthroscopic knots. The residual tendon material was excised proximally.    All instruments were removed from the shoulder and then portals were closed with interrupted 3-0 Monocryl. Steri-Strips and a soft sterile dressing were applied. The arm was placed into an abduction sling and the patient was extubated and transported to the recovery room in stable condition. There were no apparent intraoperative complications.  Sponge and needle count were correct at the end of the case.  I spoke to the patient's family in the waiting room.      Gabriela Cunningham MD

## 2023-03-27 NOTE — ANESTHESIA CARE TRANSFER NOTE
Patient: Alpesh Winston    Procedure: Procedure(s):  LEFT SHOULDER ARTHROSCOPY W INTRA-ARTICULAR DEBRIDEMENT, BICEPS TENODESIS , Labral  debridement, subacromial decompression,       Diagnosis: Superior labrum anterior-to-posterior (SLAP) tear of left shoulder [S43.432A]  Diagnosis Additional Information: No value filed.    Anesthesia Type:   General     Note:    Oropharynx: oropharynx clear of all foreign objects and spontaneously breathing  Level of Consciousness: awake  Oxygen Supplementation: face mask  Level of Supplemental Oxygen (L/min / FiO2): 6  Independent Airway: airway patency satisfactory and stable    Vital Signs Stable: post-procedure vital signs reviewed and stable  Report to RN Given: handoff report given  Patient transferred to: PACU    Handoff Report: Identifed the Patient, Identified the Reponsible Provider, Reviewed the pertinent medical history, Discussed the surgical course, Reviewed Intra-OP anesthesia mangement and issues during anesthesia, Set expectations for post-procedure period and Allowed opportunity for questions and acknowledgement of understanding      Vitals:  Vitals Value Taken Time   /75 03/27/23 1414   Temp 36.2  C (97.2  F) 03/27/23 1414   Pulse 53 03/27/23 1414   Resp 16 03/27/23 1414   SpO2 100 % 03/27/23 1414       Electronically Signed By: SARAH Matute CRNA  March 27, 2023  2:18 PM

## 2023-03-27 NOTE — PROGRESS NOTES
Pt received left sided brachial plexus nerve block with exparel. Pt received 1 mg Versed and 50 mcg Fentanyl IV. Pt tolerated procedure without immediate complication. VSS.

## 2023-03-27 NOTE — ANESTHESIA PROCEDURE NOTES
Brachial plexus Procedure Note    Pre-Procedure   Staff -        Anesthesiologist:  Sloan Jonas DO       Performed By: fellow and anesthesiologist       Location: pre-op       Procedure Start/Stop Times: 3/27/2023 11:04 AM and 3/27/2023 11:09 AM       Pre-Anesthestic Checklist: patient identified, IV checked, site marked, risks and benefits discussed, informed consent, monitors and equipment checked, pre-op evaluation, at physician/surgeon's request and post-op pain management  Timeout:       Correct Patient: Yes        Correct Procedure: Yes        Correct Site: Yes        Correct Position: Yes        Correct Laterality: Yes        Site Marked: Yes  Procedure Documentation  Procedure: Brachial plexus       Diagnosis: POST OP PAIN       Laterality: left       Patient Position: sitting       Skin prep: Chloraprep (interscalene approach).       Needle Type: short bevel       Needle Gauge: 21.        Needle Length (millimeters): 100        Ultrasound guided       1. Ultrasound was used to identify targeted nerve, plexus, vascular marker, or fascial plane and place a needle adjacent to it in real-time.       2. Ultrasound was used to visualize the spread of anesthetic in close proximity to the above referenced structure.       3. A permanent image is entered into the patient's record.    Assessment/Narrative         The placement was negative for: blood aspirated, painful injection and site bleeding       Paresthesias: No.       Bolus given via needle..        Secured via.        Insertion/Infusion Method: Single Shot       Complications: none    Medication(s) Administered   Bupivacaine 0.5% PF (Infiltration) - Infiltration   20 mL - 3/27/2023 11:04:00 AM  Bupivacaine liposome (Exparel) 1.3% LA inj susp (Infiltration) - Infiltration   10 mL - 3/27/2023 11:04:00 AM  Medication Administration Time: 3/27/2023 11:04 AM     Comments:  Left interscalene nerve block with 133 mg liposomal bupivacaine      FOR [facility]  "Bank) ONLY:   Pain Team Contact information: please page the Pain Team Via [facility]. Search \"Pain\". During daytime hours, please page the attending first. At night please page the resident first.    "

## 2023-03-28 ENCOUNTER — THERAPY VISIT (OUTPATIENT)
Dept: PHYSICAL THERAPY | Facility: CLINIC | Age: 53
End: 2023-03-28
Payer: COMMERCIAL

## 2023-03-28 DIAGNOSIS — M25.512 ACUTE PAIN OF LEFT SHOULDER: ICD-10-CM

## 2023-03-28 DIAGNOSIS — S43.432A SUPERIOR LABRUM ANTERIOR-TO-POSTERIOR (SLAP) TEAR OF LEFT SHOULDER: ICD-10-CM

## 2023-03-28 PROCEDURE — 97110 THERAPEUTIC EXERCISES: CPT | Mod: GP | Performed by: PHYSICAL THERAPIST

## 2023-03-28 PROCEDURE — 97161 PT EVAL LOW COMPLEX 20 MIN: CPT | Mod: GP | Performed by: PHYSICAL THERAPIST

## 2023-03-28 NOTE — PROGRESS NOTES
Physical Therapy Initial Evaluation  Subjective:  Alpesh Winston presents to outpatient PT 1 day s/p left shoulder surgery (biceps tenodesis).  Reports significant levels of pain over past day, disrupted sleep.  Initial injury sustained with trying to catch a falling TV.    The history is provided by the patient. No  was used.   Patient Health History  Alpesh Winston being seen for post surgery PT.     Problem began: 3/27/2023.   Problem occurred: injury   Pain is reported as 8/10 on pain scale.  General health as reported by patient is good.        Other medical allergies details: Sulfa drugs.   Surgeries include:  Orthopedic surgery. Other surgery history details: shoulder 3/27/23.    Current medications:  Pain medication.    Current occupation is .   Primary job tasks include:  Computer work and prolonged sitting.                  Therapist Generated HPI Evaluation         Type of problem:  Left shoulder.    This is a new condition.  Condition occurred with:  Other.  Where condition occurred: at home.  Patient reports pain:  In the joint.         and relieved by analgesics.    Previous treatment includes surgery.   Restrictions due to condition include:  Currently not working due to present treatment.                          Objective:  System                   Shoulder Evaluation:  ROM:  AROM:  : did not assess due to post op restrictions.                                  Strength:  : did not assess due to post op restrictions.                                 ROM:  AROM:          Flexion Wrist:  Left: WNL      Extension Wrist:  Left: WNL              PROM:  normal    Flexion Elbow:  Left: WNL  Extension Elbow: Left: WNL                                                        General     ROS    Assessment/Plan:    Patient is a 53 year old male with left side shoulder complaints.    Patient has the following significant findings with corresponding treatment plan.                 Diagnosis 1:  S/p left shoulder surgery (biceps tenodesis)  Pain -  hot/cold therapy, manual therapy, self management, education and home program  Decreased ROM/flexibility - manual therapy, therapeutic exercise and home program  Decreased joint mobility - manual therapy, therapeutic exercise and home program  Decreased strength - therapeutic exercise, therapeutic activities and home program    Therapy Evaluation Codes:   1) History comprised of:   Personal factors that impact the plan of care:      None.    Comorbidity factors that impact the plan of care are:      None.     Medications impacting care: Pain.  2) Examination of Body Systems comprised of:   Body structures and functions that impact the plan of care:      Shoulder.   Activity limitations that impact the plan of care are:      Cooking, Dressing, Lifting, Throwing and Sleeping.  3) Clinical presentation characteristics are:   Stable/Uncomplicated.  4) Decision-Making    Low complexity using standardized patient assessment instrument and/or measureable assessment of functional outcome.  Cumulative Therapy Evaluation is: Low complexity.    Previous and current functional limitations:  (See Goal Flow Sheet for this information)    Short term and Long term goals: (See Goal Flow Sheet for this information)     Communication ability:  Patient appears to be able to clearly communicate and understand verbal and written communication and follow directions correctly.  Treatment Explanation - The following has been discussed with the patient:   RX ordered/plan of care  Anticipated outcomes  Possible risks and side effects  This patient would benefit from PT intervention to resume normal activities.   Rehab potential is good.    Frequency:  2 X week, once daily  Duration:  for 4 weeks tapering to 2-4 X a month over 8 weeks  Discharge Plan:  Achieve all LTG.  Independent in home treatment program.  Reach maximal therapeutic benefit.    Please refer to the daily  flowsheet for treatment today, total treatment time and time spent performing 1:1 timed codes.

## 2023-04-03 ENCOUNTER — THERAPY VISIT (OUTPATIENT)
Dept: PHYSICAL THERAPY | Facility: CLINIC | Age: 53
End: 2023-04-03
Payer: COMMERCIAL

## 2023-04-03 DIAGNOSIS — M25.512 ACUTE PAIN OF LEFT SHOULDER: Primary | ICD-10-CM

## 2023-04-03 PROCEDURE — 97140 MANUAL THERAPY 1/> REGIONS: CPT | Mod: GP | Performed by: PHYSICAL THERAPIST

## 2023-04-03 PROCEDURE — 97016 VASOPNEUMATIC DEVICE THERAPY: CPT | Mod: GP | Performed by: PHYSICAL THERAPIST

## 2023-04-03 PROCEDURE — 97110 THERAPEUTIC EXERCISES: CPT | Mod: GP | Performed by: PHYSICAL THERAPIST

## 2023-04-04 NOTE — PROGRESS NOTES
CHIEF COMPLAINT: Status post left shoulder arthroscopy with labral debridement, arthroscopic biceps tenodesis    DATE OF SURGERY:3/27/23    HISTORY OF PRESENT ILLNESS: Alpesh is an extremely pleasant 53 year-old male who is 3/27/23 status post the above procedure. Patient states he was improving, Patient states that when he woke last he feel he may have twisted external rotation patient was in pain until he stretched his arm. He explains that today he feel very tight in the shoulder.  Patient has started physical therapy    EXAM:  Pleasant adult 53 year old in no distress.  Respirations even and unlabored.  Left upper extremity: Incisions clean, dry, and intact. No erythema. No drainage. Sens intact Ax/Musc/Med/Rad/Uln nerves. Motor intact EPL, FPL, and Intrinsics. Shoulder range of motion not tested due to postop restrictions.  No Kong deformity, expected bruising    ASSESSMENT:  1.  1 week status post above procedure    PLAN:  I reviewed the intraop findings. We discussed the plan for rehabilitation.  Patient will be in the sling for 4 weeks.  I discussed need to keep the sling on at all times except for bathing and dressing or home exercises.  Patient was encouraged to perform active range of motion exercises about the hand wrist and elbow.  We discussed a step down regimen for pain medication to over the counter medications. The patient will start physical therapy.  I will see the patient back in 6 weeks      Gabriela Cunningham  Orthopedic Surgery Sports Medicine and Shoulder Surgery

## 2023-04-05 ENCOUNTER — OFFICE VISIT (OUTPATIENT)
Dept: ORTHOPEDICS | Facility: CLINIC | Age: 53
End: 2023-04-05
Payer: COMMERCIAL

## 2023-04-05 VITALS — DIASTOLIC BLOOD PRESSURE: 85 MMHG | SYSTOLIC BLOOD PRESSURE: 135 MMHG

## 2023-04-05 DIAGNOSIS — Z47.89 ORTHOPEDIC AFTERCARE: Primary | ICD-10-CM

## 2023-04-05 PROCEDURE — 99024 POSTOP FOLLOW-UP VISIT: CPT | Performed by: ORTHOPAEDIC SURGERY

## 2023-04-05 NOTE — LETTER
4/5/2023         RE: Alpesh Winston  [address] [place] [address]        Dear Colleague,    Thank you for referring your patient, Alpesh Winston, to the [facility] [place]. Please see a copy of my visit note below.    CHIEF COMPLAINT: Status post left shoulder arthroscopy with labral debridement, arthroscopic biceps tenodesis    DATE OF SURGERY:3/27/23    HISTORY OF PRESENT ILLNESS: Alpesh is an extremely pleasant 53 year-old male who is 3/27/23 status post the above procedure. Patient states he was improving, Patient states that when he woke last he feel he may have twisted external rotation patient was in pain until he stretched his arm. He explains that today he feel very tight in the shoulder.  Patient has started physical therapy    EXAM:  Pleasant adult 53 year old in no distress.  Respirations even and unlabored.  Left upper extremity: Incisions clean, dry, and intact. No erythema. No drainage. Sens intact Ax/Musc/Med/Rad/Uln nerves. Motor intact EPL, FPL, and Intrinsics. Shoulder range of motion not tested due to postop restrictions.  No Kong deformity, expected bruising    ASSESSMENT:  1.  1 week status post above procedure    PLAN:  I reviewed the intraop findings. We discussed the plan for rehabilitation.  Patient will be in the sling for 4 weeks.  I discussed need to keep the sling on at all times except for bathing and dressing or home exercises.  Patient was encouraged to perform active range of motion exercises about the hand wrist and elbow.  We discussed a step down regimen for pain medication to over the counter medications. The patient will start physical therapy.  I will see the patient back in 6 weeks      Gabriela Pacheco  Orthopedic Surgery Sports Medicine and Shoulder Surgery        Again, thank you for allowing me to participate in the care of your patient.        Sincerely,        GABRIELA PACHECO MD

## 2023-04-06 ENCOUNTER — THERAPY VISIT (OUTPATIENT)
Dept: PHYSICAL THERAPY | Facility: CLINIC | Age: 53
End: 2023-04-06
Payer: COMMERCIAL

## 2023-04-06 DIAGNOSIS — M25.512 ACUTE PAIN OF LEFT SHOULDER: Primary | ICD-10-CM

## 2023-04-06 PROCEDURE — 97140 MANUAL THERAPY 1/> REGIONS: CPT | Mod: GP | Performed by: PHYSICAL THERAPIST

## 2023-04-06 PROCEDURE — 97110 THERAPEUTIC EXERCISES: CPT | Mod: GP | Performed by: PHYSICAL THERAPIST

## 2023-04-14 ENCOUNTER — THERAPY VISIT (OUTPATIENT)
Dept: PHYSICAL THERAPY | Facility: CLINIC | Age: 53
End: 2023-04-14
Payer: COMMERCIAL

## 2023-04-14 DIAGNOSIS — M25.512 ACUTE PAIN OF LEFT SHOULDER: Primary | ICD-10-CM

## 2023-04-14 PROCEDURE — 97110 THERAPEUTIC EXERCISES: CPT | Mod: GP | Performed by: PHYSICAL THERAPIST

## 2023-04-17 ENCOUNTER — TELEPHONE (OUTPATIENT)
Dept: ORTHOPEDICS | Facility: CLINIC | Age: 53
End: 2023-04-17
Payer: COMMERCIAL

## 2023-04-17 NOTE — TELEPHONE ENCOUNTER
LVM for patient explaining why the visit  Was moved back, and that per his last visit with Dr. Cunningham the note states he can stop wearing the sling after 4 weeks. Patient was given scheduling number to call back with any questions.    Zion Beaulieu ATC

## 2023-04-17 NOTE — TELEPHONE ENCOUNTER
[facility] Call Center    Phone Message    May a detailed message be left on voicemail: yes     Reason for Call: Other: Alpesh Acharya is calling to see why is appointment was rescheduled to 5/10 when it was his 4 wk follow up and also can he stop wearing his sling after 4 wks or does have to wait until 5/10/2023? Please call him     Action Taken: Other: [facility] ortho    Travel Screening: Not Applicable

## 2023-04-17 NOTE — TELEPHONE ENCOUNTER
Duplicate encounter. Please also see [employer] message dated 4/16/23.     MARILUZ Marlow RN

## 2023-05-04 ENCOUNTER — THERAPY VISIT (OUTPATIENT)
Dept: PHYSICAL THERAPY | Facility: CLINIC | Age: 53
End: 2023-05-04
Payer: COMMERCIAL

## 2023-05-04 DIAGNOSIS — M25.512 ACUTE PAIN OF LEFT SHOULDER: Primary | ICD-10-CM

## 2023-05-04 PROCEDURE — 97110 THERAPEUTIC EXERCISES: CPT | Mod: GP | Performed by: PHYSICAL THERAPIST

## 2023-05-04 PROCEDURE — 97530 THERAPEUTIC ACTIVITIES: CPT | Mod: GP | Performed by: PHYSICAL THERAPIST

## 2023-05-04 PROCEDURE — 97140 MANUAL THERAPY 1/> REGIONS: CPT | Mod: GP | Performed by: PHYSICAL THERAPIST

## 2023-05-10 ENCOUNTER — THERAPY VISIT (OUTPATIENT)
Dept: PHYSICAL THERAPY | Facility: CLINIC | Age: 53
End: 2023-05-10
Payer: COMMERCIAL

## 2023-05-10 ENCOUNTER — OFFICE VISIT (OUTPATIENT)
Dept: ORTHOPEDICS | Facility: CLINIC | Age: 53
End: 2023-05-10
Payer: COMMERCIAL

## 2023-05-10 VITALS — DIASTOLIC BLOOD PRESSURE: 81 MMHG | SYSTOLIC BLOOD PRESSURE: 133 MMHG

## 2023-05-10 DIAGNOSIS — Z47.89 ORTHOPEDIC AFTERCARE: Primary | ICD-10-CM

## 2023-05-10 DIAGNOSIS — M25.512 ACUTE PAIN OF LEFT SHOULDER: Primary | ICD-10-CM

## 2023-05-10 PROCEDURE — 99024 POSTOP FOLLOW-UP VISIT: CPT | Performed by: ORTHOPAEDIC SURGERY

## 2023-05-10 PROCEDURE — 97140 MANUAL THERAPY 1/> REGIONS: CPT | Mod: GP | Performed by: PHYSICAL THERAPIST

## 2023-05-10 PROCEDURE — 97110 THERAPEUTIC EXERCISES: CPT | Mod: GP | Performed by: PHYSICAL THERAPIST

## 2023-05-10 NOTE — PROGRESS NOTES
CHIEF COMPLAINT: Status post left shoulder arthroscopy with labral debridement, arthroscopic biceps tenodesis    DATE OF SURGERY:3/27/23    HISTORY OF PRESENT ILLNESS:   Alpesh is an extremely pleasant 53 year-old male who is 6 weeks, date of surgery 3/27/23 status post the above procedure.  Patient notes that shoulder is doing well.  Physical therapy is going well.  Has some pain when sleeping, otherwise doing well.  Quite eager to start weight lifting.  EXAM:  Pleasant adult 53 year old in no distress.  Respirations even and unlabored.  Left upper extremity: Incisions clean, dry, and intact. No erythema. No drainage. Sens intact Ax/Musc/Med/Rad/Uln nerves. Motor intact EPL, FPL, and Intrinsics.  Active forward elevation of 130 degrees, passive to 145, active external rotation of 30 degrees, passive to 40, active internal rotation to the buttocks level, passive to L5  No Kong deformity    ASSESSMENT:  1.  6 week status post above procedure    PLAN:  I discussed with the patient that things are going well, patient has achieved good range of motion, emphasized to take it slow in terms of being eager to weight lift.  Patient should continue with physical therapy and home exercises.  Continue with icing and anti-inflammatory.  Patient will follow-up in 2 months if needed.        Gabriela Cunningham  Orthopedic Surgery Sports Medicine and Shoulder Surgery

## 2023-05-17 ENCOUNTER — THERAPY VISIT (OUTPATIENT)
Dept: PHYSICAL THERAPY | Facility: CLINIC | Age: 53
End: 2023-05-17
Payer: COMMERCIAL

## 2023-05-17 DIAGNOSIS — M25.512 ACUTE PAIN OF LEFT SHOULDER: Primary | ICD-10-CM

## 2023-05-17 PROCEDURE — 97110 THERAPEUTIC EXERCISES: CPT | Mod: GP | Performed by: PHYSICAL THERAPIST

## 2023-05-17 PROCEDURE — 97140 MANUAL THERAPY 1/> REGIONS: CPT | Mod: GP | Performed by: PHYSICAL THERAPIST

## 2023-05-31 ENCOUNTER — THERAPY VISIT (OUTPATIENT)
Dept: PHYSICAL THERAPY | Facility: CLINIC | Age: 53
End: 2023-05-31
Payer: COMMERCIAL

## 2023-05-31 DIAGNOSIS — M25.512 ACUTE PAIN OF LEFT SHOULDER: Primary | ICD-10-CM

## 2023-05-31 PROCEDURE — 97110 THERAPEUTIC EXERCISES: CPT | Mod: GP | Performed by: PHYSICAL THERAPIST

## 2023-05-31 PROCEDURE — 97140 MANUAL THERAPY 1/> REGIONS: CPT | Mod: GP | Performed by: PHYSICAL THERAPIST

## 2023-06-14 ENCOUNTER — THERAPY VISIT (OUTPATIENT)
Dept: PHYSICAL THERAPY | Facility: CLINIC | Age: 53
End: 2023-06-14
Payer: COMMERCIAL

## 2023-06-14 DIAGNOSIS — M25.512 ACUTE PAIN OF LEFT SHOULDER: Primary | ICD-10-CM

## 2023-06-14 DIAGNOSIS — S43.432A SUPERIOR LABRUM ANTERIOR-TO-POSTERIOR (SLAP) TEAR OF LEFT SHOULDER: ICD-10-CM

## 2023-06-14 DIAGNOSIS — M24.812 INTERNAL DERANGEMENT OF LEFT SHOULDER: ICD-10-CM

## 2023-06-14 PROCEDURE — 97140 MANUAL THERAPY 1/> REGIONS: CPT | Mod: GP | Performed by: PHYSICAL THERAPIST

## 2023-06-14 PROCEDURE — 97110 THERAPEUTIC EXERCISES: CPT | Mod: GP | Performed by: PHYSICAL THERAPIST

## 2023-06-29 ENCOUNTER — THERAPY VISIT (OUTPATIENT)
Dept: PHYSICAL THERAPY | Facility: CLINIC | Age: 53
End: 2023-06-29
Payer: COMMERCIAL

## 2023-06-29 DIAGNOSIS — M25.512 ACUTE PAIN OF LEFT SHOULDER: Primary | ICD-10-CM

## 2023-06-29 PROCEDURE — 97110 THERAPEUTIC EXERCISES: CPT | Mod: GP | Performed by: PHYSICAL THERAPIST

## 2023-06-29 PROCEDURE — 97140 MANUAL THERAPY 1/> REGIONS: CPT | Mod: GP | Performed by: PHYSICAL THERAPIST

## 2023-07-11 ENCOUNTER — DOCUMENTATION ONLY (OUTPATIENT)
Dept: ORTHOPEDICS | Facility: CLINIC | Age: 53
End: 2023-07-11
Payer: COMMERCIAL

## 2023-07-11 DIAGNOSIS — S43.432A SUPERIOR LABRUM ANTERIOR-TO-POSTERIOR (SLAP) TEAR OF LEFT SHOULDER: Primary | ICD-10-CM

## 2023-07-12 ENCOUNTER — THERAPY VISIT (OUTPATIENT)
Dept: PHYSICAL THERAPY | Facility: CLINIC | Age: 53
End: 2023-07-12
Payer: COMMERCIAL

## 2023-07-12 DIAGNOSIS — M25.512 ACUTE PAIN OF LEFT SHOULDER: Primary | ICD-10-CM

## 2023-07-12 PROCEDURE — 97140 MANUAL THERAPY 1/> REGIONS: CPT | Mod: 59 | Performed by: PHYSICAL THERAPIST

## 2023-07-12 PROCEDURE — 97530 THERAPEUTIC ACTIVITIES: CPT | Mod: GP | Performed by: PHYSICAL THERAPIST

## 2023-07-12 PROCEDURE — 97110 THERAPEUTIC EXERCISES: CPT | Mod: 59 | Performed by: PHYSICAL THERAPIST

## 2023-07-26 ENCOUNTER — MYC MEDICAL ADVICE (OUTPATIENT)
Dept: FAMILY MEDICINE | Facility: CLINIC | Age: 53
End: 2023-07-26
Payer: COMMERCIAL

## 2023-08-01 NOTE — TELEPHONE ENCOUNTER
kelly Patricia is inquiring if he need blood work done after being on Ozempic for a few months or wait until annual physical in the fall.     Looks like Ozempic was started on 7/11/23.    Ne PEDRAZA RN  [facility]

## 2023-08-02 NOTE — TELEPHONE ENCOUNTER
If he will be coming in for a physical in the fall, OK to wait to do labs until then unless he'd like to get them done now.

## 2023-08-09 ENCOUNTER — THERAPY VISIT (OUTPATIENT)
Dept: PHYSICAL THERAPY | Facility: CLINIC | Age: 53
End: 2023-08-09
Payer: COMMERCIAL

## 2023-08-09 DIAGNOSIS — M25.512 ACUTE PAIN OF LEFT SHOULDER: Primary | ICD-10-CM

## 2023-08-09 PROCEDURE — 97110 THERAPEUTIC EXERCISES: CPT | Mod: GP | Performed by: PHYSICAL THERAPIST

## 2023-08-21 ENCOUNTER — PATIENT OUTREACH (OUTPATIENT)
Dept: CARE COORDINATION | Facility: CLINIC | Age: 53
End: 2023-08-21
Payer: COMMERCIAL

## 2023-09-21 ENCOUNTER — DOCUMENTATION ONLY (OUTPATIENT)
Dept: FAMILY MEDICINE | Facility: CLINIC | Age: 53
End: 2023-09-21
Payer: COMMERCIAL

## 2023-09-21 DIAGNOSIS — E78.5 HYPERLIPIDEMIA LDL GOAL <130: ICD-10-CM

## 2023-09-21 DIAGNOSIS — R79.89 ELEVATED FERRITIN: Primary | ICD-10-CM

## 2023-09-21 DIAGNOSIS — K76.0 FATTY LIVER: ICD-10-CM

## 2023-09-21 DIAGNOSIS — Z12.5 SCREENING PSA (PROSTATE SPECIFIC ANTIGEN): ICD-10-CM

## 2023-09-21 DIAGNOSIS — R73.03 PREDIABETES: ICD-10-CM

## 2023-09-21 DIAGNOSIS — E78.1 HYPERTRIGLYCERIDEMIA: ICD-10-CM

## 2023-09-21 NOTE — PROGRESS NOTES
Alpesh Winston has an upcoming lab appointment.        Future Appointments   Date Time Provider Department [place]   9/28/2023  8:45 AM [facility]    10/9/2023  2:30 PM Be[place] Joshua, NP [facility]        The appointment note says: Lab annual wellness. feritin and PSA as well     There is no Lab order available.      Please review and place future orders as appropriate.  If no Lab order will be placed, please advise patient.      Also for consideration: [facility]    Health Maintenance Due   Topic    CBC     ALT     ANNUAL REVIEW OF HM ORDERS        Thanks,    Sun Iyer

## 2023-09-28 ENCOUNTER — LAB (OUTPATIENT)
Dept: LAB | Facility: CLINIC | Age: 53
End: 2023-09-28
Payer: COMMERCIAL

## 2023-09-28 DIAGNOSIS — R79.89 ELEVATED FERRITIN: ICD-10-CM

## 2023-09-28 DIAGNOSIS — K76.0 FATTY LIVER: ICD-10-CM

## 2023-09-28 DIAGNOSIS — R73.03 PREDIABETES: ICD-10-CM

## 2023-09-28 DIAGNOSIS — E78.1 HYPERTRIGLYCERIDEMIA: ICD-10-CM

## 2023-09-28 DIAGNOSIS — E78.5 HYPERLIPIDEMIA LDL GOAL <130: ICD-10-CM

## 2023-09-28 DIAGNOSIS — Z12.5 SCREENING PSA (PROSTATE SPECIFIC ANTIGEN): ICD-10-CM

## 2023-09-28 LAB
ALBUMIN SERPL BCG-MCNC: 4.5 G/DL (ref 3.5–5.2)
ALP SERPL-CCNC: 61 U/L (ref 40–129)
ALT SERPL W P-5'-P-CCNC: 29 U/L (ref 0–70)
ANION GAP SERPL CALCULATED.3IONS-SCNC: 12 MMOL/L (ref 7–15)
AST SERPL W P-5'-P-CCNC: 28 U/L (ref 0–45)
BILIRUB SERPL-MCNC: 0.3 MG/DL
BUN SERPL-MCNC: 21 MG/DL (ref 6–20)
CALCIUM SERPL-MCNC: 9.7 MG/DL (ref 8.6–10)
CHLORIDE SERPL-SCNC: 106 MMOL/L (ref 98–107)
CHOLEST SERPL-MCNC: 210 MG/DL
CREAT SERPL-MCNC: 1.1 MG/DL (ref 0.67–1.17)
EGFRCR SERPLBLD CKD-EPI 2021: 81 ML/MIN/1.73M2
FERRITIN SERPL-MCNC: 237 NG/ML (ref 31–409)
GLUCOSE SERPL-MCNC: 123 MG/DL (ref 70–99)
HBA1C MFR BLD: 6.1 % (ref 0–5.6)
HCO3 SERPL-SCNC: 24 MMOL/L (ref 22–29)
HDLC SERPL-MCNC: 29 MG/DL
LDLC SERPL CALC-MCNC: 122 MG/DL
NONHDLC SERPL-MCNC: 181 MG/DL
POTASSIUM SERPL-SCNC: 4.5 MMOL/L (ref 3.4–5.3)
PROT SERPL-MCNC: 7.4 G/DL (ref 6.4–8.3)
PSA SERPL DL<=0.01 NG/ML-MCNC: 1.26 NG/ML (ref 0–3.5)
SODIUM SERPL-SCNC: 142 MMOL/L (ref 135–145)
TRIGL SERPL-MCNC: 296 MG/DL

## 2023-09-28 PROCEDURE — 36415 COLL VENOUS BLD VENIPUNCTURE: CPT

## 2023-09-28 PROCEDURE — 80061 LIPID PANEL: CPT

## 2023-09-28 PROCEDURE — G0103 PSA SCREENING: HCPCS

## 2023-09-28 PROCEDURE — 80053 COMPREHEN METABOLIC PANEL: CPT

## 2023-09-28 PROCEDURE — 82728 ASSAY OF FERRITIN: CPT

## 2023-09-28 PROCEDURE — 83036 HEMOGLOBIN GLYCOSYLATED A1C: CPT

## 2023-10-09 ENCOUNTER — OFFICE VISIT (OUTPATIENT)
Dept: FAMILY MEDICINE | Facility: CLINIC | Age: 53
End: 2023-10-09
Payer: COMMERCIAL

## 2023-10-09 VITALS
RESPIRATION RATE: 16 BRPM | DIASTOLIC BLOOD PRESSURE: 86 MMHG | WEIGHT: 208.7 LBS | HEIGHT: 70 IN | TEMPERATURE: 97.9 F | SYSTOLIC BLOOD PRESSURE: 132 MMHG | HEART RATE: 81 BPM | BODY MASS INDEX: 29.88 KG/M2 | OXYGEN SATURATION: 97 %

## 2023-10-09 DIAGNOSIS — E78.1 HYPERTRIGLYCERIDEMIA: ICD-10-CM

## 2023-10-09 DIAGNOSIS — Z00.00 ROUTINE GENERAL MEDICAL EXAMINATION AT A HEALTH CARE FACILITY: Primary | ICD-10-CM

## 2023-10-09 DIAGNOSIS — E78.5 HYPERLIPIDEMIA LDL GOAL <130: ICD-10-CM

## 2023-10-09 DIAGNOSIS — R73.03 PREDIABETES: ICD-10-CM

## 2023-10-09 PROCEDURE — 90480 ADMN SARSCOV2 VAC 1/ONLY CMP: CPT | Performed by: NURSE PRACTITIONER

## 2023-10-09 PROCEDURE — 91320 SARSCV2 VAC 30MCG TRS-SUC IM: CPT | Performed by: NURSE PRACTITIONER

## 2023-10-09 PROCEDURE — 99396 PREV VISIT EST AGE 40-64: CPT | Mod: 25 | Performed by: NURSE PRACTITIONER

## 2023-10-09 PROCEDURE — 99214 OFFICE O/P EST MOD 30 MIN: CPT | Mod: 25 | Performed by: NURSE PRACTITIONER

## 2023-10-09 PROCEDURE — 90682 RIV4 VACC RECOMBINANT DNA IM: CPT | Performed by: NURSE PRACTITIONER

## 2023-10-09 PROCEDURE — 90471 IMMUNIZATION ADMIN: CPT | Performed by: NURSE PRACTITIONER

## 2023-10-09 RX ORDER — ATORVASTATIN CALCIUM 20 MG/1
20 TABLET, FILM COATED ORAL DAILY
Qty: 90 TABLET | Refills: 3 | Status: SHIPPED | OUTPATIENT
Start: 2023-10-09

## 2023-10-09 RX ORDER — FENOFIBRATE 160 MG/1
160 TABLET ORAL DAILY
Qty: 90 TABLET | Refills: 3 | Status: SHIPPED | OUTPATIENT
Start: 2023-10-09

## 2023-10-09 ASSESSMENT — ENCOUNTER SYMPTOMS
MYALGIAS: 0
HEMATOCHEZIA: 0
COUGH: 0
JOINT SWELLING: 0
EYE PAIN: 0
NERVOUS/ANXIOUS: 0
PALPITATIONS: 0
FEVER: 0
PARESTHESIAS: 0
DIZZINESS: 0
SHORTNESS OF BREATH: 0
CONSTIPATION: 0
FREQUENCY: 0
HEMATURIA: 0
DIARRHEA: 0
HEARTBURN: 1
HEADACHES: 0
SORE THROAT: 0
ARTHRALGIAS: 0
ABDOMINAL PAIN: 0
NAUSEA: 0
DYSURIA: 0
WEAKNESS: 0
CHILLS: 0

## 2023-10-09 ASSESSMENT — PAIN SCALES - GENERAL: PAINLEVEL: NO PAIN (0)

## 2023-10-09 NOTE — NURSING NOTE
Prior to immunization administration, verified patients identity using patient s name and date of birth. Please see Immunization Activity for additional information.     Screening Questionnaire for Adult Immunization    Are you sick today?   No   Do you have allergies to medications, food, a vaccine component or latex?   No   Have you ever had a serious reaction after receiving a vaccination?   No   Do you have a long-term health problem with heart, lung, kidney, or metabolic disease (e.g., diabetes), asthma, a blood disorder, no spleen, complement component deficiency, a cochlear implant, or a spinal fluid leak?  Are you on long-term aspirin therapy?   No   Do you have cancer, leukemia, HIV/AIDS, or any other immune system problem?   No   Do you have a parent, brother, or sister with an immune system problem?   No   In the past 3 months, have you taken medications that affect  your immune system, such as prednisone, other steroids, or anticancer drugs; drugs for the treatment of rheumatoid arthritis, Crohn s disease, or psoriasis; or have you had radiation treatments?   No   Have you had a seizure, or a brain or other nervous system problem?   No   During the past year, have you received a transfusion of blood or blood    products, or been given immune (gamma) globulin or antiviral drug?   No   For women: Are you pregnant or is there a chance you could become       pregnant during the next month?   No   Have you received any vaccinations in the past 4 weeks?   No     Immunization questionnaire answers were all negative.      Patient instructed to remain in clinic for 15 minutes afterwards, and to report any adverse reactions.     Screening performed by Yisel Scruggs RN on 10/9/2023 at 3:15 PM.

## 2023-10-09 NOTE — PROGRESS NOTES
SUBJECTIVE:   CC: Alpesh is an 53 year old who presents for preventative health visit.     Patient presents with:  Physical  Recheck Medication: Ozempic  Results          10/9/2023     2:15 PM   Additional Questions   Roomed by Yisel GALEAS RN   Accompanied by None         10/9/2023     2:15 PM   Patient Reported Additional Medications   Patient reports taking the following new medications None       Healthy Habits:     Getting at least 3 servings of Calcium per day:  Yes    Bi-annual eye exam:  Yes    Dental care twice a year:  Yes    Sleep apnea or symptoms of sleep apnea:  None    Diet:  Regular (no restrictions) and Carbohydrate counting    Frequency of exercise:  1 day/week    Duration of exercise:  15-30 minutes    Taking medications regularly:  Yes    Medication side effects:  None    Additional concerns today:  Yes      He did have labs done recently, no change in lipids or A1c with being on 0.5 mg of Ozempic.  He is tolerating this well.                  Social History     Tobacco Use    Smoking status: Never    Smokeless tobacco: Current     Types: Chew    Tobacco comments:     chewing less   Substance Use Topics    Alcohol use: Yes     Comment: 10-12 drinks a week              10/9/2023    10:39 AM   Alcohol Use   Prescreen: >3 drinks/day or >7 drinks/week? Yes   AUDIT SCORE  8       Last PSA:   PSA   Date Value Ref Range Status   07/27/2020 1.36 0 - 4 ug/L Final     Comment:     Assay Method:  Chemiluminescence using Siemens Vista analyzer     Prostate Specific Antigen Screen   Date Value Ref Range Status   09/28/2023 1.26 0.00 - 3.50 ng/mL Final   09/09/2022 1.35 0.00 - 4.00 ug/L Final       Reviewed orders with patient. Reviewed health maintenance and updated orders accordingly - Yes      Reviewed and updated as needed this visit by clinical staff   Tobacco  Allergies  Meds              Reviewed and updated as needed this visit by Provider                     Review of Systems   Constitutional:   "Negative for chills and fever.   HENT:  Negative for congestion, ear pain, hearing loss and sore throat.    Eyes:  Negative for pain and visual disturbance.   Respiratory:  Negative for cough and shortness of breath.    Cardiovascular:  Negative for chest pain, palpitations and peripheral edema.   Gastrointestinal:  Positive for heartburn. Negative for abdominal pain, constipation, diarrhea, hematochezia and nausea.   Genitourinary:  Negative for dysuria, frequency, genital sores, hematuria, impotence, penile discharge and urgency.   Musculoskeletal:  Negative for arthralgias, joint swelling and myalgias.   Skin:  Negative for rash.   Neurological:  Negative for dizziness, weakness, headaches and paresthesias.   Psychiatric/Behavioral:  Negative for mood changes. The patient is not nervous/anxious.          OBJECTIVE:   /86 (BP Location: Right arm, Patient Position: Sitting, Cuff Size: Adult Regular)   Pulse 81   Temp 97.9  F (36.6  C) (Temporal)   Resp 16   Ht 1.77 m (5' 9.69\")   Wt 94.7 kg (208 lb 11.2 oz)   SpO2 97%   BMI 30.22 kg/m      Physical Exam  GENERAL: healthy, alert and no distress  EYES: Eyes grossly normal to inspection, PERRL and conjunctivae and sclerae normal  HENT: ear canals and TM's normal, nose and mouth without ulcers or lesions  NECK: no adenopathy, no asymmetry, masses, or scars and thyroid normal to palpation  RESP: lungs clear to auscultation - no rales, rhonchi or wheezes  CV: regular rate and rhythm, normal S1 S2, no S3 or S4, no murmur, click or rub, no peripheral edema and peripheral pulses strong  ABDOMEN: soft, nontender, no hepatosplenomegaly, no masses and bowel sounds normal  MS: no gross musculoskeletal defects noted, no edema  SKIN: no suspicious lesions or rashes  NEURO: Normal strength and tone, mentation intact and speech normal  PSYCH: mentation appears normal, affect normal/bright        ASSESSMENT/PLAN:   (Z00.00) Routine general medical examination at a " "health care facility  (primary encounter diagnosis)  Comment:   Plan: Adult Dermatology Referral            (E78.5) Hyperlipidemia LDL goal <130  Comment: stable  Plan: atorvastatin (LIPITOR) 20 MG tablet,         fenofibrate (TRIGLIDE/LOFIBRA) 160 MG tablet        The current medical regimen is effective;  continue present plan and medications. Will recheck labs in 6 months.      (R73.03) Prediabetes  Comment: stable  Plan: Semaglutide, 1 MG/DOSE, (OZEMPIC) 4 MG/3ML pen        Will increase Ozempic to 1 mg and recheck labs in 6 months.     (E78.1) Hypertriglyceridemia  Comment: no change   Plan: Semaglutide, 1 MG/DOSE, (OZEMPIC) 4 MG/3ML pen        See above.           COUNSELING:   Reviewed preventive health counseling, as reflected in patient instructions      BMI:   Estimated body mass index is 30.22 kg/m  as calculated from the following:    Height as of this encounter: 1.77 m (5' 9.69\").    Weight as of this encounter: 94.7 kg (208 lb 11.2 oz).   Weight management plan: Discussed healthy diet and exercise guidelines      He reports that he has never smoked. His smokeless tobacco use includes chew.            Belem Joshua NP  [facility]  "

## 2023-10-15 ENCOUNTER — MYC MEDICAL ADVICE (OUTPATIENT)
Dept: FAMILY MEDICINE | Facility: CLINIC | Age: 53
End: 2023-10-15
Payer: COMMERCIAL

## 2023-11-22 ENCOUNTER — TELEPHONE (OUTPATIENT)
Dept: FAMILY MEDICINE | Facility: CLINIC | Age: 53
End: 2023-11-22
Payer: COMMERCIAL

## 2023-11-22 NOTE — TELEPHONE ENCOUNTER
Central Prior Authorization Team   Phone: [phone]    Zion Steel RN31 minutes ago (9:44 AM)     AV  Patient requesting prior auth for ozempic, form attached     DEBRA Steel BSN, RN  [facility]            Note

## 2023-11-28 NOTE — TELEPHONE ENCOUNTER
Central Prior Authorization Team   Phone: [phone]    PA Initiation    Medication: Semaglutide, 1 MG/DOSE, (OZEMPIC) 4 MG/3ML pen  Insurance Company: [employer] - Phone [phone] Fax [phone]  Pharmacy [facility]illing the Rx[facility] - [facility] AT PORTAL TO REGISTERED [facility] SITES  Filling Pharmacy Phone: [phone]  Filling Pharmacy Fax:    Start Date: 11/28/2023

## 2023-12-01 NOTE — TELEPHONE ENCOUNTER
Prior Authorization Not Needed per Insurance    Medication: Semaglutide, 1 MG/DOSE, (OZEMPIC) 4 MG/3ML pen  Insurance Company: [employer] - Phone [phone] Fax [phone]  Expected CoPay:      Pharmacy Filling the Rx: [facility] - [facility] - [facility] AT PORTAL TO REGISTERED [facility] SITES  Pharmacy Notified:  Yes  Patient Notified:  **Instructed pharmacy to notify patient when script is ready to /ship.**

## 2023-12-12 ENCOUNTER — OFFICE VISIT (OUTPATIENT)
Dept: FAMILY MEDICINE | Facility: CLINIC | Age: 53
End: 2023-12-12
Payer: COMMERCIAL

## 2023-12-12 VITALS
OXYGEN SATURATION: 98 % | DIASTOLIC BLOOD PRESSURE: 73 MMHG | TEMPERATURE: 97.2 F | BODY MASS INDEX: 30.49 KG/M2 | RESPIRATION RATE: 16 BRPM | SYSTOLIC BLOOD PRESSURE: 138 MMHG | WEIGHT: 213 LBS | HEIGHT: 70 IN | HEART RATE: 66 BPM

## 2023-12-12 DIAGNOSIS — M54.50 CHRONIC BILATERAL LOW BACK PAIN WITHOUT SCIATICA: Primary | ICD-10-CM

## 2023-12-12 DIAGNOSIS — H93.13 TINNITUS, BILATERAL: ICD-10-CM

## 2023-12-12 DIAGNOSIS — G89.29 CHRONIC BILATERAL LOW BACK PAIN WITHOUT SCIATICA: Primary | ICD-10-CM

## 2023-12-12 DIAGNOSIS — J39.2 THROAT IRRITATION: ICD-10-CM

## 2023-12-12 PROCEDURE — 99214 OFFICE O/P EST MOD 30 MIN: CPT | Performed by: FAMILY MEDICINE

## 2023-12-12 ASSESSMENT — PAIN SCALES - GENERAL: PAINLEVEL: NO PAIN (0)

## 2023-12-12 NOTE — PROGRESS NOTES
Assessment & Plan     Chronic bilateral low back pain without sciatica  Chronic back and neck stiffness.  We discussed physical therapy.  We discussed trial of Flexeril.  He does not think it is necessary.  Will notify me if interested in seeing physical therapy.    Throat irritation  EGD in remote past.  Due to throat irritation and also with the neck movement he is noticing some globus sensation and it would be reasonable to see GI for further evaluation.  Referral given.  - Adult GI  Referral - Consult Only; Future    Tinnitus, bilateral  We discussed audiology for possible hearing loss.  Avoid Q-tips.  He understood.  He would like to hold off on intervention for now.      Reassured about concern of lipoma.  Offered he consider surgery evaluation but he and I both agreed that it can certainly wait.           Layton Omalley MD, MD  [facility]    Susan Betts is a 53 year old, presenting for the following health issues:  Gastrophageal Reflux (Lump on low back)      12/12/2023     3:10 PM   Additional Questions   Roomed by Payton BARBER     History of Present Illness       Back Pain:  He presents for follow up of back pain. Patient's back pain is a recurring problem.  Location of back pain:  Left lower back  Description of back pain: dull ache  Back pain spreads: nowhere    Since patient first noticed back pain, pain is: gradually worsening  Does back pain interfere with his job:  No       Reason for visit:  Lypoma on left ribs    He eats 0-1 servings of fruits and vegetables daily.He consumes 0 sweetened beverage(s) daily.He exercises with enough effort to increase his heart rate 10 to 19 minutes per day.  He exercises with enough effort to increase his heart rate 3 or less days per week. He is missing 1 dose(s) of medications per week.     Pain on kidneys on side.   Ozempic for A1c. May back off in future if not helping.   Wondering about kidneys.   Coughing and  "could be back pain.   Woke up a year ago - neck snapping. Xray - negative.   Summer time - getting worse.   Acid reflux and chews tobacco. No range of motion issue. Sometime stiff neck.   Multiple lipoma removal I nthe past. Will hold off on intervention.   Throat scoped 10-12 yrs ago.   When moving neck - feels something in neck. Baseline thyroid is fine.   No difficulty with swallowing.   Tinnitus getting worse. Both ears. 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  Physical Exam   Healed surgical scar on both CVA area but no any palpable lipoma.                      "

## 2023-12-12 NOTE — PROGRESS NOTES
Pain on kidneys on side.   Ozempic for A1c. May back off in future if not helping.   Wondering about kidneys.   Coughing and could be back pain.   Woke up a year ago - neck snapping. Xray - negative.   Summer time - getting worse.   Acid reflux and chews tobacco. No range of motion issue. Sometime stiff neck.   Multiple lipoma removal I nthe past. Will hold off on intervention.   Throat scoped 10-12 yrs ago.   When moving neck - feels something in neck. Baseline thyroid is fine.   No difficulty with swallowing.   Tinnitus getting worse. Both ears. 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

## 2024-01-04 NOTE — TELEPHONE ENCOUNTER
Records Requested     January 4, 2024 11:42 AM  [place] [address]   Facility  [facility]   Fax: [phone]   Outcome Urgent request faxed to [facility] for records.   .  1/5/24 9:05 AM - Fax received from [facility] - No records per RAJI.        REFERRAL INFORMATION:  Referring Provider:  Layton Omalley MD  Referring Clinic:  [facility]   Reason for Visit/Diagnosis: J39.2 (ICD-10-CM) - Throat irritation     FUTURE VISIT INFORMATION:  Appointment Date: 1/10/24  Appointment Time: 11:00 AM      NOTES STATUS DETAILS   OFFICE NOTE from Referring Provider Internal 12/12/23 - [facility] OV with Layton Omalley MD    OFFICE NOTE from Other Specialist Internal 8/6/18 - [facility] OV with Lotus Jo MD at [facility]     11/10/15 - [facility] OV with Cassie Farmer APRN CNP at [facility]    3/13/09  - ENT OV with Samir Alicea MD at [facility]    3/2/09 - [facility] OV with Belem Joshua NP     MEDICATION LIST Internal         COLONOSCOPY Internal 1/24/22   PERTINENT LABS Internal 9/28/23 - CMP; Ferritin   PATHOLOGY REPORTS (RELATED) Internal  1/24/22 - Colon bx    IMAGING (CT, MRI, EGD, MRCP, Small Bowel Follow Through/SBT, MR/CT Enterography) Internal CT Abdomen Pelvis - 10/10/22    XR Chest - 3/11/20, 8/6/18, 4/20/18, 12/22/15    MR Abdomen - 8/15/18    US Head and Neck - 3/3/09

## 2024-01-05 ENCOUNTER — MYC MEDICAL ADVICE (OUTPATIENT)
Dept: FAMILY MEDICINE | Facility: CLINIC | Age: 54
End: 2024-01-05
Payer: COMMERCIAL

## 2024-01-05 DIAGNOSIS — M54.50 CHRONIC BILATERAL LOW BACK PAIN WITHOUT SCIATICA: Primary | ICD-10-CM

## 2024-01-05 DIAGNOSIS — G89.29 CHRONIC BILATERAL LOW BACK PAIN WITHOUT SCIATICA: Primary | ICD-10-CM

## 2024-01-05 NOTE — TELEPHONE ENCOUNTER
Responded to the patient through MyChart.     Tessa Aguilar RN  [facility]

## 2024-01-08 RX ORDER — CYCLOBENZAPRINE HCL 5 MG
5 TABLET ORAL 3 TIMES DAILY PRN
Qty: 21 TABLET | Refills: 0 | Status: SHIPPED | OUTPATIENT
Start: 2024-01-08 | End: 2024-01-17

## 2024-01-08 RX ORDER — CYCLOBENZAPRINE HCL 5 MG
5 TABLET ORAL 3 TIMES DAILY PRN
COMMUNITY
End: 2024-01-08

## 2024-01-10 ENCOUNTER — OFFICE VISIT (OUTPATIENT)
Dept: GASTROENTEROLOGY | Facility: CLINIC | Age: 54
End: 2024-01-10
Attending: FAMILY MEDICINE
Payer: COMMERCIAL

## 2024-01-10 ENCOUNTER — PRE VISIT (OUTPATIENT)
Dept: GASTROENTEROLOGY | Facility: CLINIC | Age: 54
End: 2024-01-10

## 2024-01-10 VITALS
HEIGHT: 70 IN | BODY MASS INDEX: 29.89 KG/M2 | HEART RATE: 66 BPM | SYSTOLIC BLOOD PRESSURE: 137 MMHG | OXYGEN SATURATION: 96 % | DIASTOLIC BLOOD PRESSURE: 95 MMHG | WEIGHT: 208.8 LBS

## 2024-01-10 DIAGNOSIS — J39.2 THROAT IRRITATION: Primary | ICD-10-CM

## 2024-01-10 DIAGNOSIS — K76.0 FATTY LIVER: ICD-10-CM

## 2024-01-10 DIAGNOSIS — R10.32 LLQ ABDOMINAL PAIN: ICD-10-CM

## 2024-01-10 DIAGNOSIS — D12.6 TUBULAR ADENOMA OF COLON: ICD-10-CM

## 2024-01-10 PROCEDURE — 99205 OFFICE O/P NEW HI 60 MIN: CPT | Performed by: PHYSICIAN ASSISTANT

## 2024-01-10 ASSESSMENT — PAIN SCALES - GENERAL: PAINLEVEL: NO PAIN (0)

## 2024-01-10 NOTE — PROGRESS NOTES
"  GI CLINIC VISIT    CC/REFERRING MD:  Layton Omalley  REASON FOR CONSULTATION: J39.2 (ICD-10-CM) - Throat irritation     HPI  Alpesh Winston is a 54 year old year old male with PMHx of HLD, hypertrig, preDM, fatty liver presenting to establish care with the [facility] GI group for throat irritation.    1. Largest issue is that of persistent throat irritation, described as constant \"tightness\" that he found improved when he decreased his overall coffee intake (4x 20 oz to 2 x 20 oz currently). It started initially about 10-12 years ago, at which time sounds like he had an EGD that was unremarkable and reportedly told he could take PRN rescue med as needed. He instead decreased his coffee which overall resolved the issue.     For unclear reasons, it flared up again in past year. Shares it feels like something is stuck in his throat all the time, pointing to suprasternal notch. No improvement/worsening with swallowing. He denies trouble with initiation of a swallow - needing to cough/gag/spit up.  Denies dysphagia, odynphagia, regurgitation. No voice changes, neck masses/neck pain. No unintentional weight loss.   Does have ongoing need to clear his throat but this is an ongoing thing and does not coincide with sx flare in past year.   He does have occ heartburn described as retrosternal burning / pressure sensation especially when he is supine for the night. This occurs 2x a week if that - he typically deals with it and does not take any rescue meds. No NSAID use. Does drink ETOH, only on weekends - not able to quantify use but typically of beer, red wine, gin and tonics     He does chew tobacco - ongoing use for 35 years x 1 tin a week. In college years and early 20s he chewed much more.   No known FHX of eso cancer.   Does see dentist routinely q6mo and reports oral cavity is in good health    Got shoulder surgery May 2023 and sounds like intubated during procedure. Shares this procedure, he wonders if flaps " "are closing appropriately? No regur. No cough.     Does have ongoing L mid/lower abd discomfort for years. Constant, feels uncomfortable. No known relationship to eating/defecation to this pain. He did have L inguinal hernia repair years ago with mesh, w/ resultant tear of abd wall muscles requiring repeat intervention. He thinks adhesions/scar tissue is playing a role in this pain. I agree with him. No obstructive symptoms.   Did have recent Cscope late 2022 with BPPS 8, TI intubation and finding of 1 TA w/o high grade dysplasia.     He has regular normal BM w/o issues. Typically 1 bristol 4 stool once daily w/o blood or black stools. Good evacuation.     Weight - no weight loss   Appetite good     ROS  Denies fevers, chills, weight loss, nausea, emesis, dysphagia, odynophagia, dysphonia/hoarseness, chronic cough, neck pain/swelling/mass, heartburn, regurgitation, abdominal pain, bloating/fullness, post prandial bloating, black tarrying stools, bloody stools, rectal pain, rectal fullness, rectal itching.     Family Hx  M uncle - passed 74-75 from stage 4 CRC (never had colonoscopy)  Mother - passed 2018, dementia   Dad - passed from ?PE (kidney issues)     No other known family history or GI related malignancy (esophageal, gastric, pancreatic, liver or colon) or family history of IBD/celiac disease.     Social Hx   No  use of NSAIDs or Tylenol.   No use of OTC herbal supplements/weight loss products.      Yes ETOH, weekends, beer \"few beer\" and gin and tonics, red wine  +tobacco products.   No recreational drug use.     PROBLEM LIST  Patient Active Problem List    Diagnosis Date Noted     Acute pain of left shoulder 03/28/2023     Priority: Medium     Superior labrum anterior-to-posterior (SLAP) tear of left shoulder 03/02/2023     Priority: Medium     Added automatically from request for surgery [id number]       Fatty liver 09/20/2022     Priority: Medium     Left knee pain 10/04/2021     Priority: Medium     " Prediabetes 08/21/2021     Priority: Medium     Elevated ferritin 08/09/2018     Priority: Medium     Gastroesophageal reflux disease without esophagitis 11/10/2015     Priority: Medium     Chews tobacco 11/19/2014     Priority: Medium     Family history of coronary artery disease 01/05/2012     Priority: Medium     Family history of diabetes mellitus 12/22/2011     Priority: Medium     Hypertriglyceridemia 12/22/2011     Priority: Medium     HYPERLIPIDEMIA LDL GOAL <130 10/31/2010     Priority: Medium     ALLERGIC RHINITIS  10/12/2004     Priority: Medium       PERTINENT PAST MEDICAL HISTORY:  Past Medical History:   Diagnosis Date     Allergic rhinitis due to other allergen      History of chest pain     TO ER - EVALULATED, STRESS TEST ETC, ALL NEGATIVE     Hyperlipidaemia      Lipoma of other skin and subcutaneous tissue 5/2012    Chest wall     Mass 5/3/2012     Other and unspecified hyperlipidemia      Tibia/fibula fracture 6/28/2016       PREVIOUS SURGERIES:  Past Surgical History:   Procedure Laterality Date     ABDOMEN SURGERY  2000    Hernia surgery     ARTHROSCOPY SHOULDER SUPERIOR LABRUM ANTERIOR TO POSTERIOR REPAIR Left 3/27/2023    Procedure: LEFT SHOULDER ARTHROSCOPY W INTRA-ARTICULAR DEBRIDEMENT, BICEPS TENODESIS , Labral  debridement, subacromial decompression,;  Surgeon: Gabriela Cunningham MD;  [place] [facility] [place]     COLONOSCOPY N/A 1/24/2022    Procedure: COLONOSCOPY, WITH POLYPECTOMY;  Surgeon: Michelle Valenzuela MD;  [place] [facility] [place]     ENT SURGERY  1989     EYE SURGERY      SURGERY X 2 BILAT TEAR DUCTS      HEAD & NECK SURGERY       HERNIA REPAIR  2000     LAPAROSCOPIC HERNIORRHAPHY INGUINAL BILATERAL Bilateral 10/27/2016    Procedure: LAPAROSCOPIC HERNIORRHAPHY INGUINAL BILATERAL;  Surgeon: Nellie Duron MD;  Location:  OR     SOFT TISSUE SURGERY      Many lypoma removed - Multiple dates     SURGICAL HISTORY OF -       removal of lipoma X 4     SURGICAL HISTORY OF -   10/00    left inguinal  hernia repair     SURGICAL HISTORY OF -       wisdom teeth extraction       ALLERGIES:     Allergies   Allergen Reactions     Cefdinir Hives     Sulfa Antibiotics Rash       PERTINENT MEDICATIONS:    Current Outpatient Medications:      atorvastatin (LIPITOR) 20 MG tablet, Take 1 tablet (20 mg) by mouth daily, Disp: 90 tablet, Rfl: 3     COENZYME Q-10 PO, Take 20 mg by mouth daily, Disp: , Rfl:      cyclobenzaprine (FLEXERIL) 5 MG tablet, Take 1 tablet (5 mg) by mouth 3 times daily as needed for muscle spasms, Disp: 21 tablet, Rfl: 0     fenofibrate (TRIGLIDE/LOFIBRA) 160 MG tablet, Take 1 tablet (160 mg) by mouth daily, Disp: 90 tablet, Rfl: 3     Omega-3 Fatty Acids (FISH OIL PO), , Disp: , Rfl:      Semaglutide, 1 MG/DOSE, (OZEMPIC) 4 MG/3ML pen, Inject 1 mg Subcutaneous every 7 days, Disp: 9 mL, Rfl: 1     sildenafil (VIAGRA) 100 MG tablet, Take 0.5-1 tablets ( mg) by mouth daily as needed, Disp: 10 tablet, Rfl: PRN    SOCIAL HISTORY:  Social History     Socioeconomic History     Marital status:      Spouse name: Not on file     Number of children: Not on file     Years of education: Not on file     Highest education level: Not on file   Occupational History     Occupation:      Employer: [employer]   Tobacco Use     Smoking status: Never     Smokeless tobacco: Current     Types: Chew     Tobacco comments:     chewing less   Vaping Use     Vaping Use: Never used   Substance and Sexual Activity     Alcohol use: Yes     Comment: 10-12 drinks a week      Drug use: No     Sexual activity: Yes     Partners: Female     Birth control/protection: Male Surgical   Other Topics Concern     Parent/sibling w/ CABG, MI or angioplasty before 65F 55M? No   Social History Narrative    Dairy/d 1 servings/d.     Caffeine 3 20 oz bottles  servings/d    Exercise 2 x week    Sunscreen used - NA    Seatbelts used - Yes    Working smoke/CO detectors in the home - Yes    Guns stored in the home - Yes     Self Breast Exams - NA    Self Testicular Exam - No    Eye Exam up to date - No    Dental Exam up to date - No    Pap Smear up to date - NA    Mammogram up to date - NA    PSA up to date - NA    Dexa Scan up to date - NA    Flex Sig / Colonoscopy up to date - NA    Immunizations up to date - No-1991    Abuse: Current or Past(Physical, Sexual or Emotional)- No    Do you feel safe in your environment - Yes             Social Determinants of Health     Financial Resource Strain: Low Risk  (12/12/2023)    Financial Resource Strain      Within the past 12 months, have you or your family members you live with been unable to get utilities (heat, electricity) when it was really needed?: No   Food Insecurity: Low Risk  (12/12/2023)    Food Insecurity      Within the past 12 months, did you worry that your food would run out before you got money to buy more?: No      Within the past 12 months, did the food you bought just not last and you didn t have money to get more?: No   Transportation Needs: Low Risk  (12/12/2023)    Transportation Needs      Within the past 12 months, has lack of transportation kept you from medical appointments, getting your medicines, non-medical meetings or appointments, work, or from getting things that you need?: No   Physical Activity: Not on file   Stress: Not on file   Social Connections: Not on file   Interpersonal Safety: Low Risk  (10/9/2023)    Interpersonal Safety      Do you feel physically and emotionally safe where you currently live?: Yes      Within the past 12 months, have you been hit, slapped, kicked or otherwise physically hurt by someone?: No      Within the past 12 months, have you been humiliated or emotionally abused in other ways by your partner or ex-partner?: No   Housing Stability: Low Risk  (12/12/2023)    Housing Stability      Do you have housing? : Yes      Are you worried about losing your housing?: No       FAMILY HISTORY:  Family History   Problem Relation Age of  "Onset     C.A.D. Maternal Grandmother         MI X 2 ,  in her 60's      Coronary Artery Disease Maternal Grandmother      Hypertension Maternal Grandmother      Hyperlipidemia Maternal Grandmother      Other Cancer Maternal Grandmother         cervical     Breast Cancer Maternal Grandmother      Heart Disease Father      Diabetes Father      Prostate Cancer Father      Hypertension Father      Diabetes Paternal Grandmother         type 2     Breast Cancer Sister      Alzheimer Disease Mother 74        CBD     Hypertension Mother      Alcohol/Drug Maternal Uncle      C.A.D. Maternal Uncle         MI age 56     Substance Abuse Other      Colon Cancer Other          in  stage 4 colon cancer     Cerebrovascular Disease No family hx of      Cancer - colorectal No family hx of      Past/family/social history reviewed and no changes    PHYSICAL EXAMINATION:  Vitals reviewed: BP (!) 137/95   Pulse 66   Ht 1.778 m (5' 10\")   Wt 94.7 kg (208 lb 12.8 oz)   SpO2 96%   BMI 29.96 kg/m    Wt:   Wt Readings from Last 2 Encounters:   23 96.6 kg (213 lb)   10/09/23 94.7 kg (208 lb 11.2 oz)      Constitutional: aaox3, cooperative, pleasant, not dyspneic/diaphoretic, no acute distress  Eyes: Sclera anicteric/injected  Ears/nose/mouth/throat: hearing intact  Neck: supple, active ROM w/o limitation or pain . NEG cervical/mandibular lymphadenopathy   CV: No edema  Respiratory: Unlabored breathing  Abd:  Nondistended, +bs, no hepatosplenomegaly, nontender, no peritoneal signs  Skin: warm, perfused, no jaundice  Psych: Normal affect  MSK: Normal gait     PERTINENT STUDIES:    Lab on 2023   Component Date Value Ref Range Status     Prostate Specific Antigen Screen 2023 1.26  0.00 - 3.50 ng/mL Final     Cholesterol 2023 210 (H)  <200 mg/dL Final     Triglycerides 2023 296 (H)  <150 mg/dL Final     Direct Measure HDL 2023 29 (L)  >=40 mg/dL Final     LDL Cholesterol Calculated 2023 " 122 (H)  <=100 mg/dL Final     Non HDL Cholesterol 09/28/2023 181 (H)  <130 mg/dL Final     Hemoglobin A1C 09/28/2023 6.1 (H)  0.0 - 5.6 % Final     Ferritin 09/28/2023 237  31 - 409 ng/mL Final     Sodium 09/28/2023 142  135 - 145 mmol/L Final     Potassium 09/28/2023 4.5  3.4 - 5.3 mmol/L Final     Carbon Dioxide (CO2) 09/28/2023 24  22 - 29 mmol/L Final     Anion Gap 09/28/2023 12  7 - 15 mmol/L Final     Urea Nitrogen 09/28/2023 21.0 (H)  6.0 - 20.0 mg/dL Final     Creatinine 09/28/2023 1.10  0.67 - 1.17 mg/dL Final     GFR Estimate 09/28/2023 81  >60 mL/min/1.73m2 Final     Calcium 09/28/2023 9.7  8.6 - 10.0 mg/dL Final     Chloride 09/28/2023 106  98 - 107 mmol/L Final     Glucose 09/28/2023 123 (H)  70 - 99 mg/dL Final     Alkaline Phosphatase 09/28/2023 61  40 - 129 U/L Final     AST 09/28/2023 28  0 - 45 U/L Final     ALT 09/28/2023 29  0 - 70 U/L Final     Protein Total 09/28/2023 7.4  6.4 - 8.3 g/dL Final     Albumin 09/28/2023 4.5  3.5 - 5.2 g/dL Final     Bilirubin Total 09/28/2023 0.3  <=1.2 mg/dL Final        Lab Results   Component Value Date    WBC 5.7 09/09/2022    WBC 5.0 04/09/2020    WBC 4.8 10/22/2019    HGB 14.0 03/21/2023    HGB 15.0 09/09/2022    HGB 15.2 03/04/2021     09/09/2022     04/09/2020     10/22/2019    CHOL 210 (H) 09/28/2023    CHOL 204 (H) 12/20/2022    CHOL 227 (H) 09/09/2022    TRIG 296 (H) 09/28/2023    TRIG 251 (H) 12/20/2022    TRIG 424 (H) 09/09/2022    HDL 29 (L) 09/28/2023    HDL 33 (L) 12/20/2022    HDL 26 (L) 09/09/2022    ALT 29 09/28/2023    ALT 54 09/20/2022    ALT 52 08/11/2021    AST 28 09/28/2023    AST 28 09/20/2022    AST 30 08/11/2021     09/28/2023     08/18/2021     08/11/2021    BUN 21.0 (H) 09/28/2023    BUN 18 08/18/2021    BUN 18 08/11/2021    CO2 24 09/28/2023    CO2 23 08/18/2021    CO2 24 08/11/2021    TSH 1.67 08/06/2018    TSH 1.72 01/05/2012    TSH 1.22 03/02/2009    GLUF 101 01/05/2012        Liver Function  Studies -   Recent Labs   Lab Test 09/28/23  0902   PROTTOTAL 7.4   ALBUMIN 4.5   BILITOTAL 0.3   ALKPHOS 61   AST 28   ALT 29        PREVIOUS ENDOSCOPY    Results for orders placed or performed during the hospital encounter of 01/24/22   COLONOSCOPY   Result Value Ref Range    COLONOSCOPY       [facility]  [address] [place] [address] [place] [address] [phone]     Endoscopy Department  _______________________________________________________________________________  Patient Name: Alpesh Winston      Procedure Date: 1/24/2022 12:32 PM  MRN: [id number]                       Account Number: [id number]  YOB: 1970               Admit Type: Outpatient  Age: 52                               Room: Pro 5  Gender: Male                          Note Status: Finalized  Attending MD: Michelle Valenzuela MD     Pause for the Cause: performed.  Total Sedation Time: per anesthesia.    _______________________________________________________________________________     Procedure:             Colonoscopy  Indications:           Screening for colorectal malignant neoplasm  Providers:             Michelle Valenzuela MD, Angi Lynn, Nellie Floyd, Marivel Jolly, RN  Referring MD:          Belem Joshua NP, NP  Medicines:              Monitored Anesthesia Care  Complications:         No immediate complications.  _______________________________________________________________________________  Procedure:             Pre-Anesthesia Assessment:                         - Please see EPIC H&P for pre-anesthesia assessment.                         After obtaining informed consent, the colonoscope was                          passed under direct vision. Throughout the procedure,                          the patient's blood pressure, pulse, and oxygen                          saturations were monitored continuously. The                          Colonoscope was introduced through  the anus and                          advanced to the terminal ileum, with identification of                          the appendiceal orifice and IC valve. The colonoscopy                          was performed without difficulty. The patient                          tolerated the procedure well. The quality of the bowel                           preparation was evaluated using the BBPS ([place] Bowel                          Preparation Scale) with scores of: Right Colon = 2                          (minor amount of residual staining, small fragments of                          stool and/or opaque liquid, but mucosa seen well),                          Transverse Colon = 3 (entire mucosa seen well with no                          residual staining, small fragments of stool or opaque                          liquid) and Left Colon = 3 (entire mucosa seen well                          with no residual staining, small fragments of stool or                          opaque liquid). The total BBPS score equals 8.                                                                                   Findings:       The terminal ileum appeared normal.       Two sessile polyps were found in the proximal ascending colon. The        polyps were 4 to 6 mm in size. These polyps were removed with a cold        snare. Resection and retrieval were  complete.       Non-bleeding internal hemorrhoids were found during retroflexion. The        hemorrhoids were mild.                                                                                   Impression:            - The examined portion of the ileum was normal.                         - Two 4 to 6 mm polyps in the proximal ascending                          colon, removed with a cold snare. Resected and                          retrieved.                         - Non-bleeding internal hemorrhoids.  Recommendation:        - Discharge patient to home (ambulatory).                          - Resume previous diet.                         - Continue present medications.                         - Await pathology results.                         - The findings and recommendations were discussed with                          the patient.                         - Repeat colonoscopy in 7-10 years for surveillance                          based on pathology results.                                                                                      Electronically signed by: Michelle Valenzuela MD  __________________  Michelle Valenzuela MD  1/24/2022 1:54:37 PM  I was physically present for the entire viewing portion of the exam.  __________________________  Signature of teaching physician  Michelle Valenzuela MD  Number of Addenda: 0    Note Initiated On: 1/24/2022 12:32 PM  Scope In:  Scope Out:       Final Diagnosis  POLYPS X2, ASCENDING COLON, POLYPECTOMY:  - Tubular adenoma; negative for high-grade dysplasia  - Inflammatory polyp    ASSESSMENT/PLAN:  Alpesh Winston is a 54 year old year old male with PMHx of HLD, hypertrig, preDM, fatty liver presenting to establish care with the [facility] GI group for throat irritation. He reports constant suprasternal sensation for past year that relented some after he decreased his daily coffee intake. It is associated with occ heartburn sensation and throat clearing sensation. He does chew tobacco chronically (35+y). His goal is comprehensive evaluation of this throat irritation today.     #throat irritation noted to suprasternal notch x 1 year   Sx consistent with globus with ddx structural esophageal changes, GERD/reflux, functional disease (reflux hypersensitivity, functional heartburn). Given chewing tobacco use, onset of sx age 53, malignancy should be included in ddx.   -EGD w/ gastric biopsy and further evaluation of ?structural changes   -XR eso timed w/ tablet   -VSS w/ SLP though no overt oropharyngeal dysphagia sx   -ENT referral for consideration of laryngoscopy  given sx and long term chewing tobacco use. R/O malignancy.     Future consideration  1. If EGD negative, can consider empiric PPI trial x 8-12 weeks   2. If EGD negative, further work-up could include HRM +/- 24h impedence     #hepatic steatosis   In setting with normal AST/ALT, likely driven by MAFLD and ETOH use   -recommended decrease/limit ETOH use  -recommend lipid control (on atorv and fenofibrate)  -recommend slow gradual weight loss and regular exercise as tolerated     #L mid/lower abdominal pain, chronic  Mild and ongoing for years, likely related to adhesions 2/2 inguinal hernia repair x 2 w/ mesh and perhaps component of abd wall pain  -we discussed possible PT eval, he declined today     #TA of colon w/o high grade dysplasia   Cscope 1/2022 with 7-10y recall; due 1/2029 - 1/2032    Future consideration  1.Sooner scope for alarm sx      Orders Placed This Encounter   Procedures     XR Video Swallow with SLP or OT - Order with Speech Therapy Referral     XR Esophagram     Adult ENT  Referral     Speech Therapy Referral     Adult GI  Referral - Procedure Only       RTC - after EGD     Thank you for this consultation.  It was a pleasure to participate in the care of this patient; please contact me with any further questions.     Afsaneh Suarez PA-C    Follow up: As planned above. Today, I personally spent 52 minutes in direct face to face time with the patient, of which greater than 50% of the time was spent in patient education and counseling as described above. Approximately 20 minutes were spent on indirect care associated with the patient's consultation including but not limited to review of: patient medical records to date, clinic visits, hospital records, lab results, imaging studies, procedural documentation, and coordinating care with other providers. The findings from this review are summarized in the above note. All of the above accounted for a cumulative time of 72 minutes and was  performed on the date of service.

## 2024-01-10 NOTE — LETTER
"    1/10/2024         RE: Alpesh Winston  [address] [place] [address]        Dear Colleague,    Thank you for referring your patient, Alpesh Winston, to the [facility] [place]. Please see a copy of my visit note below.      GI CLINIC VISIT    CC/REFERRING MD:  Layton Omalley  REASON FOR CONSULTATION: J39.2 (ICD-10-CM) - Throat irritation     HPI  Alpesh Winston is a 54 year old year old male with PMHx of HLD, hypertrig, preDM, fatty liver presenting to establish care with the [facility] GI group for throat irritation.    1. Largest issue is that of persistent throat irritation, described as constant \"tightness\" that he found improved when he decreased his overall coffee intake (4x 20 oz to 2 x 20 oz currently). It started initially about 10-12 years ago, at which time sounds like he had an EGD that was unremarkable and reportedly told he could take PRN rescue med as needed. He instead decreased his coffee which overall resolved the issue.     For unclear reasons, it flared up again in past year. Shares it feels like something is stuck in his throat all the time, pointing to suprasternal notch. No improvement/worsening with swallowing. He denies trouble with initiation of a swallow - needing to cough/gag/spit up.  Denies dysphagia, odynphagia, regurgitation. No voice changes, neck masses/neck pain. No unintentional weight loss.   Does have ongoing need to clear his throat but this is an ongoing thing and does not coincide with sx flare in past year.   He does have occ heartburn described as retrosternal burning / pressure sensation especially when he is supine for the night. This occurs 2x a week if that - he typically deals with it and does not take any rescue meds. No NSAID use. Does drink ETOH, only on weekends - not able to quantify use but typically of beer, red wine, gin and tonics     He does chew tobacco - ongoing use for 35 years x 1 tin " "a week. In college years and early 20s he chewed much more.   No known FHX of eso cancer.   Does see dentist routinely q6mo and reports oral cavity is in good health    Got shoulder surgery May 2023 and sounds like intubated during procedure. Shares this procedure, he wonders if flaps are closing appropriately? No regur. No cough.     Does have ongoing L mid/lower abd discomfort for years. Constant, feels uncomfortable. No known relationship to eating/defecation to this pain. He did have L inguinal hernia repair years ago with mesh, w/ resultant tear of abd wall muscles requiring repeat intervention. He thinks adhesions/scar tissue is playing a role in this pain. I agree with him. No obstructive symptoms.   Did have recent Cscope late 2022 with BPPS 8, TI intubation and finding of 1 TA w/o high grade dysplasia.     He has regular normal BM w/o issues. Typically 1 bristol 4 stool once daily w/o blood or black stools. Good evacuation.     Weight - no weight loss   Appetite good     ROS  Denies fevers, chills, weight loss, nausea, emesis, dysphagia, odynophagia, dysphonia/hoarseness, chronic cough, neck pain/swelling/mass, heartburn, regurgitation, abdominal pain, bloating/fullness, post prandial bloating, black tarrying stools, bloody stools, rectal pain, rectal fullness, rectal itching.     Family Hx  M uncle - passed 74-75 from stage 4 CRC (never had colonoscopy)  Mother - passed 2018, dementia   Dad - passed from ?PE (kidney issues)     No other known family history or GI related malignancy (esophageal, gastric, pancreatic, liver or colon) or family history of IBD/celiac disease.     Social Hx   No  use of NSAIDs or Tylenol.   No use of OTC herbal supplements/weight loss products.      Yes ETOH, weekends, beer \"few beer\" and gin and tonics, red wine  +tobacco products.   No recreational drug use.     PROBLEM LIST  Patient Active Problem List    Diagnosis Date Noted    Acute pain of left shoulder 03/28/2023     " Priority: Medium    Superior labrum anterior-to-posterior (SLAP) tear of left shoulder 03/02/2023     Priority: Medium     Added automatically from request for surgery [id number]      Fatty liver 09/20/2022     Priority: Medium    Left knee pain 10/04/2021     Priority: Medium    Prediabetes 08/21/2021     Priority: Medium    Elevated ferritin 08/09/2018     Priority: Medium    Gastroesophageal reflux disease without esophagitis 11/10/2015     Priority: Medium    Chews tobacco 11/19/2014     Priority: Medium    Family history of coronary artery disease 01/05/2012     Priority: Medium    Family history of diabetes mellitus 12/22/2011     Priority: Medium    Hypertriglyceridemia 12/22/2011     Priority: Medium    HYPERLIPIDEMIA LDL GOAL <130 10/31/2010     Priority: Medium    ALLERGIC RHINITIS  10/12/2004     Priority: Medium       PERTINENT PAST MEDICAL HISTORY:  Past Medical History:   Diagnosis Date    Allergic rhinitis due to other allergen     History of chest pain     TO ER - EVALULATED, STRESS TEST ETC, ALL NEGATIVE    Hyperlipidaemia     Lipoma of other skin and subcutaneous tissue 5/2012    Chest wall    Mass 5/3/2012    Other and unspecified hyperlipidemia     Tibia/fibula fracture 6/28/2016       PREVIOUS SURGERIES:  Past Surgical History:   Procedure Laterality Date    ABDOMEN SURGERY  2000    Hernia surgery    ARTHROSCOPY SHOULDER SUPERIOR LABRUM ANTERIOR TO POSTERIOR REPAIR Left 3/27/2023    Procedure: LEFT SHOULDER ARTHROSCOPY W INTRA-ARTICULAR DEBRIDEMENT, BICEPS TENODESIS , Labral  debridement, subacromial decompression,;  Surgeon: Gabriela Cunningham MD;  [place] [facility] [place]    COLONOSCOPY N/A 1/24/2022    Procedure: COLONOSCOPY, WITH POLYPECTOMY;  Surgeon: Michelle Valenzuela MD;  [place] [facility] [place]    ENT SURGERY  1989    EYE SURGERY      SURGERY X 2 BILAT TEAR DUCTS     HEAD & NECK SURGERY      HERNIA REPAIR  2000    LAPAROSCOPIC HERNIORRHAPHY INGUINAL BILATERAL Bilateral 10/27/2016    Procedure: LAPAROSCOPIC  HERNIORRHAPHY INGUINAL BILATERAL;  Surgeon: Nellie Duron MD;  Location: [facility] OR    SOFT TISSUE SURGERY      Many lypoma removed - Multiple dates    SURGICAL HISTORY OF -       removal of lipoma X 4    SURGICAL HISTORY OF -   10/00    left inguinal hernia repair    SURGICAL HISTORY OF -       wisdom teeth extraction       ALLERGIES:     Allergies   Allergen Reactions    Cefdinir Hives    Sulfa Antibiotics Rash       PERTINENT MEDICATIONS:    Current Outpatient Medications:     atorvastatin (LIPITOR) 20 MG tablet, Take 1 tablet (20 mg) by mouth daily, Disp: 90 tablet, Rfl: 3    COENZYME Q-10 PO, Take 20 mg by mouth daily, Disp: , Rfl:     cyclobenzaprine (FLEXERIL) 5 MG tablet, Take 1 tablet (5 mg) by mouth 3 times daily as needed for muscle spasms, Disp: 21 tablet, Rfl: 0    fenofibrate (TRIGLIDE/LOFIBRA) 160 MG tablet, Take 1 tablet (160 mg) by mouth daily, Disp: 90 tablet, Rfl: 3    Omega-3 Fatty Acids (FISH OIL PO), , Disp: , Rfl:     Semaglutide, 1 MG/DOSE, (OZEMPIC) 4 MG/3ML pen, Inject 1 mg Subcutaneous every 7 days, Disp: 9 mL, Rfl: 1    sildenafil (VIAGRA) 100 MG tablet, Take 0.5-1 tablets ( mg) by mouth daily as needed, Disp: 10 tablet, Rfl: PRN    SOCIAL HISTORY:  Social History     Socioeconomic History    Marital status:      Spouse name: Not on file    Number of children: Not on file    Years of education: Not on file    Highest education level: Not on file   Occupational History    Occupation:      Employer: [employer]   Tobacco Use    Smoking status: Never    Smokeless tobacco: Current     Types: Chew    Tobacco comments:     chewing less   Vaping Use    Vaping Use: Never used   Substance and Sexual Activity    Alcohol use: Yes     Comment: 10-12 drinks a week     Drug use: No    Sexual activity: Yes     Partners: Female     Birth control/protection: Male Surgical   Other Topics Concern    Parent/sibling w/ CABG, MI or angioplasty before 65F 55M? No   Social History  Narrative    Dairy/d 1 servings/d.     Caffeine 3 20 oz bottles  servings/d    Exercise 2 x week    Sunscreen used - NA    Seatbelts used - Yes    Working smoke/CO detectors in the home - Yes    Guns stored in the home - Yes    Self Breast Exams - NA    Self Testicular Exam - No    Eye Exam up to date - No    Dental Exam up to date - No    Pap Smear up to date - NA    Mammogram up to date - NA    PSA up to date - NA    Dexa Scan up to date - NA    Flex Sig / Colonoscopy up to date - NA    Immunizations up to date - No-1991    Abuse: Current or Past(Physical, Sexual or Emotional)- No    Do you feel safe in your environment - Yes             Social Determinants of Health     Financial Resource Strain: Low Risk  (12/12/2023)    Financial Resource Strain     Within the past 12 months, have you or your family members you live with been unable to get utilities (heat, electricity) when it was really needed?: No   Food Insecurity: Low Risk  (12/12/2023)    Food Insecurity     Within the past 12 months, did you worry that your food would run out before you got money to buy more?: No     Within the past 12 months, did the food you bought just not last and you didn t have money to get more?: No   Transportation Needs: Low Risk  (12/12/2023)    Transportation Needs     Within the past 12 months, has lack of transportation kept you from medical appointments, getting your medicines, non-medical meetings or appointments, work, or from getting things that you need?: No   Physical Activity: Not on file   Stress: Not on file   Social Connections: Not on file   Interpersonal Safety: Low Risk  (10/9/2023)    Interpersonal Safety     Do you feel physically and emotionally safe where you currently live?: Yes     Within the past 12 months, have you been hit, slapped, kicked or otherwise physically hurt by someone?: No     Within the past 12 months, have you been humiliated or emotionally abused in other ways by your partner or  "ex-partner?: No   Housing Stability: Low Risk  (2023)    Housing Stability     Do you have housing? : Yes     Are you worried about losing your housing?: No       FAMILY HISTORY:  Family History   Problem Relation Age of Onset    C.A.D. Maternal Grandmother         MI X 2 ,  in her 60's     Coronary Artery Disease Maternal Grandmother     Hypertension Maternal Grandmother     Hyperlipidemia Maternal Grandmother     Other Cancer Maternal Grandmother         cervical    Breast Cancer Maternal Grandmother     Heart Disease Father     Diabetes Father     Prostate Cancer Father     Hypertension Father     Diabetes Paternal Grandmother         type 2    Breast Cancer Sister     Alzheimer Disease Mother 74        CBD    Hypertension Mother     Alcohol/Drug Maternal Uncle     C.A.D. Maternal Uncle         MI age 56    Substance Abuse Other     Colon Cancer Other          in  stage 4 colon cancer    Cerebrovascular Disease No family hx of     Cancer - colorectal No family hx of      Past/family/social history reviewed and no changes    PHYSICAL EXAMINATION:  Vitals reviewed: BP (!) 137/95   Pulse 66   Ht 1.778 m (5' 10\")   Wt 94.7 kg (208 lb 12.8 oz)   SpO2 96%   BMI 29.96 kg/m    Wt:   Wt Readings from Last 2 Encounters:   23 96.6 kg (213 lb)   10/09/23 94.7 kg (208 lb 11.2 oz)      Constitutional: aaox3, cooperative, pleasant, not dyspneic/diaphoretic, no acute distress  Eyes: Sclera anicteric/injected  Ears/nose/mouth/throat: hearing intact  Neck: supple, active ROM w/o limitation or pain . NEG cervical/mandibular lymphadenopathy   CV: No edema  Respiratory: Unlabored breathing  Abd:  Nondistended, +bs, no hepatosplenomegaly, nontender, no peritoneal signs  Skin: warm, perfused, no jaundice  Psych: Normal affect  MSK: Normal gait     PERTINENT STUDIES:    Lab on 2023   Component Date Value Ref Range Status    Prostate Specific Antigen Screen 2023 1.26  0.00 - 3.50 ng/mL Final    " Cholesterol 09/28/2023 210 (H)  <200 mg/dL Final    Triglycerides 09/28/2023 296 (H)  <150 mg/dL Final    Direct Measure HDL 09/28/2023 29 (L)  >=40 mg/dL Final    LDL Cholesterol Calculated 09/28/2023 122 (H)  <=100 mg/dL Final    Non HDL Cholesterol 09/28/2023 181 (H)  <130 mg/dL Final    Hemoglobin A1C 09/28/2023 6.1 (H)  0.0 - 5.6 % Final    Ferritin 09/28/2023 237  31 - 409 ng/mL Final    Sodium 09/28/2023 142  135 - 145 mmol/L Final    Potassium 09/28/2023 4.5  3.4 - 5.3 mmol/L Final    Carbon Dioxide (CO2) 09/28/2023 24  22 - 29 mmol/L Final    Anion Gap 09/28/2023 12  7 - 15 mmol/L Final    Urea Nitrogen 09/28/2023 21.0 (H)  6.0 - 20.0 mg/dL Final    Creatinine 09/28/2023 1.10  0.67 - 1.17 mg/dL Final    GFR Estimate 09/28/2023 81  >60 mL/min/1.73m2 Final    Calcium 09/28/2023 9.7  8.6 - 10.0 mg/dL Final    Chloride 09/28/2023 106  98 - 107 mmol/L Final    Glucose 09/28/2023 123 (H)  70 - 99 mg/dL Final    Alkaline Phosphatase 09/28/2023 61  40 - 129 U/L Final    AST 09/28/2023 28  0 - 45 U/L Final    ALT 09/28/2023 29  0 - 70 U/L Final    Protein Total 09/28/2023 7.4  6.4 - 8.3 g/dL Final    Albumin 09/28/2023 4.5  3.5 - 5.2 g/dL Final    Bilirubin Total 09/28/2023 0.3  <=1.2 mg/dL Final        Lab Results   Component Value Date    WBC 5.7 09/09/2022    WBC 5.0 04/09/2020    WBC 4.8 10/22/2019    HGB 14.0 03/21/2023    HGB 15.0 09/09/2022    HGB 15.2 03/04/2021     09/09/2022     04/09/2020     10/22/2019    CHOL 210 (H) 09/28/2023    CHOL 204 (H) 12/20/2022    CHOL 227 (H) 09/09/2022    TRIG 296 (H) 09/28/2023    TRIG 251 (H) 12/20/2022    TRIG 424 (H) 09/09/2022    HDL 29 (L) 09/28/2023    HDL 33 (L) 12/20/2022    HDL 26 (L) 09/09/2022    ALT 29 09/28/2023    ALT 54 09/20/2022    ALT 52 08/11/2021    AST 28 09/28/2023    AST 28 09/20/2022    AST 30 08/11/2021     09/28/2023     08/18/2021     08/11/2021    BUN 21.0 (H) 09/28/2023    BUN 18 08/18/2021    BUN 18  08/11/2021    CO2 24 09/28/2023    CO2 23 08/18/2021    CO2 24 08/11/2021    TSH 1.67 08/06/2018    TSH 1.72 01/05/2012    TSH 1.22 03/02/2009    GLUF 101 01/05/2012        Liver Function Studies -   Recent Labs   Lab Test 09/28/23  0902   PROTTOTAL 7.4   ALBUMIN 4.5   BILITOTAL 0.3   ALKPHOS 61   AST 28   ALT 29        PREVIOUS ENDOSCOPY    Results for orders placed or performed during the hospital encounter of 01/24/22   COLONOSCOPY   Result Value Ref Range    COLONOSCOPY       [facility]  [address] [place] [address] [place] [address] [phone]     Endoscopy Department  _______________________________________________________________________________  Patient Name: Alpesh Kellyhimick      Procedure Date: 1/24/2022 12:32 PM  MRN: [id number]                       Account Number: [id number]  YOB: 1970               Admit Type: Outpatient  Age: 52                               Room: Pro 5  Gender: Male                          Note Status: Finalized  Attending MD: Michelle Valenzuela MD     Pause for the Cause: performed.  Total Sedation Time: per anesthesia.    _______________________________________________________________________________     Procedure:             Colonoscopy  Indications:           Screening for colorectal malignant neoplasm  Providers:             Michelle Valenzuela MD, Nellie Mccann, Marivel Jolly, RN  Referring MD:          Belem Joshua, NP, NP  Medicines:              Monitored Anesthesia Care  Complications:         No immediate complications.  _______________________________________________________________________________  Procedure:             Pre-Anesthesia Assessment:                         - Please see EPIC H&P for pre-anesthesia assessment.                         After obtaining informed consent, the colonoscope was                          passed under direct vision. Throughout the procedure,                           the patient's blood pressure, pulse, and oxygen                          saturations were monitored continuously. The                          Colonoscope was introduced through the anus and                          advanced to the terminal ileum, with identification of                          the appendiceal orifice and IC valve. The colonoscopy                          was performed without difficulty. The patient                          tolerated the procedure well. The quality of the bowel                           preparation was evaluated using the BBPS ([place] Bowel                          Preparation Scale) with scores of: Right Colon = 2                          (minor amount of residual staining, small fragments of                          stool and/or opaque liquid, but mucosa seen well),                          Transverse Colon = 3 (entire mucosa seen well with no                          residual staining, small fragments of stool or opaque                          liquid) and Left Colon = 3 (entire mucosa seen well                          with no residual staining, small fragments of stool or                          opaque liquid). The total BBPS score equals 8.                                                                                   Findings:       The terminal ileum appeared normal.       Two sessile polyps were found in the proximal ascending colon. The        polyps were 4 to 6 mm in size. These polyps were removed with a cold        snare. Resection and retrieval were  complete.       Non-bleeding internal hemorrhoids were found during retroflexion. The        hemorrhoids were mild.                                                                                   Impression:            - The examined portion of the ileum was normal.                         - Two 4 to 6 mm polyps in the proximal ascending                          colon, removed with a cold snare. Resected  and                          retrieved.                         - Non-bleeding internal hemorrhoids.  Recommendation:        - Discharge patient to home (ambulatory).                         - Resume previous diet.                         - Continue present medications.                         - Await pathology results.                         - The findings and recommendations were discussed with                          the patient.                         - Repeat colonoscopy in 7-10 years for surveillance                          based on pathology results.                                                                                      Electronically signed by: Michelle Valenzuela MD  __________________  Michelle Valenzuela MD  1/24/2022 1:54:37 PM  I was physically present for the entire viewing portion of the exam.  __________________________  Signature of teaching physician  Michelle Valenzuela MD  Number of Addenda: 0    Note Initiated On: 1/24/2022 12:32 PM  Scope In:  Scope Out:       Final Diagnosis  POLYPS X2, ASCENDING COLON, POLYPECTOMY:  - Tubular adenoma; negative for high-grade dysplasia  - Inflammatory polyp    ASSESSMENT/PLAN:  Alpesh Winston is a 54 year old year old male with PMHx of HLD, hypertrig, preDM, fatty liver presenting to establish care with the [facility] GI group for throat irritation. He reports constant suprasternal sensation for past year that relented some after he decreased his daily coffee intake. It is associated with occ heartburn sensation and throat clearing sensation. He does chew tobacco chronically (35+y). His goal is comprehensive evaluation of this throat irritation today.     #throat irritation noted to suprasternal notch x 1 year   Sx consistent with globus with ddx structural esophageal changes, GERD/reflux, functional disease (reflux hypersensitivity, functional heartburn). Given chewing tobacco use, onset of sx age 53, malignancy should be included in ddx.   -EGD w/  gastric biopsy and further evaluation of ?structural changes   -XR eso timed w/ tablet   -VSS w/ SLP though no overt oropharyngeal dysphagia sx   -ENT referral for consideration of laryngoscopy given sx and long term chewing tobacco use. R/O malignancy.     Future consideration  1. If EGD negative, can consider empiric PPI trial x 8-12 weeks   2. If EGD negative, further work-up could include HRM +/- 24h impedence     #hepatic steatosis   In setting with normal AST/ALT, likely driven by MAFLD and ETOH use   -recommended decrease/limit ETOH use  -recommend lipid control (on atorv and fenofibrate)  -recommend slow gradual weight loss and regular exercise as tolerated     #L mid/lower abdominal pain, chronic  Mild and ongoing for years, likely related to adhesions 2/2 inguinal hernia repair x 2 w/ mesh and perhaps component of abd wall pain  -we discussed possible PT eval, he declined today     #TA of colon w/o high grade dysplasia   Cscope 1/2022 with 7-10y recall; due 1/2029 - 1/2032    Future consideration  1.Sooner scope for alarm sx      Orders Placed This Encounter   Procedures    XR Video Swallow with SLP or OT - Order with Speech Therapy Referral    XR Esophagram    Adult ENT  Referral    Speech Therapy Referral    Adult GI  Referral - Procedure Only       RTC - after EGD     Thank you for this consultation.  It was a pleasure to participate in the care of this patient; please contact me with any further questions.     Follow up: As planned above. Today, I personally spent 52 minutes in direct face to face time with the patient, of which greater than 50% of the time was spent in patient education and counseling as described above. Approximately 20 minutes were spent on indirect care associated with the patient's consultation including but not limited to review of: patient medical records to date, clinic visits, hospital records, lab results, imaging studies, procedural documentation, and  coordinating care with other providers. The findings from this review are summarized in the above note. All of the above accounted for a cumulative time of 72 minutes and was performed on the date of service.         Again, thank you for allowing me to participate in the care of your patient.      Sincerely,    Afsaneh Suarez PA-C

## 2024-01-10 NOTE — NURSING NOTE
"Chief Complaint   Patient presents with    New Patient       Vitals:    01/10/24 1053   BP: (!) 137/95   Pulse: 66   SpO2: 96%   Weight: 94.7 kg (208 lb 12.8 oz)   Height: 1.778 m (5' 10\")       Body mass index is 29.96 kg/m .    [employer]    "

## 2024-01-10 NOTE — PATIENT INSTRUCTIONS
It was a pleasure taking care of you today.  I've included a brief summary of our discussion and care plan from today's visit below.  Please review this information with your primary care provider.  _______________________________________________________________________    My recommendations are summarized as follows:    ENT referral for throat irritation   Speech evaluation with swallow study   Upper endoscopy - please call for an appt. Call once weekly for cancellation.   We scope at [facility].   Limit alcohol use. Regular weight loss and control of cholesterol.   We can consider PT evaluation for ongoing abdominal pain     Please call our clinic or send a MyChart message to us if you have any questions or concerns. MyChart messages are answered by your nurse or doctor typically within 24 hours.  Please allow extra time on weekends and holidays    Return to GI Clinic in (after upper endoscopy) months to review your progress.    _______________________________________________________________________    How do I schedule labs, imaging studies, or procedures that were ordered in clinic today?      Labs: To schedule lab appointment at the [facility] and [facility] Center, use my chart or call [phone]. If you have a [facility] lab closer to home where you are regularly seen you can give them a call.      Procedures: If a colonoscopy, upper endoscopy, breath test, esophageal manometry, or pH impedence was ordered today, our endoscopy team will call you to schedule this. If you have not heard from our endoscopy team within a week, please call [phone] option 2 to schedule.      Imaging Studies: If you were scheduled for a CT scan, X-ray, MRI, ultrasound, HIDA scan or other imaging study, please call [phone] to have this scheduled.      Referral: If a referral to another specialty was ordered, expect a phone call or follow instructions above. If you have not heard from anyone  regarding your referral in a week, please call our clinic to check the status.      Who do I call with any questions after my visit?  Please be in touch if there are any further questions that arise following today's visit.  There are multiple ways to contact your gastroenterology care team.       During business hours, you may reach a Gastroenterology nurse at [phone]     To schedule or reschedule an appointment, please call [phone].      You can always send a secure message through TuneGO.  TuneGO messages are answered by your nurse or doctor typically within 24 hours.  Please allow extra time on weekends and holidays.       For urgent/emergent questions after business hours, you may reach the on-call GI Fellow by contacting the [facility]  at [phone].     How will I get the results of any tests ordered?    You will receive all of your results.  If you have signed up for [email or web address], any tests ordered at your visit will be available to you after your physician reviews them.  Typically this takes 1-2 weeks.  If there are urgent results that require a change in your care plan, your physician or nurse will call you to discuss the next steps.       What is TuneGO?  TuneGO is a secure way for you to access all of your healthcare records from the [facility].  It is a web based computer program, so you can sign on to it from any location.  It also allows you to send secure messages to your care team.  I recommend signing up for TuneGO access if you have not already done so and are comfortable with using a computer.       How to I schedule a follow-up visit?  If you did not schedule a follow-up visit today, please call [phone] to schedule a follow-up office visit.      Sincerely,    Afsaneh Suarez PA-C  Gastroenterology    --  Lifestyle modifications for gastroesophageal reflux disease (GERD).     1. Change your eating habits.  -- It's best to eat several small meals  instead of two or three large meals.  -- After you eat, wait 2 to 3 hours before you lie down. Late-night snacks aren't a good idea.  -- Chocolate, mint, and alcohol can make GERD worse. They relax the valve between the esophagus and the stomach.  -- Spicy foods, fatty foods, foods that have a lot of acid (like tomatoes and oranges), and coffee can make GERD symptoms worse in some people. If your symptoms are worse after you eat a certain food, you may want to stop eating that food to see if your symptoms get better.    2. Do not smoke or chew tobacco.    3. If you have GERD symptoms at night, raise the head of your bed 6 in. (15 cm) to 8 in. (20 cm) by putting the frame on blocks or placing a foam wedge under the head of your mattress. (Adding extra pillows does not work.)    4. Avoid or reduce pressure on your stomach. Don't wear tight clothing around your middle.    5. Lose weight if you need to. Losing just 5 to 10 pounds can help.

## 2024-01-11 ENCOUNTER — APPOINTMENT (OUTPATIENT)
Dept: CT IMAGING | Facility: CLINIC | Age: 54
End: 2024-01-11
Attending: EMERGENCY MEDICINE
Payer: COMMERCIAL

## 2024-01-11 ENCOUNTER — HOSPITAL ENCOUNTER (EMERGENCY)
Facility: CLINIC | Age: 54
Discharge: HOME | End: 2024-01-11
Attending: EMERGENCY MEDICINE | Admitting: EMERGENCY MEDICINE
Payer: COMMERCIAL

## 2024-01-11 VITALS
TEMPERATURE: 97.4 F | SYSTOLIC BLOOD PRESSURE: 152 MMHG | RESPIRATION RATE: 16 BRPM | HEART RATE: 68 BPM | OXYGEN SATURATION: 97 % | DIASTOLIC BLOOD PRESSURE: 94 MMHG

## 2024-01-11 DIAGNOSIS — R10.9 LEFT FLANK PAIN: ICD-10-CM

## 2024-01-11 DIAGNOSIS — K76.0 HEPATIC STEATOSIS: ICD-10-CM

## 2024-01-11 LAB
ALBUMIN SERPL BCG-MCNC: 4.5 G/DL (ref 3.5–5.2)
ALBUMIN UR-MCNC: NEGATIVE MG/DL
ALP SERPL-CCNC: 55 U/L (ref 40–150)
ALT SERPL W P-5'-P-CCNC: 30 U/L (ref 0–70)
ANION GAP SERPL CALCULATED.3IONS-SCNC: 11 MMOL/L (ref 7–15)
APPEARANCE UR: CLEAR
AST SERPL W P-5'-P-CCNC: 25 U/L (ref 0–45)
BASOPHILS # BLD AUTO: 0.1 10E3/UL (ref 0–0.2)
BASOPHILS NFR BLD AUTO: 1 %
BILIRUB SERPL-MCNC: 0.4 MG/DL
BILIRUB UR QL STRIP: NEGATIVE
BUN SERPL-MCNC: 16.8 MG/DL (ref 6–20)
CALCIUM SERPL-MCNC: 9.9 MG/DL (ref 8.6–10)
CHLORIDE SERPL-SCNC: 105 MMOL/L (ref 98–107)
COLOR UR AUTO: ABNORMAL
CREAT SERPL-MCNC: 1.01 MG/DL (ref 0.67–1.17)
DEPRECATED HCO3 PLAS-SCNC: 24 MMOL/L (ref 22–29)
EGFRCR SERPLBLD CKD-EPI 2021: 89 ML/MIN/1.73M2
EOSINOPHIL # BLD AUTO: 0.1 10E3/UL (ref 0–0.7)
EOSINOPHIL NFR BLD AUTO: 3 %
ERYTHROCYTE [DISTWIDTH] IN BLOOD BY AUTOMATED COUNT: 12.5 % (ref 10–15)
GLUCOSE SERPL-MCNC: 152 MG/DL (ref 70–99)
GLUCOSE UR STRIP-MCNC: 100 MG/DL
HCT VFR BLD AUTO: 40.1 % (ref 40–53)
HGB BLD-MCNC: 13.2 G/DL (ref 13.3–17.7)
HGB UR QL STRIP: NEGATIVE
IMM GRANULOCYTES # BLD: 0 10E3/UL
IMM GRANULOCYTES NFR BLD: 0 %
KETONES UR STRIP-MCNC: NEGATIVE MG/DL
LEUKOCYTE ESTERASE UR QL STRIP: NEGATIVE
LIPASE SERPL-CCNC: 71 U/L (ref 13–60)
LYMPHOCYTES # BLD AUTO: 1.8 10E3/UL (ref 0.8–5.3)
LYMPHOCYTES NFR BLD AUTO: 34 %
MCH RBC QN AUTO: 30.4 PG (ref 26.5–33)
MCHC RBC AUTO-ENTMCNC: 32.9 G/DL (ref 31.5–36.5)
MCV RBC AUTO: 92 FL (ref 78–100)
MONOCYTES # BLD AUTO: 0.5 10E3/UL (ref 0–1.3)
MONOCYTES NFR BLD AUTO: 10 %
NEUTROPHILS # BLD AUTO: 2.8 10E3/UL (ref 1.6–8.3)
NEUTROPHILS NFR BLD AUTO: 52 %
NITRATE UR QL: NEGATIVE
NRBC # BLD AUTO: 0 10E3/UL
NRBC BLD AUTO-RTO: 0 /100
PH UR STRIP: 6.5 [PH] (ref 5–7)
PLATELET # BLD AUTO: 290 10E3/UL (ref 150–450)
POTASSIUM SERPL-SCNC: 4.1 MMOL/L (ref 3.4–5.3)
PROT SERPL-MCNC: 7.4 G/DL (ref 6.4–8.3)
RBC # BLD AUTO: 4.34 10E6/UL (ref 4.4–5.9)
RBC URINE: 0 /HPF
SODIUM SERPL-SCNC: 140 MMOL/L (ref 135–145)
SP GR UR STRIP: 1.01 (ref 1–1.03)
SQUAMOUS EPITHELIAL: <1 /HPF
UROBILINOGEN UR STRIP-MCNC: NORMAL MG/DL
WBC # BLD AUTO: 5.3 10E3/UL (ref 4–11)
WBC URINE: <1 /HPF

## 2024-01-11 PROCEDURE — 80053 COMPREHEN METABOLIC PANEL: CPT | Performed by: EMERGENCY MEDICINE

## 2024-01-11 PROCEDURE — 99285 EMERGENCY DEPT VISIT HI MDM: CPT | Mod: 25 | Performed by: EMERGENCY MEDICINE

## 2024-01-11 PROCEDURE — 250N000011 HC RX IP 250 OP 636: Performed by: EMERGENCY MEDICINE

## 2024-01-11 PROCEDURE — 36415 COLL VENOUS BLD VENIPUNCTURE: CPT | Performed by: EMERGENCY MEDICINE

## 2024-01-11 PROCEDURE — 99284 EMERGENCY DEPT VISIT MOD MDM: CPT | Performed by: EMERGENCY MEDICINE

## 2024-01-11 PROCEDURE — 85025 COMPLETE CBC W/AUTO DIFF WBC: CPT | Performed by: EMERGENCY MEDICINE

## 2024-01-11 PROCEDURE — 74176 CT ABD & PELVIS W/O CONTRAST: CPT | Mod: 26 | Performed by: RADIOLOGY

## 2024-01-11 PROCEDURE — 258N000003 HC RX IP 258 OP 636: Performed by: EMERGENCY MEDICINE

## 2024-01-11 PROCEDURE — 96375 TX/PRO/DX INJ NEW DRUG ADDON: CPT | Performed by: EMERGENCY MEDICINE

## 2024-01-11 PROCEDURE — 96374 THER/PROPH/DIAG INJ IV PUSH: CPT | Performed by: EMERGENCY MEDICINE

## 2024-01-11 PROCEDURE — 74176 CT ABD & PELVIS W/O CONTRAST: CPT

## 2024-01-11 PROCEDURE — 81001 URINALYSIS AUTO W/SCOPE: CPT | Performed by: EMERGENCY MEDICINE

## 2024-01-11 PROCEDURE — 83690 ASSAY OF LIPASE: CPT | Performed by: EMERGENCY MEDICINE

## 2024-01-11 PROCEDURE — 96361 HYDRATE IV INFUSION ADD-ON: CPT | Performed by: EMERGENCY MEDICINE

## 2024-01-11 RX ORDER — ONDANSETRON 2 MG/ML
4 INJECTION INTRAMUSCULAR; INTRAVENOUS EVERY 30 MIN PRN
Status: DISCONTINUED | OUTPATIENT
Start: 2024-01-11 | End: 2024-01-11 | Stop reason: HOSPADM

## 2024-01-11 RX ORDER — IBUPROFEN 600 MG/1
600 TABLET, FILM COATED ORAL EVERY 6 HOURS PRN
Qty: 30 TABLET | Refills: 0 | Status: SHIPPED | OUTPATIENT
Start: 2024-01-11 | End: 2024-05-23

## 2024-01-11 RX ORDER — KETOROLAC TROMETHAMINE 15 MG/ML
15 INJECTION, SOLUTION INTRAMUSCULAR; INTRAVENOUS ONCE
Status: COMPLETED | OUTPATIENT
Start: 2024-01-11 | End: 2024-01-11

## 2024-01-11 RX ORDER — LIDOCAINE 50 MG/G
1 PATCH TOPICAL EVERY 24 HOURS
Qty: 10 PATCH | Refills: 0 | Status: SHIPPED | OUTPATIENT
Start: 2024-01-11 | End: 2024-01-21

## 2024-01-11 RX ORDER — METAXALONE 800 MG/1
800 TABLET ORAL 3 TIMES DAILY
Qty: 20 TABLET | Refills: 0 | Status: SHIPPED | OUTPATIENT
Start: 2024-01-11 | End: 2024-05-23

## 2024-01-11 RX ADMIN — KETOROLAC TROMETHAMINE 15 MG: 15 INJECTION, SOLUTION INTRAMUSCULAR; INTRAVENOUS at 13:25

## 2024-01-11 RX ADMIN — SODIUM CHLORIDE 1000 ML: 9 INJECTION, SOLUTION INTRAVENOUS at 13:26

## 2024-01-11 RX ADMIN — ONDANSETRON 4 MG: 2 INJECTION INTRAMUSCULAR; INTRAVENOUS at 13:25

## 2024-01-11 ASSESSMENT — ACTIVITIES OF DAILY LIVING (ADL)
ADLS_ACUITY_SCORE: 35
ADLS_ACUITY_SCORE: 33
ADLS_ACUITY_SCORE: 35

## 2024-01-11 NOTE — DISCHARGE INSTRUCTIONS
TODAY'S VISIT:  You were seen today for flank pain   -   - If you had any labs or imaging/radiology tests performed today, you should also discuss these tests with your usual provider.     FOLLOW-UP:  Please make an appointment to follow up with:  - Your Primary Care Provider. If you do not have a PCP, please call the Primary Care Center (phone: [phone] for an appointment    - Have your provider review the results from today's visit with you again to make sure no further follow-up or additional testing is needed based on those results.     RETURN TO THE EMERGENCY DEPARTMENT  Return to the Emergency Department at any time for any new or worsening symptoms or any concerns.

## 2024-01-11 NOTE — ED TRIAGE NOTES
Pt arrives ambulatory to triage c/o sudden oneset L flank and back pain at 1130     Triage Assessment (Adult)       Row Name 01/11/24 1248          Triage Assessment    Airway WDL WDL        Respiratory WDL    Respiratory WDL WDL        Cardiac WDL    Cardiac WDL WDL        Peripheral/Neurovascular WDL    Peripheral Neurovascular WDL WDL        Cognitive/Neuro/Behavioral WDL    Cognitive/Neuro/Behavioral WDL WDL

## 2024-01-11 NOTE — ED PROVIDER NOTES
History     Chief Complaint   Patient presents with    Back Pain    Flank Pain     HPI  Alpesh Winston is a 54 year old male with a past medical history of hyperlipidemia, GERD, fatty liver, prediabetes who presents to the emergency department with a chief complaint of back pain.  Located in the left flank.  Has been present for a couple of weeks as a mild discomfort, but suddenly significantly worsened at 1130 today while he was sitting eating lunch.  He notes he was not exerting himself at that time.  No recent injuries..  It is constant, but waxes and wanes.  It does intermittently get very severe/sharp.  He is afebrile on arrival to the emergency department today.  He does have some associated nausea without vomiting.  He denies any dysuria or hematuria.  No fevers or chills.  He has not had pain like this in the past.    The patient has had a history of bilateral inguinal hernia repair, no other intra-abdominal surgeries.  No history of back problems or kidney stones.  He does note he has a family history of kidney stones in his father.    I have reviewed the Medications, Allergies, Past Medical and Surgical History, and Social History in the [employer] system.    Past Medical History:   Diagnosis Date    Allergic rhinitis due to other allergen     History of chest pain     TO ER - EVALULATED, STRESS TEST ETC, ALL NEGATIVE    Hyperlipidaemia     Lipoma of other skin and subcutaneous tissue 5/2012    Chest wall    Mass 5/3/2012    Other and unspecified hyperlipidemia     Tibia/fibula fracture 6/28/2016     Past Surgical History:   Procedure Laterality Date    ABDOMEN SURGERY  2000    Hernia surgery    ARTHROSCOPY SHOULDER SUPERIOR LABRUM ANTERIOR TO POSTERIOR REPAIR Left 3/27/2023    Procedure: LEFT SHOULDER ARTHROSCOPY W INTRA-ARTICULAR DEBRIDEMENT, BICEPS TENODESIS , Labral  debridement, subacromial decompression,;  Surgeon: Gabriela Cunningham MD;  Location: [facility] OR    COLONOSCOPY N/A 1/24/2022    Procedure:  COLONOSCOPY, WITH POLYPECTOMY;  Surgeon: Michelle Valenzuela MD;  [place] [facility] [place]    ENT SURGERY  1989    EYE SURGERY      SURGERY X 2 BILAT TEAR DUCTS     HEAD & NECK SURGERY      HERNIA REPAIR  2000    LAPAROSCOPIC HERNIORRHAPHY INGUINAL BILATERAL Bilateral 10/27/2016    Procedure: LAPAROSCOPIC HERNIORRHAPHY INGUINAL BILATERAL;  Surgeon: Nellie Duron MD;  Location:  OR    SOFT TISSUE SURGERY      Many lypoma removed - Multiple dates    SURGICAL HISTORY OF -       removal of lipoma X 4    SURGICAL HISTORY OF -   10/00    left inguinal hernia repair    SURGICAL HISTORY OF -       wisdom teeth extraction     No current facility-administered medications for this encounter.     Current Outpatient Medications   Medication    atorvastatin (LIPITOR) 20 MG tablet    COENZYME Q-10 PO    cyclobenzaprine (FLEXERIL) 5 MG tablet    fenofibrate (TRIGLIDE/LOFIBRA) 160 MG tablet    Omega-3 Fatty Acids (FISH OIL PO)    Semaglutide, 1 MG/DOSE, (OZEMPIC) 4 MG/3ML pen    sildenafil (VIAGRA) 100 MG tablet     Allergies   Allergen Reactions    Cefdinir Hives    Sulfa Antibiotics Rash     Past medical history, past surgical history, medications, and allergies were reviewed with the patient. Additional pertinent items: None    Social History     Socioeconomic History    Marital status:      Spouse name: Not on file    Number of children: Not on file    Years of education: Not on file    Highest education level: Not on file   Occupational History    Occupation:      Employer: [employer]   Tobacco Use    Smoking status: Never    Smokeless tobacco: Current     Types: Chew    Tobacco comments:     chewing less   Vaping Use    Vaping Use: Never used   Substance and Sexual Activity    Alcohol use: Yes     Comment: 10-12 drinks a week     Drug use: No    Sexual activity: Yes     Partners: Female     Birth control/protection: Male Surgical   Other Topics Concern    Parent/sibling w/ CABG, MI or angioplasty before  65F 55M? No   Social History Narrative    Dairy/d 1 servings/d.     Caffeine 3 20 oz bottles  servings/d    Exercise 2 x week    Sunscreen used - NA    Seatbelts used - Yes    Working smoke/CO detectors in the home - Yes    Guns stored in the home - Yes    Self Breast Exams - NA    Self Testicular Exam - No    Eye Exam up to date - No    Dental Exam up to date - No    Pap Smear up to date - NA    Mammogram up to date - NA    PSA up to date - NA    Dexa Scan up to date - NA    Flex Sig / Colonoscopy up to date - NA    Immunizations up to date - No-1991    Abuse: Current or Past(Physical, Sexual or Emotional)- No    Do you feel safe in your environment - Yes             Social Determinants of Health     Financial Resource Strain: Low Risk  (12/12/2023)    Financial Resource Strain     Within the past 12 months, have you or your family members you live with been unable to get utilities (heat, electricity) when it was really needed?: No   Food Insecurity: Low Risk  (12/12/2023)    Food Insecurity     Within the past 12 months, did you worry that your food would run out before you got money to buy more?: No     Within the past 12 months, did the food you bought just not last and you didn t have money to get more?: No   Transportation Needs: Low Risk  (12/12/2023)    Transportation Needs     Within the past 12 months, has lack of transportation kept you from medical appointments, getting your medicines, non-medical meetings or appointments, work, or from getting things that you need?: No   Physical Activity: Not on file   Stress: Not on file   Social Connections: Not on file   Interpersonal Safety: Low Risk  (10/9/2023)    Interpersonal Safety     Do you feel physically and emotionally safe where you currently live?: Yes     Within the past 12 months, have you been hit, slapped, kicked or otherwise physically hurt by someone?: No     Within the past 12 months, have you been humiliated or emotionally abused in other ways  by your partner or ex-partner?: No   Housing Stability: Low Risk  (12/12/2023)    Housing Stability     Do you have housing? : Yes     Are you worried about losing your housing?: No     Social history was reviewed with the patient. Additional pertinent items: None    Review of Systems  A medically appropriate review of systems was performed with pertinent positives and negatives noted in the HPI, and all other systems negative.    Physical Exam   BP: (!) 152/94  Pulse: 68  Temp: 97.4  F (36.3  C)  Resp: 16  SpO2: 97 %      General: Well nourished, well developed, NAD  HEENT: EOMI, anicteric. NCAT, MMM  Neck: no jugular venous distension, supple, nl ROM  Cardiac: Regular rate, extremities well-perfused  Pulm: NLB, normal RR  Abd: Soft, nontender, nondistended.  No masses palpated.  Back: No midline tenderness or step-off, no there is left CVA/flank tenderness, no right CVA tenderness  Skin: Warm and dry to the touch.  No rash  Extremities: No LE edema, no cyanosis, w/w/p  Neuro: A&Ox3, no gross focal deficits    ED Course        Procedures                           Labs Ordered and Resulted from Time of ED Arrival to Time of ED Departure - No data to display         No results found for this or any previous visit (from the past 24 hour(s)).    Labs, vital signs, and imaging studies were reviewed by me.    Medications - No data to display    Assessments & Plan (with Medical Decision Making)   The patient presents the emergency department complaining of flank pain.  Differential diagnosis includes nephrolithiasis, UTI, musculoskeletal flank pain, gastritis/gastroenteritis.  Labs, urinalysis, CT of the abdomen pelvis ordered to further evaluate the patient.  IV fluids, Toradol, and Zofran were ordered for symptomatic relief in the emergency department.    Laboratory work-up was remarkable for normal white blood cell count, hemoglobin slightly low at 13.2 (this was previously 14.0 when it was last checked 9 months ago),  creatinine within normal limits at 1.01, glucose 152, normal LFTs, lipase very slightly elevated at 71. Urinalysis not appear consistent with UTI.    CT of the abdomen and pelvis shows hepatic steatosis (patient was aware of this diagnosis), no acute disease process.    I have reviewed the nursing notes.    I have reviewed the findings, diagnosis, plan and need for follow up with the patient.    Patient to be discharged home. Advised to follow up with PCP within 1 week. To return to ER immediately with any new/worsening symptoms. Plan of care discussed with patient who expresses understanding and agrees with plan of care.    Critical care was not performed.     Medical Decision Making  The patient's presentation was of moderate complexity (an undiagnosed new problem with uncertain diagnosis).    The patient's evaluation involved:  ordering and/or review of 3+ test(s) in this encounter (see separate area of note for details)  independent interpretation of testing performed by another health professional (CT)    The patient's management necessitated moderate risk (prescription drug management including medications given in the ED).    CT images were personally reviewed by me, I agree with the radiology reads.    New Prescriptions    IBUPROFEN (ADVIL/MOTRIN) 600 MG TABLET    Take 1 tablet (600 mg) by mouth every 6 hours as needed    LIDOCAINE (LIDODERM) 5 % PATCH    Place 1 patch onto the skin every 24 hours for 10 days    METAXALONE (SKELAXIN) 800 MG TABLET    Take 1 tablet (800 mg) by mouth 3 times daily       Final diagnoses:   Left flank pain   Hepatic steatosis       IVVEK MCNEAL MD  1/11/2024   [facility] EMERGENCY DEPARTMENT       Vivek Mcneal MD  01/11/24 8675

## 2024-01-15 ENCOUNTER — PATIENT OUTREACH (OUTPATIENT)
Dept: GASTROENTEROLOGY | Facility: CLINIC | Age: 54
End: 2024-01-15
Payer: COMMERCIAL

## 2024-01-17 ENCOUNTER — OFFICE VISIT (OUTPATIENT)
Dept: FAMILY MEDICINE | Facility: CLINIC | Age: 54
End: 2024-01-17
Payer: COMMERCIAL

## 2024-01-17 VITALS
HEART RATE: 96 BPM | DIASTOLIC BLOOD PRESSURE: 72 MMHG | BODY MASS INDEX: 29.56 KG/M2 | WEIGHT: 206.5 LBS | OXYGEN SATURATION: 98 % | RESPIRATION RATE: 19 BRPM | SYSTOLIC BLOOD PRESSURE: 136 MMHG | TEMPERATURE: 97.5 F | HEIGHT: 70 IN

## 2024-01-17 DIAGNOSIS — M62.830 BACK MUSCLE SPASM: Primary | ICD-10-CM

## 2024-01-17 PROCEDURE — 99214 OFFICE O/P EST MOD 30 MIN: CPT | Performed by: NURSE PRACTITIONER

## 2024-01-17 ASSESSMENT — PAIN SCALES - GENERAL: PAINLEVEL: MODERATE PAIN (5)

## 2024-01-17 NOTE — PROGRESS NOTES
"  Assessment & Plan     (M62.830) Back muscle spasm  (primary encounter diagnosis)  Comment:   Plan: tiZANidine (ZANAFLEX) 4 MG tablet        Will try Tizanidine instead.  Discussed the use and indication of this medication as well as potential side effects.  Continue to use heat, Lidoderm patches, look into chiropractor and acupuncture.  He declines a PT referral.  Follow up if symptoms persist or worsen.        I spent a total of 31 minutes on the day of the visit.   Time spent by me doing chart review, history and exam, documentation and further activities per the note    MED REC REQUIRED  Post Medication Reconciliation Status: discharge medications reconciled and changed, per note/orders      Subjective   Alpesh is a 54 year old, presenting for the following health issues:  Hospital F/U        1/17/2024     1:28 PM   Additional Questions   Roomed by JOSETTE Mark   Accompanied by self         1/17/2024     1:28 PM   Patient Reported Additional Medications   Patient reports taking the following new medications none     HPI       ED/UC Followup:    Facility:  [facility]  Date of visit: 1/11/20204   Reason for visit: 1/11/2024  Current Status: still have the pain but on pain meds.     He has been using Flexeril and Lidocaine patch.  Improved a bit in the past 2 days. 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  Pain is worse with sitting or lying, certain movements.          Objective    /72 (BP Location: Right arm, Patient Position: Sitting, Cuff Size: Adult Regular)   Pulse 96   Temp 97.5  F (36.4  C) (Temporal)   Resp 19   Ht 1.778 m (5' 10\")   Wt 93.7 kg (206 lb 8 oz)   SpO2 98%   BMI 29.63 kg/m    Body mass index is 29.63 kg/m .    Physical Exam   GENERAL: healthy and alert  MS: reduced ROM of lumbar spine; tenderness of left thoracolumbar area  NEURO: Normal strength and tone, mentation intact and speech normal  PSYCH: mentation appears normal, affect normal/bright            Signed Electronically by: " Belem Joshua, NP

## 2024-01-26 ENCOUNTER — ANCILLARY PROCEDURE (OUTPATIENT)
Dept: GENERAL RADIOLOGY | Facility: CLINIC | Age: 54
End: 2024-01-26
Attending: PHYSICIAN ASSISTANT
Payer: COMMERCIAL

## 2024-01-26 DIAGNOSIS — J39.2 THROAT IRRITATION: ICD-10-CM

## 2024-01-26 PROCEDURE — 74220 X-RAY XM ESOPHAGUS 1CNTRST: CPT | Mod: GC | Performed by: RADIOLOGY

## 2024-02-07 ENCOUNTER — TELEPHONE (OUTPATIENT)
Dept: GASTROENTEROLOGY | Facility: CLINIC | Age: 54
End: 2024-02-07
Payer: COMMERCIAL

## 2024-02-07 NOTE — TELEPHONE ENCOUNTER
"Endoscopy Scheduling Screen    Have you had a positive Covid test in the last 14 days?  No    Are you active on MyChart?   Yes    What insurance is in the chart?  Other:  BCBS    Ordering/Referring Provider: Erick   (If ordering provider performs procedure, schedule with ordering provider unless otherwise instructed. )    BMI: Estimated body mass index is 29.63 kg/m  as calculated from the following:    Height as of 1/17/24: 1.778 m (5' 10\").    Weight as of 1/17/24: 93.7 kg (206 lb 8 oz).     Sedation Ordered  MAC/deep sedation.   BMI<= 45 45 < BMI <= 48 48 < BMI < = 50  BMI > 50   No Restrictions No [facility]  No [facility]  [facility] with exceptions Hospital Only OR Only       Are you taking any prescription medications for pain 3 or more times per week?   NO - No RN review required.    Do you have a history of malignant hyperthermia or adverse reaction to anesthesia?  No    (Females) Are you currently pregnant?   No     Have you been diagnosed or told you have pulmonary hypertension?   No    Do you have an LVAD?  No    Have you been told you have moderate to severe sleep apnea?  No    Have you been told you have COPD, asthma, or any other lung disease?  No    Do you have any heart conditions?  No     Have you ever had an organ transplant?   No    Have you ever had or are you awaiting a heart or lung transplant?   No    Have you had a stroke or transient ischemic attack (TIA aka \"mini stroke\" in the last 6 months?   No    Have you been diagnosed with or been told you have cirrhosis of the liver?   No    Are you currently on dialysis?   No    Do you need assistance transferring?   No    BMI: Estimated body mass index is 29.63 kg/m  as calculated from the following:    Height as of 1/17/24: 1.778 m (5' 10\").    Weight as of 1/17/24: 93.7 kg (206 lb 8 oz).     Is patients BMI > 40 and scheduling location [facility]?  No    Do you take an injectable medication for weight loss or diabetes (excluding insulin)?  Yes, hold time can " be up to 7 days. Please check with you prescribing provider for recommendation.     Do you take the medication Naltrexone?  No    Do you take blood thinners?  No       Prep   Are you currently on dialysis or do you have chronic kidney disease?  No    Do you have a diagnosis of diabetes?  No- pre diabetic    Do you have a diagnosis of cystic fibrosis (CF)?  No    On a regular basis do you go 3 -5 days between bowel movements?  No    BMI > 40?  No    Preferred Pharmacy:        [id number] - [place], [place] - [address] [facility] [address]  [address]  [place] [address]  Phone: [phone] Fax: [phone]        Final Scheduling Details   Colonoscopy prep sent?  N/A    Procedure scheduled  Upper endoscopy (EGD)    Surgeon:  Khadra Graves     Date of procedure:  05/23/2024     Pre-OP / PAC:   No - Not required for this site.    Location  [facility] - ASC - Patient preference.    Sedation   MAC/Deep Sedation - Per order.      Patient Reminders:   You will receive a call from a Nurse to review instructions and health history.  This assessment must be completed prior to your procedure.  Failure to complete the Nurse assessment may result in the procedure being cancelled.      On the day of your procedure, please designate an adult(s) who can drive you home stay with you for the next 24 hours. The medicines used in the exam will make you sleepy. You will not be able to drive.      You cannot take public transportation, ride share services, or non-medical taxi service without a responsible caregiver.  Medical transport services are allowed with the requirement that a responsible caregiver will receive you at your destination.  We require that drivers and caregivers are confirmed prior to your procedure.

## 2024-02-08 NOTE — LETTER
5/15/2019       RE: Alpesh Winston  [address] [place] [address]     Dear Colleague,    Thank you for referring your patient, Alpesh Winston, to the [facility]. Please see a copy of my visit note below.    PROBLEM LIST:  1.  Elevated ferritin, negative HFE gene testing, likely related to fatty liver disease.  On intermittent phlebotomy to lower ferritin.  MRI for liver iron on 8/15/2018 showed no iron overload.  2.  Hypertriglyceridemia diagnosed age 21    Interim History: Mr. Winston is here for follow up of elevated ferritin.  He has had 9 therapeutic phlebotomies since September 2018.  He tolerates the phlebotomy well.  The ferritin has dropped from 1098 ng/ml on 9/19/2018 to 308 ng/mL on 5/8/2019.  He is not sick this winter.  His arm is sore from the outside of his house.  No other concerns.  He is wondering how much more therapeutic phlebotomy he will need.      PHYSICAL EXAMINATION:  /84   Pulse 61   Temp 98.4  F (36.9  C) (Oral)   Resp 16   Wt 93.7 kg (206 lb 9.1 oz)   SpO2 95%   BMI 29.64 kg/m       General appearance:  Patient is 48 yo man in no acute distress.     HEENT:  No pallor, icterus, or mucositis.  No thyromegaly.   Lymph nodes:  No cervical, supraclavicular, axillary, or inguinal lymphadenopathy.   Lungs:  Clear to auscultation bilaterally.   Heart:  Regular rate and rhythm; no S3 S4 or murmer.     Abdomen:  Positive bowel sounds, soft and nontender, nondistended.  No hepatomegaly. No splenomegaly appreciated.    Extremities:  No joint swelling or tenderness.  No ankle edema.     Skin:  No rash, no petechiae or ecchymoses.      Assessment recommendation: Elevated ferritin related to fatty liver.  Good improvement with therapeutic phlebotomy.  I recommend 1 more therapeutic phlebotomy here.  After that he can contact [facility] to have phlebotomy there, and they will use the donated blood..  I believe he can donate  Spoke with patient. She states she feels good just had higher bp reading for the day.Denies SOB, chest pain, palpitations, headache or dizziness.  She is keeping a bp journal and calling us back with the readings.    every 6 weeks.  If they need information from us can call us and we can fax it over.  He should have repeat CBC with ferritin and 6 in 12 months and see me in 12 months.    Suma Cornejo MD  Hematology

## 2024-03-04 ENCOUNTER — TELEPHONE (OUTPATIENT)
Dept: GASTROENTEROLOGY | Facility: CLINIC | Age: 54
End: 2024-03-04
Payer: COMMERCIAL

## 2024-03-04 NOTE — TELEPHONE ENCOUNTER
Patient Contacted for the patient to call back and schedule the following:    Appointment type: Return GI  Provider: Afsaneh Suarez PA-C  Return date: After EGD  Specialty phone number: [phone]  Additional appointment(s) needed: N/A  Additonal Notes: Pt will call back in 30 mins

## 2024-03-07 DIAGNOSIS — R73.03 PREDIABETES: ICD-10-CM

## 2024-03-07 DIAGNOSIS — E78.1 HYPERTRIGLYCERIDEMIA: ICD-10-CM

## 2024-03-08 ENCOUNTER — TELEPHONE (OUTPATIENT)
Dept: FAMILY MEDICINE | Facility: CLINIC | Age: 54
End: 2024-03-08

## 2024-03-08 DIAGNOSIS — K76.0 FATTY LIVER: Primary | ICD-10-CM

## 2024-03-08 DIAGNOSIS — R73.03 PREDIABETES: ICD-10-CM

## 2024-03-08 DIAGNOSIS — E78.1 HYPERTRIGLYCERIDEMIA: ICD-10-CM

## 2024-03-08 DIAGNOSIS — E66.811 CLASS 1 OBESITY WITH SERIOUS COMORBIDITY AND BODY MASS INDEX (BMI) OF 30.0 TO 30.9 IN ADULT, UNSPECIFIED OBESITY TYPE: ICD-10-CM

## 2024-03-08 RX ORDER — SEMAGLUTIDE 1.34 MG/ML
INJECTION, SOLUTION SUBCUTANEOUS
Qty: 9 ML | Refills: 1 | Status: SHIPPED | OUTPATIENT
Start: 2024-03-08 | End: 2024-05-23

## 2024-03-08 NOTE — TELEPHONE ENCOUNTER
Aileen: do you wish an alternative or notify patient of denial?    Karen PEDRAZA RN  [facility]

## 2024-03-08 NOTE — TELEPHONE ENCOUNTER
PRIOR AUTHORIZATION DENIED    Medication: SEMAGLUTIDE (1 MG/DOSE) 4 MG/3ML SC SOPN  Insurance Company: [employer] - Phone [phone] Fax [phone]  Denial Date: 3/8/2024  Denial Reason(s):     Appeal Information:     Patient Notified: No

## 2024-03-10 NOTE — TELEPHONE ENCOUNTER
They won't cover due to the fact that he doesn't have type 2 diabetes, so I will change to Wegovy and see if that gets covered.

## 2024-03-19 ENCOUNTER — MYC MEDICAL ADVICE (OUTPATIENT)
Dept: FAMILY MEDICINE | Facility: CLINIC | Age: 54
End: 2024-03-19
Payer: COMMERCIAL

## 2024-03-19 DIAGNOSIS — R73.03 PREDIABETES: Primary | ICD-10-CM

## 2024-03-20 NOTE — TELEPHONE ENCOUNTER
Aileen: would you order without the eVisit we recommended?    Hello, just curious if I could get labs done to check to see if my a1c levels have dropped and if the Ozempic/Wegovy is doing anything. I would eventually like to eliminate the use of them if my results are going in the right direction. Also, if results are not favorable if I could get prescribed Trulicity or Victoza as they are covered 100% by insurance?? Thanks!   ---  Can I just get the Labs done without an evisit?  I asked this question last fall and was told that I could have them done earlier than my physical in the fall if I wanted to.  I would like to do so.    Karen PEDRAZA RN  [facility]

## 2024-03-21 NOTE — TELEPHONE ENCOUNTER
Writer responded via [employer].  ADITI RamachandranN, RN-BC  [facility]

## 2024-04-12 ENCOUNTER — LAB (OUTPATIENT)
Dept: LAB | Facility: CLINIC | Age: 54
End: 2024-04-12
Payer: COMMERCIAL

## 2024-04-12 DIAGNOSIS — R73.03 PREDIABETES: ICD-10-CM

## 2024-04-12 LAB — HBA1C MFR BLD: 5.6 % (ref 0–5.6)

## 2024-04-12 PROCEDURE — 83036 HEMOGLOBIN GLYCOSYLATED A1C: CPT

## 2024-04-12 PROCEDURE — 36415 COLL VENOUS BLD VENIPUNCTURE: CPT

## 2024-04-17 ENCOUNTER — MYC MEDICAL ADVICE (OUTPATIENT)
Dept: FAMILY MEDICINE | Facility: CLINIC | Age: 54
End: 2024-04-17
Payer: COMMERCIAL

## 2024-05-13 ENCOUNTER — TELEPHONE (OUTPATIENT)
Dept: GASTROENTEROLOGY | Facility: CLINIC | Age: 54
End: 2024-05-13
Payer: COMMERCIAL

## 2024-05-13 NOTE — TELEPHONE ENCOUNTER
Pre visit planning completed.      Procedure details:    Patient scheduled for Upper endoscopy (EGD) on 5/23/24.     Arrival time: 0930. Procedure time 1030    Facility location: [facility]; [address] [place] [address], 5[address] Floor, [place] [address]. Check in location: 5th Floor.    Sedation type: MAC    Pre op exam needed? N/A    Indication for procedure:   Throat irritation            Chart review:     Electronic implanted devices? No    Recent diagnosis of diverticulitis within the last 6 weeks? No    Diabetic? Prediabetic.       Medication review:    Anticoagulants? No    NSAIDS? Yes.  Ibuprofen (Advil, Motrin).  Holding interval of 1 day before procedure.    Other medication HOLDING recommendations:  Weight loss injectable medication: Ozempic (Semaglutide). Weekly dosing of medication.  Hold 7 days before procedure.  Follow up with managing provider.   Semaglutide-Weight Management (WEGOVY). Weekly dosing of medication.  Hold 7 days before procedure.  Follow up with managing provider.       Prep for procedure:     Prep instructions sent via [employer]         Belem Patel RN  Endoscopy Procedure Pre Assessment RN  [phone] option 4

## 2024-05-14 NOTE — TELEPHONE ENCOUNTER
Attempted to contact patient in order to complete pre assessment questions.     Patient scheduled for Upper endoscopy (EGD) on 5/23/24     No answer. Left message to return call to [phone] option 4    Callback required communication sent via [employer].      Belem Patel RN  Endoscopy Procedure Pre Assessment RN

## 2024-05-15 NOTE — TELEPHONE ENCOUNTER
Pre assessment completed for upcoming procedure.   (Please see previous telephone encounter notes for complete details)    Procedure details:    Arrival time and facility location reviewed.    Pre op exam needed? N/A    Designated  policy reviewed. Instructed to have someone stay 24 hours post procedure.     COVID policy reviewed.      Medication review:    Medications reviewed. Please see supporting documentation below. Holding recommendations discussed (if applicable).       Prep for procedure:     Procedure prep instructions reviewed.        Additional information needed?  N/A      Patient  verbalized understanding and had no questions or concerns at this time.      Corinne Kliber, RN  Endoscopy Procedure Pre Assessment RN  [phone] option 4

## 2024-05-22 ENCOUNTER — ANESTHESIA EVENT (OUTPATIENT)
Dept: SURGERY | Facility: AMBULATORY SURGERY CENTER | Age: 54
End: 2024-05-22
Payer: COMMERCIAL

## 2024-05-23 ENCOUNTER — ANESTHESIA (OUTPATIENT)
Dept: SURGERY | Facility: AMBULATORY SURGERY CENTER | Age: 54
End: 2024-05-23
Payer: COMMERCIAL

## 2024-05-23 ENCOUNTER — HOSPITAL ENCOUNTER (OUTPATIENT)
Facility: AMBULATORY SURGERY CENTER | Age: 54
Discharge: HOME | End: 2024-05-23
Attending: STUDENT IN AN ORGANIZED HEALTH CARE EDUCATION/TRAINING PROGRAM
Payer: COMMERCIAL

## 2024-05-23 VITALS
HEIGHT: 70 IN | SYSTOLIC BLOOD PRESSURE: 133 MMHG | WEIGHT: 200 LBS | BODY MASS INDEX: 28.63 KG/M2 | HEART RATE: 58 BPM | RESPIRATION RATE: 16 BRPM | OXYGEN SATURATION: 98 % | DIASTOLIC BLOOD PRESSURE: 79 MMHG | TEMPERATURE: 97.3 F

## 2024-05-23 VITALS — HEART RATE: 69 BPM

## 2024-05-23 LAB — UPPER GI ENDOSCOPY: NORMAL

## 2024-05-23 PROCEDURE — 43239 EGD BIOPSY SINGLE/MULTIPLE: CPT | Performed by: STUDENT IN AN ORGANIZED HEALTH CARE EDUCATION/TRAINING PROGRAM

## 2024-05-23 PROCEDURE — 43239 EGD BIOPSY SINGLE/MULTIPLE: CPT | Performed by: ANESTHESIOLOGY

## 2024-05-23 PROCEDURE — 43239 EGD BIOPSY SINGLE/MULTIPLE: CPT | Performed by: NURSE ANESTHETIST, CERTIFIED REGISTERED

## 2024-05-23 PROCEDURE — 88305 TISSUE EXAM BY PATHOLOGIST: CPT | Mod: 26 | Performed by: PATHOLOGY

## 2024-05-23 PROCEDURE — 88305 TISSUE EXAM BY PATHOLOGIST: CPT | Mod: TC | Performed by: STUDENT IN AN ORGANIZED HEALTH CARE EDUCATION/TRAINING PROGRAM

## 2024-05-23 RX ORDER — ONDANSETRON 2 MG/ML
4 INJECTION INTRAMUSCULAR; INTRAVENOUS EVERY 6 HOURS PRN
Status: CANCELLED | OUTPATIENT
Start: 2024-05-23

## 2024-05-23 RX ORDER — NALOXONE HYDROCHLORIDE 0.4 MG/ML
0.4 INJECTION, SOLUTION INTRAMUSCULAR; INTRAVENOUS; SUBCUTANEOUS
Status: CANCELLED | OUTPATIENT
Start: 2024-05-23

## 2024-05-23 RX ORDER — ONDANSETRON 2 MG/ML
4 INJECTION INTRAMUSCULAR; INTRAVENOUS
Status: DISCONTINUED | OUTPATIENT
Start: 2024-05-23 | End: 2024-05-24 | Stop reason: HOSPADM

## 2024-05-23 RX ORDER — FLUMAZENIL 0.1 MG/ML
0.2 INJECTION, SOLUTION INTRAVENOUS
Status: CANCELLED | OUTPATIENT
Start: 2024-05-23 | End: 2024-05-23

## 2024-05-23 RX ORDER — SODIUM CHLORIDE, SODIUM LACTATE, POTASSIUM CHLORIDE, CALCIUM CHLORIDE 600; 310; 30; 20 MG/100ML; MG/100ML; MG/100ML; MG/100ML
INJECTION, SOLUTION INTRAVENOUS CONTINUOUS
Status: DISCONTINUED | OUTPATIENT
Start: 2024-05-23 | End: 2024-05-24 | Stop reason: HOSPADM

## 2024-05-23 RX ORDER — NALOXONE HYDROCHLORIDE 0.4 MG/ML
0.2 INJECTION, SOLUTION INTRAMUSCULAR; INTRAVENOUS; SUBCUTANEOUS
Status: CANCELLED | OUTPATIENT
Start: 2024-05-23

## 2024-05-23 RX ORDER — PROCHLORPERAZINE MALEATE 10 MG
10 TABLET ORAL EVERY 6 HOURS PRN
Status: CANCELLED | OUTPATIENT
Start: 2024-05-23

## 2024-05-23 RX ORDER — PROPOFOL 10 MG/ML
INJECTION, EMULSION INTRAVENOUS PRN
Status: DISCONTINUED | OUTPATIENT
Start: 2024-05-23 | End: 2024-05-23

## 2024-05-23 RX ORDER — SODIUM CHLORIDE, SODIUM LACTATE, POTASSIUM CHLORIDE, CALCIUM CHLORIDE 600; 310; 30; 20 MG/100ML; MG/100ML; MG/100ML; MG/100ML
INJECTION, SOLUTION INTRAVENOUS CONTINUOUS PRN
Status: DISCONTINUED | OUTPATIENT
Start: 2024-05-23 | End: 2024-05-23

## 2024-05-23 RX ORDER — LIDOCAINE 40 MG/G
CREAM TOPICAL
Status: DISCONTINUED | OUTPATIENT
Start: 2024-05-23 | End: 2024-05-24 | Stop reason: HOSPADM

## 2024-05-23 RX ORDER — PROPOFOL 10 MG/ML
INJECTION, EMULSION INTRAVENOUS CONTINUOUS PRN
Status: DISCONTINUED | OUTPATIENT
Start: 2024-05-23 | End: 2024-05-23

## 2024-05-23 RX ORDER — LIDOCAINE HYDROCHLORIDE 20 MG/ML
INJECTION, SOLUTION INFILTRATION; PERINEURAL PRN
Status: DISCONTINUED | OUTPATIENT
Start: 2024-05-23 | End: 2024-05-23

## 2024-05-23 RX ORDER — ONDANSETRON 4 MG/1
4 TABLET, ORALLY DISINTEGRATING ORAL EVERY 6 HOURS PRN
Status: CANCELLED | OUTPATIENT
Start: 2024-05-23

## 2024-05-23 RX ADMIN — LIDOCAINE HYDROCHLORIDE 100 MG: 20 INJECTION, SOLUTION INFILTRATION; PERINEURAL at 10:14

## 2024-05-23 RX ADMIN — PROPOFOL 100 MG: 10 INJECTION, EMULSION INTRAVENOUS at 10:15

## 2024-05-23 RX ADMIN — PROPOFOL 200 MCG/KG/MIN: 10 INJECTION, EMULSION INTRAVENOUS at 10:15

## 2024-05-23 RX ADMIN — SODIUM CHLORIDE, SODIUM LACTATE, POTASSIUM CHLORIDE, CALCIUM CHLORIDE: 600; 310; 30; 20 INJECTION, SOLUTION INTRAVENOUS at 09:50

## 2024-05-23 NOTE — ANESTHESIA PREPROCEDURE EVALUATION
Anesthesia Pre-Procedure Evaluation    Patient: Alpesh Winston   MRN: [id number] : 1970        Procedure : Procedure(s):  Esophagoscopy, gastroscopy, duodenoscopy (EGD), combined          Past Medical History:   Diagnosis Date     Allergic rhinitis due to other allergen      History of chest pain     TO ER - EVALULATED, STRESS TEST ETC, ALL NEGATIVE     Hyperlipidaemia      Lipoma of other skin and subcutaneous tissue 2012    Chest wall     Mass 5/3/2012     Other and unspecified hyperlipidemia      Tibia/fibula fracture 2016      Past Surgical History:   Procedure Laterality Date     ABDOMEN SURGERY      Hernia surgery     ARTHROSCOPY SHOULDER SUPERIOR LABRUM ANTERIOR TO POSTERIOR REPAIR Left 3/27/2023    Procedure: LEFT SHOULDER ARTHROSCOPY W INTRA-ARTICULAR DEBRIDEMENT, BICEPS TENODESIS , Labral  debridement, subacromial decompression,;  Surgeon: Gabriela Cunningham MD;  Location: [facility] OR     COLONOSCOPY N/A 2022    Procedure: COLONOSCOPY, WITH POLYPECTOMY;  Surgeon: Michelle Valenzuela MD;  Location: [facility] OR     ENT SURGERY       EYE SURGERY      SURGERY X 2 BILAT TEAR DUCTS      HEAD & NECK SURGERY       HERNIA REPAIR       LAPAROSCOPIC HERNIORRHAPHY INGUINAL BILATERAL Bilateral 10/27/2016    Procedure: LAPAROSCOPIC HERNIORRHAPHY INGUINAL BILATERAL;  Surgeon: Nellie Duron MD;  Location:  OR     SOFT TISSUE SURGERY      Many lypoma removed - Multiple dates     SURGICAL HISTORY OF -       removal of lipoma X 4     SURGICAL HISTORY OF -   10/00    left inguinal hernia repair     SURGICAL HISTORY OF -       wisdom teeth extraction      Allergies   Allergen Reactions     Cefdinir Hives     Sulfa Antibiotics Rash      Social History     Tobacco Use     Smoking status: Never     Smokeless tobacco: Current     Types: Chew     Tobacco comments:     chewing less   Substance Use Topics     Alcohol use: Yes     Comment: 10-12 drinks a week       Wt Readings from Last 1 Encounters:  "  01/17/24 93.7 kg (206 lb 8 oz)        Anesthesia Evaluation   Pt has had prior anesthetic. Type: General and MAC.        ROS/MED HX  ENT/Pulmonary:  - neg pulmonary ROS     Neurologic:  - neg neurologic ROS     Cardiovascular:  - neg cardiovascular ROS   (+) Dyslipidemia - -   -  - -                                      METS/Exercise Tolerance: >4 METS    Hematologic:  - neg hematologic  ROS     Musculoskeletal:  - neg musculoskeletal ROS     GI/Hepatic: Comment: Fatty liver    (+) GERD, Asymptomatic on medication,           liver disease,       Renal/Genitourinary:       Endo:  - neg endo ROS     Psychiatric/Substance Use: Comment: Heavy drinker of  10-12 drinks per week      Infectious Disease:  - neg infectious disease ROS     Malignancy:       Other:            Physical Exam    Airway        Mallampati: I   TM distance: > 3 FB   Neck ROM: full   Mouth opening: > 3 cm    Respiratory Devices and Support         Dental       (+) Completely normal teeth      Cardiovascular   cardiovascular exam normal       Rhythm and rate: regular and normal     Pulmonary   pulmonary exam normal        breath sounds clear to auscultation       OUTSIDE LABS:  CBC:   Lab Results   Component Value Date    WBC 5.3 01/11/2024    WBC 5.7 09/09/2022    HGB 13.2 (L) 01/11/2024    HGB 14.0 03/21/2023    HCT 40.1 01/11/2024    HCT 45.1 09/09/2022     01/11/2024     09/09/2022     BMP:   Lab Results   Component Value Date     01/11/2024     09/28/2023    POTASSIUM 4.1 01/11/2024    POTASSIUM 4.5 09/28/2023    CHLORIDE 105 01/11/2024    CHLORIDE 106 09/28/2023    CO2 24 01/11/2024    CO2 24 09/28/2023    BUN 16.8 01/11/2024    BUN 21.0 (H) 09/28/2023    CR 1.01 01/11/2024    CR 1.10 09/28/2023     (H) 01/11/2024     (H) 09/28/2023     COAGS: No results found for: \"PTT\", \"INR\", \"FIBR\"  POC: No results found for: \"BGM\", \"HCG\", \"HCGS\"  HEPATIC:   Lab Results   Component Value Date    ALBUMIN 4.5 " 01/11/2024    PROTTOTAL 7.4 01/11/2024    ALT 30 01/11/2024    AST 25 01/11/2024    ALKPHOS 55 01/11/2024    BILITOTAL 0.4 01/11/2024     OTHER:   Lab Results   Component Value Date    A1C 5.6 04/12/2024    NAOMI 9.9 01/11/2024    LIPASE 71 (H) 01/11/2024    TSH 1.67 08/06/2018    T4 0.79 03/02/2009    T3 103 03/02/2009    CRP <2.9 08/06/2018    SED 7 08/06/2018       Anesthesia Plan    ASA Status:  2    NPO Status:  NPO Appropriate    Anesthesia Type: MAC.      Maintenance: TIVA.        Consents    Anesthesia Plan(s) and associated risks, benefits, and realistic alternatives discussed. Questions answered and patient/representative(s) expressed understanding.     - Discussed:     - Discussed with:  Patient      - Extended Intubation/Ventilatory Support Discussed: No.      - Patient is DNR/DNI Status: No     Use of blood products discussed: No .     Postoperative Care            Comments:               Elsi Hogan MD    I have reviewed the pertinent notes and labs in the chart from the past 30 days and (re)examined the patient.  Any updates or changes from those notes are reflected in this note.

## 2024-05-23 NOTE — H&P
Alpesh Winston  [id number]  male  54 year old      Reason for procedure/surgery: egd for throat irritation    Patient Active Problem List   Diagnosis    ALLERGIC RHINITIS     HYPERLIPIDEMIA LDL GOAL <130    Family history of diabetes mellitus    Hypertriglyceridemia    Family history of coronary artery disease    Chews tobacco    Gastroesophageal reflux disease without esophagitis    Elevated ferritin    Prediabetes    Left knee pain    Fatty liver    Superior labrum anterior-to-posterior (SLAP) tear of left shoulder    Acute pain of left shoulder       Past Surgical History:    Past Surgical History:   Procedure Laterality Date    ABDOMEN SURGERY  2000    Hernia surgery    ARTHROSCOPY SHOULDER SUPERIOR LABRUM ANTERIOR TO POSTERIOR REPAIR Left 3/27/2023    Procedure: LEFT SHOULDER ARTHROSCOPY W INTRA-ARTICULAR DEBRIDEMENT, BICEPS TENODESIS , Labral  debridement, subacromial decompression,;  Surgeon: Gabriela Cunningham MD;  Location: [facility] [place]    COLONOSCOPY N/A 1/24/2022    Procedure: COLONOSCOPY, WITH POLYPECTOMY;  Surgeon: Michelle Valenzuela MD;  Location: [facility]    ENT SURGERY  1989    EYE SURGERY      SURGERY X 2 BILAT TEAR DUCTS     HEAD & NECK SURGERY      HERNIA REPAIR  2000    LAPAROSCOPIC HERNIORRHAPHY INGUINAL BILATERAL Bilateral 10/27/2016    Procedure: LAPAROSCOPIC HERNIORRHAPHY INGUINAL BILATERAL;  Surgeon: Nellie Duron MD;  Location:  OR    SOFT TISSUE SURGERY      Many lypoma removed - Multiple dates    SURGICAL HISTORY OF -       removal of lipoma X 4    SURGICAL HISTORY OF -   10/00    left inguinal hernia repair    SURGICAL HISTORY OF -       wisdom teeth extraction       Past Medical History:   Past Medical History:   Diagnosis Date    Allergic rhinitis due to other allergen     History of chest pain     TO ER - EVALULATED, STRESS TEST ETC, ALL NEGATIVE    Hyperlipidaemia     Lipoma of other skin and subcutaneous tissue 5/2012    Chest wall    Mass 5/3/2012    Other and unspecified  hyperlipidemia     Tibia/fibula fracture 2016       Social History:   Social History     Tobacco Use    Smoking status: Never    Smokeless tobacco: Current     Types: Chew    Tobacco comments:     chewing less   Substance Use Topics    Alcohol use: Yes     Comment: 10-12 drinks a week        Family History:   Family History   Problem Relation Age of Onset    C.A.D. Maternal Grandmother         MI X 2 ,  in her 60's     Coronary Artery Disease Maternal Grandmother     Hypertension Maternal Grandmother     Hyperlipidemia Maternal Grandmother     Other Cancer Maternal Grandmother         cervical    Breast Cancer Maternal Grandmother     Heart Disease Father     Diabetes Father     Prostate Cancer Father     Hypertension Father     Diabetes Paternal Grandmother         type 2    Breast Cancer Sister     Alzheimer Disease Mother 74        CBD    Hypertension Mother     Alcohol/Drug Maternal Uncle     C.A.D. Maternal Uncle         MI age 56    Substance Abuse Other     Colon Cancer Other          in  stage 4 colon cancer    Cerebrovascular Disease No family hx of     Cancer - colorectal No family hx of        Allergies:   Allergies   Allergen Reactions    Cefdinir Hives    Sulfa Antibiotics Rash       Active Medications:   Current Outpatient Medications   Medication Sig Dispense Refill    atorvastatin (LIPITOR) 20 MG tablet Take 1 tablet (20 mg) by mouth daily 90 tablet 3    COENZYME Q-10 PO Take 20 mg by mouth daily      fenofibrate (TRIGLIDE/LOFIBRA) 160 MG tablet Take 1 tablet (160 mg) by mouth daily 90 tablet 3    Omega-3 Fatty Acids (FISH OIL PO)       sildenafil (VIAGRA) 100 MG tablet Take 0.5-1 tablets ( mg) by mouth daily as needed 10 tablet PRN       Systemic Review:   CONSTITUTIONAL: NEGATIVE for fever, chills, change in weight  ENT/MOUTH: NEGATIVE for ear, mouth and throat problems  RESP: NEGATIVE for significant cough or SOB  CV: NEGATIVE for chest pain, palpitations or peripheral  "edema    Physical Examination:   Vital Signs: BP (!) 141/95 (BP Location: Right arm)   Pulse 59   Temp 97  F (36.1  C) (Temporal)   Resp 18   Ht 1.778 m (5' 10\")   Wt 90.7 kg (200 lb)   SpO2 97%   BMI 28.70 kg/m    GENERAL: healthy, alert and no distress  NECK: no adenopathy, no asymmetry, masses, or scars  RESP: lungs clear to auscultation - no rales, rhonchi or wheezes  CV: regular rate and rhythm, normal S1 S2, no S3 or S4, no murmur, click or rub, no peripheral edema and peripheral pulses strong  ABDOMEN: soft, nontender, no hepatosplenomegaly, no masses and bowel sounds normal  MS: no gross musculoskeletal defects noted, no edema      Plan: Appropriate to proceed as scheduled.      Khadra Graves MD  5/23/2024    PCP:  Belem Joshua    "

## 2024-05-23 NOTE — ANESTHESIA POSTPROCEDURE EVALUATION
Patient: Alpesh Winston    Procedure: Procedure(s):  ESOPHAGOGASTRODUODENOSCOPY, WITH BIOPSY       Anesthesia Type:  MAC    Note:  Disposition: Outpatient   Postop Pain Control: Uneventful            Sign Out: Well controlled pain   PONV: No   Neuro/Psych: Uneventful            Sign Out: Acceptable/Baseline neuro status   Airway/Respiratory: Uneventful            Sign Out: Acceptable/Baseline resp. status   CV/Hemodynamics: Uneventful            Sign Out: Acceptable CV status; No obvious hypovolemia; No obvious fluid overload   Other NRE: NONE   DID A NON-ROUTINE EVENT OCCUR? No       Last vitals:  Vitals Value Taken Time   /79 05/23/24 1100   Temp 36.3  C (97.3  F) 05/23/24 1100   Pulse 58 05/23/24 1100   Resp 16 05/23/24 1100   SpO2 98 % 05/23/24 1100       Electronically Signed By: Elsi Hogan MD  May 23, 2024  11:11 AM

## 2024-05-23 NOTE — ANESTHESIA CARE TRANSFER NOTE
Patient: Alpesh Winston    Procedure: Procedure(s):  ESOPHAGOGASTRODUODENOSCOPY, WITH BIOPSY       Diagnosis: Throat irritation [J39.2]  Diagnosis Additional Information: No value filed.    Anesthesia Type:   MAC     Note:    Oropharynx: oropharynx clear of all foreign objects and spontaneously breathing  Level of Consciousness: awake  Oxygen Supplementation: room air    Independent Airway: airway patency satisfactory and stable  Dentition: dentition unchanged  Vital Signs Stable: post-procedure vital signs reviewed and stable  Report to RN Given: handoff report given  Patient transferred to: Phase II  Comments: Uneventful transport   Report to RN  Exchanging well; color natl  Pt responds appropriately to command  IV patent  Lips/teeth/dentition as preop status  Questions answered      Handoff Report: Identifed the Patient, Identified the Reponsible Provider, Reviewed the pertinent medical history, Discussed the surgical course, Reviewed Intra-OP anesthesia mangement and issues during anesthesia, Set expectations for post-procedure period and Allowed opportunity for questions and acknowledgement of understanding      Vitals:  Vitals Value Taken Time   /80    Temp 97.1    Pulse 67    Resp 16    SpO2 97% room air        Electronically Signed By: SARAH FLOWERS CRNA  May 23, 2024  10:32 AM

## 2024-05-24 LAB
PATH REPORT.COMMENTS IMP SPEC: NORMAL
PATH REPORT.COMMENTS IMP SPEC: NORMAL
PATH REPORT.FINAL DX SPEC: NORMAL
PATH REPORT.GROSS SPEC: NORMAL
PATH REPORT.MICROSCOPIC SPEC OTHER STN: NORMAL
PATH REPORT.RELEVANT HX SPEC: NORMAL
PHOTO IMAGE: NORMAL

## 2024-06-03 ENCOUNTER — VIRTUAL VISIT (OUTPATIENT)
Dept: GASTROENTEROLOGY | Facility: CLINIC | Age: 54
End: 2024-06-03
Attending: PHYSICIAN ASSISTANT
Payer: COMMERCIAL

## 2024-06-03 DIAGNOSIS — J39.2 THROAT IRRITATION: Primary | ICD-10-CM

## 2024-06-03 PROCEDURE — 99213 OFFICE O/P EST LOW 20 MIN: CPT | Mod: 95 | Performed by: PHYSICIAN ASSISTANT

## 2024-06-03 PROCEDURE — G2211 COMPLEX E/M VISIT ADD ON: HCPCS | Mod: 95 | Performed by: PHYSICIAN ASSISTANT

## 2024-06-03 NOTE — PATIENT INSTRUCTIONS
It was a pleasure taking care of you today.  I've included a brief summary of our discussion and care plan from today's visit below.  Please review this information with your primary care provider.  _______________________________________________________________________    My recommendations are summarized as follows:    Recommend to schedule the video swallow study - please call at [phone] for [place], [phone] for [place] and [phone] for [place].   Let's have you start a daily pepcid 20 mg once in the morning. If your symptoms and globus improve with this med. If they do, there's room to move up (either 40 mg in the morning, or 20 mg twice daily for better coverage throughout the day).   We could consider step up the medication regiment to a proton pump inhibitor course (which is a strong medication to decrease acid production). This decision depends on how you respond to the daily pepcid.   No infection was picked up on the biopsy sample but we can keep this as a possible course of treatment - particularly if the above medications do not help the symptoms (or if symptoms worsen). Though it's important to note you do not have risk factors for an infection of the esophagus.   I'll ask the nursing leadership to reach out to you about Friday - this should not have happened. I apologize.     Please call our clinic or send a MyChart message to us if you have any questions or concerns. MyChart messages are answered by your nurse or doctor typically within 24 hours.  Please allow extra time on weekends and holidays    Return to GI Clinic in 6-8 weeks to review your progress.    _______________________________________________________________________    How do I schedule labs, imaging studies, or procedures that were ordered in clinic today?      Labs: To schedule lab appointment at the Clinic and Surgery Center, use my chart or call [phone]. If you have a [facility] closer to home where  you are regularly seen you can give them a call.      Procedures: If a colonoscopy, upper endoscopy, breath test, esophageal manometry, or pH impedence was ordered today, our endoscopy team will call you to schedule this. If you have not heard from our endoscopy team within a week, please call [phone] option 2 to schedule.      Imaging Studies: If you were scheduled for a CT scan, X-ray, MRI, ultrasound, HIDA scan or other imaging study, please call [phone] to have this scheduled.      Referral: If a referral to another specialty was ordered, expect a phone call or follow instructions above. If you have not heard from anyone regarding your referral in a week, please call our clinic to check the status.      Who do I call with any questions after my visit?  Please be in touch if there are any further questions that arise following today's visit.  There are multiple ways to contact your gastroenterology care team.       During business hours, you may reach a Gastroenterology nurse at [phone]     To schedule or reschedule an appointment, please call [phone].      You can always send a secure message through [employer].  [employer] messages are answered by your nurse or doctor typically within 24 hours.  Please allow extra time on weekends and holidays.       For urgent/emergent questions after business hours, you may reach the on-call GI Fellow by contacting the [facility]  at [phone].     How will I get the results of any tests ordered?    You will receive all of your results.  If you have signed up for [employer], any tests ordered at your visit will be available to you after your physician reviews them.  Typically this takes 1-2 weeks.  If there are urgent results that require a change in your care plan, your physician or nurse will call you to discuss the next steps.       What is [employer]?  [employer] is a secure way for you to access all of your healthcare records from the  [facility].  It is a web based computer program, so you can sign on to it from any location.  It also allows you to send secure messages to your care team.  I recommend signing up for [employer] access if you have not already done so and are comfortable with using a computer.       How to I schedule a follow-up visit?  If you did not schedule a follow-up visit today, please call [phone] to schedule a follow-up office visit.      Sincerely,    Afsaneh Suarez PA-C  Gastroenterology

## 2024-06-03 NOTE — LETTER
"6/3/2024      Alpesh Winston  [address] [place] [address]      Dear Colleague,    Thank you for referring your patient, Alpesh Winston, to the [facility] [place]. Please see a copy of my visit note below.    Joined the call at 6/3/2024, 12:01:46 pm.  Left the call at 6/3/2024, 12:31:38 pm.  You were on the call for 29 minutes 52 seconds .    GI CLINIC VISIT    HPI  Alpesh Winston is a 54 year old year old male with PMHx of HLD, hypertrig, preDM, fatty liver following with the [facility] group for throat irritation. He est'd care with me in 1/2024.     Today 6/3/24    Interval history   -EGD - white plaques to mid esophagus, otherwise normal esophagus. GEJ Hill Grade III, erythametous gastric mucosa, few small gastric polyps, normal duodenum   Biopsy   --gastric antrum biopsy - mild chronic inactive gastritis, no HP or GIM/dysplasia  --Gastric body biopsy - gastric body type mucosa with mild chronic inactive gastritis, NEG for HP or GIM/dysplasia  --Gastric polyp - FGP w/o dysplasia  --Distal esophageal biopsy - squamous w/o significant histopathologic abnl. No EoE, intestinal metaplasia or dysplasia  --Proximal esophageal biopsies - squamous epithelium without significant histopathologic abnl. No evidence of EoE. Fragments of unremarkable gastric body type mucosa.   -XR eso timed with tablet - tablet temporarily caught at level of aortic arch but cleared, otherwise unremarkable study   -no VFSS scheduled yet     He is overall doing well. His sx of throat irritation continue but it remains mild, \"more a nagging\" sensation.   Continues to moderate his coffee intake which he feels is overall helpful in managing his upper GI sx. No regurg, dysphagia, odynphagia, nausea/vomiting.   Heartburn is mild, as noted previously  Appetite/weight are stable   Stooling is per his baseline w/o issues. Once daily BSC type 4. No blood or black.    He reports he arrived " "early to last fri's appt but he was not checked in until 12:44 pm (appt time 12pm). He was then told I had left the clinic early and could not see him which was why the appt had to be scheduled to today (false). Informed him of Friday's \"late\" check in and how I had asked team to offer an overbook appt slot for today at 12pm instead.     Jan 2024 appt with me   1. Largest issue is that of persistent throat irritation, described as constant \"tightness\" that he found improved when he decreased his overall coffee intake (4x 20 oz to 2 x 20 oz currently). It started initially about 10-12 years ago, at which time sounds like he had an EGD that was unremarkable and reportedly told he could take PRN rescue med as needed. He instead decreased his coffee which overall resolved the issue.     For unclear reasons, it flared up again in past year. Shares it feels like something is stuck in his throat all the time, pointing to suprasternal notch. No improvement/worsening with swallowing. He denies trouble with initiation of a swallow - needing to cough/gag/spit up.  Denies dysphagia, odynphagia, regurgitation. No voice changes, neck masses/neck pain. No unintentional weight loss.   Does have ongoing need to clear his throat but this is an ongoing thing and does not coincide with sx flare in past year.   He does have occ heartburn described as retrosternal burning / pressure sensation especially when he is supine for the night. This occurs 2x a week if that - he typically deals with it and does not take any rescue meds. No NSAID use. Does drink ETOH, only on weekends - not able to quantify use but typically of beer, red wine, gin and tonics     He does chew tobacco - ongoing use for 35 years x 1 tin a week. In college years and early 20s he chewed much more.   No known FHX of eso cancer.   Does see dentist routinely q6mo and reports oral cavity is in good health    Got shoulder surgery May 2023 and sounds like intubated during " "procedure. Shares this procedure, he wonders if flaps are closing appropriately? No regur. No cough.     Does have ongoing L mid/lower abd discomfort for years. Constant, feels uncomfortable. No known relationship to eating/defecation to this pain. He did have L inguinal hernia repair years ago with mesh, w/ resultant tear of abd wall muscles requiring repeat intervention. He thinks adhesions/scar tissue is playing a role in this pain. I agree with him. No obstructive symptoms.   Did have recent Cscope late 2022 with BPPS 8, TI intubation and finding of 1 TA w/o high grade dysplasia.     He has regular normal BM w/o issues. Typically 1 bristol 4 stool once daily w/o blood or black stools. Good evacuation.     Weight - no weight loss   Appetite good     ROS  Denies fevers, chills, weight loss, nausea, emesis, dysphagia, odynophagia, dysphonia/hoarseness, chronic cough, neck pain/swelling/mass, heartburn, regurgitation, abdominal pain, bloating/fullness, post prandial bloating, black tarrying stools, bloody stools, rectal pain, rectal fullness, rectal itching.     Family Hx  M uncle - passed 74-75 from stage 4 CRC (never had colonoscopy)  Mother - passed 2018, dementia   Dad - passed from ?PE (kidney issues)     No other known family history or GI related malignancy (esophageal, gastric, pancreatic, liver or colon) or family history of IBD/celiac disease.     Social Hx   No  use of NSAIDs or Tylenol.   No use of OTC herbal supplements/weight loss products.      Yes ETOH, weekends, beer \"few beer\" and gin and tonics, red wine  +tobacco products.   No recreational drug use.     PROBLEM LIST  Patient Active Problem List    Diagnosis Date Noted    Acute pain of left shoulder 03/28/2023     Priority: Medium    Superior labrum anterior-to-posterior (SLAP) tear of left shoulder 03/02/2023     Priority: Medium     Added automatically from request for surgery [id number]      Fatty liver 09/20/2022     Priority: Medium    Left " knee pain 10/04/2021     Priority: Medium    Prediabetes 08/21/2021     Priority: Medium    Elevated ferritin 08/09/2018     Priority: Medium    Gastroesophageal reflux disease without esophagitis 11/10/2015     Priority: Medium    Chews tobacco 11/19/2014     Priority: Medium    Family history of coronary artery disease 01/05/2012     Priority: Medium    Family history of diabetes mellitus 12/22/2011     Priority: Medium    Hypertriglyceridemia 12/22/2011     Priority: Medium    HYPERLIPIDEMIA LDL GOAL <130 10/31/2010     Priority: Medium    ALLERGIC RHINITIS  10/12/2004     Priority: Medium       PERTINENT PAST MEDICAL HISTORY:  Past Medical History:   Diagnosis Date    Allergic rhinitis due to other allergen     History of chest pain     TO ER - EVALULATED, STRESS TEST ETC, ALL NEGATIVE    Hyperlipidaemia     Lipoma of other skin and subcutaneous tissue 5/2012    Chest wall    Mass 5/3/2012    Other and unspecified hyperlipidemia     Tibia/fibula fracture 6/28/2016       PREVIOUS SURGERIES:  Past Surgical History:   Procedure Laterality Date    ABDOMEN SURGERY  2000    Hernia surgery    ARTHROSCOPY SHOULDER SUPERIOR LABRUM ANTERIOR TO POSTERIOR REPAIR Left 3/27/2023    Procedure: LEFT SHOULDER ARTHROSCOPY W INTRA-ARTICULAR DEBRIDEMENT, BICEPS TENODESIS , Labral  debridement, subacromial decompression,;  Surgeon: Gabriela Cunningham MD;  Location: [facility] OR    COLONOSCOPY N/A 1/24/2022    Procedure: COLONOSCOPY, WITH POLYPECTOMY;  Surgeon: Michelle Valenzuela MD;  Location: [facility] OR    ENT SURGERY  1989    ESOPHAGOSCOPY, GASTROSCOPY, DUODENOSCOPY (EGD), COMBINED N/A 5/23/2024    Procedure: ESOPHAGOGASTRODUODENOSCOPY, WITH BIOPSY;  Surgeon: Khadra Graves MD;  Location: [facility] OR    EYE SURGERY      SURGERY X 2 BILAT TEAR DUCTS     HEAD & NECK SURGERY      HERNIA REPAIR  2000    LAPAROSCOPIC HERNIORRHAPHY INGUINAL BILATERAL Bilateral 10/27/2016    Procedure: LAPAROSCOPIC HERNIORRHAPHY INGUINAL BILATERAL;  Surgeon: Domi  MD Nellie;  Location: [facility] OR    SOFT TISSUE SURGERY      Many lypoma removed - Multiple dates    SURGICAL HISTORY OF -       removal of lipoma X 4    SURGICAL HISTORY OF -   10/00    left inguinal hernia repair    SURGICAL HISTORY OF -       wisdom teeth extraction       ALLERGIES:     Allergies   Allergen Reactions    Cefdinir Hives    Sulfa Antibiotics Rash       PERTINENT MEDICATIONS:    Current Outpatient Medications:     atorvastatin (LIPITOR) 20 MG tablet, Take 1 tablet (20 mg) by mouth daily, Disp: 90 tablet, Rfl: 3    COENZYME Q-10 PO, Take 20 mg by mouth daily, Disp: , Rfl:     fenofibrate (TRIGLIDE/LOFIBRA) 160 MG tablet, Take 1 tablet (160 mg) by mouth daily, Disp: 90 tablet, Rfl: 3    Omega-3 Fatty Acids (FISH OIL PO), , Disp: , Rfl:     sildenafil (VIAGRA) 100 MG tablet, Take 0.5-1 tablets ( mg) by mouth daily as needed, Disp: 10 tablet, Rfl: PRN    SOCIAL HISTORY:  Social History     Socioeconomic History    Marital status:      Spouse name: Not on file    Number of children: Not on file    Years of education: Not on file    Highest education level: Not on file   Occupational History    Occupation:      Employer: [employer]   Tobacco Use    Smoking status: Never    Smokeless tobacco: Current     Types: Chew    Tobacco comments:     chewing less   Vaping Use    Vaping status: Never Used   Substance and Sexual Activity    Alcohol use: Yes     Comment: 10-12 drinks a week     Drug use: No    Sexual activity: Yes     Partners: Female     Birth control/protection: Male Surgical   Other Topics Concern    Parent/sibling w/ CABG, MI or angioplasty before 65F 55M? No   Social History Narrative    Dairy/d 1 servings/d.     Caffeine 3 20 oz bottles  servings/d    Exercise 2 x week    Sunscreen used - NA    Seatbelts used - Yes    Working smoke/CO detectors in the home - Yes    Guns stored in the home - Yes    Self Breast Exams - NA    Self Testicular Exam - No    Eye Exam up to  date - No    Dental Exam up to date - No    Pap Smear up to date - NA    Mammogram up to date - NA    PSA up to date - NA    Dexa Scan up to date - NA    Flex Sig / Colonoscopy up to date - NA    Immunizations up to date - No-1991    Abuse: Current or Past(Physical, Sexual or Emotional)- No    Do you feel safe in your environment - Yes             Social Determinants of Health     Financial Resource Strain: Low Risk  (2024)    Financial Resource Strain     Within the past 12 months, have you or your family members you live with been unable to get utilities (heat, electricity) when it was really needed?: No   Food Insecurity: Low Risk  (2024)    Food Insecurity     Within the past 12 months, did you worry that your food would run out before you got money to buy more?: No     Within the past 12 months, did the food you bought just not last and you didn t have money to get more?: No   Transportation Needs: Low Risk  (2024)    Transportation Needs     Within the past 12 months, has lack of transportation kept you from medical appointments, getting your medicines, non-medical meetings or appointments, work, or from getting things that you need?: No   Physical Activity: Not on file   Stress: Not on file   Social Connections: Not on file   Interpersonal Safety: Low Risk  (10/9/2023)    Interpersonal Safety     Do you feel physically and emotionally safe where you currently live?: Yes     Within the past 12 months, have you been hit, slapped, kicked or otherwise physically hurt by someone?: No     Within the past 12 months, have you been humiliated or emotionally abused in other ways by your partner or ex-partner?: No   Housing Stability: Low Risk  (2024)    Housing Stability     Do you have housing? : Yes     Are you worried about losing your housing?: No       FAMILY HISTORY:  Family History   Problem Relation Age of Onset    C.A.D. Maternal Grandmother         MI X 2 ,  in her 60's     Coronary  Artery Disease Maternal Grandmother     Hypertension Maternal Grandmother     Hyperlipidemia Maternal Grandmother     Other Cancer Maternal Grandmother         cervical    Breast Cancer Maternal Grandmother     Heart Disease Father     Diabetes Father     Prostate Cancer Father     Hypertension Father     Diabetes Paternal Grandmother         type 2    Breast Cancer Sister     Alzheimer Disease Mother 74        CBD    Hypertension Mother     Alcohol/Drug Maternal Uncle     C.A.D. Maternal Uncle         MI age 56    Substance Abuse Other     Colon Cancer Other          in  stage 4 colon cancer    Cerebrovascular Disease No family hx of     Cancer - colorectal No family hx of      Past/family/social history reviewed and no changes    PHYSICAL EXAMINATION:  Vitals reviewed: There were no vitals taken for this visit.  Wt:   Wt Readings from Last 2 Encounters:   24 90.7 kg (200 lb)   24 93.7 kg (206 lb 8 oz)      Video physical exam  General: Patient appears well in no acute distress.   Skin: No visualized rash or lesions on visualized skin  Eyes: EOMI, no erythema, sclera icterus or discharge noted  Resp: Appears to be breathing comfortably without accessory muscle usage, speaking in full sentences, no cough  MSK: Appears to have normal range of motion based on visualized movements  Neurologic: No apparent tremors, facial movements symmetric  Psych: affect normal, alert and oriented    PERTINENT STUDIES:    Lab on 2023   Component Date Value Ref Range Status    Prostate Specific Antigen Screen 2023 1.26  0.00 - 3.50 ng/mL Final    Cholesterol 2023 210 (H)  <200 mg/dL Final    Triglycerides 2023 296 (H)  <150 mg/dL Final    Direct Measure HDL 2023 29 (L)  >=40 mg/dL Final    LDL Cholesterol Calculated 2023 122 (H)  <=100 mg/dL Final    Non HDL Cholesterol 2023 181 (H)  <130 mg/dL Final    Hemoglobin A1C 2023 6.1 (H)  0.0 - 5.6 % Final    Ferritin  09/28/2023 237  31 - 409 ng/mL Final    Sodium 09/28/2023 142  135 - 145 mmol/L Final    Potassium 09/28/2023 4.5  3.4 - 5.3 mmol/L Final    Carbon Dioxide (CO2) 09/28/2023 24  22 - 29 mmol/L Final    Anion Gap 09/28/2023 12  7 - 15 mmol/L Final    Urea Nitrogen 09/28/2023 21.0 (H)  6.0 - 20.0 mg/dL Final    Creatinine 09/28/2023 1.10  0.67 - 1.17 mg/dL Final    GFR Estimate 09/28/2023 81  >60 mL/min/1.73m2 Final    Calcium 09/28/2023 9.7  8.6 - 10.0 mg/dL Final    Chloride 09/28/2023 106  98 - 107 mmol/L Final    Glucose 09/28/2023 123 (H)  70 - 99 mg/dL Final    Alkaline Phosphatase 09/28/2023 61  40 - 129 U/L Final    AST 09/28/2023 28  0 - 45 U/L Final    ALT 09/28/2023 29  0 - 70 U/L Final    Protein Total 09/28/2023 7.4  6.4 - 8.3 g/dL Final    Albumin 09/28/2023 4.5  3.5 - 5.2 g/dL Final    Bilirubin Total 09/28/2023 0.3  <=1.2 mg/dL Final        Lab Results   Component Value Date    WBC 5.3 01/11/2024    WBC 5.7 09/09/2022    WBC 5.0 04/09/2020    HGB 13.2 (L) 01/11/2024    HGB 14.0 03/21/2023    HGB 15.0 09/09/2022     01/11/2024     09/09/2022     04/09/2020    CHOL 210 (H) 09/28/2023    CHOL 204 (H) 12/20/2022    CHOL 227 (H) 09/09/2022    TRIG 296 (H) 09/28/2023    TRIG 251 (H) 12/20/2022    TRIG 424 (H) 09/09/2022    HDL 29 (L) 09/28/2023    HDL 33 (L) 12/20/2022    HDL 26 (L) 09/09/2022    ALT 30 01/11/2024    ALT 29 09/28/2023    ALT 54 09/20/2022    AST 25 01/11/2024    AST 28 09/28/2023    AST 28 09/20/2022     01/11/2024     09/28/2023     08/18/2021    BUN 16.8 01/11/2024    BUN 21.0 (H) 09/28/2023    BUN 18 08/18/2021    CO2 24 01/11/2024    CO2 24 09/28/2023    CO2 23 08/18/2021    TSH 1.67 08/06/2018    TSH 1.72 01/05/2012    TSH 1.22 03/02/2009    GLUF 101 01/05/2012        Liver Function Studies -   Recent Labs   Lab Test 09/28/23  0902   PROTTOTAL 7.4   ALBUMIN 4.5   BILITOTAL 0.3   ALKPHOS 61   AST 28   ALT 29        PREVIOUS ENDOSCOPY    Results for  orders placed or performed during the hospital encounter of 01/24/22   COLONOSCOPY   Result Value Ref Range    COLONOSCOPY       [facility]  [address] [place] [address] [place] [address] [phone]     Endoscopy Department  _______________________________________________________________________________  Patient Name: Alpesh Winston      Procedure Date: 1/24/2022 12:32 PM  MRN: [id number]                       Account Number: [id number]  YOB: 1970               Admit Type: Outpatient  Age: 52                               Room: Pro 5  Gender: Male                          Note Status: Finalized  Attending MD: Michelle Valenzuela MD     Pause for the Cause: performed.  Total Sedation Time: per anesthesia.    _______________________________________________________________________________     Procedure:             Colonoscopy  Indications:           Screening for colorectal malignant neoplasm  Providers:             Michelle Valenzuela MD, Nellie Mccann, Marivel Jolly, GERI  Referring MD:          Belem Joshua NP, NP  Medicines:              Monitored Anesthesia Care  Complications:         No immediate complications.  _______________________________________________________________________________  Procedure:             Pre-Anesthesia Assessment:                         - Please see EPIC H&P for pre-anesthesia assessment.                         After obtaining informed consent, the colonoscope was                          passed under direct vision. Throughout the procedure,                          the patient's blood pressure, pulse, and oxygen                          saturations were monitored continuously. The                          Colonoscope was introduced through the anus and                          advanced to the terminal ileum, with identification of                          the appendiceal orifice and IC valve. The  colonoscopy                          was performed without difficulty. The patient                          tolerated the procedure well. The quality of the bowel                           preparation was evaluated using the BBPS (Alston Bowel                          Preparation Scale) with scores of: Right Colon = 2                          (minor amount of residual staining, small fragments of                          stool and/or opaque liquid, but mucosa seen well),                          Transverse Colon = 3 (entire mucosa seen well with no                          residual staining, small fragments of stool or opaque                          liquid) and Left Colon = 3 (entire mucosa seen well                          with no residual staining, small fragments of stool or                          opaque liquid). The total BBPS score equals 8.                                                                                   Findings:       The terminal ileum appeared normal.       Two sessile polyps were found in the proximal ascending colon. The        polyps were 4 to 6 mm in size. These polyps were removed with a cold        snare. Resection and retrieval were  complete.       Non-bleeding internal hemorrhoids were found during retroflexion. The        hemorrhoids were mild.                                                                                   Impression:            - The examined portion of the ileum was normal.                         - Two 4 to 6 mm polyps in the proximal ascending                          colon, removed with a cold snare. Resected and                          retrieved.                         - Non-bleeding internal hemorrhoids.  Recommendation:        - Discharge patient to home (ambulatory).                         - Resume previous diet.                         - Continue present medications.                         - Await pathology results.                         -  The findings and recommendations were discussed with                          the patient.                         - Repeat colonoscopy in 7-10 years for surveillance                          based on pathology results.                                                                                      Electronically signed by: Michelle Valenzuela MD  __________________  Michelle Valenzuela MD  1/24/2022 1:54:37 PM  I was physically present for the entire viewing portion of the exam.  __________________________  Signature of teaching physician  Michelle Valenzuela MD  Number of Addenda: 0    Note Initiated On: 1/24/2022 12:32 PM  Scope In:  Scope Out:       Final Diagnosis  POLYPS X2, ASCENDING COLON, POLYPECTOMY:  - Tubular adenoma; negative for high-grade dysplasia  - Inflammatory polyp    ASSESSMENT/PLAN:  Alpesh Winston is a 54 year old year old male with PMHx of HLD, hypertrig, preDM, fatty liver following w/ [facility] group for throat irritation. He reports constant suprasternal sensation for past year that relented some after he decreased his daily coffee intake. It is associated with occ heartburn sensation and throat clearing sensation. He does chew tobacco chronically (35+y). His goal is comprehensive evaluation of this throat irritation.     #throat irritation noted to suprasternal notch x 1 year   Sx consistent with globus with ddx GERD/reflux, functional disease (reflux hypersensitivity, functional heartburn).     -We reviewed EGD findings - findings of which are suggestive but not diagnostic of GERD per Kasper 2.0 consensus. He does have Hill Grade III GEJ for which I recommended diaphragmatic breathing exercises. If he were to have regu (none reported today), can add on scheduled alginate product.   -curious about gastric mucosa noted to proximal esophagus - consider ectopic gastric mucosa to esophagus, which could in theory secrete gastric acid locally leading to irritation. For this, I recommended start of  daily H2B. We can consider scheduled PPI trial depending on clinical response to H2B trial. I reached out to pathologist on this finding - ?ectopic gastric mucosa   -advised he schedule VFSS   -ENT appt not scheduled yet - we can considr in future depending on above clinical response     Future consideration  1. Further objective work-up for GERD/NERD could include HRM +/- 24h impedence     #hepatic steatosis   In setting with normal AST/ALT, likely driven by MAFLD and ETOH use   -recommended decrease/limit ETOH use  -recommend lipid control (on atorv and fenofibrate)  -recommend slow gradual weight loss and regular exercise as tolerated     FIB-4 Calculation: 0.82 at 1/11/2024  1:20 PM  Calculated from:  SGOT/AST: 25 U/L at 1/11/2024  1:20 PM  SGPT/ALT: 30 U/L at 1/11/2024  1:20 PM  Platelets: 290 10e3/uL at 1/11/2024  1:20 PM  Age: 54 years    #L mid/lower abdominal pain, chronic  Mild and ongoing for years, likely related to adhesions 2/2 inguinal hernia repair x 2 w/ mesh and perhaps component of abd wall pain  -we discussed possible PT previously, he declined today     #TA of colon w/o high grade dysplasia   Cscope 1/2022 with 7-10y recall; due 1/2029 - 1/2032    Future consideration  1.Sooner scope for alarm sx      No orders of the defined types were placed in this encounter.    RTC - 6-8 weeks or sooner PRN     Thank you for this consultation.  It was a pleasure to participate in the care of this patient; please contact me with any further questions.     Follow up: As planned above. Today, I personally spent 29 minutes in direct face to face time with the patient, of which greater than 50% of the time was spent in patient education and counseling as described above. Approximately minutes were spent on indirect care associated with the patient's consultation including but not limited to review of: patient medical records to date, clinic visits, hospital records, lab results, imaging studies, procedural  documentation, and coordinating care with other providers. The findings from this review are summarized in the above note. All of the above accounted for a cumulative time of 29 minutes and was performed on the date of service.       Again, thank you for allowing me to participate in the care of your patient.      Sincerely,    Afsaneh Suarez PA-C

## 2024-06-03 NOTE — NURSING NOTE
Is the patient currently in the state of [place]? YES    Visit mode:VIDEO    If the visit is dropped, the patient can be reconnected by: VIDEO VISIT: Text to cell phone:   Telephone Information:   Mobile [phone]       Will anyone else be joining the visit? NO  (If patient encounters technical issues they should call [phone]    How would you like to obtain your AVS? MyChart    Are changes needed to the allergy or medication list? No    Are refills needed on medications prescribed by this physician? NO    Reason for visit: RECHECK    Merna BLACK

## 2024-06-03 NOTE — PROGRESS NOTES
"Virtual Visit Details    Type of service:  Video Visit     Originating Location (pt. Location): Home in [place]    Distant Location (provider location):  Off-site  Platform used for Video Visit: Rafael    Joined the call at 6/3/2024, 12:01:46 pm.  Left the call at 6/3/2024, 12:31:38 pm.  You were on the call for 29 minutes 52 seconds .    GI CLINIC VISIT    HPI  Alpesh Winston is a 54 year old year old male with PMHx of HLD, hypertrig, preDM, fatty liver following with the [facility] group for throat irritation. He est'd care with me in 1/2024.     Today 6/3/24    Interval history   -EGD - white plaques to mid esophagus, otherwise normal esophagus. GEJ Hill Grade III, erythametous gastric mucosa, few small gastric polyps, normal duodenum   Biopsy   --gastric antrum biopsy - mild chronic inactive gastritis, no HP or GIM/dysplasia  --Gastric body biopsy - gastric body type mucosa with mild chronic inactive gastritis, NEG for HP or GIM/dysplasia  --Gastric polyp - FGP w/o dysplasia  --Distal esophageal biopsy - squamous w/o significant histopathologic abnl. No EoE, intestinal metaplasia or dysplasia  --Proximal esophageal biopsies - squamous epithelium without significant histopathologic abnl. No evidence of EoE. Fragments of unremarkable gastric body type mucosa.   -XR eso timed with tablet - tablet temporarily caught at level of aortic arch but cleared, otherwise unremarkable study   -no VFSS scheduled yet     He is overall doing well. His sx of throat irritation continue but it remains mild, \"more a nagging\" sensation.   Continues to moderate his coffee intake which he feels is overall helpful in managing his upper GI sx. No regurg, dysphagia, odynphagia, nausea/vomiting.   Heartburn is mild, as noted previously  Appetite/weight are stable   Stooling is per his baseline w/o issues. Once daily BSC type 4. No blood or black.    He reports he arrived early to last fri's appt but he was not checked in until 12:44 pm (appt " "time 12pm). He was then told I had left the clinic early and could not see him which was why the appt had to be scheduled to today (false). Informed him of Friday's \"late\" check in and how I had asked team to offer an overbook appt slot for today at 12pm instead.     Jan 2024 appt with me   1. Largest issue is that of persistent throat irritation, described as constant \"tightness\" that he found improved when he decreased his overall coffee intake (4x 20 oz to 2 x 20 oz currently). It started initially about 10-12 years ago, at which time sounds like he had an EGD that was unremarkable and reportedly told he could take PRN rescue med as needed. He instead decreased his coffee which overall resolved the issue.     For unclear reasons, it flared up again in past year. Shares it feels like something is stuck in his throat all the time, pointing to suprasternal notch. No improvement/worsening with swallowing. He denies trouble with initiation of a swallow - needing to cough/gag/spit up.  Denies dysphagia, odynphagia, regurgitation. No voice changes, neck masses/neck pain. No unintentional weight loss.   Does have ongoing need to clear his throat but this is an ongoing thing and does not coincide with sx flare in past year.   He does have occ heartburn described as retrosternal burning / pressure sensation especially when he is supine for the night. This occurs 2x a week if that - he typically deals with it and does not take any rescue meds. No NSAID use. Does drink ETOH, only on weekends - not able to quantify use but typically of beer, red wine, gin and tonics     He does chew tobacco - ongoing use for 35 years x 1 tin a week. In college years and early 20s he chewed much more.   No known FHX of eso cancer.   Does see dentist routinely q6mo and reports oral cavity is in good health    Got shoulder surgery May 2023 and sounds like intubated during procedure. Shares this procedure, he wonders if flaps are closing " "appropriately? No regur. No cough.     Does have ongoing L mid/lower abd discomfort for years. Constant, feels uncomfortable. No known relationship to eating/defecation to this pain. He did have L inguinal hernia repair years ago with mesh, w/ resultant tear of abd wall muscles requiring repeat intervention. He thinks adhesions/scar tissue is playing a role in this pain. I agree with him. No obstructive symptoms.   Did have recent Cscope late 2022 with BPPS 8, TI intubation and finding of 1 TA w/o high grade dysplasia.     He has regular normal BM w/o issues. Typically 1 bristol 4 stool once daily w/o blood or black stools. Good evacuation.     Weight - no weight loss   Appetite good     ROS  Denies fevers, chills, weight loss, nausea, emesis, dysphagia, odynophagia, dysphonia/hoarseness, chronic cough, neck pain/swelling/mass, heartburn, regurgitation, abdominal pain, bloating/fullness, post prandial bloating, black tarrying stools, bloody stools, rectal pain, rectal fullness, rectal itching.     Family Hx  M uncle - passed 74-75 from stage 4 CRC (never had colonoscopy)  Mother - passed 2018, dementia   Dad - passed from ?PE (kidney issues)     No other known family history or GI related malignancy (esophageal, gastric, pancreatic, liver or colon) or family history of IBD/celiac disease.     Social Hx   No  use of NSAIDs or Tylenol.   No use of OTC herbal supplements/weight loss products.      Yes ETOH, weekends, beer \"few beer\" and gin and tonics, red wine  +tobacco products.   No recreational drug use.     PROBLEM LIST  Patient Active Problem List    Diagnosis Date Noted    Acute pain of left shoulder 03/28/2023     Priority: Medium    Superior labrum anterior-to-posterior (SLAP) tear of left shoulder 03/02/2023     Priority: Medium     Added automatically from request for surgery [id number]      Fatty liver 09/20/2022     Priority: Medium    Left knee pain 10/04/2021     Priority: Medium    Prediabetes 08/21/2021 "     Priority: Medium    Elevated ferritin 08/09/2018     Priority: Medium    Gastroesophageal reflux disease without esophagitis 11/10/2015     Priority: Medium    Chews tobacco 11/19/2014     Priority: Medium    Family history of coronary artery disease 01/05/2012     Priority: Medium    Family history of diabetes mellitus 12/22/2011     Priority: Medium    Hypertriglyceridemia 12/22/2011     Priority: Medium    HYPERLIPIDEMIA LDL GOAL <130 10/31/2010     Priority: Medium    ALLERGIC RHINITIS  10/12/2004     Priority: Medium       PERTINENT PAST MEDICAL HISTORY:  Past Medical History:   Diagnosis Date    Allergic rhinitis due to other allergen     History of chest pain     TO ER - EVALULATED, STRESS TEST ETC, ALL NEGATIVE    Hyperlipidaemia     Lipoma of other skin and subcutaneous tissue 5/2012    Chest wall    Mass 5/3/2012    Other and unspecified hyperlipidemia     Tibia/fibula fracture 6/28/2016       PREVIOUS SURGERIES:  Past Surgical History:   Procedure Laterality Date    ABDOMEN SURGERY  2000    Hernia surgery    ARTHROSCOPY SHOULDER SUPERIOR LABRUM ANTERIOR TO POSTERIOR REPAIR Left 3/27/2023    Procedure: LEFT SHOULDER ARTHROSCOPY W INTRA-ARTICULAR DEBRIDEMENT, BICEPS TENODESIS , Labral  debridement, subacromial decompression,;  Surgeon: Gabriela Cunningham MD;  [place] [facility] [place]    COLONOSCOPY N/A 1/24/2022    Procedure: COLONOSCOPY, WITH POLYPECTOMY;  Surgeon: Michelle Valenzuela MD;  [place] [facility] [place]    ENT SURGERY  1989    ESOPHAGOSCOPY, GASTROSCOPY, DUODENOSCOPY (EGD), COMBINED N/A 5/23/2024    Procedure: ESOPHAGOGASTRODUODENOSCOPY, WITH BIOPSY;  Surgeon: Khadra Graves MD;  [place] [facility] [place]    EYE SURGERY      SURGERY X 2 BILAT TEAR DUCTS     HEAD & NECK SURGERY      HERNIA REPAIR  2000    LAPAROSCOPIC HERNIORRHAPHY INGUINAL BILATERAL Bilateral 10/27/2016    Procedure: LAPAROSCOPIC HERNIORRHAPHY INGUINAL BILATERAL;  Surgeon: Nellie Duron MD;  Location:  OR    SOFT TISSUE SURGERY      [facility]  removed - Multiple dates    SURGICAL HISTORY OF -       removal of lipoma X 4    SURGICAL HISTORY OF -   10/00    left inguinal hernia repair    SURGICAL HISTORY OF -       wisdom teeth extraction       ALLERGIES:     Allergies   Allergen Reactions    Cefdinir Hives    Sulfa Antibiotics Rash       PERTINENT MEDICATIONS:    Current Outpatient Medications:     atorvastatin (LIPITOR) 20 MG tablet, Take 1 tablet (20 mg) by mouth daily, Disp: 90 tablet, Rfl: 3    COENZYME Q-10 PO, Take 20 mg by mouth daily, Disp: , Rfl:     fenofibrate (TRIGLIDE/LOFIBRA) 160 MG tablet, Take 1 tablet (160 mg) by mouth daily, Disp: 90 tablet, Rfl: 3    Omega-3 Fatty Acids (FISH OIL PO), , Disp: , Rfl:     sildenafil (VIAGRA) 100 MG tablet, Take 0.5-1 tablets ( mg) by mouth daily as needed, Disp: 10 tablet, Rfl: PRN    SOCIAL HISTORY:  Social History     Socioeconomic History    Marital status:      Spouse name: Not on file    Number of children: Not on file    Years of education: Not on file    Highest education level: Not on file   Occupational History    Occupation:      Employer: [employer]   Tobacco Use    Smoking status: Never    Smokeless tobacco: Current     Types: Chew    Tobacco comments:     chewing less   Vaping Use    Vaping status: Never Used   Substance and Sexual Activity    Alcohol use: Yes     Comment: 10-12 drinks a week     Drug use: No    Sexual activity: Yes     Partners: Female     Birth control/protection: Male Surgical   Other Topics Concern    Parent/sibling w/ CABG, MI or angioplasty before 65F 55M? No   Social History Narrative    Dairy/d 1 servings/d.     Caffeine 3 20 oz bottles  servings/d    Exercise 2 x week    Sunscreen used - NA    Seatbelts used - Yes    Working smoke/CO detectors in the home - Yes    Guns stored in the home - Yes    Self Breast Exams - NA    Self Testicular Exam - No    Eye Exam up to date - No    Dental Exam up to date - No    Pap Smear up to date - NA     Mammogram up to date - NA    PSA up to date - NA    Dexa Scan up to date - NA    Flex Sig / Colonoscopy up to date - NA    Immunizations up to date - No-    Abuse: Current or Past(Physical, Sexual or Emotional)- No    Do you feel safe in your environment - Yes             Social Determinants of Health     Financial Resource Strain: Low Risk  (2024)    Financial Resource Strain     Within the past 12 months, have you or your family members you live with been unable to get utilities (heat, electricity) when it was really needed?: No   Food Insecurity: Low Risk  (2024)    Food Insecurity     Within the past 12 months, did you worry that your food would run out before you got money to buy more?: No     Within the past 12 months, did the food you bought just not last and you didn t have money to get more?: No   Transportation Needs: Low Risk  (2024)    Transportation Needs     Within the past 12 months, has lack of transportation kept you from medical appointments, getting your medicines, non-medical meetings or appointments, work, or from getting things that you need?: No   Physical Activity: Not on file   Stress: Not on file   Social Connections: Not on file   Interpersonal Safety: Low Risk  (10/9/2023)    Interpersonal Safety     Do you feel physically and emotionally safe where you currently live?: Yes     Within the past 12 months, have you been hit, slapped, kicked or otherwise physically hurt by someone?: No     Within the past 12 months, have you been humiliated or emotionally abused in other ways by your partner or ex-partner?: No   Housing Stability: Low Risk  (2024)    Housing Stability     Do you have housing? : Yes     Are you worried about losing your housing?: No       FAMILY HISTORY:  Family History   Problem Relation Age of Onset    C.A.D. Maternal Grandmother         MI X 2 ,  in her 60's     Coronary Artery Disease Maternal Grandmother     Hypertension Maternal  Grandmother     Hyperlipidemia Maternal Grandmother     Other Cancer Maternal Grandmother         cervical    Breast Cancer Maternal Grandmother     Heart Disease Father     Diabetes Father     Prostate Cancer Father     Hypertension Father     Diabetes Paternal Grandmother         type 2    Breast Cancer Sister     Alzheimer Disease Mother 74        CBD    Hypertension Mother     Alcohol/Drug Maternal Uncle     C.A.D. Maternal Uncle         MI age 56    Substance Abuse Other     Colon Cancer Other          in  stage 4 colon cancer    Cerebrovascular Disease No family hx of     Cancer - colorectal No family hx of      Past/family/social history reviewed and no changes    PHYSICAL EXAMINATION:  Vitals reviewed: There were no vitals taken for this visit.  Wt:   Wt Readings from Last 2 Encounters:   24 90.7 kg (200 lb)   24 93.7 kg (206 lb 8 oz)      Video physical exam  General: Patient appears well in no acute distress.   Skin: No visualized rash or lesions on visualized skin  Eyes: EOMI, no erythema, sclera icterus or discharge noted  Resp: Appears to be breathing comfortably without accessory muscle usage, speaking in full sentences, no cough  MSK: Appears to have normal range of motion based on visualized movements  Neurologic: No apparent tremors, facial movements symmetric  Psych: affect normal, alert and oriented    PERTINENT STUDIES:    Lab on 2023   Component Date Value Ref Range Status    Prostate Specific Antigen Screen 2023 1.26  0.00 - 3.50 ng/mL Final    Cholesterol 2023 210 (H)  <200 mg/dL Final    Triglycerides 2023 296 (H)  <150 mg/dL Final    Direct Measure HDL 2023 29 (L)  >=40 mg/dL Final    LDL Cholesterol Calculated 2023 122 (H)  <=100 mg/dL Final    Non HDL Cholesterol 2023 181 (H)  <130 mg/dL Final    Hemoglobin A1C 2023 6.1 (H)  0.0 - 5.6 % Final    Ferritin 2023 237  31 - 409 ng/mL Final    Sodium 2023 142  135 -  145 mmol/L Final    Potassium 09/28/2023 4.5  3.4 - 5.3 mmol/L Final    Carbon Dioxide (CO2) 09/28/2023 24  22 - 29 mmol/L Final    Anion Gap 09/28/2023 12  7 - 15 mmol/L Final    Urea Nitrogen 09/28/2023 21.0 (H)  6.0 - 20.0 mg/dL Final    Creatinine 09/28/2023 1.10  0.67 - 1.17 mg/dL Final    GFR Estimate 09/28/2023 81  >60 mL/min/1.73m2 Final    Calcium 09/28/2023 9.7  8.6 - 10.0 mg/dL Final    Chloride 09/28/2023 106  98 - 107 mmol/L Final    Glucose 09/28/2023 123 (H)  70 - 99 mg/dL Final    Alkaline Phosphatase 09/28/2023 61  40 - 129 U/L Final    AST 09/28/2023 28  0 - 45 U/L Final    ALT 09/28/2023 29  0 - 70 U/L Final    Protein Total 09/28/2023 7.4  6.4 - 8.3 g/dL Final    Albumin 09/28/2023 4.5  3.5 - 5.2 g/dL Final    Bilirubin Total 09/28/2023 0.3  <=1.2 mg/dL Final        Lab Results   Component Value Date    WBC 5.3 01/11/2024    WBC 5.7 09/09/2022    WBC 5.0 04/09/2020    HGB 13.2 (L) 01/11/2024    HGB 14.0 03/21/2023    HGB 15.0 09/09/2022     01/11/2024     09/09/2022     04/09/2020    CHOL 210 (H) 09/28/2023    CHOL 204 (H) 12/20/2022    CHOL 227 (H) 09/09/2022    TRIG 296 (H) 09/28/2023    TRIG 251 (H) 12/20/2022    TRIG 424 (H) 09/09/2022    HDL 29 (L) 09/28/2023    HDL 33 (L) 12/20/2022    HDL 26 (L) 09/09/2022    ALT 30 01/11/2024    ALT 29 09/28/2023    ALT 54 09/20/2022    AST 25 01/11/2024    AST 28 09/28/2023    AST 28 09/20/2022     01/11/2024     09/28/2023     08/18/2021    BUN 16.8 01/11/2024    BUN 21.0 (H) 09/28/2023    BUN 18 08/18/2021    CO2 24 01/11/2024    CO2 24 09/28/2023    CO2 23 08/18/2021    TSH 1.67 08/06/2018    TSH 1.72 01/05/2012    TSH 1.22 03/02/2009    GLUF 101 01/05/2012        Liver Function Studies -   Recent Labs   Lab Test 09/28/23  0902   PROTTOTAL 7.4   ALBUMIN 4.5   BILITOTAL 0.3   ALKPHOS 61   AST 28   ALT 29        PREVIOUS ENDOSCOPY    Results for orders placed or performed during the hospital encounter of 01/24/22    COLONOSCOPY   Result Value Ref Range    COLONOSCOPY       [facility]  [address] [place] [address] [place] [address] [phone]     Endoscopy Department  _______________________________________________________________________________  Patient Name: Alpesh Winston      Procedure Date: 1/24/2022 12:32 PM  MRN: [id number]                       Account Number: [id number]  YOB: 1970               Admit Type: Outpatient  Age: 52                               Room: Pro 5  Gender: Male                          Note Status: Finalized  Attending MD: Michelle Valenzuela MD     Pause for the Cause: performed.  Total Sedation Time: per anesthesia.    _______________________________________________________________________________     Procedure:             Colonoscopy  Indications:           Screening for colorectal malignant neoplasm  Providers:             Michelle Valenzuela MD, Nellie Mccann, Marivel Jolly, GERI  Referring MD:          Belem Joshua NP, NP  Medicines:              Monitored Anesthesia Care  Complications:         No immediate complications.  _______________________________________________________________________________  Procedure:             Pre-Anesthesia Assessment:                         - Please see EPIC H&P for pre-anesthesia assessment.                         After obtaining informed consent, the colonoscope was                          passed under direct vision. Throughout the procedure,                          the patient's blood pressure, pulse, and oxygen                          saturations were monitored continuously. The                          Colonoscope was introduced through the anus and                          advanced to the terminal ileum, with identification of                          the appendiceal orifice and IC valve. The colonoscopy                          was performed without difficulty. The  patient                          tolerated the procedure well. The quality of the bowel                           preparation was evaluated using the BBPS (Bieber Bowel                          Preparation Scale) with scores of: Right Colon = 2                          (minor amount of residual staining, small fragments of                          stool and/or opaque liquid, but mucosa seen well),                          Transverse Colon = 3 (entire mucosa seen well with no                          residual staining, small fragments of stool or opaque                          liquid) and Left Colon = 3 (entire mucosa seen well                          with no residual staining, small fragments of stool or                          opaque liquid). The total BBPS score equals 8.                                                                                   Findings:       The terminal ileum appeared normal.       Two sessile polyps were found in the proximal ascending colon. The        polyps were 4 to 6 mm in size. These polyps were removed with a cold        snare. Resection and retrieval were  complete.       Non-bleeding internal hemorrhoids were found during retroflexion. The        hemorrhoids were mild.                                                                                   Impression:            - The examined portion of the ileum was normal.                         - Two 4 to 6 mm polyps in the proximal ascending                          colon, removed with a cold snare. Resected and                          retrieved.                         - Non-bleeding internal hemorrhoids.  Recommendation:        - Discharge patient to home (ambulatory).                         - Resume previous diet.                         - Continue present medications.                         - Await pathology results.                         - The findings and recommendations were discussed with                           the patient.                         - Repeat colonoscopy in 7-10 years for surveillance                          based on pathology results.                                                                                      Electronically signed by: Michelle Valenzuela MD  __________________  Michelle Valenzuela MD  1/24/2022 1:54:37 PM  I was physically present for the entire viewing portion of the exam.  __________________________  Signature of teaching physician  Michelle Valenzuela MD  Number of Addenda: 0    Note Initiated On: 1/24/2022 12:32 PM  Scope In:  Scope Out:       Final Diagnosis  POLYPS X2, ASCENDING COLON, POLYPECTOMY:  - Tubular adenoma; negative for high-grade dysplasia  - Inflammatory polyp    ASSESSMENT/PLAN:  Alpesh Winston is a 54 year old year old male with PMHx of HLD, hypertrig, preDM, fatty liver following w/ [facility] group for throat irritation. He reports constant suprasternal sensation for past year that relented some after he decreased his daily coffee intake. It is associated with occ heartburn sensation and throat clearing sensation. He does chew tobacco chronically (35+y). His goal is comprehensive evaluation of this throat irritation.     #throat irritation noted to suprasternal notch x 1 year   Sx consistent with globus with ddx GERD/reflux, functional disease (reflux hypersensitivity, functional heartburn).     -We reviewed EGD findings - findings of which are suggestive but not diagnostic of GERD per Kasper 2.0 consensus. He does have Hill Grade III GEJ for which I recommended diaphragmatic breathing exercises. If he were to have regu (none reported today), can add on scheduled alginate product.   -curious about gastric mucosa noted to proximal esophagus - consider ectopic gastric mucosa to esophagus, which could in theory secrete gastric acid locally leading to irritation. For this, I recommended start of daily H2B. We can consider scheduled PPI trial depending on clinical response  to H2B trial. I reached out to pathologist on this finding - ?ectopic gastric mucosa   -advised he schedule VFSS   -ENT appt not scheduled yet - we can considr in future depending on above clinical response     Future consideration  1. Further objective work-up for GERD/NERD could include HRM +/- 24h impedence     #hepatic steatosis   In setting with normal AST/ALT, likely driven by MAFLD and ETOH use   -recommended decrease/limit ETOH use  -recommend lipid control (on atorv and fenofibrate)  -recommend slow gradual weight loss and regular exercise as tolerated     FIB-4 Calculation: 0.82 at 1/11/2024  1:20 PM  Calculated from:  SGOT/AST: 25 U/L at 1/11/2024  1:20 PM  SGPT/ALT: 30 U/L at 1/11/2024  1:20 PM  Platelets: 290 10e3/uL at 1/11/2024  1:20 PM  Age: 54 years    #L mid/lower abdominal pain, chronic  Mild and ongoing for years, likely related to adhesions 2/2 inguinal hernia repair x 2 w/ mesh and perhaps component of abd wall pain  -we discussed possible PT previously, he declined today     #TA of colon w/o high grade dysplasia   Cscope 1/2022 with 7-10y recall; due 1/2029 - 1/2032    Future consideration  1.Sooner scope for alarm sx      No orders of the defined types were placed in this encounter.    RTC - 6-8 weeks or sooner PRN     Thank you for this consultation.  It was a pleasure to participate in the care of this patient; please contact me with any further questions.     Afsaneh Suarez PA-C    Follow up: As planned above. Today, I personally spent 29 minutes in direct face to face time with the patient, of which greater than 50% of the time was spent in patient education and counseling as described above. Approximately minutes were spent on indirect care associated with the patient's consultation including but not limited to review of: patient medical records to date, clinic visits, hospital records, lab results, imaging studies, procedural documentation, and coordinating care with other providers. The  findings from this review are summarized in the above note. All of the above accounted for a cumulative time of 29 minutes and was performed on the date of service.

## 2024-06-15 ENCOUNTER — TELEPHONE (OUTPATIENT)
Dept: GASTROENTEROLOGY | Facility: CLINIC | Age: 54
End: 2024-06-15
Payer: COMMERCIAL

## 2024-06-15 NOTE — TELEPHONE ENCOUNTER
"left voice mail with patient (1st attempt) to schedule from active request order created by Afsaneh Suarez.    6-8 wk. \"Please work him into my secheudle OK for  ALDO slot. If no available slot, overbook on Monday at 12p\"   If overbooking make it is an in person for an in person day or a virtual for a virtual day    Return gi  Around 7/15/2024  In person or virtual   W/ Afsaneh    Please send TE if you cannot find anything       Gave [facility] number to schedule per request - [phone] option 1.    See request for suggested appt date, visit type, and need for any additional testing/imaging/labs required.    "

## 2024-09-09 ENCOUNTER — PATIENT OUTREACH (OUTPATIENT)
Dept: CARE COORDINATION | Facility: CLINIC | Age: 54
End: 2024-09-09
Payer: COMMERCIAL

## 2024-10-15 ENCOUNTER — DOCUMENTATION ONLY (OUTPATIENT)
Dept: LAB | Facility: CLINIC | Age: 54
End: 2024-10-15
Payer: COMMERCIAL

## 2024-10-15 DIAGNOSIS — Z12.5 SCREENING PSA (PROSTATE SPECIFIC ANTIGEN): ICD-10-CM

## 2024-10-15 DIAGNOSIS — R73.03 PREDIABETES: ICD-10-CM

## 2024-10-15 DIAGNOSIS — E78.1 HYPERTRIGLYCERIDEMIA: Primary | ICD-10-CM

## 2024-10-15 DIAGNOSIS — K76.0 FATTY LIVER: ICD-10-CM

## 2024-10-15 NOTE — PROGRESS NOTES
Alpesh Winston has an upcoming lab appointment:    Future Appointments   Date Time Provider Department [place]   10/17/2024  7:45 AM [facility]   10/22/2024  2:30 PM Belem Joshua, NP [facility]        There is no order available. Please review and place either future orders or HMPO (Review of Health Maintenance Protocol Orders), as appropriate.    Health Maintenance Due   Topic    LIPID     ANNUAL REVIEW OF HM ORDERS      IZABELLA Angel

## 2024-10-17 ENCOUNTER — LAB (OUTPATIENT)
Dept: LAB | Facility: CLINIC | Age: 54
End: 2024-10-17
Payer: COMMERCIAL

## 2024-10-17 DIAGNOSIS — Z12.5 SCREENING PSA (PROSTATE SPECIFIC ANTIGEN): ICD-10-CM

## 2024-10-17 DIAGNOSIS — E78.1 HYPERTRIGLYCERIDEMIA: ICD-10-CM

## 2024-10-17 DIAGNOSIS — R73.03 PREDIABETES: ICD-10-CM

## 2024-10-17 DIAGNOSIS — K76.0 FATTY LIVER: ICD-10-CM

## 2024-10-17 LAB
ALBUMIN SERPL BCG-MCNC: 4.5 G/DL (ref 3.5–5.2)
ALP SERPL-CCNC: 69 U/L (ref 40–150)
ALT SERPL W P-5'-P-CCNC: 36 U/L (ref 0–70)
ANION GAP SERPL CALCULATED.3IONS-SCNC: 11 MMOL/L (ref 7–15)
AST SERPL W P-5'-P-CCNC: 27 U/L (ref 0–45)
BILIRUB SERPL-MCNC: 0.4 MG/DL
BUN SERPL-MCNC: 18.2 MG/DL (ref 6–20)
CALCIUM SERPL-MCNC: 9.8 MG/DL (ref 8.8–10.4)
CHLORIDE SERPL-SCNC: 109 MMOL/L (ref 98–107)
CHOLEST SERPL-MCNC: 214 MG/DL
CREAT SERPL-MCNC: 0.97 MG/DL (ref 0.67–1.17)
EGFRCR SERPLBLD CKD-EPI 2021: >90 ML/MIN/1.73M2
EST. AVERAGE GLUCOSE BLD GHB EST-MCNC: 128 MG/DL
FASTING STATUS PATIENT QL REPORTED: YES
FASTING STATUS PATIENT QL REPORTED: YES
GLUCOSE SERPL-MCNC: 127 MG/DL (ref 70–99)
HBA1C MFR BLD: 6.1 % (ref 0–5.6)
HCO3 SERPL-SCNC: 24 MMOL/L (ref 22–29)
HDLC SERPL-MCNC: 31 MG/DL
LDLC SERPL CALC-MCNC: 135 MG/DL
NONHDLC SERPL-MCNC: 183 MG/DL
POTASSIUM SERPL-SCNC: 4.1 MMOL/L (ref 3.4–5.3)
PROT SERPL-MCNC: 7.4 G/DL (ref 6.4–8.3)
PSA SERPL DL<=0.01 NG/ML-MCNC: 1.33 NG/ML (ref 0–3.5)
SODIUM SERPL-SCNC: 144 MMOL/L (ref 135–145)
TRIGL SERPL-MCNC: 238 MG/DL

## 2024-10-17 PROCEDURE — 36415 COLL VENOUS BLD VENIPUNCTURE: CPT

## 2024-10-17 PROCEDURE — G0103 PSA SCREENING: HCPCS

## 2024-10-17 PROCEDURE — 80053 COMPREHEN METABOLIC PANEL: CPT

## 2024-10-17 PROCEDURE — 83036 HEMOGLOBIN GLYCOSYLATED A1C: CPT

## 2024-10-17 PROCEDURE — 80061 LIPID PANEL: CPT

## 2024-10-22 ENCOUNTER — OFFICE VISIT (OUTPATIENT)
Dept: FAMILY MEDICINE | Facility: CLINIC | Age: 54
End: 2024-10-22
Payer: COMMERCIAL

## 2024-10-22 VITALS
BODY MASS INDEX: 30.47 KG/M2 | WEIGHT: 205.7 LBS | TEMPERATURE: 97.6 F | OXYGEN SATURATION: 97 % | SYSTOLIC BLOOD PRESSURE: 138 MMHG | DIASTOLIC BLOOD PRESSURE: 74 MMHG | HEIGHT: 69 IN | HEART RATE: 69 BPM | RESPIRATION RATE: 20 BRPM

## 2024-10-22 DIAGNOSIS — R73.03 PREDIABETES: ICD-10-CM

## 2024-10-22 DIAGNOSIS — E78.5 HYPERLIPIDEMIA LDL GOAL <130: ICD-10-CM

## 2024-10-22 DIAGNOSIS — Z00.00 ROUTINE GENERAL MEDICAL EXAMINATION AT A HEALTH CARE FACILITY: Primary | ICD-10-CM

## 2024-10-22 PROCEDURE — 99213 OFFICE O/P EST LOW 20 MIN: CPT | Mod: 25 | Performed by: NURSE PRACTITIONER

## 2024-10-22 PROCEDURE — 99396 PREV VISIT EST AGE 40-64: CPT | Mod: 25 | Performed by: NURSE PRACTITIONER

## 2024-10-22 RX ORDER — FENOFIBRATE 160 MG/1
160 TABLET ORAL DAILY
Qty: 90 TABLET | Refills: 4 | Status: SHIPPED | OUTPATIENT
Start: 2024-10-22

## 2024-10-22 RX ORDER — ATORVASTATIN CALCIUM 20 MG/1
20 TABLET, FILM COATED ORAL DAILY
Qty: 90 TABLET | Refills: 4 | Status: SHIPPED | OUTPATIENT
Start: 2024-10-22

## 2024-10-22 SDOH — HEALTH STABILITY: PHYSICAL HEALTH: ON AVERAGE, HOW MANY DAYS PER WEEK DO YOU ENGAGE IN MODERATE TO STRENUOUS EXERCISE (LIKE A BRISK WALK)?: 3 DAYS

## 2024-10-22 SDOH — HEALTH STABILITY: PHYSICAL HEALTH: ON AVERAGE, HOW MANY MINUTES DO YOU ENGAGE IN EXERCISE AT THIS LEVEL?: 30 MIN

## 2024-10-22 ASSESSMENT — PAIN SCALES - GENERAL: PAINLEVEL: NO PAIN (0)

## 2024-10-22 ASSESSMENT — SOCIAL DETERMINANTS OF HEALTH (SDOH): HOW OFTEN DO YOU GET TOGETHER WITH FRIENDS OR RELATIVES?: TWICE A WEEK

## 2024-10-22 NOTE — PROGRESS NOTES
"Preventive Care Visit  [facility]  Belem Joshua, NP, Nurse Practitioner - Family  Oct 22, 2024      Assessment & Plan     (Z00.00) Routine general medical examination at a health care facility  (primary encounter diagnosis)  Comment:   Plan:     (E78.5) Hyperlipidemia LDL goal <130  Comment: stable  Plan: atorvastatin (LIPITOR) 20 MG tablet,         fenofibrate (TRIGLIDE/LOFIBRA) 160 MG tablet,         Lipid panel reflex to direct LDL Fasting        The current medical regimen is effective;  continue present plan and medications.      (R73.03) Prediabetes  Comment: increased  Plan: Hemoglobin A1c        Discussed working on weight loss, diet and exercise.  Recheck labs in 6 months.             BMI  Estimated body mass index is 30.38 kg/m  as calculated from the following:    Height as of this encounter: 1.753 m (5' 9\").    Weight as of this encounter: 93.3 kg (205 lb 11.2 oz).       Counseling  Appropriate preventive services were addressed with this patient via screening, questionnaire, or discussion as appropriate for fall prevention, nutrition, physical activity, Tobacco-use cessation, social engagement, weight loss and cognition.  Checklist reviewing preventive services available has been given to the patient.  Reviewed patient's diet, addressing concerns and/or questions.   He is at risk for lack of exercise and has been provided with information to increase physical activity for the benefit of his well-being.   He is at risk for psychosocial distress and has been provided with information to reduce risk.           Susan Betts is a 54 year old, presenting for the following:  Physical        10/22/2024     2:06 PM   Additional Questions   Roomed by Daniela WESTBROOK   Accompanied by self         10/22/2024     2:06 PM   Patient Reported Additional Medications   Patient reports taking the following new medications None        Health Care Directive  Patient does not have a Health " Care Directive or Living Will: Discussed advance care planning with patient; however, patient declined at this time.    HPI  He is doing well on his current medications.              10/22/2024   General Health   How would you rate your overall physical health? Good   Feel stress (tense, anxious, or unable to sleep) Only a little      (!) STRESS CONCERN      10/22/2024   Nutrition   Three or more servings of calcium each day? (!) I DON'T KNOW   Diet: Regular (no restrictions)   How many servings of fruit and vegetables per day? (!) 2-3   How many sweetened beverages each day? 0-1            10/22/2024   Exercise   Days per week of moderate/strenous exercise 3 days   Average minutes spent exercising at this level 30 min            10/22/2024   Social Factors   Frequency of gathering with friends or relatives Twice a week   Worry food won't last until get money to buy more No   Food not last or not have enough money for food? No   Do you have housing? (Housing is defined as stable permanent housing and does not include staying ouside in a car, in a tent, in an abandoned building, in an overnight shelter, or couch-surfing.) Yes   Are you worried about losing your housing? No   Lack of transportation? No   Unable to get utilities (heat,electricity)? No            10/22/2024   Fall Risk   Fallen 2 or more times in the past year? No   Trouble with walking or balance? No             10/22/2024   Dental   Dentist two times every year? Yes            10/22/2024   TB Screening   Were you born outside of the [place]? No                  10/22/2024   Substance Use   Alcohol more than 3/day or more than 7/wk No   Do you use any other substances recreationally? No        Social History     Tobacco Use    Smoking status: Never    Smokeless tobacco: Current     Types: Chew    Tobacco comments:     chewing less   Vaping Use    Vaping status: Never Used   Substance Use Topics    Alcohol use: Yes     Comment: 10-12 drinks a week     Drug  "use: No           10/22/2024   STI Screening   New sexual partner(s) since last STI/HIV test? No      ASCVD Risk   The 10-year ASCVD risk score (Neli BERGMAN, et al., 2019) is: 8.3%    Values used to calculate the score:      Age: 54 years      Sex: Male      Is Non- : No      Diabetic: No      Tobacco smoker: No      Systolic Blood Pressure: 138 mmHg      Is BP treated: No      HDL Cholesterol: 31 mg/dL      Total Cholesterol: 214 mg/dL           Reviewed and updated as needed this visit by Provider   Tobacco  Allergies  Meds  Problems  Med Hx  Surg Hx  Fam Hx                     Objective    Exam  /74 (BP Location: Right arm, Patient Position: Sitting, Cuff Size: Adult Regular)   Pulse 69   Temp 97.6  F (36.4  C) (Temporal)   Resp 20   Ht 1.753 m (5' 9\")   Wt 93.3 kg (205 lb 11.2 oz)   SpO2 97%   BMI 30.38 kg/m     Estimated body mass index is 30.38 kg/m  as calculated from the following:    Height as of this encounter: 1.753 m (5' 9\").    Weight as of this encounter: 93.3 kg (205 lb 11.2 oz).    Physical Exam  GENERAL: alert and no distress  EYES: Eyes grossly normal to inspection, PERRL and conjunctivae and sclerae normal  HENT: ear canals and TM's normal, nose and mouth without ulcers or lesions  NECK: no adenopathy, no asymmetry, masses, or scars  RESP: lungs clear to auscultation - no rales, rhonchi or wheezes  CV: regular rate and rhythm, normal S1 S2, no S3 or S4, no murmur, click or rub, no peripheral edema  ABDOMEN: soft, nontender, no hepatosplenomegaly, no masses and bowel sounds normal  MS: no gross musculoskeletal defects noted, no edema  SKIN: no suspicious lesions or rashes  NEURO: Normal strength and tone, mentation intact and speech normal  PSYCH: mentation appears normal, affect normal/bright        Signed Electronically by: Belem Joshua NP    "

## 2024-10-22 NOTE — PATIENT INSTRUCTIONS
Patient Education   Preventive Care Advice   This is general advice given by our system to help you stay healthy. However, your care team may have specific advice just for you. Please talk to your care team about your preventive care needs.  Nutrition  Eat 5 or more servings of fruits and vegetables each day.  Try wheat bread, brown rice and whole grain pasta (instead of white bread, rice, and pasta).  Get enough calcium and vitamin D. Check the label on foods and aim for 100% of the RDA (recommended daily allowance).  Lifestyle  Exercise at least 150 minutes each week  (30 minutes a day, 5 days a week).  Do muscle strengthening activities 2 days a week. These help control your weight and prevent disease.  No smoking.  Wear sunscreen to prevent skin cancer.  Have a dental exam and cleaning every 6 months.  Yearly exams  See your health care team every year to talk about:  Any changes in your health.  Any medicines your care team has prescribed.  Preventive care, family planning, and ways to prevent chronic diseases.  Shots (vaccines)   HPV shots (up to age 26), if you've never had them before.  Hepatitis B shots (up to age 59), if you've never had them before.  COVID-19 shot: Get this shot when it's due.  Flu shot: Get a flu shot every year.  Tetanus shot: Get a tetanus shot every 10 years.  Pneumococcal, hepatitis A, and RSV shots: Ask your care team if you need these based on your risk.  Shingles shot (for age 50 and up)  General health tests  Diabetes screening:  Starting at age 35, Get screened for diabetes at least every 3 years.  If you are younger than age 35, ask your care team if you should be screened for diabetes.  Cholesterol test: At age 39, start having a cholesterol test every 5 years, or more often if advised.  Bone density scan (DEXA): At age 50, ask your care team if you should have this scan for osteoporosis (brittle bones).  Hepatitis C: Get tested at least once in your life.  STIs (sexually  transmitted infections)  Before age 24: Ask your care team if you should be screened for STIs.  After age 24: Get screened for STIs if you're at risk. You are at risk for STIs (including HIV) if:  You are sexually active with more than one person.  You don't use condoms every time.  You or a partner was diagnosed with a sexually transmitted infection.  If you are at risk for HIV, ask about PrEP medicine to prevent HIV.  Get tested for HIV at least once in your life, whether you are at risk for HIV or not.  Cancer screening tests  Cervical cancer screening: If you have a cervix, begin getting regular cervical cancer screening tests starting at age 21.  Breast cancer scan (mammogram): If you've ever had breasts, begin having regular mammograms starting at age 40. This is a scan to check for breast cancer.  Colon cancer screening: It is important to start screening for colon cancer at age 45.  Have a colonoscopy test every 10 years (or more often if you're at risk) Or, ask your provider about stool tests like a FIT test every year or Cologuard test every 3 years.  To learn more about your testing options, visit:   .  For help making a decision, visit:   https://bit.ly/ci80177.  Prostate cancer screening test: If you have a prostate, ask your care team if a prostate cancer screening test (PSA) at age 55 is right for you.  Lung cancer screening: If you are a current or former smoker ages 50 to 80, ask your care team if ongoing lung cancer screenings are right for you.  For informational purposes only. Not to replace the advice of your health care provider. Copyright   2023 [employer]. All rights reserved. Clinically reviewed by the [facility] Transitions Program. [employer] [id number] - REV 01/24.

## 2025-03-24 NOTE — TELEPHONE ENCOUNTER
Belem Adames NP   Pended future order for lab only 6-12 months, glucose  Would you like to add hgb A1C ?     My chart message sent to patient - advised on voicemail to call triage with further questions/concerns     Lizbeth Hogan Registered Nurse   [facility]        Detail Level: Detailed Initiate Treatment: Suggested options of: Cerave/amlactin/eucerin/cetaphil cream daily after showers. Gentle cleansers discussed like cetaphil vs vanicream. Basic skin care reviewed. Suggested increasing water intake as well. Render In Strict Bullet Format?: No

## 2025-05-14 ENCOUNTER — LAB (OUTPATIENT)
Dept: LAB | Facility: CLINIC | Age: 55
End: 2025-05-14
Payer: COMMERCIAL

## 2025-05-14 ENCOUNTER — RESULTS FOLLOW-UP (OUTPATIENT)
Dept: FAMILY MEDICINE | Facility: CLINIC | Age: 55
End: 2025-05-14

## 2025-05-14 DIAGNOSIS — R73.03 PREDIABETES: ICD-10-CM

## 2025-05-14 DIAGNOSIS — E78.5 HYPERLIPIDEMIA LDL GOAL <130: ICD-10-CM

## 2025-05-14 LAB
EST. AVERAGE GLUCOSE BLD GHB EST-MCNC: 140 MG/DL
HBA1C MFR BLD: 6.5 % (ref 0–5.6)

## 2025-05-14 PROCEDURE — 36415 COLL VENOUS BLD VENIPUNCTURE: CPT

## 2025-05-14 PROCEDURE — 83036 HEMOGLOBIN GLYCOSYLATED A1C: CPT

## 2025-05-14 PROCEDURE — 80061 LIPID PANEL: CPT

## 2025-05-15 LAB
CHOLEST SERPL-MCNC: 215 MG/DL
FASTING STATUS PATIENT QL REPORTED: YES
HDLC SERPL-MCNC: 33 MG/DL
LDLC SERPL CALC-MCNC: 139 MG/DL
NONHDLC SERPL-MCNC: 182 MG/DL
TRIGL SERPL-MCNC: 214 MG/DL

## 2025-05-21 PROBLEM — E11.9 NEW ONSET TYPE 2 DIABETES MELLITUS (H): Status: ACTIVE | Noted: 2025-05-21

## 2025-05-22 ENCOUNTER — MYC MEDICAL ADVICE (OUTPATIENT)
Dept: FAMILY MEDICINE | Facility: CLINIC | Age: 55
End: 2025-05-22
Payer: COMMERCIAL

## 2025-05-22 ENCOUNTER — PATIENT OUTREACH (OUTPATIENT)
Dept: CARE COORDINATION | Facility: CLINIC | Age: 55
End: 2025-05-22
Payer: COMMERCIAL

## 2025-05-22 DIAGNOSIS — E11.9 NEW ONSET TYPE 2 DIABETES MELLITUS (H): ICD-10-CM

## 2025-05-26 ENCOUNTER — PATIENT OUTREACH (OUTPATIENT)
Dept: CARE COORDINATION | Facility: CLINIC | Age: 55
End: 2025-05-26
Payer: COMMERCIAL

## 2025-05-27 ENCOUNTER — TELEPHONE (OUTPATIENT)
Dept: FAMILY MEDICINE | Facility: CLINIC | Age: 55
End: 2025-05-27
Payer: COMMERCIAL

## 2025-05-27 NOTE — TELEPHONE ENCOUNTER
Prior Authorization Retail Medication Request    Medication/Dose: Semaglutide, 1 MG/DOSE, (OZEMPIC) 4 MG/3ML pen   Diagnosis and ICD code (if different than what is on RX):    New onset type 2 diabetes mellitus (H) [E11.9]          Insurance   Primary: [facility] OUT OF STATE   Insurance ID:  [id number]     Pharmacy Information (if different than what is on RX)  Name:  [facility]  Phone:  [phone]   Fax: [phone]

## 2025-05-29 NOTE — TELEPHONE ENCOUNTER
Note: Due to record-high volumes, our turn-around time is taking longer than usual . We are currently 3  business days behind in the pools.   We are working diligently to submit all requests in a timely manner and in the order they are received. Please only flag TRUE URGENT requests as high priority to the pool at this time.   If you have questions on status of PA's,  please send a note/message in the active PA encounter and send back to the [facility] PA pool [[id number]].    If you have questions about the turn-around time or about our process, please reach out to our supervisor Farheen Rosales.   Thank you!   RPPA (Retail Pharmacy Prior Authorization) team    Retail Pharmacy Prior Authorization Team   Phone: [phone]    CMM KEY: (Key: [id number])    PA Initiation    Medication: OZEMPIC (1 MG/DOSE) 4 MG/3ML SC SOPN  Insurance Company: [employer] Non-Specialty PA's - Phone [phone] Fax [phone]  Pharmacy Filling the Rx: [id number] - [place], [place] - [address] AT [facility] [address]  Filling Pharmacy Phone: [phone]  Filling Pharmacy Fax: [phone]  Start Date: 5/29/2025

## 2025-08-17 ENCOUNTER — HEALTH MAINTENANCE LETTER (OUTPATIENT)
Age: 55
End: 2025-08-17

## (undated) DEVICE — GLOVE EXAM NITRILE LG PF LATEX FREE 5064

## (undated) DEVICE — DEVICE PASSER LASSO 25DEG CVD RT AR-6068-25TR

## (undated) DEVICE — CANNULA ARTHREX PASSPORT BUTTON 8MMX4CM AR-6592-08-40

## (undated) DEVICE — POSITIONER ARTHREX ARM SLEEVE LATERAL AR-1651

## (undated) DEVICE — TUBING SUCTION 12"X1/4" N612

## (undated) DEVICE — GOWN IMPERVIOUS 2XL BLUE

## (undated) DEVICE — TUBING SYSTEM ARTHREX PATIENT REDEUCE AR-6421

## (undated) DEVICE — SOL WATER IRRIG 500ML BOTTLE 2F7113

## (undated) DEVICE — KIT ENDO TURNOVER/PROCEDURE CARRY-ON 101822

## (undated) DEVICE — SUCTION MANIFOLD NEPTUNE 2 SYS 4 PORT 0702-020-000

## (undated) DEVICE — ENDO BITE BLOCK ADULT OMNI-BLOC

## (undated) DEVICE — TUBING SUCTION MEDI-VAC 1/4"X20' N620A

## (undated) DEVICE — IMM SHOULDER  ABDUCT ULTRASLING III LG 11-0449-4

## (undated) DEVICE — DRSG STERI STRIP 1/2X4" R1547

## (undated) DEVICE — ENDO TRAP POLYP E-TRAP 00711099

## (undated) DEVICE — PACK SHOULDER CUSTOM ASC SOP15ASUMA

## (undated) DEVICE — DRAPE SHOULDER PACK SPLITS 29365

## (undated) DEVICE — SU MONOCRYL 3-0 PS-1 27" Y936H

## (undated) DEVICE — NDL SPINAL 18GA 3.5" 405184

## (undated) DEVICE — GLOVE BIOGEL PI MICRO SZ 6.5 48565

## (undated) DEVICE — SUCTION MANIFOLD NEPTUNE 2 SYS 1 PORT 702-025-000

## (undated) DEVICE — SPECIMEN CONTAINER 3OZ W/FORMALIN 59901

## (undated) DEVICE — STRAP STIRRUP W/SLIP 30187-030

## (undated) DEVICE — PREP CHLORAPREP 26ML TINTED ORANGE  260815

## (undated) DEVICE — DRAPE STERI U 1015

## (undated) DEVICE — LINEN ORTHO PACK 5446

## (undated) DEVICE — Device

## (undated) DEVICE — TUBING SET ARTHREX DUALWAVE OUTFLOW AR-6430

## (undated) DEVICE — SYR 10ML FINGER CONTROL W/O NDL 309695

## (undated) DEVICE — DRAPE MAYO STAND 23X54 8337

## (undated) DEVICE — SOL HYDROGEN PEROXIDE 3% 4OZ BOTTLE F0010

## (undated) DEVICE — SYR 50ML SLIP TIP W/O NDL 309654

## (undated) DEVICE — ENDO SNARE EXACTO COLD 9MM LOOP 2.4MMX230CM 00711115

## (undated) DEVICE — DRAPE U-POUCH 34X29" 1067

## (undated) DEVICE — BRUSH SURGICAL SCRUB W/4% CHG SOL 25ML 371073

## (undated) DEVICE — GLOVE BIOGEL PI MICRO INDICATOR UNDERGLOVE SZ 6.5 48965

## (undated) DEVICE — BUR ARTHREX COOLCUT TORPEDO 4.0MMX13CM AR-8400TD

## (undated) DEVICE — ENDO FORCEP BX CAPTURA PRO SPIKE G50696

## (undated) RX ORDER — FENTANYL CITRATE 50 UG/ML
INJECTION, SOLUTION INTRAMUSCULAR; INTRAVENOUS
Status: DISPENSED
Start: 2023-03-27

## (undated) RX ORDER — ACETAMINOPHEN 325 MG/1
TABLET ORAL
Status: DISPENSED
Start: 2023-03-27

## (undated) RX ORDER — GABAPENTIN 300 MG/1
CAPSULE ORAL
Status: DISPENSED
Start: 2023-03-27

## (undated) RX ORDER — HYDROMORPHONE HYDROCHLORIDE 1 MG/ML
INJECTION, SOLUTION INTRAMUSCULAR; INTRAVENOUS; SUBCUTANEOUS
Status: DISPENSED
Start: 2023-03-27

## (undated) RX ORDER — GLYCOPYRROLATE 0.2 MG/ML
INJECTION INTRAMUSCULAR; INTRAVENOUS
Status: DISPENSED
Start: 2023-03-27

## (undated) RX ORDER — DEXAMETHASONE SODIUM PHOSPHATE 4 MG/ML
INJECTION, SOLUTION INTRA-ARTICULAR; INTRALESIONAL; INTRAMUSCULAR; INTRAVENOUS; SOFT TISSUE
Status: DISPENSED
Start: 2023-03-27

## (undated) RX ORDER — PROPOFOL 10 MG/ML
INJECTION, EMULSION INTRAVENOUS
Status: DISPENSED
Start: 2023-03-27

## (undated) RX ORDER — CEFAZOLIN SODIUM 2 G/50ML
SOLUTION INTRAVENOUS
Status: DISPENSED
Start: 2023-03-27

## (undated) RX ORDER — ONDANSETRON 2 MG/ML
INJECTION INTRAMUSCULAR; INTRAVENOUS
Status: DISPENSED
Start: 2023-03-27

## (undated) RX ORDER — CIPROFLOXACIN 500 MG/1
TABLET, FILM COATED ORAL
Status: DISPENSED
Start: 2018-03-22